# Patient Record
Sex: MALE | Race: WHITE | Employment: OTHER | URBAN - METROPOLITAN AREA
[De-identification: names, ages, dates, MRNs, and addresses within clinical notes are randomized per-mention and may not be internally consistent; named-entity substitution may affect disease eponyms.]

---

## 2017-09-26 PROBLEM — E66.9 OBESITY: Status: ACTIVE | Noted: 2017-09-26

## 2017-09-26 PROBLEM — I82.409 DEEP VENOUS THROMBOSIS (HCC): Status: ACTIVE | Noted: 2017-09-26

## 2017-09-26 PROBLEM — J84.112 IDIOPATHIC PULMONARY FIBROSIS (HCC): Status: ACTIVE | Noted: 2017-09-26

## 2017-09-26 PROBLEM — M10.9 GOUT: Status: ACTIVE | Noted: 2017-09-26

## 2017-11-08 PROBLEM — G47.33 OBSTRUCTIVE SLEEP APNEA: Status: ACTIVE | Noted: 2017-11-08

## 2017-11-08 PROBLEM — Z87.891 HISTORY OF TOBACCO USE: Status: ACTIVE | Noted: 2017-11-08

## 2017-11-22 LAB
LEFT VENTRICULAR EJECTION FRACTION MODE: NORMAL
LV EF: 49 %

## 2017-12-05 PROBLEM — I51.9 RIGHT VENTRICULAR DYSFUNCTION: Status: ACTIVE | Noted: 2017-12-05

## 2018-05-21 ENCOUNTER — HOSPITAL ENCOUNTER (OUTPATIENT)
Age: 63
Discharge: HOME | End: 2018-05-23
Payer: MEDICARE

## 2018-05-21 PROCEDURE — 36415 COLL VENOUS BLD VENIPUNCTURE: CPT

## 2018-05-21 PROCEDURE — 80076 HEPATIC FUNCTION PANEL: CPT

## 2018-05-22 LAB
ALBUMIN SERPL-MCNC: 3.6 G/DL (ref 3.5–5.2)
ALP BLD-CCNC: 134 U/L (ref 40–129)
ALT SERPL-CCNC: 22 U/L (ref 0–40)
AST SERPL-CCNC: 30 U/L (ref 0–39)
BILIRUB SERPL-MCNC: 1.2 MG/DL (ref 0–1.2)
BILIRUBIN DIRECT: 0.2 MG/DL (ref 0–0.3)
BILIRUBIN, INDIRECT: 1 MG/DL (ref 0–1)
TOTAL PROTEIN: 7.3 G/DL (ref 6.4–8.3)

## 2018-07-17 ENCOUNTER — OFFICE VISIT (OUTPATIENT)
Dept: CARDIOLOGY CLINIC | Age: 63
End: 2018-07-17
Payer: MEDICARE

## 2018-07-17 VITALS
HEIGHT: 70 IN | WEIGHT: 227.8 LBS | RESPIRATION RATE: 16 BRPM | HEART RATE: 92 BPM | SYSTOLIC BLOOD PRESSURE: 110 MMHG | DIASTOLIC BLOOD PRESSURE: 70 MMHG | OXYGEN SATURATION: 92 % | BODY MASS INDEX: 32.61 KG/M2

## 2018-07-17 DIAGNOSIS — J84.10 PULMONARY FIBROSIS (HCC): ICD-10-CM

## 2018-07-17 DIAGNOSIS — I51.9 RIGHT VENTRICULAR DYSFUNCTION: Primary | ICD-10-CM

## 2018-07-17 PROCEDURE — 93000 ELECTROCARDIOGRAM COMPLETE: CPT | Performed by: INTERNAL MEDICINE

## 2018-07-17 PROCEDURE — 99215 OFFICE O/P EST HI 40 MIN: CPT | Performed by: INTERNAL MEDICINE

## 2018-07-17 PROCEDURE — 94618 PULMONARY STRESS TESTING: CPT | Performed by: INTERNAL MEDICINE

## 2018-07-17 RX ORDER — DULOXETIN HYDROCHLORIDE 30 MG/1
60 CAPSULE, DELAYED RELEASE ORAL DAILY
COMMUNITY

## 2018-07-17 NOTE — PATIENT INSTRUCTIONS
1. Right heart catheterization July 26, 2018    2. Take all your medications on the day of the right heart catheterization     3. 6 minute walk testing today    4. Labs during the right heart catheterization    5.  Return visit in 6 weeks

## 2018-07-17 NOTE — PROGRESS NOTES
as possible cause of dyspnea. Just recently diagnosed with VIDAL - gets CPAP tomorrow. He denies orthopnea, PND, abdominal distention or bloating, early satiety, anorexia/change in appetite, unintentional weight loss. He does not have lower extremity edema. He denies exertional lightheadedness. He denies palpitations, syncope or near syncope. Review of systems is negative for chest pain, pressure, discomfort. When ambulating on an incline, he denies leg claudication. History is negative for neurological symptoms including transient loss of vision. In particular he denies any SICCA symptoms, arthralgias, myalgias, esophageal issues, Raynaud's, skin lesions or change, digital ulcerations. Patient Active Problem List    Diagnosis Date Noted    Right ventricular dysfunction 12/05/2017     Priority: High     Dx 10/2017 on TTE. RV:LV is > 1.5, with apical RV and severe RV dysfunction, and systolic septal flattening consistent with elevated RVSP. CPET 11/2017 with RER 1.1, greatly elevated VE/VCO2, low peak VO2. Suspected that he hyperventilated resulting in RER of > 1.1, however no matter what, had elevated VE/VCO2. High suspicion of elevated PVR -> low RV stroke volume and low LV preload. Patient again refused RHC.  Idiopathic pulmonary fibrosis ([facility] [address] 09/26/2017     Priority: High     Dx ~ 2014.  Obstructive sleep apnea 11/08/2017     Priority: Medium     Dx 10/2017. Initiated CPAP 11/2017.  Obesity 09/26/2017     Priority: Medium    Gout 09/26/2017     Priority: Low    Deep venous thrombosis ([address] 09/26/2017     Priority: Low     Dx 1995. Was not attributed to anything. Was on oral anticoagulation/warfarin for 1 year only. 10/2017 VQ scan: low probability of pulmonary emboli.  History of tobacco use 11/08/2017      1 PPD x 40 years, quit in 2014             Past Medical History:   Diagnosis Date    Deep venous thrombosis ([address] 9/26/2017    Dx 1995. Was not attributed to anything. Was on oral anticoagulation for 1 year only.  Gout     History of tobacco use 11/8/2017     1 PPD x 40 years, quit in 2014    Idiopathic pulmonary fibrosis ([facility] [address] 9/26/2017    Interstitial lung disease ([facility] [address]     Kidney stones     Obesity 9/26/2017    Obstructive sleep apnea 11/8/2017    Dx 10/2017. Initiated CPAP 11/2017.      Sleep apnea     Tobacco abuse          Past Surgical History:   Procedure Laterality Date    TONSILLECTOMY      VASECTOMY           No Known Allergies        Outpatient Prescriptions Marked as Taking for the 7/17/18 encounter (Office Visit) with Marin Barrow MD   Medication Sig Dispense Refill    tiotropium (Gerardine Rhody) 18 MCG inhalation capsule Inhale 18 mcg into the lungs daily      DULoxetine (CYMBALTA) 30 MG extended release capsule Take 30 mg by mouth daily      OXYGEN Inhale into the lungs       diphenhydrAMINE (BENADRYL) 25 MG tablet Take 25 mg by mouth nightly as needed for Itching       Sodium Chloride-Sodium Bicarb (NETI POT SINUS WASH NA) by Nasal route      Nintedanib Esylate (OFEV) 100 MG CAPS Take 150 mg/day by mouth 2 times daily      allopurinol (ZYLOPRIM) 300 MG tablet Take 300 mg by mouth daily       cetirizine (ZYRTEC) 10 MG tablet Take 10 mg by mouth daily      fluticasone (FLONASE) 50 MCG/ACT nasal spray 2 sprays by Nasal route daily as needed       vitamin B-12 (CYANOCOBALAMIN) 500 MCG tablet Take 500 mcg by mouth daily      Ascorbic Acid (VITAMIN C) 500 MG tablet Take 500 mg by mouth daily      Multiple Vitamins-Minerals (CENTRUM SILVER 50+MEN) TABS Take by mouth daily      Cholecalciferol (VITAMIN D-3 PO) Take by mouth daily      omeprazole (PRILOSEC) 20 MG delayed release capsule Take 1 capsule by mouth daily 60 capsule 3         PAH Guideline directed medical therapy:  PDE-5i: No  ERA:  No  Prostacyclin:  No  Oral No IV  No Subcutaneous  No   Prostacyclin receptor agonist: No  Soluble guanylate cyclase stimulator: No  Spironolactone: No  Digoxin: No            Review of Systems:   Cardiac: As per HPI  General: No fever, chills, rigors  Pulmonary: As per HPI  HEENT: No visual disturbances, difficult swallowing  GI: No nausea, vomiting, abdominal pain  : No dysuria or hematuria  Endocrine: No thyroid disease or diabetes  Musculoskeletal: CAGLE x 4, no focal motor deficits  Skin: Intact, no rashes  Neuro/Psych: No headache or seizures        Weights: Wt Readings from Last 3 Encounters:   07/17/18 227 lb 12.8 oz (103.3 kg)   01/08/18 235 lb 12.8 oz (107 kg)   11/22/17 235 lb (106.6 kg)     Physical Examination:     /70   Pulse 92   Resp 16   Ht 5' 10\" (1.778 m)   Wt 227 lb 12.8 oz (103.3 kg) Comment: no home weights. SpO2 92% Comment: 2liters NC  BMI 32.69 kg/m²   Wt Readings from Last 3 Encounters:   07/17/18 227 lb 12.8 oz (103.3 kg)   01/08/18 235 lb 12.8 oz (107 kg)   11/22/17 235 lb (106.6 kg)     CONSTITUTIONAL: Alert and oriented times 3, no acute distress and cooperative to examination with proper mood and affect. SKIN: Skin color, texture, turgor normal. No rashes or lesions. LYMPH: no cervical nodes, no inguinal nodes  HEENT: Head is normocephalic, atraumatic. EOMI, PERRLA. NECK: Supple, symmetrical, trachea midline, no adenopathy, thyroid symmetric, not enlarged and no tenderness, skin normal. No JVD. CHEST/LUNGS: chest symmetric with normal A/P diameter, normal respiratory rate and rhythm, lungs clear to auscultation without wheezes, rales or rhonchi. No accessory muscle use. Scars None   CARDIOVASCULAR: Heart sounds are normal.  Regular rate and rhythm without murmur, gallop or rub. Normal S1 and S2. . Carotid and femoral pulses 2+/4 and equal bilaterally. Normal P2.  ABDOMEN: Normal shape. No and Laparoscopic scar(s) present. Normal bowel sounds. No bruits. soft, nondistended, no masses or organomegaly. no evidence of hernia. Percussion: Normal without hepatosplenomegally.  Tenderness: essentially ruled out signs of  HFpEF including elevated SHUN, LV mass index. Given age, long standing systemic hypertension, and obesity, and despite TTE, there is likely a component of left heart disease, however unsure of the degree it would contribute to [place] if present, as he has not had any hospitalizations for HFpEF, and history is completely negative for anything other than a possibility of HF ACC/AHA stage A or B. He has known obstructive sleep apnea and wears his CPAP intermittently. I encouraged him to comply with the CPAP, discussing all the risks of untreated VIDAL including HFpEF and PH. Since he started wearing the CPAP again he has noted some symptomatic improvement. He does have a history of DVT in the 1990s. He denies any recurrent incidence, and denies any history, signs or symptoms of pulmonary emboli. Hypercoag labs have not been done. VQ scan done last fall revealed low probability of PEs, and no chronic changes. Prior CPET done revealed abnormal functional capacity, and when he became hypoxic, began to hyperventilate which I think accounts for the RER of > 1.1 and not because he actually exercised to capacity. Thus abnormal VO2 I do not think was accurately represented. If represented, the low peak VO2 and abnormal VE/VCO2 may be due to elevated PVR. Likely all these will be repeated at transplant center. Nocturnal cough last year has resolved with addition of a PPI. Assessment:   1. Chronic hypoxemia, LTOT  2. Idiopathic pulmonary fibrosis  4. Obesity  5. Obstructive sleep apnea  6. History of deep venous thrombosis  7. History of gout  8. GERD         Plan:   1. Right heart catheterization July 26, 2018  2. Take all your medications on the day of the right heart catheterization   3. 6 minute walk testing today  4. Labs during the right heart catheterization      Cele Medina M.D.   Advanced Heart Failure and Pulmonary Hypertension  [facility] [id number]

## 2018-07-23 ENCOUNTER — HOSPITAL ENCOUNTER (OUTPATIENT)
Age: 63
Discharge: HOME | End: 2018-07-25
Payer: MEDICARE

## 2018-07-23 DIAGNOSIS — J84.10 PULMONARY FIBROSIS (HCC): ICD-10-CM

## 2018-07-23 DIAGNOSIS — I51.9 RIGHT VENTRICULAR DYSFUNCTION: ICD-10-CM

## 2018-07-23 LAB
ALBUMIN SERPL-MCNC: 3.7 G/DL (ref 3.5–5.2)
ALP BLD-CCNC: 166 U/L (ref 40–129)
ALT SERPL-CCNC: 22 U/L (ref 0–40)
ANION GAP SERPL CALCULATED.3IONS-SCNC: 16 MMOL/L (ref 7–16)
AST SERPL-CCNC: 30 U/L (ref 0–39)
BACTERIA: ABNORMAL /HPF
BASOPHILS ABSOLUTE: 0.05 E9/L (ref 0–0.2)
BASOPHILS RELATIVE PERCENT: 0.5 % (ref 0–2)
BILIRUB SERPL-MCNC: 1.6 MG/DL (ref 0–1.2)
BILIRUBIN URINE: NEGATIVE
BLOOD, URINE: NEGATIVE
BUN BLDV-MCNC: 17 MG/DL (ref 8–23)
CALCIUM SERPL-MCNC: 9.3 MG/DL (ref 8.6–10.2)
CHLORIDE BLD-SCNC: 102 MMOL/L (ref 98–107)
CHOLESTEROL, TOTAL: 161 MG/DL (ref 0–199)
CLARITY: CLEAR
CO2: 23 MMOL/L (ref 22–29)
COLOR: YELLOW
CREAT SERPL-MCNC: 1 MG/DL (ref 0.7–1.2)
EOSINOPHILS ABSOLUTE: 0.2 E9/L (ref 0.05–0.5)
EOSINOPHILS RELATIVE PERCENT: 1.8 % (ref 0–6)
GFR AFRICAN AMERICAN: >60
GFR NON-AFRICAN AMERICAN: >60 ML/MIN/1.73
GLUCOSE BLD-MCNC: 95 MG/DL (ref 74–109)
GLUCOSE URINE: NEGATIVE MG/DL
HBA1C MFR BLD: 6.3 % (ref 4–5.6)
HCT VFR BLD CALC: 58.3 % (ref 37–54)
HDLC SERPL-MCNC: 39 MG/DL
HEMOGLOBIN: 18.1 G/DL (ref 12.5–16.5)
IMMATURE GRANULOCYTES #: 0.04 E9/L
IMMATURE GRANULOCYTES %: 0.4 % (ref 0–5)
KETONES, URINE: NEGATIVE MG/DL
LDL CHOLESTEROL CALCULATED: 103 MG/DL (ref 0–99)
LEUKOCYTE ESTERASE, URINE: NEGATIVE
LYMPHOCYTES ABSOLUTE: 2.17 E9/L (ref 1.5–4)
LYMPHOCYTES RELATIVE PERCENT: 20 % (ref 20–42)
MCH RBC QN AUTO: 30.6 PG (ref 26–35)
MCHC RBC AUTO-ENTMCNC: 31 % (ref 32–34.5)
MCV RBC AUTO: 98.6 FL (ref 80–99.9)
MONOCYTES ABSOLUTE: 0.77 E9/L (ref 0.1–0.95)
MONOCYTES RELATIVE PERCENT: 7.1 % (ref 2–12)
NEUTROPHILS ABSOLUTE: 7.62 E9/L (ref 1.8–7.3)
NEUTROPHILS RELATIVE PERCENT: 70.2 % (ref 43–80)
NITRITE, URINE: NEGATIVE
PDW BLD-RTO: 16.6 FL (ref 11.5–15)
PH UA: 6 (ref 5–9)
PLATELET # BLD: 217 E9/L (ref 130–450)
PMV BLD AUTO: 11.4 FL (ref 7–12)
POTASSIUM SERPL-SCNC: 4.9 MMOL/L (ref 3.5–5)
PROSTATE SPECIFIC ANTIGEN: 2.77 NG/ML (ref 0–4)
PROTEIN UA: 30 MG/DL
RBC # BLD: 5.91 E12/L (ref 3.8–5.8)
RBC UA: ABNORMAL /HPF (ref 0–2)
SODIUM BLD-SCNC: 141 MMOL/L (ref 132–146)
SPECIFIC GRAVITY UA: 1.01 (ref 1–1.03)
TOTAL PROTEIN: 7.2 G/DL (ref 6.4–8.3)
TRIGL SERPL-MCNC: 95 MG/DL (ref 0–149)
UROBILINOGEN, URINE: 0.2 E.U./DL
VLDLC SERPL CALC-MCNC: 19 MG/DL
WBC # BLD: 10.9 E9/L (ref 4.5–11.5)
WBC UA: ABNORMAL /HPF (ref 0–5)

## 2018-07-23 PROCEDURE — 85025 COMPLETE CBC W/AUTO DIFF WBC: CPT

## 2018-07-23 PROCEDURE — G0103 PSA SCREENING: HCPCS

## 2018-07-23 PROCEDURE — 80053 COMPREHEN METABOLIC PANEL: CPT

## 2018-07-23 PROCEDURE — 36415 COLL VENOUS BLD VENIPUNCTURE: CPT

## 2018-07-23 PROCEDURE — 83036 HEMOGLOBIN GLYCOSYLATED A1C: CPT

## 2018-07-23 PROCEDURE — 81001 URINALYSIS AUTO W/SCOPE: CPT

## 2018-07-23 PROCEDURE — 82248 BILIRUBIN DIRECT: CPT

## 2018-07-23 PROCEDURE — 80061 LIPID PANEL: CPT

## 2018-07-24 LAB
BILIRUBIN DIRECT: 0.4 MG/DL (ref 0–0.3)
BILIRUBIN, INDIRECT: 1.2 MG/DL (ref 0–1)

## 2018-07-26 ENCOUNTER — HOSPITAL ENCOUNTER (OUTPATIENT)
Dept: CARDIAC CATH/INVASIVE PROCEDURES | Age: 63
Setting detail: OBSERVATION
Discharge: HOME | End: 2018-07-27
Attending: INTERNAL MEDICINE | Admitting: INTERNAL MEDICINE
Payer: MEDICARE

## 2018-07-26 DIAGNOSIS — R09.02 HYPOXIA: ICD-10-CM

## 2018-07-26 LAB
B.E.: 1.6 MMOL/L
COHB: 1.2 % (ref 0–1.5)
CRITICAL: ABNORMAL
DATE ANALYZED: ABNORMAL
DATE OF COLLECTION: ABNORMAL
HCO3: 29 MMOL/L
HHB: 41.3 %
LAB: ABNORMAL
Lab: 1106
METHB: 0.1 % (ref 0–1.5)
MODE: ABNORMAL
O2 CONTENT: 15 ML/DL
O2 SATURATION: 58.2 %
O2HB: 57.4 %
OPERATOR ID: ABNORMAL
PATIENT TEMP: 37 C
PCO2: 54.8 MMHG
PH BLOOD GAS: 7.34 (ref 7.3–7.42)
PO2: 33 MMHG
SOURCE, BLOOD GAS: ABNORMAL
THB: 18.7 G/DL (ref 11.5–16.5)
TIME ANALYZED: 1111

## 2018-07-26 PROCEDURE — 6370000000 HC RX 637 (ALT 250 FOR IP): Performed by: INTERNAL MEDICINE

## 2018-07-26 PROCEDURE — 93451 RIGHT HEART CATH: CPT | Performed by: INTERNAL MEDICINE

## 2018-07-26 PROCEDURE — C1751 CATH, INF, PER/CENT/MIDLINE: HCPCS

## 2018-07-26 PROCEDURE — C1894 INTRO/SHEATH, NON-LASER: HCPCS

## 2018-07-26 PROCEDURE — 6360000002 HC RX W HCPCS

## 2018-07-26 PROCEDURE — S0028 INJECTION, FAMOTIDINE, 20 MG: HCPCS | Performed by: INTERNAL MEDICINE

## 2018-07-26 PROCEDURE — 2709999900 HC NON-CHARGEABLE SUPPLY

## 2018-07-26 PROCEDURE — 2500000003 HC RX 250 WO HCPCS: Performed by: INTERNAL MEDICINE

## 2018-07-26 PROCEDURE — 2580000003 HC RX 258: Performed by: INTERNAL MEDICINE

## 2018-07-26 PROCEDURE — G0378 HOSPITAL OBSERVATION PER HR: HCPCS

## 2018-07-26 PROCEDURE — 82805 BLOOD GASES W/O2 SATURATION: CPT

## 2018-07-26 PROCEDURE — 2500000003 HC RX 250 WO HCPCS

## 2018-07-26 RX ORDER — BUMETANIDE 0.25 MG/ML
1 INJECTION, SOLUTION INTRAMUSCULAR; INTRAVENOUS DAILY
Status: DISCONTINUED | OUTPATIENT
Start: 2018-07-26 | End: 2018-07-27 | Stop reason: HOSPADM

## 2018-07-26 RX ORDER — ALLOPURINOL 300 MG/1
300 TABLET ORAL DAILY
Status: DISCONTINUED | OUTPATIENT
Start: 2018-07-26 | End: 2018-07-27 | Stop reason: HOSPADM

## 2018-07-26 RX ORDER — SODIUM CHLORIDE 0.9 % (FLUSH) 0.9 %
10 SYRINGE (ML) INJECTION PRN
Status: DISCONTINUED | OUTPATIENT
Start: 2018-07-26 | End: 2018-07-27 | Stop reason: HOSPADM

## 2018-07-26 RX ORDER — LANOLIN ALCOHOL/MO/W.PET/CERES
500 CREAM (GRAM) TOPICAL DAILY
Status: DISCONTINUED | OUTPATIENT
Start: 2018-07-26 | End: 2018-07-27 | Stop reason: HOSPADM

## 2018-07-26 RX ORDER — SODIUM CHLORIDE 0.9 % (FLUSH) 0.9 %
10 SYRINGE (ML) INJECTION EVERY 12 HOURS SCHEDULED
Status: DISCONTINUED | OUTPATIENT
Start: 2018-07-26 | End: 2018-07-27 | Stop reason: HOSPADM

## 2018-07-26 RX ORDER — POTASSIUM CHLORIDE 20 MEQ/1
40 TABLET, EXTENDED RELEASE ORAL PRN
Status: DISCONTINUED | OUTPATIENT
Start: 2018-07-26 | End: 2018-07-27 | Stop reason: HOSPADM

## 2018-07-26 RX ORDER — MAGNESIUM SULFATE 1 G/100ML
1 INJECTION INTRAVENOUS PRN
Status: DISCONTINUED | OUTPATIENT
Start: 2018-07-26 | End: 2018-07-27 | Stop reason: HOSPADM

## 2018-07-26 RX ORDER — POTASSIUM CHLORIDE 20MEQ/15ML
40 LIQUID (ML) ORAL PRN
Status: DISCONTINUED | OUTPATIENT
Start: 2018-07-26 | End: 2018-07-27 | Stop reason: HOSPADM

## 2018-07-26 RX ORDER — POTASSIUM CHLORIDE 7.45 MG/ML
10 INJECTION INTRAVENOUS PRN
Status: DISCONTINUED | OUTPATIENT
Start: 2018-07-26 | End: 2018-07-27 | Stop reason: HOSPADM

## 2018-07-26 RX ORDER — FLUTICASONE PROPIONATE 50 MCG
2 SPRAY, SUSPENSION (ML) NASAL DAILY PRN
Status: DISCONTINUED | OUTPATIENT
Start: 2018-07-26 | End: 2018-07-27 | Stop reason: HOSPADM

## 2018-07-26 RX ORDER — BUMETANIDE 0.25 MG/ML
2 INJECTION, SOLUTION INTRAMUSCULAR; INTRAVENOUS ONCE
Status: COMPLETED | OUTPATIENT
Start: 2018-07-26 | End: 2018-07-26

## 2018-07-26 RX ORDER — DIPHENHYDRAMINE HCL 25 MG
25 TABLET ORAL NIGHTLY PRN
Status: DISCONTINUED | OUTPATIENT
Start: 2018-07-26 | End: 2018-07-27 | Stop reason: HOSPADM

## 2018-07-26 RX ORDER — PANTOPRAZOLE SODIUM 40 MG/1
40 TABLET, DELAYED RELEASE ORAL
Status: DISCONTINUED | OUTPATIENT
Start: 2018-07-27 | End: 2018-07-27 | Stop reason: HOSPADM

## 2018-07-26 RX ORDER — ONDANSETRON 2 MG/ML
4 INJECTION INTRAMUSCULAR; INTRAVENOUS EVERY 6 HOURS PRN
Status: DISCONTINUED | OUTPATIENT
Start: 2018-07-26 | End: 2018-07-27 | Stop reason: HOSPADM

## 2018-07-26 RX ORDER — DULOXETIN HYDROCHLORIDE 30 MG/1
30 CAPSULE, DELAYED RELEASE ORAL DAILY
Status: DISCONTINUED | OUTPATIENT
Start: 2018-07-26 | End: 2018-07-27 | Stop reason: HOSPADM

## 2018-07-26 RX ORDER — SODIUM CHLORIDE 9 MG/ML
INJECTION, SOLUTION INTRAVENOUS ONCE
Status: COMPLETED | OUTPATIENT
Start: 2018-07-26 | End: 2018-07-26

## 2018-07-26 RX ORDER — CETIRIZINE HYDROCHLORIDE 10 MG/1
10 TABLET ORAL DAILY
Status: DISCONTINUED | OUTPATIENT
Start: 2018-07-26 | End: 2018-07-27 | Stop reason: HOSPADM

## 2018-07-26 RX ORDER — ASCORBIC ACID 500 MG
500 TABLET ORAL DAILY
Status: DISCONTINUED | OUTPATIENT
Start: 2018-07-26 | End: 2018-07-27 | Stop reason: HOSPADM

## 2018-07-26 RX ADMIN — ALLOPURINOL 300 MG: 300 TABLET ORAL at 21:00

## 2018-07-26 RX ADMIN — SODIUM CHLORIDE: 9 INJECTION, SOLUTION INTRAVENOUS at 07:14

## 2018-07-26 RX ADMIN — Medication 10 ML: at 21:00

## 2018-07-26 RX ADMIN — CYANOCOBALAMIN TAB 1000 MCG 500 MCG: 1000 TAB at 16:53

## 2018-07-26 RX ADMIN — BUMETANIDE 1 MG: 0.25 INJECTION INTRAMUSCULAR; INTRAVENOUS at 16:54

## 2018-07-26 RX ADMIN — BUMETANIDE 2 MG: 0.25 INJECTION INTRAMUSCULAR; INTRAVENOUS at 12:10

## 2018-07-26 RX ADMIN — Medication 500 MG: at 16:53

## 2018-07-26 RX ADMIN — FAMOTIDINE 20 MG: 10 INJECTION, SOLUTION INTRAVENOUS at 21:00

## 2018-07-26 RX ADMIN — Medication 10 ML: at 16:54

## 2018-07-26 ASSESSMENT — PAIN SCALES - GENERAL
PAINLEVEL_OUTOF10: 0

## 2018-07-26 NOTE — PROCEDURES
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 Low cardiac indices  8. Erythropoiesis, in the setting of chronic hypoxemia (no other eval done yet to date)      Recommendations:   1. Referral to lung transplant center for evaluation, and possible enrollment into [facility] clinical trial for WHO group 3  2. Bumetanide 2 mg IV x 1 now  3. Add on erythropoietin levels to labs. MARYCARMEN-2 if abnormal only. Chela Askew M.D.   Advanced Heart Failure and Pulmonary Hypertension  [place] [phone]

## 2018-07-27 VITALS
RESPIRATION RATE: 18 BRPM | SYSTOLIC BLOOD PRESSURE: 123 MMHG | BODY MASS INDEX: 31.12 KG/M2 | DIASTOLIC BLOOD PRESSURE: 84 MMHG | HEART RATE: 97 BPM | OXYGEN SATURATION: 93 % | WEIGHT: 217.4 LBS | HEIGHT: 70 IN | TEMPERATURE: 97.8 F

## 2018-07-27 LAB
ANION GAP SERPL CALCULATED.3IONS-SCNC: 12 MMOL/L (ref 7–16)
BASOPHILS ABSOLUTE: 0.07 E9/L (ref 0–0.2)
BASOPHILS RELATIVE PERCENT: 0.6 % (ref 0–2)
BUN BLDV-MCNC: 20 MG/DL (ref 8–23)
CALCIUM SERPL-MCNC: 9.6 MG/DL (ref 8.6–10.2)
CHLORIDE BLD-SCNC: 96 MMOL/L (ref 98–107)
CO2: 30 MMOL/L (ref 22–29)
CREAT SERPL-MCNC: 1.2 MG/DL (ref 0.7–1.2)
EOSINOPHILS ABSOLUTE: 0.23 E9/L (ref 0.05–0.5)
EOSINOPHILS RELATIVE PERCENT: 1.9 % (ref 0–6)
FERRITIN: 96 NG/ML
GFR AFRICAN AMERICAN: >60
GFR NON-AFRICAN AMERICAN: >60 ML/MIN/1.73
GLUCOSE BLD-MCNC: 101 MG/DL (ref 74–109)
HCT VFR BLD CALC: 56.1 % (ref 37–54)
HEMOGLOBIN: 19 G/DL (ref 12.5–16.5)
IMMATURE GRANULOCYTES #: 0.05 E9/L
IMMATURE GRANULOCYTES %: 0.4 % (ref 0–5)
IRON SATURATION: 29 % (ref 20–55)
IRON: 87 MCG/DL (ref 59–158)
LYMPHOCYTES ABSOLUTE: 2.09 E9/L (ref 1.5–4)
LYMPHOCYTES RELATIVE PERCENT: 17.2 % (ref 20–42)
MAGNESIUM: 2 MG/DL (ref 1.6–2.6)
MCH RBC QN AUTO: 31.5 PG (ref 26–35)
MCHC RBC AUTO-ENTMCNC: 33.9 % (ref 32–34.5)
MCV RBC AUTO: 92.9 FL (ref 80–99.9)
MONOCYTES ABSOLUTE: 0.89 E9/L (ref 0.1–0.95)
MONOCYTES RELATIVE PERCENT: 7.3 % (ref 2–12)
NEUTROPHILS ABSOLUTE: 8.83 E9/L (ref 1.8–7.3)
NEUTROPHILS RELATIVE PERCENT: 72.6 % (ref 43–80)
PDW BLD-RTO: 15.4 FL (ref 11.5–15)
PLATELET # BLD: 199 E9/L (ref 130–450)
PMV BLD AUTO: 11 FL (ref 7–12)
POTASSIUM SERPL-SCNC: 4.6 MMOL/L (ref 3.5–5)
PRO-BNP: 1608 PG/ML (ref 0–125)
RBC # BLD: 6.04 E12/L (ref 3.8–5.8)
SODIUM BLD-SCNC: 138 MMOL/L (ref 132–146)
TOTAL IRON BINDING CAPACITY: 301 MCG/DL (ref 250–450)
TSH SERPL DL<=0.05 MIU/L-ACNC: 1.76 UIU/ML (ref 0.27–4.2)
WBC # BLD: 12.2 E9/L (ref 4.5–11.5)

## 2018-07-27 PROCEDURE — 83880 ASSAY OF NATRIURETIC PEPTIDE: CPT

## 2018-07-27 PROCEDURE — S0028 INJECTION, FAMOTIDINE, 20 MG: HCPCS | Performed by: INTERNAL MEDICINE

## 2018-07-27 PROCEDURE — 99220 PR INITIAL OBSERVATION CARE/DAY 70 MINUTES: CPT | Performed by: INTERNAL MEDICINE

## 2018-07-27 PROCEDURE — 96374 THER/PROPH/DIAG INJ IV PUSH: CPT

## 2018-07-27 PROCEDURE — 2580000003 HC RX 258: Performed by: INTERNAL MEDICINE

## 2018-07-27 PROCEDURE — G0378 HOSPITAL OBSERVATION PER HR: HCPCS

## 2018-07-27 PROCEDURE — 80048 BASIC METABOLIC PNL TOTAL CA: CPT

## 2018-07-27 PROCEDURE — 96375 TX/PRO/DX INJ NEW DRUG ADDON: CPT

## 2018-07-27 PROCEDURE — 82728 ASSAY OF FERRITIN: CPT

## 2018-07-27 PROCEDURE — 83540 ASSAY OF IRON: CPT

## 2018-07-27 PROCEDURE — 83735 ASSAY OF MAGNESIUM: CPT

## 2018-07-27 PROCEDURE — 84443 ASSAY THYROID STIM HORMONE: CPT

## 2018-07-27 PROCEDURE — 85025 COMPLETE CBC W/AUTO DIFF WBC: CPT

## 2018-07-27 PROCEDURE — 2500000003 HC RX 250 WO HCPCS: Performed by: INTERNAL MEDICINE

## 2018-07-27 PROCEDURE — 36415 COLL VENOUS BLD VENIPUNCTURE: CPT

## 2018-07-27 PROCEDURE — 6370000000 HC RX 637 (ALT 250 FOR IP): Performed by: INTERNAL MEDICINE

## 2018-07-27 PROCEDURE — 2700000000 HC OXYGEN THERAPY PER DAY

## 2018-07-27 PROCEDURE — 83550 IRON BINDING TEST: CPT

## 2018-07-27 RX ADMIN — Medication 500 MG: at 10:21

## 2018-07-27 RX ADMIN — PANTOPRAZOLE SODIUM 40 MG: 40 TABLET, DELAYED RELEASE ORAL at 07:25

## 2018-07-27 RX ADMIN — FAMOTIDINE 20 MG: 10 INJECTION, SOLUTION INTRAVENOUS at 10:22

## 2018-07-27 RX ADMIN — TIOTROPIUM BROMIDE 18 MCG: 18 CAPSULE ORAL; RESPIRATORY (INHALATION) at 10:21

## 2018-07-27 RX ADMIN — CETIRIZINE HYDROCHLORIDE 10 MG: 10 TABLET, FILM COATED ORAL at 10:21

## 2018-07-27 RX ADMIN — DULOXETINE HYDROCHLORIDE 30 MG: 30 CAPSULE, DELAYED RELEASE ORAL at 10:21

## 2018-07-27 RX ADMIN — BUMETANIDE 1 MG: 0.25 INJECTION INTRAMUSCULAR; INTRAVENOUS at 10:22

## 2018-07-27 RX ADMIN — Medication 10 ML: at 10:22

## 2018-07-27 RX ADMIN — CYANOCOBALAMIN TAB 1000 MCG 500 MCG: 1000 TAB at 10:21

## 2018-07-27 ASSESSMENT — PAIN SCALES - GENERAL
PAINLEVEL_OUTOF10: 0

## 2018-07-27 NOTE — PLAN OF CARE
Problem: Airway Clearance - Ineffective:  Goal: Ability to maintain a clear airway will improve  Ability to maintain a clear airway will improve  Outcome: Met This Shift

## 2018-08-02 NOTE — H&P
Advanced Heart Failure and Pulmonary Hypertension Clinic  Follow Up Visit        Reason for Visit: pulmonary hypertension       Referring Physician: Genna Spring M.D. Primary Cardiologist: Chela Askew M.D. History of Present Illness:   Mr. Yfn Montenegro is a pleasant 57year old with history of long standing tobacco use, obesity, recently diagnosed obstructive sleep apnea, who long standing pulmonary fibrosis, chronic hypoxemia with LTOT (no changes recently) initially referred to me 2017 for further investigation of [place] and possible referral for lung transplantation evaluation. His wife was especially against any invasive hemodynamics, since her own father had  in the operating room when undergoing CABG. No matter what I said, could not convince her and so patient was enthusiastic about undergoing RHC as well, and additionally deferred any referral to a quaternary center for lung transplant evaluation. 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 Since I saw him early January, he denies recurrent hospitalization or ED visits or urgent care visits. Cannot recall any prolonged illness other than mild viral URI with prolonged cough which has since resolved. He has ongoing limited functional capacity but nothing has changed, and he remains on the same amount of supplemental O2. He denies overt dyspnea with exertion, shortness of breath, or decline in overall functional capacity recently though in the past 1 year he feels that his activity level has decreased.  This was felt to be out of proportion to whatever CT imaging has revealed, prompting referral for further investigation of pulmonary hypertension Deep venous thrombosis ([facility] [address] 9/26/2017    Dx 1995. Was not attributed to anything. Was on oral anticoagulation for 1 year only.  Gout     History of tobacco use 11/8/2017     1 PPD x 40 years, quit in 2014    Hypertension     Idiopathic pulmonary fibrosis ([facility] [address] 9/26/2017    Interstitial lung disease ([facility] [address]     Kidney stones     Obesity 9/26/2017    Obstructive sleep apnea 11/8/2017    Dx 10/2017. Initiated CPAP 11/2017.      Sleep apnea     Tobacco abuse          Past Surgical History:   Procedure Laterality Date    CARDIAC CATHETERIZATION  07/26/2018    Dr. Hope Mast           No Known Allergies        Outpatient Prescriptions Marked as Taking for the 7/26/18 encounter [facility] Encounter) with [facility]   Medication Sig Dispense Refill    tiotropium (SPIRIVA HANDIHALER) 18 MCG inhalation capsule Inhale 18 mcg into the lungs daily      DULoxetine (CYMBALTA) 30 MG extended release capsule Take 30 mg by mouth daily      OXYGEN Inhale into the lungs       CPAP Machine MISC by Does not apply route      diphenhydrAMINE (BENADRYL) 25 MG tablet Take 25 mg by mouth nightly as needed for Itching       Sodium Chloride-Sodium Bicarb (NETI POT SINUS WASH NA) by Nasal route      Nintedanib Esylate (OFEV) 100 MG CAPS Take 150 mg/day by mouth 2 times daily      allopurinol (ZYLOPRIM) 300 MG tablet Take 300 mg by mouth daily       cetirizine (ZYRTEC) 10 MG tablet Take 10 mg by mouth daily      fluticasone (FLONASE) 50 MCG/ACT nasal spray 2 sprays by Nasal route daily as needed       vitamin B-12 (CYANOCOBALAMIN) 500 MCG tablet Take 500 mcg by mouth daily      Ascorbic Acid (VITAMIN C) 500 MG tablet Take 500 mg by mouth daily      Multiple Vitamins-Minerals (CENTRUM SILVER 50+MEN) TABS Take by mouth daily      Cholecalciferol (VITAMIN D-3 PO) Take by mouth daily      omeprazole (PRILOSEC) 20 MG delayed release capsule Take 1 capsule by mouth daily 60 capsule 3         PAH Guideline directed medical therapy:  PDE-5i: No  ERA:  No  Prostacyclin:  No  Oral No IV  No Subcutaneous  No   Prostacyclin receptor agonist: No  Soluble guanylate cyclase stimulator: No  Spironolactone: No  Digoxin: No            Review of Systems:   Cardiac: As per HPI  General: No fever, chills, rigors  Pulmonary: As per HPI  HEENT: No visual disturbances, difficult swallowing  GI: No nausea, vomiting, abdominal pain  : No dysuria or hematuria  Endocrine: No thyroid disease or diabetes  Musculoskeletal: CAGLE x 4, no focal motor deficits  Skin: Intact, no rashes  Neuro/Psych: No headache or seizures        Weights: Wt Readings from Last 3 Encounters:   07/27/18 217 lb 6.4 oz (98.6 kg)   07/17/18 227 lb 12.8 oz (103.3 kg)   01/08/18 235 lb 12.8 oz (107 kg)     Physical Examination:     /84   Pulse 97   Temp 97.8 °F (36.6 °C) (Temporal)   Resp 18   Ht 5' 10\" (1.778 m)   Wt 217 lb 6.4 oz (98.6 kg)   SpO2 93%   BMI 31.19 kg/m²   Wt Readings from Last 3 Encounters:   07/27/18 217 lb 6.4 oz (98.6 kg)   07/17/18 227 lb 12.8 oz (103.3 kg)   01/08/18 235 lb 12.8 oz (107 kg)     CONSTITUTIONAL: Alert and oriented times 3, no acute distress and cooperative to examination with proper mood and affect. SKIN: Skin color, texture, turgor normal. No rashes or lesions. LYMPH: no cervical nodes, no inguinal nodes  HEENT: Head is normocephalic, atraumatic. EOMI, PERRLA. NECK: Supple, symmetrical, trachea midline, no adenopathy, thyroid symmetric, not enlarged and no tenderness, skin normal. No JVD. CHEST/LUNGS: chest symmetric with normal A/P diameter, normal respiratory rate and rhythm, lungs clear to auscultation without wheezes, rales or rhonchi. No accessory muscle use. Scars None   CARDIOVASCULAR: Heart sounds are normal.  Regular rate and rhythm without murmur, gallop or rub. Normal S1 and S2. . Carotid and femoral pulses 2+/4 and equal bilaterally.  Normal P2.  ABDOMEN: Normal shape. No and Laparoscopic scar(s) present. Normal bowel sounds. No bruits. soft, nondistended, no masses or organomegaly. no evidence of hernia. Percussion: Normal without hepatosplenomegally. Tenderness: absent. RECTAL: deferred, not clinically indicated  NEUROLOGIC: There are no focalizing motor or sensory deficits. CN II-XII are grossly intact. Placido Lucks EXTREMITIES: no cyanosis, no clubbing and no edema. All the following diagnostics were personally reviewed and interpreted by me. Lab Data:  No lab data      2017 CT chest Imagin/2017 PFTs -               6 minute walk test 2017: ambulated 1000 feet. Average functional capacity. Normal HR and BP at rest. Appropriate increase in HR and BP. Appropriate HR and BP recovery. Audrey score averaged about 2-3. Stopped due to shortness of breath. 6 minute walk test 2018: On 3 L/min nasal cannula, desaturations to 81% at peak, HR 80s-120s, BP 110s-140s/60s-70s, audrey score of 3. Ambulated 1220 feet. 6 minute walk test 2018: 4 L/min nasal cannula. 1100 feet. Normal HR and BP response, poor HRR. Audrey score 3 at test end. Poor functional capacity for age. Discussion:   Mr. Ruby Desai is a 57year old with several year history of dyspnea with exertion, dry cough, and slow functional decline especially in the past one year, who presents today for further investigation of possible pulmonary hypertension. He has previously chose not to pursue RHC, but today is willing to proceed and ultimately be referred for lung transplantation evaluation at the urging of his family. Since I last saw him, no signs of overt RHF, with only occasional dependent lower extremity edema. No signs of RV ischemia including syncope, and no dysrhythmias. He denies any change in chronic O2 requirements the past 2-3 years, and remains at 5 l/min. NO signs of LHF/HFpEF.       Pulmonary function studies have revealed a restrictive physiology as of catheterization   3. 6 minute walk testing today  4. Labs during the right heart catheterization      Avani Johnson M.D.   Advanced Heart Failure and Pulmonary Hypertension  [facility] [id number]

## 2018-08-03 ENCOUNTER — TELEPHONE (OUTPATIENT)
Dept: CARDIOLOGY CLINIC | Age: 63
End: 2018-08-03

## 2018-08-12 NOTE — DISCHARGE SUMMARY
Physician Discharge Summary     Patient ID:  Corinne Reyna  [id number]  24 y.o.  1955    Admit date: 7/26/2018    Discharge date and time: 7/27/2018  3:21 PM     Admitting Physician: Matty Murray MD     Discharge Physician: Matty Murray M.D. Admission Diagnoses:   hypoxia    Discharge Diagnoses:   Hypoxia  Acute on chronic hypoxic respiratory failure  Pulmonary arterial hypertension  Right heart failure  Right ventricular dysfunction      Admission Condition: fair    Discharged Condition: good    Indication for Admission: acute on chronic hypoxia    Hospital Course: Patient presented for outpatient elective RHC for further investigation of pulmonary hypertension in the setting of IPF/ILD, and this demonstrated severely elevated PVR, and when supine desaturated on chronic O2. Therefore admitted for IV diuresis and for further investigation. He was admitted observation. Decision made the next day to refer for expedited lung transplantation evaluation (potential heart but his Elisa was good), at [facility] in [facility], and he was agreeable. Appointment was made and patient was discharged to home on baseline O2 after IV diuretics. Consults: none    Significant Diagnostic Studies:      Treatments:      Hemodynamic Data:   RA (a/v/m): 18/14/14  RV (a/v/m): 90/12/20  PA (a/v/m): 90/49/63  PCWP (PAOP) (a/v/m): 17/20/16  PCWP (PAOP) (a/v/m): 18/21/17  PCWP (PAOP) (a/v/m): 18/22/18        Cuff blood pressure: 131/96/123        PA O2 saturation: 58.2 %  SA O2 saturation: 96 %  Hemoglobin: 18.1 g/dL  BSA: 2.19 m2       NATE THERMODILUTION   Stroke volume (mls) 32.63 29.7   Cardiac Output (l/min) 3.14 2.96   Cardiac Output Index (l/min/m2) 1.4 1.3      Calculations using [facility]):   Cardiac power output () = 0.851  Pulmonary artery pulsatility index (Elisa) = 2.9  DPG 33  TPG 47  PVR 14.9 = 1197  PVR Index 2685  SVR 2777  SVR Index 6228           Impression:  1.  Elevated

## 2018-09-11 ENCOUNTER — OFFICE VISIT (OUTPATIENT)
Dept: CARDIOLOGY CLINIC | Age: 63
End: 2018-09-11

## 2018-09-11 VITALS
HEART RATE: 80 BPM | OXYGEN SATURATION: 94 % | WEIGHT: 232 LBS | HEIGHT: 70 IN | DIASTOLIC BLOOD PRESSURE: 84 MMHG | SYSTOLIC BLOOD PRESSURE: 130 MMHG | BODY MASS INDEX: 33.21 KG/M2 | RESPIRATION RATE: 20 BRPM

## 2018-09-11 DIAGNOSIS — R06.02 SHORTNESS OF BREATH: Primary | ICD-10-CM

## 2018-09-11 RX ORDER — BUMETANIDE 1 MG/1
1 TABLET ORAL DAILY
Qty: 90 TABLET | Refills: 3 | Status: SHIPPED | OUTPATIENT
Start: 2018-09-11 | End: 2019-03-11 | Stop reason: SDUPTHER

## 2018-09-11 NOTE — PATIENT INSTRUCTIONS
1. I wrote for bumetanide 1 mg daily only. 2. Hold this until you are seen again on Thursday for the heart cath and discuss with Dr. Pk Bradshaw. 3. The other medications we talked about for the right ventricle include aldactone and digoxin. 4. Return visit in 6 months.

## 2018-10-10 ENCOUNTER — HOSPITAL ENCOUNTER (OUTPATIENT)
Age: 63
Discharge: HOME | End: 2018-10-12
Payer: MEDICARE

## 2018-10-10 LAB
ALBUMIN SERPL-MCNC: 3.6 G/DL (ref 3.5–5.2)
ALP BLD-CCNC: 143 U/L (ref 40–129)
ALT SERPL-CCNC: 16 U/L (ref 0–40)
ANION GAP SERPL CALCULATED.3IONS-SCNC: 11 MMOL/L (ref 7–16)
AST SERPL-CCNC: 24 U/L (ref 0–39)
BASOPHILS ABSOLUTE: 0.05 E9/L (ref 0–0.2)
BASOPHILS RELATIVE PERCENT: 0.6 % (ref 0–2)
BILIRUB SERPL-MCNC: 1 MG/DL (ref 0–1.2)
BUN BLDV-MCNC: 16 MG/DL (ref 8–23)
CALCIUM SERPL-MCNC: 9.2 MG/DL (ref 8.6–10.2)
CHLORIDE BLD-SCNC: 103 MMOL/L (ref 98–107)
CO2: 25 MMOL/L (ref 22–29)
CREAT SERPL-MCNC: 0.8 MG/DL (ref 0.7–1.2)
EOSINOPHILS ABSOLUTE: 0.22 E9/L (ref 0.05–0.5)
EOSINOPHILS RELATIVE PERCENT: 2.4 % (ref 0–6)
GFR AFRICAN AMERICAN: >60
GFR NON-AFRICAN AMERICAN: >60 ML/MIN/1.73
GLUCOSE BLD-MCNC: 108 MG/DL (ref 74–109)
HBA1C MFR BLD: 6.3 % (ref 4–5.6)
HCT VFR BLD CALC: 50.2 % (ref 37–54)
HEMOGLOBIN: 16.3 G/DL (ref 12.5–16.5)
IMMATURE GRANULOCYTES #: 0.05 E9/L
IMMATURE GRANULOCYTES %: 0.6 % (ref 0–5)
LYMPHOCYTES ABSOLUTE: 1.52 E9/L (ref 1.5–4)
LYMPHOCYTES RELATIVE PERCENT: 16.8 % (ref 20–42)
MCH RBC QN AUTO: 31.1 PG (ref 26–35)
MCHC RBC AUTO-ENTMCNC: 32.5 % (ref 32–34.5)
MCV RBC AUTO: 95.8 FL (ref 80–99.9)
MONOCYTES ABSOLUTE: 0.7 E9/L (ref 0.1–0.95)
MONOCYTES RELATIVE PERCENT: 7.7 % (ref 2–12)
NEUTROPHILS ABSOLUTE: 6.52 E9/L (ref 1.8–7.3)
NEUTROPHILS RELATIVE PERCENT: 71.9 % (ref 43–80)
PDW BLD-RTO: 15.3 FL (ref 11.5–15)
PLATELET # BLD: 225 E9/L (ref 130–450)
PMV BLD AUTO: 10.8 FL (ref 7–12)
POTASSIUM SERPL-SCNC: 4.4 MMOL/L (ref 3.5–5)
RBC # BLD: 5.24 E12/L (ref 3.8–5.8)
SODIUM BLD-SCNC: 139 MMOL/L (ref 132–146)
TOTAL PROTEIN: 7.2 G/DL (ref 6.4–8.3)
URIC ACID, SERUM: 5 MG/DL (ref 3.4–7)
WBC # BLD: 9.1 E9/L (ref 4.5–11.5)

## 2018-10-10 PROCEDURE — 36415 COLL VENOUS BLD VENIPUNCTURE: CPT

## 2018-10-10 PROCEDURE — 84550 ASSAY OF BLOOD/URIC ACID: CPT

## 2018-10-10 PROCEDURE — 80053 COMPREHEN METABOLIC PANEL: CPT

## 2018-10-10 PROCEDURE — 85025 COMPLETE CBC W/AUTO DIFF WBC: CPT

## 2018-10-10 PROCEDURE — 83036 HEMOGLOBIN GLYCOSYLATED A1C: CPT

## 2018-11-12 ENCOUNTER — TELEPHONE (OUTPATIENT)
Dept: CARDIOLOGY CLINIC | Age: 63
End: 2018-11-12

## 2018-11-12 NOTE — TELEPHONE ENCOUNTER
11/12/2018 8:50 AM spoke with Link Brocktwyla at [facility] [phone]    update from last office visit notes sent 9-6-2018. Work up with transplant team. Next appt at [address] [place] [address] is  11-27-18   Dr Zofia Estrella and transplant team. Direct # fo rDr Zofia Estrella office is: [phone]. And fax [phone] (home)  James Lowe 1955 , left voice message for Mr Fredy Hutson to call office with update on how he is feeling and how work up is going at [facility]. Pending call back.

## 2018-12-04 ENCOUNTER — HOSPITAL ENCOUNTER (OUTPATIENT)
Age: 63
Discharge: HOME | End: 2018-12-06
Payer: MEDICARE

## 2018-12-04 LAB
ALBUMIN SERPL-MCNC: 3.8 G/DL (ref 3.5–5.2)
ALP BLD-CCNC: 130 U/L (ref 40–129)
ALT SERPL-CCNC: 13 U/L (ref 0–40)
ANION GAP SERPL CALCULATED.3IONS-SCNC: 13 MMOL/L (ref 7–16)
AST SERPL-CCNC: 22 U/L (ref 0–39)
BASOPHILS ABSOLUTE: 0.05 E9/L (ref 0–0.2)
BASOPHILS RELATIVE PERCENT: 0.5 % (ref 0–2)
BILIRUB SERPL-MCNC: 0.6 MG/DL (ref 0–1.2)
BUN BLDV-MCNC: 20 MG/DL (ref 8–23)
CALCIUM SERPL-MCNC: 9.5 MG/DL (ref 8.6–10.2)
CHLORIDE BLD-SCNC: 102 MMOL/L (ref 98–107)
CHOLESTEROL, TOTAL: 153 MG/DL (ref 0–199)
CO2: 25 MMOL/L (ref 22–29)
CREAT SERPL-MCNC: 0.9 MG/DL (ref 0.7–1.2)
EOSINOPHILS ABSOLUTE: 0.34 E9/L (ref 0.05–0.5)
EOSINOPHILS RELATIVE PERCENT: 3.6 % (ref 0–6)
GFR AFRICAN AMERICAN: >60
GFR NON-AFRICAN AMERICAN: >60 ML/MIN/1.73
GLUCOSE BLD-MCNC: 105 MG/DL (ref 74–99)
HBA1C MFR BLD: 5.7 % (ref 4–5.6)
HCT VFR BLD CALC: 51.8 % (ref 37–54)
HDLC SERPL-MCNC: 40 MG/DL
HEMOGLOBIN: 16.1 G/DL (ref 12.5–16.5)
IMMATURE GRANULOCYTES #: 0.03 E9/L
IMMATURE GRANULOCYTES %: 0.3 % (ref 0–5)
LDL CHOLESTEROL CALCULATED: 95 MG/DL (ref 0–99)
LYMPHOCYTES ABSOLUTE: 1.29 E9/L (ref 1.5–4)
LYMPHOCYTES RELATIVE PERCENT: 13.8 % (ref 20–42)
MCH RBC QN AUTO: 31.1 PG (ref 26–35)
MCHC RBC AUTO-ENTMCNC: 31.1 % (ref 32–34.5)
MCV RBC AUTO: 100.2 FL (ref 80–99.9)
MONOCYTES ABSOLUTE: 0.82 E9/L (ref 0.1–0.95)
MONOCYTES RELATIVE PERCENT: 8.8 % (ref 2–12)
NEUTROPHILS ABSOLUTE: 6.81 E9/L (ref 1.8–7.3)
NEUTROPHILS RELATIVE PERCENT: 73 % (ref 43–80)
PDW BLD-RTO: 14.6 FL (ref 11.5–15)
PLATELET # BLD: 231 E9/L (ref 130–450)
PMV BLD AUTO: 11 FL (ref 7–12)
POTASSIUM SERPL-SCNC: 4.4 MMOL/L (ref 3.5–5)
RBC # BLD: 5.17 E12/L (ref 3.8–5.8)
SODIUM BLD-SCNC: 140 MMOL/L (ref 132–146)
TOTAL PROTEIN: 7.2 G/DL (ref 6.4–8.3)
TRIGL SERPL-MCNC: 88 MG/DL (ref 0–149)
VLDLC SERPL CALC-MCNC: 18 MG/DL
WBC # BLD: 9.3 E9/L (ref 4.5–11.5)

## 2018-12-04 PROCEDURE — 80053 COMPREHEN METABOLIC PANEL: CPT

## 2018-12-04 PROCEDURE — 83036 HEMOGLOBIN GLYCOSYLATED A1C: CPT

## 2018-12-04 PROCEDURE — 36415 COLL VENOUS BLD VENIPUNCTURE: CPT

## 2018-12-04 PROCEDURE — 85025 COMPLETE CBC W/AUTO DIFF WBC: CPT

## 2018-12-04 PROCEDURE — 80061 LIPID PANEL: CPT

## 2018-12-14 ENCOUNTER — HOSPITAL ENCOUNTER (OUTPATIENT)
Age: 63
Discharge: HOME | End: 2018-12-16
Payer: MEDICARE

## 2018-12-14 LAB
ALBUMIN SERPL-MCNC: 3.7 G/DL (ref 3.5–5.2)
ALP BLD-CCNC: 130 U/L (ref 40–129)
ALT SERPL-CCNC: 15 U/L (ref 0–40)
ANION GAP SERPL CALCULATED.3IONS-SCNC: 14 MMOL/L (ref 7–16)
AST SERPL-CCNC: 26 U/L (ref 0–39)
BASOPHILS ABSOLUTE: 0.04 E9/L (ref 0–0.2)
BASOPHILS RELATIVE PERCENT: 0.5 % (ref 0–2)
BILIRUB SERPL-MCNC: 0.8 MG/DL (ref 0–1.2)
BUN BLDV-MCNC: 19 MG/DL (ref 8–23)
CALCIUM SERPL-MCNC: 9.4 MG/DL (ref 8.6–10.2)
CHLORIDE BLD-SCNC: 101 MMOL/L (ref 98–107)
CO2: 24 MMOL/L (ref 22–29)
CREAT SERPL-MCNC: 0.9 MG/DL (ref 0.7–1.2)
EOSINOPHILS ABSOLUTE: 0.3 E9/L (ref 0.05–0.5)
EOSINOPHILS RELATIVE PERCENT: 3.4 % (ref 0–6)
GFR AFRICAN AMERICAN: >60
GFR NON-AFRICAN AMERICAN: >60 ML/MIN/1.73
GLUCOSE BLD-MCNC: 95 MG/DL (ref 74–99)
HCT VFR BLD CALC: 52.8 % (ref 37–54)
HEMOGLOBIN: 16.9 G/DL (ref 12.5–16.5)
IMMATURE GRANULOCYTES #: 0.03 E9/L
IMMATURE GRANULOCYTES %: 0.3 % (ref 0–5)
LYMPHOCYTES ABSOLUTE: 1.42 E9/L (ref 1.5–4)
LYMPHOCYTES RELATIVE PERCENT: 16.3 % (ref 20–42)
MCH RBC QN AUTO: 31.1 PG (ref 26–35)
MCHC RBC AUTO-ENTMCNC: 32 % (ref 32–34.5)
MCV RBC AUTO: 97.1 FL (ref 80–99.9)
MONOCYTES ABSOLUTE: 0.79 E9/L (ref 0.1–0.95)
MONOCYTES RELATIVE PERCENT: 9.1 % (ref 2–12)
NEUTROPHILS ABSOLUTE: 6.14 E9/L (ref 1.8–7.3)
NEUTROPHILS RELATIVE PERCENT: 70.4 % (ref 43–80)
PDW BLD-RTO: 14.3 FL (ref 11.5–15)
PLATELET # BLD: 239 E9/L (ref 130–450)
PMV BLD AUTO: 10.7 FL (ref 7–12)
POTASSIUM SERPL-SCNC: 4.8 MMOL/L (ref 3.5–5)
RBC # BLD: 5.44 E12/L (ref 3.8–5.8)
SODIUM BLD-SCNC: 139 MMOL/L (ref 132–146)
TOTAL PROTEIN: 7.3 G/DL (ref 6.4–8.3)
WBC # BLD: 8.7 E9/L (ref 4.5–11.5)

## 2018-12-14 PROCEDURE — 36415 COLL VENOUS BLD VENIPUNCTURE: CPT

## 2018-12-14 PROCEDURE — 85025 COMPLETE CBC W/AUTO DIFF WBC: CPT

## 2018-12-14 PROCEDURE — 80053 COMPREHEN METABOLIC PANEL: CPT

## 2019-01-07 ENCOUNTER — HOSPITAL ENCOUNTER (OUTPATIENT)
Age: 64
Discharge: HOME | End: 2019-01-09
Payer: MEDICARE

## 2019-01-07 PROCEDURE — 36415 COLL VENOUS BLD VENIPUNCTURE: CPT

## 2019-01-07 PROCEDURE — 80076 HEPATIC FUNCTION PANEL: CPT

## 2019-01-08 LAB
ALBUMIN SERPL-MCNC: 3.8 G/DL (ref 3.5–5.2)
ALP BLD-CCNC: 134 U/L (ref 40–129)
ALT SERPL-CCNC: 17 U/L (ref 0–40)
AST SERPL-CCNC: 28 U/L (ref 0–39)
BILIRUB SERPL-MCNC: 0.7 MG/DL (ref 0–1.2)
BILIRUBIN DIRECT: 0.2 MG/DL (ref 0–0.3)
BILIRUBIN, INDIRECT: 0.5 MG/DL (ref 0–1)
TOTAL PROTEIN: 7.6 G/DL (ref 6.4–8.3)

## 2019-02-04 ENCOUNTER — HOSPITAL ENCOUNTER (OUTPATIENT)
Age: 64
Discharge: HOME | End: 2019-02-04
Payer: MEDICARE

## 2019-02-04 PROCEDURE — 87070 CULTURE OTHR SPECIMN AEROBIC: CPT

## 2019-02-04 PROCEDURE — 87015 SPECIMEN INFECT AGNT CONCNTJ: CPT

## 2019-02-04 PROCEDURE — 87205 SMEAR GRAM STAIN: CPT

## 2019-02-04 PROCEDURE — 87116 MYCOBACTERIA CULTURE: CPT

## 2019-02-04 PROCEDURE — 87102 FUNGUS ISOLATION CULTURE: CPT

## 2019-02-04 PROCEDURE — 87206 SMEAR FLUORESCENT/ACID STAI: CPT

## 2019-02-06 LAB
CULTURE, RESPIRATORY: NORMAL
SMEAR, RESPIRATORY: NORMAL

## 2019-03-11 ENCOUNTER — OFFICE VISIT (OUTPATIENT)
Dept: CARDIOLOGY CLINIC | Age: 64
End: 2019-03-11
Payer: MEDICARE

## 2019-03-11 VITALS
DIASTOLIC BLOOD PRESSURE: 46 MMHG | SYSTOLIC BLOOD PRESSURE: 98 MMHG | RESPIRATION RATE: 18 BRPM | BODY MASS INDEX: 33.41 KG/M2 | WEIGHT: 233.4 LBS | HEIGHT: 70 IN | OXYGEN SATURATION: 92 % | HEART RATE: 78 BPM | TEMPERATURE: 98.1 F

## 2019-03-11 DIAGNOSIS — J84.112 IPF (IDIOPATHIC PULMONARY FIBROSIS) (HCC): ICD-10-CM

## 2019-03-11 DIAGNOSIS — I27.20 PULMONARY HYPERTENSION (HCC): Primary | ICD-10-CM

## 2019-03-11 DIAGNOSIS — R06.02 SHORTNESS OF BREATH: ICD-10-CM

## 2019-03-11 DIAGNOSIS — I27.21 PAH (PULMONARY ARTERY HYPERTENSION) (HCC): Primary | ICD-10-CM

## 2019-03-11 LAB
FUNGUS (MYCOLOGY) CULTURE: NORMAL
FUNGUS STAIN: NORMAL

## 2019-03-11 PROCEDURE — 93000 ELECTROCARDIOGRAM COMPLETE: CPT | Performed by: INTERNAL MEDICINE

## 2019-03-11 PROCEDURE — 99215 OFFICE O/P EST HI 40 MIN: CPT | Performed by: INTERNAL MEDICINE

## 2019-03-11 RX ORDER — BUMETANIDE 1 MG/1
1 TABLET ORAL DAILY
Qty: 90 TABLET | Refills: 3 | Status: SHIPPED
Start: 2019-03-11 | End: 2020-03-02

## 2019-03-11 RX ORDER — ISONIAZID 300 MG/1
300 TABLET ORAL DAILY
COMMUNITY
End: 2020-09-20 | Stop reason: ALTCHOICE

## 2019-03-11 RX ORDER — PRAVASTATIN SODIUM 10 MG
10 TABLET ORAL DAILY
COMMUNITY
Start: 2019-01-08 | End: 2020-09-20 | Stop reason: ALTCHOICE

## 2019-03-11 RX ORDER — LANOLIN ALCOHOL/MO/W.PET/CERES
50 CREAM (GRAM) TOPICAL DAILY
COMMUNITY
End: 2020-09-20 | Stop reason: ALTCHOICE

## 2019-03-19 ENCOUNTER — TELEPHONE (OUTPATIENT)
Dept: CARDIOLOGY CLINIC | Age: 64
End: 2019-03-19

## 2019-03-26 LAB
AFB CULTURE (MYCOBACTERIA): NORMAL
AFB SMEAR: NORMAL

## 2019-04-11 ENCOUNTER — HOSPITAL ENCOUNTER (OUTPATIENT)
Age: 64
Discharge: HOME | End: 2019-04-13
Payer: MEDICARE

## 2019-04-11 PROCEDURE — 87329 GIARDIA AG IA: CPT

## 2019-04-11 PROCEDURE — 87324 CLOSTRIDIUM AG IA: CPT

## 2019-04-11 PROCEDURE — 87045 FECES CULTURE AEROBIC BACT: CPT

## 2019-04-11 PROCEDURE — 89055 LEUKOCYTE ASSESSMENT FECAL: CPT

## 2019-04-11 PROCEDURE — G0328 FECAL BLOOD SCRN IMMUNOASSAY: HCPCS

## 2019-04-12 LAB
C DIFFICILE TOXIN, EIA: NORMAL
GIARDIA ANTIGEN STOOL: NORMAL
OCCULT BLOOD SCREENING: NORMAL
WHITE BLOOD CELLS (WBC), STOOL: NORMAL

## 2019-04-14 LAB — CULTURE, STOOL: NORMAL

## 2019-07-29 ENCOUNTER — TELEPHONE (OUTPATIENT)
Dept: CARDIOLOGY CLINIC | Age: 64
End: 2019-07-29

## 2019-09-17 ENCOUNTER — HOSPITAL ENCOUNTER (OUTPATIENT)
Age: 64
Discharge: HOME | End: 2019-09-19
Payer: MEDICARE

## 2019-09-18 ENCOUNTER — HOSPITAL ENCOUNTER (OUTPATIENT)
Age: 64
Discharge: HOME | End: 2019-09-20
Payer: MEDICARE

## 2019-09-18 PROCEDURE — 87206 SMEAR FLUORESCENT/ACID STAI: CPT

## 2019-09-18 PROCEDURE — 87070 CULTURE OTHR SPECIMN AEROBIC: CPT

## 2019-09-20 LAB
CULTURE, RESPIRATORY: NORMAL
SMEAR, RESPIRATORY: NORMAL

## 2020-01-11 ENCOUNTER — HOSPITAL ENCOUNTER (OUTPATIENT)
Age: 65
Discharge: HOME | End: 2020-01-13
Payer: MEDICARE

## 2020-01-11 LAB
ALBUMIN SERPL-MCNC: 3.4 G/DL (ref 3.5–5.2)
ALP BLD-CCNC: 117 U/L (ref 40–129)
ALT SERPL-CCNC: 15 U/L (ref 0–40)
ANION GAP SERPL CALCULATED.3IONS-SCNC: 12 MMOL/L (ref 7–16)
AST SERPL-CCNC: 25 U/L (ref 0–39)
BASOPHILS ABSOLUTE: 0.03 E9/L (ref 0–0.2)
BASOPHILS RELATIVE PERCENT: 0.3 % (ref 0–2)
BILIRUB SERPL-MCNC: 0.6 MG/DL (ref 0–1.2)
BUN BLDV-MCNC: 19 MG/DL (ref 8–23)
CALCIUM SERPL-MCNC: 9.1 MG/DL (ref 8.6–10.2)
CHLORIDE BLD-SCNC: 102 MMOL/L (ref 98–107)
CHOLESTEROL, TOTAL: 125 MG/DL (ref 0–199)
CO2: 25 MMOL/L (ref 22–29)
CREAT SERPL-MCNC: 0.8 MG/DL (ref 0.7–1.2)
EOSINOPHILS ABSOLUTE: 0.26 E9/L (ref 0.05–0.5)
EOSINOPHILS RELATIVE PERCENT: 2.7 % (ref 0–6)
GFR AFRICAN AMERICAN: >60
GFR NON-AFRICAN AMERICAN: >60 ML/MIN/1.73
GLUCOSE BLD-MCNC: 108 MG/DL (ref 74–99)
HBA1C MFR BLD: 6 % (ref 4–5.6)
HCT VFR BLD CALC: 52.6 % (ref 37–54)
HDLC SERPL-MCNC: 40 MG/DL
HEMOGLOBIN: 15.9 G/DL (ref 12.5–16.5)
IMMATURE GRANULOCYTES #: 0.05 E9/L
IMMATURE GRANULOCYTES %: 0.5 % (ref 0–5)
LDL CHOLESTEROL CALCULATED: 63 MG/DL (ref 0–99)
LYMPHOCYTES ABSOLUTE: 1.47 E9/L (ref 1.5–4)
LYMPHOCYTES RELATIVE PERCENT: 15.5 % (ref 20–42)
MCH RBC QN AUTO: 28.8 PG (ref 26–35)
MCHC RBC AUTO-ENTMCNC: 30.2 % (ref 32–34.5)
MCV RBC AUTO: 95.3 FL (ref 80–99.9)
MONOCYTES ABSOLUTE: 0.67 E9/L (ref 0.1–0.95)
MONOCYTES RELATIVE PERCENT: 7.1 % (ref 2–12)
NEUTROPHILS ABSOLUTE: 6.98 E9/L (ref 1.8–7.3)
NEUTROPHILS RELATIVE PERCENT: 73.9 % (ref 43–80)
PDW BLD-RTO: 15.6 FL (ref 11.5–15)
PLATELET # BLD: 196 E9/L (ref 130–450)
PMV BLD AUTO: 11.4 FL (ref 7–12)
POTASSIUM SERPL-SCNC: 4.4 MMOL/L (ref 3.5–5)
RBC # BLD: 5.52 E12/L (ref 3.8–5.8)
SODIUM BLD-SCNC: 139 MMOL/L (ref 132–146)
TOTAL PROTEIN: 7 G/DL (ref 6.4–8.3)
TRIGL SERPL-MCNC: 110 MG/DL (ref 0–149)
URIC ACID, SERUM: 4.6 MG/DL (ref 3.4–7)
VLDLC SERPL CALC-MCNC: 22 MG/DL
WBC # BLD: 9.5 E9/L (ref 4.5–11.5)

## 2020-01-11 PROCEDURE — 85025 COMPLETE CBC W/AUTO DIFF WBC: CPT

## 2020-01-11 PROCEDURE — 80061 LIPID PANEL: CPT

## 2020-01-11 PROCEDURE — 83036 HEMOGLOBIN GLYCOSYLATED A1C: CPT

## 2020-01-11 PROCEDURE — 80053 COMPREHEN METABOLIC PANEL: CPT

## 2020-01-11 PROCEDURE — 36415 COLL VENOUS BLD VENIPUNCTURE: CPT

## 2020-01-11 PROCEDURE — 84550 ASSAY OF BLOOD/URIC ACID: CPT

## 2020-03-02 RX ORDER — BUMETANIDE 1 MG/1
TABLET ORAL
Qty: 90 TABLET | Refills: 4 | Status: SHIPPED
Start: 2020-03-02 | End: 2020-09-20 | Stop reason: ALTCHOICE

## 2020-03-02 NOTE — TELEPHONE ENCOUNTER
Faxed  Dr Abhay Lewis for most recent office notes, med list.  [phone]  Fax [phone]  Then called DR Abhay Lewis and spoke with  Jim Quach,  8:57 AM   Still listed on transplant listing. Verified bumex 1mg daily from DR Abhay Lewis office. Reordered per epic auto generated request via DR Norman Bennett.   Estefani Morales RN

## 2020-04-15 ENCOUNTER — TELEPHONE (OUTPATIENT)
Dept: CARDIOLOGY CLINIC | Age: 65
End: 2020-04-15

## 2020-08-17 ENCOUNTER — HOSPITAL ENCOUNTER (OUTPATIENT)
Age: 65
Discharge: HOME | End: 2020-08-19
Payer: MEDICARE

## 2020-08-17 LAB
ANION GAP SERPL CALCULATED.3IONS-SCNC: 15 MMOL/L (ref 7–16)
ANISOCYTOSIS: ABNORMAL
BASOPHILS ABSOLUTE: 0 E9/L (ref 0–0.2)
BASOPHILS RELATIVE PERCENT: 0.2 % (ref 0–2)
BUN BLDV-MCNC: 29 MG/DL (ref 8–23)
CALCIUM SERPL-MCNC: 9.9 MG/DL (ref 8.6–10.2)
CHLORIDE BLD-SCNC: 102 MMOL/L (ref 98–107)
CO2: 26 MMOL/L (ref 22–29)
CREAT SERPL-MCNC: 1 MG/DL (ref 0.7–1.2)
EOSINOPHILS ABSOLUTE: 0.08 E9/L (ref 0.05–0.5)
EOSINOPHILS RELATIVE PERCENT: 0.9 % (ref 0–6)
GFR AFRICAN AMERICAN: >60
GFR NON-AFRICAN AMERICAN: >60 ML/MIN/1.73
GLUCOSE BLD-MCNC: 165 MG/DL (ref 74–99)
HCT VFR BLD CALC: 33.5 % (ref 37–54)
HEMOGLOBIN: 9.7 G/DL (ref 12.5–16.5)
LYMPHOCYTES ABSOLUTE: 1.19 E9/L (ref 1.5–4)
LYMPHOCYTES RELATIVE PERCENT: 13.9 % (ref 20–42)
MCH RBC QN AUTO: 30.3 PG (ref 26–35)
MCHC RBC AUTO-ENTMCNC: 29 % (ref 32–34.5)
MCV RBC AUTO: 104.7 FL (ref 80–99.9)
MONOCYTES ABSOLUTE: 0.6 E9/L (ref 0.1–0.95)
MONOCYTES RELATIVE PERCENT: 7 % (ref 2–12)
NEUTROPHILS ABSOLUTE: 6.63 E9/L (ref 1.8–7.3)
NEUTROPHILS RELATIVE PERCENT: 78.3 % (ref 43–80)
NUCLEATED RED BLOOD CELLS: 2.6 /100 WBC
OVALOCYTES: ABNORMAL
PDW BLD-RTO: 17.1 FL (ref 11.5–15)
PLATELET # BLD: 364 E9/L (ref 130–450)
PMV BLD AUTO: 9.8 FL (ref 7–12)
POIKILOCYTES: ABNORMAL
POLYCHROMASIA: ABNORMAL
POTASSIUM SERPL-SCNC: 4.3 MMOL/L (ref 3.5–5)
RBC # BLD: 3.2 E12/L (ref 3.8–5.8)
SODIUM BLD-SCNC: 143 MMOL/L (ref 132–146)
WBC # BLD: 8.5 E9/L (ref 4.5–11.5)

## 2020-08-17 PROCEDURE — 80197 ASSAY OF TACROLIMUS: CPT

## 2020-08-17 PROCEDURE — 85025 COMPLETE CBC W/AUTO DIFF WBC: CPT

## 2020-08-17 PROCEDURE — 80048 BASIC METABOLIC PNL TOTAL CA: CPT

## 2020-08-19 LAB — TACROLIMUS BLOOD: 7.7 NG/ML

## 2020-08-24 ENCOUNTER — HOSPITAL ENCOUNTER (OUTPATIENT)
Age: 65
Discharge: HOME | End: 2020-08-26
Payer: MEDICARE

## 2020-08-24 LAB
ANION GAP SERPL CALCULATED.3IONS-SCNC: 19 MMOL/L (ref 7–16)
ANISOCYTOSIS: ABNORMAL
BASOPHILS ABSOLUTE: 0 E9/L (ref 0–0.2)
BASOPHILS RELATIVE PERCENT: 0.1 % (ref 0–2)
BUN BLDV-MCNC: 34 MG/DL (ref 8–23)
CALCIUM SERPL-MCNC: 9.6 MG/DL (ref 8.6–10.2)
CHLORIDE BLD-SCNC: 105 MMOL/L (ref 98–107)
CO2: 20 MMOL/L (ref 22–29)
CREAT SERPL-MCNC: 0.8 MG/DL (ref 0.7–1.2)
EOSINOPHILS ABSOLUTE: 0 E9/L (ref 0.05–0.5)
EOSINOPHILS RELATIVE PERCENT: 0.1 % (ref 0–6)
GFR AFRICAN AMERICAN: >60
GFR NON-AFRICAN AMERICAN: >60 ML/MIN/1.73
GLUCOSE BLD-MCNC: 85 MG/DL (ref 74–99)
HCT VFR BLD CALC: 34.5 % (ref 37–54)
HEMOGLOBIN: 9.8 G/DL (ref 12.5–16.5)
LYMPHOCYTES ABSOLUTE: 1.86 E9/L (ref 1.5–4)
LYMPHOCYTES RELATIVE PERCENT: 27 % (ref 20–42)
MCH RBC QN AUTO: 30.6 PG (ref 26–35)
MCHC RBC AUTO-ENTMCNC: 28.4 % (ref 32–34.5)
MCV RBC AUTO: 107.8 FL (ref 80–99.9)
METAMYELOCYTES RELATIVE PERCENT: 1.7 % (ref 0–1)
MONOCYTES ABSOLUTE: 0.34 E9/L (ref 0.1–0.95)
MONOCYTES RELATIVE PERCENT: 5.2 % (ref 2–12)
NEUTROPHILS ABSOLUTE: 4.69 E9/L (ref 1.8–7.3)
NEUTROPHILS RELATIVE PERCENT: 66.1 % (ref 43–80)
NUCLEATED RED BLOOD CELLS: 0.9 /100 WBC
OVALOCYTES: ABNORMAL
PDW BLD-RTO: 18.8 FL (ref 11.5–15)
PLATELET # BLD: 272 E9/L (ref 130–450)
PMV BLD AUTO: 9.9 FL (ref 7–12)
POIKILOCYTES: ABNORMAL
POLYCHROMASIA: ABNORMAL
POTASSIUM SERPL-SCNC: 4.3 MMOL/L (ref 3.5–5)
RBC # BLD: 3.2 E12/L (ref 3.8–5.8)
SODIUM BLD-SCNC: 144 MMOL/L (ref 132–146)
TEAR DROP CELLS: ABNORMAL
WBC # BLD: 6.9 E9/L (ref 4.5–11.5)

## 2020-08-24 PROCEDURE — 80048 BASIC METABOLIC PNL TOTAL CA: CPT

## 2020-08-24 PROCEDURE — 85025 COMPLETE CBC W/AUTO DIFF WBC: CPT

## 2020-08-31 ENCOUNTER — HOSPITAL ENCOUNTER (OUTPATIENT)
Age: 65
Discharge: HOME | End: 2020-09-02
Payer: MEDICARE

## 2020-08-31 LAB
ANION GAP SERPL CALCULATED.3IONS-SCNC: 15 MMOL/L (ref 7–16)
ANISOCYTOSIS: ABNORMAL
BASOPHILS ABSOLUTE: 0 E9/L (ref 0–0.2)
BASOPHILS RELATIVE PERCENT: 0 % (ref 0–2)
BUN BLDV-MCNC: 31 MG/DL (ref 8–23)
CALCIUM SERPL-MCNC: 9.9 MG/DL (ref 8.6–10.2)
CHLORIDE BLD-SCNC: 106 MMOL/L (ref 98–107)
CO2: 25 MMOL/L (ref 22–29)
CREAT SERPL-MCNC: 0.8 MG/DL (ref 0.7–1.2)
EOSINOPHILS ABSOLUTE: 0 E9/L (ref 0.05–0.5)
EOSINOPHILS RELATIVE PERCENT: 0 % (ref 0–6)
GFR AFRICAN AMERICAN: >60
GFR NON-AFRICAN AMERICAN: >60 ML/MIN/1.73
GLUCOSE BLD-MCNC: 29 MG/DL (ref 74–99)
HCT VFR BLD CALC: 39.2 % (ref 37–54)
HEMOGLOBIN: 10.8 G/DL (ref 12.5–16.5)
LYMPHOCYTES ABSOLUTE: 1.62 E9/L (ref 1.5–4)
LYMPHOCYTES RELATIVE PERCENT: 18 % (ref 20–42)
MCH RBC QN AUTO: 30.4 PG (ref 26–35)
MCHC RBC AUTO-ENTMCNC: 27.6 % (ref 32–34.5)
MCV RBC AUTO: 110.4 FL (ref 80–99.9)
METAMYELOCYTES RELATIVE PERCENT: 1 % (ref 0–1)
MONOCYTES ABSOLUTE: 0.99 E9/L (ref 0.1–0.95)
MONOCYTES RELATIVE PERCENT: 11 % (ref 2–12)
MYELOCYTE PERCENT: 1 % (ref 0–0)
NEUTROPHILS ABSOLUTE: 6.39 E9/L (ref 1.8–7.3)
NEUTROPHILS RELATIVE PERCENT: 69 % (ref 43–80)
NUCLEATED RED BLOOD CELLS: 1 /100 WBC
PDW BLD-RTO: 18.9 FL (ref 11.5–15)
PLATELET # BLD: 228 E9/L (ref 130–450)
PMV BLD AUTO: 10.2 FL (ref 7–12)
POIKILOCYTES: ABNORMAL
POLYCHROMASIA: ABNORMAL
POTASSIUM SERPL-SCNC: 4.3 MMOL/L (ref 3.5–5)
RBC # BLD: 3.55 E12/L (ref 3.8–5.8)
SODIUM BLD-SCNC: 146 MMOL/L (ref 132–146)
TEAR DROP CELLS: ABNORMAL
WBC # BLD: 9 E9/L (ref 4.5–11.5)

## 2020-08-31 PROCEDURE — 36415 COLL VENOUS BLD VENIPUNCTURE: CPT

## 2020-08-31 PROCEDURE — 80048 BASIC METABOLIC PNL TOTAL CA: CPT

## 2020-08-31 PROCEDURE — 85025 COMPLETE CBC W/AUTO DIFF WBC: CPT

## 2020-09-20 ENCOUNTER — HOSPITAL ENCOUNTER (INPATIENT)
Age: 65
LOS: 1 days | Discharge: ANOTHER HOSPITAL | DRG: 205 | End: 2020-09-20
Attending: EMERGENCY MEDICINE | Admitting: INTERNAL MEDICINE
Payer: MEDICARE

## 2020-09-20 ENCOUNTER — APPOINTMENT (OUTPATIENT)
Dept: GENERAL RADIOLOGY | Age: 65
DRG: 205 | End: 2020-09-20
Payer: MEDICARE

## 2020-09-20 ENCOUNTER — APPOINTMENT (OUTPATIENT)
Dept: CT IMAGING | Age: 65
DRG: 205 | End: 2020-09-20
Payer: MEDICARE

## 2020-09-20 VITALS
HEIGHT: 70 IN | WEIGHT: 207 LBS | DIASTOLIC BLOOD PRESSURE: 67 MMHG | OXYGEN SATURATION: 97 % | TEMPERATURE: 98.5 F | RESPIRATION RATE: 20 BRPM | SYSTOLIC BLOOD PRESSURE: 115 MMHG | HEART RATE: 106 BPM | BODY MASS INDEX: 29.63 KG/M2

## 2020-09-20 PROBLEM — J18.9 PNEUMONIA: Status: ACTIVE | Noted: 2020-09-20

## 2020-09-20 LAB
ALBUMIN SERPL-MCNC: 3.6 G/DL (ref 3.5–5.2)
ALP BLD-CCNC: 74 U/L (ref 40–129)
ALT SERPL-CCNC: 17 U/L (ref 0–40)
ANION GAP SERPL CALCULATED.3IONS-SCNC: 11 MMOL/L (ref 7–16)
APTT: 25.8 SEC (ref 24.5–35.1)
AST SERPL-CCNC: 23 U/L (ref 0–39)
BASOPHILS ABSOLUTE: 0 E9/L (ref 0–0.2)
BASOPHILS RELATIVE PERCENT: 0.3 % (ref 0–2)
BILIRUB SERPL-MCNC: 0.4 MG/DL (ref 0–1.2)
BUN BLDV-MCNC: 36 MG/DL (ref 8–23)
CALCIUM SERPL-MCNC: 9.8 MG/DL (ref 8.6–10.2)
CHLORIDE BLD-SCNC: 97 MMOL/L (ref 98–107)
CO2: 31 MMOL/L (ref 22–29)
CREAT SERPL-MCNC: 0.9 MG/DL (ref 0.7–1.2)
EKG ATRIAL RATE: 111 BPM
EKG P AXIS: 37 DEGREES
EKG P-R INTERVAL: 154 MS
EKG Q-T INTERVAL: 286 MS
EKG QRS DURATION: 90 MS
EKG QTC CALCULATION (BAZETT): 388 MS
EKG R AXIS: -16 DEGREES
EKG T AXIS: 50 DEGREES
EKG VENTRICULAR RATE: 111 BPM
EOSINOPHILS ABSOLUTE: 0 E9/L (ref 0.05–0.5)
EOSINOPHILS RELATIVE PERCENT: 0 % (ref 0–6)
GFR AFRICAN AMERICAN: >60
GFR NON-AFRICAN AMERICAN: >60 ML/MIN/1.73
GLUCOSE BLD-MCNC: 204 MG/DL (ref 74–99)
HCT VFR BLD CALC: 39.9 % (ref 37–54)
HEMOGLOBIN: 11.5 G/DL (ref 12.5–16.5)
INR BLD: 1
LACTIC ACID, SEPSIS: 1.1 MMOL/L (ref 0.5–1.9)
LYMPHOCYTES ABSOLUTE: 0.6 E9/L (ref 1.5–4)
LYMPHOCYTES RELATIVE PERCENT: 5.2 % (ref 20–42)
MCH RBC QN AUTO: 30.7 PG (ref 26–35)
MCHC RBC AUTO-ENTMCNC: 28.8 % (ref 32–34.5)
MCV RBC AUTO: 106.7 FL (ref 80–99.9)
METAMYELOCYTES RELATIVE PERCENT: 0.9 % (ref 0–1)
MONOCYTES ABSOLUTE: 0.83 E9/L (ref 0.1–0.95)
MONOCYTES RELATIVE PERCENT: 6.9 % (ref 2–12)
NEUTROPHILS ABSOLUTE: 10.47 E9/L (ref 1.8–7.3)
NEUTROPHILS RELATIVE PERCENT: 87.1 % (ref 43–80)
PDW BLD-RTO: 16.8 FL (ref 11.5–15)
PLATELET # BLD: 177 E9/L (ref 130–450)
PMV BLD AUTO: 10.3 FL (ref 7–12)
POTASSIUM REFLEX MAGNESIUM: 4.7 MMOL/L (ref 3.5–5)
PROTHROMBIN TIME: 10.9 SEC (ref 9.3–12.4)
RBC # BLD: 3.74 E12/L (ref 3.8–5.8)
SODIUM BLD-SCNC: 139 MMOL/L (ref 132–146)
TOTAL PROTEIN: 6.5 G/DL (ref 6.4–8.3)
TROPONIN: 0.04 NG/ML (ref 0–0.03)
WBC # BLD: 11.9 E9/L (ref 4.5–11.5)

## 2020-09-20 PROCEDURE — 87040 BLOOD CULTURE FOR BACTERIA: CPT

## 2020-09-20 PROCEDURE — 6360000002 HC RX W HCPCS: Performed by: STUDENT IN AN ORGANIZED HEALTH CARE EDUCATION/TRAINING PROGRAM

## 2020-09-20 PROCEDURE — 87150 DNA/RNA AMPLIFIED PROBE: CPT

## 2020-09-20 PROCEDURE — 1200000000 HC SEMI PRIVATE

## 2020-09-20 PROCEDURE — 93005 ELECTROCARDIOGRAM TRACING: CPT | Performed by: EMERGENCY MEDICINE

## 2020-09-20 PROCEDURE — 96374 THER/PROPH/DIAG INJ IV PUSH: CPT

## 2020-09-20 PROCEDURE — 96368 THER/DIAG CONCURRENT INF: CPT

## 2020-09-20 PROCEDURE — 83605 ASSAY OF LACTIC ACID: CPT

## 2020-09-20 PROCEDURE — 71275 CT ANGIOGRAPHY CHEST: CPT

## 2020-09-20 PROCEDURE — 96366 THER/PROPH/DIAG IV INF ADDON: CPT

## 2020-09-20 PROCEDURE — 85730 THROMBOPLASTIN TIME PARTIAL: CPT

## 2020-09-20 PROCEDURE — 80053 COMPREHEN METABOLIC PANEL: CPT

## 2020-09-20 PROCEDURE — 96361 HYDRATE IV INFUSION ADD-ON: CPT

## 2020-09-20 PROCEDURE — 6360000004 HC RX CONTRAST MEDICATION: Performed by: RADIOLOGY

## 2020-09-20 PROCEDURE — 36415 COLL VENOUS BLD VENIPUNCTURE: CPT

## 2020-09-20 PROCEDURE — 94640 AIRWAY INHALATION TREATMENT: CPT

## 2020-09-20 PROCEDURE — 96375 TX/PRO/DX INJ NEW DRUG ADDON: CPT

## 2020-09-20 PROCEDURE — 96365 THER/PROPH/DIAG IV INF INIT: CPT

## 2020-09-20 PROCEDURE — 6370000000 HC RX 637 (ALT 250 FOR IP): Performed by: STUDENT IN AN ORGANIZED HEALTH CARE EDUCATION/TRAINING PROGRAM

## 2020-09-20 PROCEDURE — 99285 EMERGENCY DEPT VISIT HI MDM: CPT

## 2020-09-20 PROCEDURE — 85025 COMPLETE CBC W/AUTO DIFF WBC: CPT

## 2020-09-20 PROCEDURE — 84484 ASSAY OF TROPONIN QUANT: CPT

## 2020-09-20 PROCEDURE — 71045 X-RAY EXAM CHEST 1 VIEW: CPT

## 2020-09-20 PROCEDURE — 87186 SC STD MICRODIL/AGAR DIL: CPT

## 2020-09-20 PROCEDURE — 2580000003 HC RX 258: Performed by: STUDENT IN AN ORGANIZED HEALTH CARE EDUCATION/TRAINING PROGRAM

## 2020-09-20 PROCEDURE — 85610 PROTHROMBIN TIME: CPT

## 2020-09-20 RX ORDER — MIRTAZAPINE 15 MG/1
7.5 TABLET, FILM COATED ORAL NIGHTLY
Status: ON HOLD | COMMUNITY
End: 2022-06-29

## 2020-09-20 RX ORDER — IPRATROPIUM BROMIDE AND ALBUTEROL SULFATE 2.5; .5 MG/3ML; MG/3ML
1 SOLUTION RESPIRATORY (INHALATION) ONCE
Status: COMPLETED | OUTPATIENT
Start: 2020-09-20 | End: 2020-09-20

## 2020-09-20 RX ORDER — LORATADINE 10 MG/1
10 CAPSULE, LIQUID FILLED ORAL DAILY
COMMUNITY

## 2020-09-20 RX ORDER — MYCOPHENOLATE MOFETIL 250 MG/1
250 CAPSULE ORAL 2 TIMES DAILY
COMMUNITY

## 2020-09-20 RX ORDER — TACROLIMUS 1 MG/1
6 CAPSULE ORAL 2 TIMES DAILY
COMMUNITY

## 2020-09-20 RX ORDER — VALGANCICLOVIR 450 MG/1
900 TABLET, FILM COATED ORAL DAILY
COMMUNITY

## 2020-09-20 RX ORDER — PREDNISONE 20 MG/1
5 TABLET ORAL DAILY
COMMUNITY

## 2020-09-20 RX ORDER — PANTOPRAZOLE SODIUM 40 MG/1
40 TABLET, DELAYED RELEASE ORAL DAILY
COMMUNITY

## 2020-09-20 RX ORDER — SODIUM CHLORIDE 0.9 % (FLUSH) 0.9 %
10 SYRINGE (ML) INJECTION EVERY 12 HOURS SCHEDULED
Status: DISCONTINUED | OUTPATIENT
Start: 2020-09-20 | End: 2020-09-20 | Stop reason: HOSPADM

## 2020-09-20 RX ORDER — SODIUM CHLORIDE 0.9 % (FLUSH) 0.9 %
10 SYRINGE (ML) INJECTION PRN
Status: DISCONTINUED | OUTPATIENT
Start: 2020-09-20 | End: 2020-09-20 | Stop reason: HOSPADM

## 2020-09-20 RX ORDER — NITROGLYCERIN 0.4 MG/1
0.4 TABLET SUBLINGUAL ONCE
Status: DISCONTINUED | OUTPATIENT
Start: 2020-09-20 | End: 2020-09-20 | Stop reason: HOSPADM

## 2020-09-20 RX ORDER — DAPSONE 100 MG/1
100 TABLET ORAL DAILY
COMMUNITY

## 2020-09-20 RX ORDER — VORICONAZOLE 200 MG/1
300 TABLET, FILM COATED ORAL 2 TIMES DAILY
COMMUNITY

## 2020-09-20 RX ORDER — 0.9 % SODIUM CHLORIDE 0.9 %
500 INTRAVENOUS SOLUTION INTRAVENOUS ONCE
Status: COMPLETED | OUTPATIENT
Start: 2020-09-20 | End: 2020-09-20

## 2020-09-20 RX ORDER — MAGNESIUM OXIDE 400 MG/1
400 TABLET ORAL 2 TIMES DAILY
COMMUNITY

## 2020-09-20 RX ORDER — FUROSEMIDE 40 MG/1
40 TABLET ORAL DAILY
Status: ON HOLD | COMMUNITY
End: 2022-06-29

## 2020-09-20 RX ORDER — FENTANYL CITRATE 50 UG/ML
25 INJECTION, SOLUTION INTRAMUSCULAR; INTRAVENOUS ONCE
Status: COMPLETED | OUTPATIENT
Start: 2020-09-20 | End: 2020-09-20

## 2020-09-20 RX ORDER — SITAGLIPTIN 100 MG/1
100 TABLET, FILM COATED ORAL DAILY
Status: ON HOLD | COMMUNITY
End: 2022-06-29

## 2020-09-20 RX ORDER — ASPIRIN 81 MG/1
81 TABLET, CHEWABLE ORAL DAILY
COMMUNITY

## 2020-09-20 RX ADMIN — SODIUM CHLORIDE 1000 ML: 9 INJECTION, SOLUTION INTRAVENOUS at 11:37

## 2020-09-20 RX ADMIN — VANCOMYCIN HYDROCHLORIDE 2000 MG: 10 INJECTION, POWDER, LYOPHILIZED, FOR SOLUTION INTRAVENOUS at 12:26

## 2020-09-20 RX ADMIN — CEFEPIME HYDROCHLORIDE 2 G: 2 INJECTION, POWDER, FOR SOLUTION INTRAVENOUS at 11:43

## 2020-09-20 RX ADMIN — IOPAMIDOL 75 ML: 755 INJECTION, SOLUTION INTRAVENOUS at 10:02

## 2020-09-20 RX ADMIN — IPRATROPIUM BROMIDE AND ALBUTEROL SULFATE 1 AMPULE: .5; 3 SOLUTION RESPIRATORY (INHALATION) at 08:47

## 2020-09-20 RX ADMIN — FENTANYL CITRATE 25 MCG: 50 INJECTION, SOLUTION INTRAMUSCULAR; INTRAVENOUS at 08:48

## 2020-09-20 ASSESSMENT — PAIN SCALES - GENERAL
PAINLEVEL_OUTOF10: 7
PAINLEVEL_OUTOF10: 6

## 2020-09-20 ASSESSMENT — ENCOUNTER SYMPTOMS
EYES NEGATIVE: 1
ABDOMINAL PAIN: 0
NAUSEA: 0
SORE THROAT: 0
COUGH: 0
SHORTNESS OF BREATH: 1

## 2020-09-20 ASSESSMENT — PAIN DESCRIPTION - FREQUENCY: FREQUENCY: CONTINUOUS

## 2020-09-20 ASSESSMENT — PAIN DESCRIPTION - ONSET: ONSET: ON-GOING

## 2020-09-20 ASSESSMENT — PAIN DESCRIPTION - DESCRIPTORS: DESCRIPTORS: SHARP

## 2020-09-20 ASSESSMENT — PAIN DESCRIPTION - PROGRESSION: CLINICAL_PROGRESSION: NOT CHANGED

## 2020-09-20 ASSESSMENT — PAIN DESCRIPTION - ORIENTATION: ORIENTATION: MID

## 2020-09-20 ASSESSMENT — PAIN DESCRIPTION - PAIN TYPE: TYPE: ACUTE PAIN

## 2020-09-20 NOTE — ED PROVIDER NOTES
Patient evaluated and case discussed with Dr. Leonor Becerra.    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 He describes the chest pain as sharp and radiating down his left arm. He says this morning he woke up feeling short of breath. He does have an IVC filter and is on Eliquis. He states he did have an infection in his blood approximately 1 month ago. He is not on oxygen at home, however on initial presentation his SPO2 was 89%. It has remained 98-99% on 2 L NCO2. He endorses some lightheadedness. He denies fever, chills, nausea, and headache. The history is provided by the patient. Review of Systems   Constitutional: Negative for chills and fever. HENT: Negative for congestion and sore throat. Eyes: Negative. Respiratory: Positive for shortness of breath. Negative for cough. Cardiovascular: Positive for chest pain. Gastrointestinal: Negative for abdominal pain and nausea. Endocrine: Negative. Genitourinary: Negative for dysuria. Musculoskeletal: Negative. Skin: Negative. Neurological: Positive for light-headedness. Negative for headaches. Physical Exam  Constitutional:       General: He is in acute distress. Comments: Patient in mild distress, taking rapid shallow breaths  Appears uncomfortable   HENT:      Head: Normocephalic. Eyes:      Extraocular Movements: Extraocular movements intact. Pupils: Pupils are equal, round, and reactive to light. Neck:      Musculoskeletal: Normal range of motion. Cardiovascular:      Rate and Rhythm: Regular rhythm. Tachycardia present. Pulmonary:      Effort: Tachypnea and respiratory distress present. Breath sounds: Decreased breath sounds and wheezing present.       Comments: Diminished breath sounds in all lung fields on having CP and that nitro did not help much. [AP]      ED Course User Index  [AP] Melida Durbin MD  [NC] Mariely Chiu,       ED Course as of Sep 20 1634   Sun Sep 20, 2020   0179 ATTENDING PROVIDER ATTESTATION:     I have personally performed and/or participated in the history, exam, medical decision making, and procedures and agree with all pertinent clinical information unless otherwise noted. I have also reviewed and agree with the past medical, family and social history unless otherwise noted. I have discussed this patient in detail with the resident, and provided the instruction and education regarding patient complaining of left chest pain with increased short of breath, symptoms started yesterday. Denies fevers, sweats or chills. Has a known history of current PE, is on blood thinners and states he is taking them regularly. Denies new extremity pain or swelling. Denies fevers, sweats or chills or productive cough. Does get worsened pain with deep breathing or coughing. No lightheadedness or syncope. No palpitations. .  My findings/plan: Patient in mild distress, tachypnea with shallow splinted breathing and appears uncomfortable with it. He has scattered mild wheezes with diminished left-sided breath sounds compared to the right. Heart rate mildly tachycardic. He has no pretibial edema or calf pain. No jaundice or icterus. Abdomen is soft and nontender. No focal neurologic deficits. Alert and awake. [NC]   [id number] Stat CXR showed no apparent pneumothorax    [AP]   4959 Pt sitting up in bed, remains tachypneic    [AP]   7977 Pt sitting in bed. States chest pain is still present. Requesting ice chips. [AP]   4824 Case discussed with Dr. Radha Mansfield, detailed over given, he will admit the patient, telemetry full admission. [NC]   6248 Wife at bedside. States patient was getting treatment for pneumonia for the past several weeks.   He did have a PICC line in and was 11.5 - 15.0 fL    Platelets 848 207 - 322 E9/L    MPV 10.3 7.0 - 12.0 fL    Neutrophils % 87.1 (H) 43.0 - 80.0 %    Lymphocytes % 5.2 (L) 20.0 - 42.0 %    Monocytes % 6.9 2.0 - 12.0 %    Eosinophils % 0.0 0.0 - 6.0 %    Basophils % 0.3 0.0 - 2.0 %    Neutrophils Absolute 10.47 (H) 1.80 - 7.30 E9/L    Lymphocytes Absolute 0.60 (L) 1.50 - 4.00 E9/L    Monocytes Absolute 0.83 0.10 - 0.95 E9/L    Eosinophils Absolute 0.00 (L) 0.05 - 0.50 E9/L    Basophils Absolute 0.00 0.00 - 0.20 E9/L    Metamyelocytes Relative 0.9 0.0 - 1.0 %   Comprehensive Metabolic Panel w/ Reflex to MG   Result Value Ref Range    Sodium 139 132 - 146 mmol/L    Potassium reflex Magnesium 4.7 3.5 - 5.0 mmol/L    Chloride 97 (L) 98 - 107 mmol/L    CO2 31 (H) 22 - 29 mmol/L    Anion Gap 11 7 - 16 mmol/L    Glucose 204 (H) 74 - 99 mg/dL    BUN 36 (H) 8 - 23 mg/dL    CREATININE 0.9 0.7 - 1.2 mg/dL    GFR Non-African American >60 >=60 mL/min/1.73    GFR African American >60     Calcium 9.8 8.6 - 10.2 mg/dL    Total Protein 6.5 6.4 - 8.3 g/dL    Alb 3.6 3.5 - 5.2 g/dL    Total Bilirubin 0.4 0.0 - 1.2 mg/dL    Alkaline Phosphatase 74 40 - 129 U/L    ALT 17 0 - 40 U/L    AST 23 0 - 39 U/L   Lactate, Sepsis   Result Value Ref Range    Lactic Acid, Sepsis 1.1 0.5 - 1.9 mmol/L   APTT   Result Value Ref Range    aPTT 25.8 24.5 - 35.1 sec   Protime-INR   Result Value Ref Range    Protime 10.9 9.3 - 12.4 sec    INR 1.0    EKG 12 Lead   Result Value Ref Range    Ventricular Rate 111 BPM    Atrial Rate 111 BPM    P-R Interval 154 ms    QRS Duration 90 ms    Q-T Interval 286 ms    QTc Calculation (Bazett) 388 ms    P Axis 37 degrees    R Axis -16 degrees    T Axis 50 degrees       RADIOLOGY:  CTA PULMONARY W CONTRAST   Final Result   1. No evidence of pulmonary embolism. 2. Probable bilateral lower lobe atelectasis, greater on the left. Subtle   pneumonia at the left base cannot be completely excluded. Tiny left pleural   effusion.    3. 1.2 cm slightly irregular right middle lobe nodule. Correlation with   PET-CT scan should be considered. 4. Multiple calcified mediastinal nodes. No definite mediastinal or hilar   lymphadenopathy. RECOMMENDATIONS:   12.0 mm solid suspicious pulmonary nodule. Consider a non-contrast Chest CT   at 3 months, a PET/CT, or tissue sampling. These guidelines do not apply to immunocompromised patients and patients with   cancer. Follow up in patients with significant comorbidities as clinically   warranted. For lung cancer screening, adhere to Lung-RADS guidelines. Reference: Radiology. 2017; 284(1):228-43. XR CHEST PORTABLE   Final Result   1. Left basilar opacity that could be related to atelectasis or pneumonia. Probable right basilar atelectasis. 2. Median sternotomy changes. EKG:  This EKG is signed and interpreted by me. Rate: 111   Rhythm: Sinus and occasional PACs  Interpretation: no acute changes  Comparison: no previous EKG available      ------------------------- NURSING NOTES AND VITALS REVIEWED ---------------------------  Date / Time Roomed:  9/20/2020  8:16 AM  ED Bed Assignment:  04/04    The nursing notes within the ED encounter and vital signs as below have been reviewed. Patient Vitals for the past 24 hrs:   BP Temp Temp src Pulse Resp SpO2 Height Weight   09/20/20 1149 89/66 -- -- 108 24 98 % -- --   09/20/20 1012 122/75 -- -- 108 24 98 % -- --   09/20/20 0820 138/81 98 °F (36.7 °C) Oral 113 20 (!) 89 % 5' 10\" (1.778 m) 205 lb (93 kg)       Oxygen Saturation Interpretation: Improved after treatment    ------------------------------------------ PROGRESS NOTES ------------------------------------------  Re-evaluation(s):  See ED course above    Counseling:  I have spoken with the patient and discussed todays results, in addition to providing specific details for the plan of care and counseling regarding the diagnosis and prognosis.   Their questions are answered at this time and they are agreeable with the plan of admission.    --------------------------------- ADDITIONAL PROVIDER NOTES ---------------------------------  Consultations:  ED course above    This patient's ED course included: a personal history and physicial examination, re-evaluation prior to disposition, multiple bedside re-evaluations, IV medications, cardiac monitoring and continuous pulse oximetry    This patient has remained hemodynamically stable during their ED course. Diagnosis:  1. Chest pain, unspecified type    2. Dyspnea, unspecified type    3. Pneumonia due to organism    4. Elevated troponin        Disposition:  Patient's disposition: Admit to med/surg floor  Patient's condition is stable.             Rubén Bradley MD  Resident  09/20/20 1719

## 2020-09-20 NOTE — PROGRESS NOTES
Nurse to nurse called to [facility] at [facility] 3 [phone].

## 2020-09-20 NOTE — H&P
History and Physical      CHIEF COMPLAINT: Chest pain    History of Present Illness: This is a very pleasant 77years old white male with history of idiopathic pulmonary fibrosis who recently underwent double lung transplant March 2020 postop developed complication of pulmonary embolism he is currently on medications he presented to the ER at [facility] complaining of sharp chest pain radiating down his left arm with associated shortness of breath because of the pulmonary embolism patient does have a IVC filter and was placed on Eliquis. Patient had bacteremia approximately a month ago not on any oxygen at home when he came to the ER his pulse ox was 89% on room air he was given 2 L of oxygen and the pulse ox came up to 99% no fever no chills no nausea no vomiting no headaches did complain of mild lightheadedness and increasing work of breathing. Informant(s) for H&P: Patient     REVIEW OF SYSTEMS:  Constitutional: Negative for chills and fever. HENT: Negative for congestion and sore throat. Eyes: Negative. Respiratory: Positive for shortness of breath. Negative for cough. Cardiovascular: Positive for chest pain. Gastrointestinal: Negative for abdominal pain and nausea. Endocrine: Negative. Genitourinary: Negative for dysuria. Musculoskeletal: Negative. Skin: Negative. Neurological: Positive for light-headedness. Negative for headaches. PMH:  Past Medical History:   Diagnosis Date    COPD (chronic obstructive pulmonary disease) ([address]     Deep venous thrombosis ([address] 9/26/2017    Dx 1995. Was not attributed to anything. Was on oral anticoagulation for 1 year only.  Gout     History of tobacco use 11/8/2017     1 PPD x 40 years, quit in 2014    Hypertension     Idiopathic pulmonary fibrosis ([address] 9/26/2017    Interstitial lung disease ([address]     Kidney stones     Obesity 9/26/2017    Obstructive sleep apnea 11/8/2017    Dx 10/2017. Initiated CPAP 11/2017.      Sleep apnea     Tobacco abuse        Surgical History:  Past Surgical History:   Procedure Laterality Date    CARDIAC CATHETERIZATION  07/26/2018    Dr. Nataliia Chicas, DOUBLE  03/2020    TONSILLECTOMY      VASECTOMY         Medications Prior to Admission:    Prior to Admission medications    Medication Sig Start Date End Date Taking? Authorizing Provider   tacrolimus (PROGRAF) 1 MG capsule Take 1 mg by mouth 2 times daily . 5 ng hs   Yes Historical Provider, MD   mycophenolate (CELLCEPT) 250 MG capsule Take 250 mg by mouth 2 times daily   Yes Historical Provider, MD   predniSONE (DELTASONE) 20 MG tablet Take 40 mg by mouth daily   Yes Historical Provider, MD   dapsone 100 MG tablet Take 100 mg by mouth daily   Yes Historical Provider, MD   valGANciclovir (VALCYTE) 450 MG tablet Take 900 mg by mouth daily   Yes Historical Provider, MD   voriconazole (VFEND) 200 MG tablet Take 200 mg by mouth 2 times daily   Yes Historical Provider, MD   aspirin 81 MG chewable tablet Take 81 mg by mouth daily   Yes Historical Provider, MD   furosemide (LASIX) 40 MG tablet Take 40 mg by mouth daily   Yes Historical Provider, MD   pantoprazole (PROTONIX) 40 MG tablet Take 40 mg by mouth daily Delayed release   Yes Historical Provider, MD   loratadine (CLARITIN) 10 MG capsule Take 10 mg by mouth daily   Yes Historical Provider, MD   magnesium oxide (MAG-OX) 400 MG tablet Take 1,200 mg by mouth 2 times daily   Yes Historical Provider, MD   metoprolol tartrate (LOPRESSOR) 25 MG tablet Take 25 mg by mouth 2 times daily   Yes Historical Provider, MD   mirtazapine (REMERON) 15 MG tablet Take 7.5 mg by mouth nightly   Yes Historical Provider, MD   SITagliptin (JANUVIA) 100 MG tablet Take 100 mg by mouth daily   Yes Historical Provider, MD   apixaban (ELIQUIS) 5 MG TABS tablet Take 5 mg by mouth 2 times daily   Yes Historical Provider, MD   DULoxetine (CYMBALTA) 30 MG extended release capsule Take 60 mg by mouth daily    Yes Historical Provider, MD   CPAP Machine MISC Inhale into the lungs nightly Indications: Sleep Apnea, Dr Leigha Lowe Cpap. *Has started to use more consistently   Yes Historical Provider, MD   allopurinol (ZYLOPRIM) 300 MG tablet Take 300 mg by mouth daily  9/10/17  Yes Historical Provider, MD   Multiple Vitamins-Minerals (CENTRUM SILVER 50+MEN) TABS Take by mouth daily   Yes Historical Provider, MD   Cholecalciferol (VITAMIN D-3 PO) Take 2,000 Units by mouth daily    Yes Historical Provider, MD   tiotropium (SPIRIVA HANDIHALER) 18 MCG inhalation capsule Inhale 18 mcg into the lungs daily    Historical Provider, MD   OXYGEN Inhale into the lungs Indications: Dr Hai Crawford manages/orders 02 6-8liters activity and  4 liters otherwise     Historical Provider, MD       Allergies:    Patient has no known allergies. Social History:    reports that he quit smoking about 5 years ago. His smoking use included cigarettes. He has a 45.00 pack-year smoking history. He has never used smokeless tobacco. He reports previous alcohol use of about 2.0 standard drinks of alcohol per week. He reports that he does not use drugs. Family History:   family history is not on file.       PHYSICAL EXAM:  Vitals:  /67   Pulse 106   Temp 98.5 °F (36.9 °C) (Oral)   Resp 20   Ht 5' 10\" (1.778 m)   Wt 207 lb (93.9 kg)   SpO2 97%   BMI 29.70 kg/m²     Patient awake alert oriented in mild respiratory distress  HEENT unremarkable  Cardiac examination S1-S2 normal tachycardic  Abdomen soft negative hepatosplenomegaly  Pulmonary decreased breath sound at the bases diffuse wheezing  Extremities trace edema no clubbing or cyanosis  Skin unremarkable  Neurologic exam no focal deficits normal mentation cranial nerves II through XII intact  Psych mild anxiety        LABS:  Recent Labs     09/20/20  0845      K 4.7   CL 97*   CO2 31*   BUN 36*   CREATININE 0.9   GLUCOSE 204*   CALCIUM 9.8       Recent Labs 09/20/20  0845   WBC 11.9*   RBC 3.74*   HGB 11.5*   HCT 39.9   .7*   MCH 30.7   MCHC 28.8*   RDW 16.8*      MPV 10.3       No results for input(s): POCGLU in the last 72 hours. Radiology: Xr Chest Portable    Result Date: 9/20/2020  EXAMINATION: ONE XRAY VIEW OF THE CHEST 9/20/2020 8:47 am COMPARISON: None. HISTORY: ORDERING SYSTEM PROVIDED HISTORY: SOB, diminished left sounds TECHNOLOGIST PROVIDED HISTORY: Reason for exam:->SOB, diminished left sounds FINDINGS: There are median sternotomy changes. Strandy densities seen at the left base that could be related to atelectasis or pneumonia. Mild probable atelectasis is seen at the right base. There is poor inspiration. The upper lung fields are clear. The heart is within normal limits in size and there is no evidence of vascular congestion or pleural effusion. 1. Left basilar opacity that could be related to atelectasis or pneumonia. Probable right basilar atelectasis. 2. Median sternotomy changes. Cta Pulmonary W Contrast    Result Date: 9/20/2020  EXAMINATION: CTA OF THE CHEST 9/20/2020 9:44 am TECHNIQUE: CTA of the chest was performed after the administration of intravenous contrast.  Multiplanar reformatted images are provided for review. MIP images are provided for review. Dose modulation, iterative reconstruction, and/or weight based adjustment of the mA/kV was utilized to reduce the radiation dose to as low as reasonably achievable. COMPARISON: Portable chest done earlier today HISTORY: ORDERING SYSTEM PROVIDED HISTORY: chest pain, hx of PE TECHNOLOGIST PROVIDED HISTORY: Reason for exam:->chest pain, hx of PE SOB, chest pain, history of double lung transplant in March 2020 FINDINGS: Pulmonary Arteries: Pulmonary arteries are adequately opacified for evaluation. No evidence of intraluminal filling defect to suggest pulmonary embolism. Main pulmonary artery is normal in caliber.  Mediastinum: No evidence of mediastinal 284(1):228-43. EKG: Reviewed no acute changes    ASSESSMENT:      Active Problems:    Pneumonia  Resolved Problems:    * No resolved hospital problems. *    Interstitial pulmonary fibrosis  Status post double lung transplant  Pneumonitis  History of DVT/PE  History of gout  History of kidney stones  COPD  Obstructive sleep apnea  History of tobacco abuse  Hypertension  Leukocytosis      PLAN:    1. Patient was initially admitted to telemetry floor  2. IV antibiotics were given in the ER  3. After discussing with the patient and his transplant coordinator Rosalinda Brito it was decided to transfer the patient to [facility] because of his complex medical history. 4. I will be making the phone call to the transfer center to make arrangements  5.  Continue rest of the home medications    Code Status: Full code  DVT prophylaxis: Patient already on Eliquis      Electronically signed by Haider Best MD on 9/20/2020 at 2:03 PM

## 2020-09-21 LAB
ACINETOBACTER BAUMANNII BY PCR: NOT DETECTED
BOTTLE TYPE: ABNORMAL
CANDIDA ALBICANS BY PCR: NOT DETECTED
CANDIDA GLABRATA BY PCR: NOT DETECTED
CANDIDA KRUSEI BY PCR: NOT DETECTED
CANDIDA PARAPSILOSIS BY PCR: NOT DETECTED
CANDIDA TROPICALIS BY PCR: NOT DETECTED
ENTEROBACTER CLOACAE COMPLEX BY PCR: NOT DETECTED
ENTEROBACTERALES BY PCR: NOT DETECTED
ENTEROCOCCUS BY PCR: NOT DETECTED
ESCHERICHIA COLI BY PCR: NOT DETECTED
HAEMOPHILUS INFLUENZAE BY PCR: NOT DETECTED
KLEBSIELLA OXYTOCA BY PCR: NOT DETECTED
KLEBSIELLA PNEUMONIAE GROUP BY PCR: NOT DETECTED
LISTERIA MONOCYTOGENES BY PCR: NOT DETECTED
METHICILLIN RESISTANCE MECA/C  BY PCR: NOT DETECTED
NEISSERIA MENINGITIDIS BY PCR: NOT DETECTED
ORDER NUMBER: ABNORMAL
PROTEUS BY PCR: NOT DETECTED
PSEUDOMONAS AERUGINOSA BY PCR: NOT DETECTED
SERRATIA MARCESCENS BY PCR: NOT DETECTED
SOURCE OF BLOOD CULTURE: ABNORMAL
STAPHYLOCOCCUS AUREUS BY PCR: DETECTED
STAPHYLOCOCCUS SPECIES BY PCR: DETECTED
STREPTOCOCCUS AGALACTIAE BY PCR: NOT DETECTED
STREPTOCOCCUS PNEUMONIAE BY PCR: NOT DETECTED
STREPTOCOCCUS PYOGENES  BY PCR: NOT DETECTED
STREPTOCOCCUS SPECIES BY PCR: NOT DETECTED

## 2020-09-23 LAB
CULTURE, BLOOD 2: ABNORMAL
CULTURE, BLOOD 2: ABNORMAL
ORGANISM: ABNORMAL
ORGANISM: ABNORMAL

## 2020-10-05 ENCOUNTER — HOSPITAL ENCOUNTER (OUTPATIENT)
Age: 65
Discharge: HOME | End: 2020-10-07
Payer: MEDICARE

## 2020-10-05 LAB
ANION GAP SERPL CALCULATED.3IONS-SCNC: 13 MMOL/L (ref 7–16)
ANISOCYTOSIS: ABNORMAL
BASOPHILS ABSOLUTE: 0.01 E9/L (ref 0–0.2)
BASOPHILS RELATIVE PERCENT: 0.2 % (ref 0–2)
BUN BLDV-MCNC: 27 MG/DL (ref 8–23)
C-REACTIVE PROTEIN: 3.6 MG/DL (ref 0–0.4)
CALCIUM SERPL-MCNC: 9.2 MG/DL (ref 8.6–10.2)
CHLORIDE BLD-SCNC: 107 MMOL/L (ref 98–107)
CO2: 24 MMOL/L (ref 22–29)
CREAT SERPL-MCNC: 0.8 MG/DL (ref 0.7–1.2)
EOSINOPHILS ABSOLUTE: 0.02 E9/L (ref 0.05–0.5)
EOSINOPHILS RELATIVE PERCENT: 0.4 % (ref 0–6)
GFR AFRICAN AMERICAN: >60
GFR NON-AFRICAN AMERICAN: >60 ML/MIN/1.73
GLUCOSE BLD-MCNC: 95 MG/DL (ref 74–99)
HCT VFR BLD CALC: 31.6 % (ref 37–54)
HEMOGLOBIN: 8.8 G/DL (ref 12.5–16.5)
HYPOCHROMIA: ABNORMAL
IMMATURE GRANULOCYTES #: 0.33 E9/L
IMMATURE GRANULOCYTES %: 5.9 % (ref 0–5)
LYMPHOCYTES ABSOLUTE: 1.01 E9/L (ref 1.5–4)
LYMPHOCYTES RELATIVE PERCENT: 18.2 % (ref 20–42)
MCH RBC QN AUTO: 30.8 PG (ref 26–35)
MCHC RBC AUTO-ENTMCNC: 27.8 % (ref 32–34.5)
MCV RBC AUTO: 110.5 FL (ref 80–99.9)
MONOCYTES ABSOLUTE: 0.39 E9/L (ref 0.1–0.95)
MONOCYTES RELATIVE PERCENT: 7 % (ref 2–12)
NEUTROPHILS ABSOLUTE: 3.8 E9/L (ref 1.8–7.3)
NEUTROPHILS RELATIVE PERCENT: 68.3 % (ref 43–80)
OVALOCYTES: ABNORMAL
PDW BLD-RTO: 17.1 FL (ref 11.5–15)
PLATELET # BLD: 260 E9/L (ref 130–450)
PMV BLD AUTO: 10.1 FL (ref 7–12)
POIKILOCYTES: ABNORMAL
POLYCHROMASIA: ABNORMAL
POTASSIUM SERPL-SCNC: 4.7 MMOL/L (ref 3.5–5)
RBC # BLD: 2.86 E12/L (ref 3.8–5.8)
SCHISTOCYTES: ABNORMAL
SEDIMENTATION RATE, ERYTHROCYTE: 62 MM/HR (ref 0–15)
SODIUM BLD-SCNC: 144 MMOL/L (ref 132–146)
TEAR DROP CELLS: ABNORMAL
WBC # BLD: 5.6 E9/L (ref 4.5–11.5)

## 2020-10-05 PROCEDURE — 85651 RBC SED RATE NONAUTOMATED: CPT

## 2020-10-05 PROCEDURE — 86140 C-REACTIVE PROTEIN: CPT

## 2020-10-05 PROCEDURE — 80048 BASIC METABOLIC PNL TOTAL CA: CPT

## 2020-10-05 PROCEDURE — 85025 COMPLETE CBC W/AUTO DIFF WBC: CPT

## 2020-10-12 ENCOUNTER — HOSPITAL ENCOUNTER (OUTPATIENT)
Age: 65
Discharge: HOME | End: 2020-10-14
Payer: MEDICARE

## 2020-10-12 LAB
ANION GAP SERPL CALCULATED.3IONS-SCNC: 14 MMOL/L (ref 7–16)
ANISOCYTOSIS: ABNORMAL
BASOPHILS ABSOLUTE: 0.01 E9/L (ref 0–0.2)
BASOPHILS RELATIVE PERCENT: 0.1 % (ref 0–2)
BUN BLDV-MCNC: 28 MG/DL (ref 8–23)
C-REACTIVE PROTEIN: 10.4 MG/DL (ref 0–0.4)
CALCIUM SERPL-MCNC: 9.2 MG/DL (ref 8.6–10.2)
CHLORIDE BLD-SCNC: 101 MMOL/L (ref 98–107)
CO2: 27 MMOL/L (ref 22–29)
CREAT SERPL-MCNC: 0.8 MG/DL (ref 0.7–1.2)
EOSINOPHILS ABSOLUTE: 0 E9/L (ref 0.05–0.5)
EOSINOPHILS RELATIVE PERCENT: 0 % (ref 0–6)
GFR AFRICAN AMERICAN: >60
GFR NON-AFRICAN AMERICAN: >60 ML/MIN/1.73
GLUCOSE BLD-MCNC: 63 MG/DL (ref 74–99)
HCT VFR BLD CALC: 34.8 % (ref 37–54)
HEMOGLOBIN: 9.6 G/DL (ref 12.5–16.5)
HYPOCHROMIA: ABNORMAL
IMMATURE GRANULOCYTES #: 0.97 E9/L
IMMATURE GRANULOCYTES %: 11.1 % (ref 0–5)
LYMPHOCYTES ABSOLUTE: 0.54 E9/L (ref 1.5–4)
LYMPHOCYTES RELATIVE PERCENT: 6.2 % (ref 20–42)
MCH RBC QN AUTO: 29.5 PG (ref 26–35)
MCHC RBC AUTO-ENTMCNC: 27.6 % (ref 32–34.5)
MCV RBC AUTO: 107.1 FL (ref 80–99.9)
MONOCYTES ABSOLUTE: 0.65 E9/L (ref 0.1–0.95)
MONOCYTES RELATIVE PERCENT: 7.4 % (ref 2–12)
NEUTROPHILS ABSOLUTE: 6.58 E9/L (ref 1.8–7.3)
NEUTROPHILS RELATIVE PERCENT: 75.2 % (ref 43–80)
PDW BLD-RTO: 16.8 FL (ref 11.5–15)
PLATELET # BLD: 272 E9/L (ref 130–450)
PMV BLD AUTO: 10.2 FL (ref 7–12)
POIKILOCYTES: ABNORMAL
POLYCHROMASIA: ABNORMAL
POTASSIUM SERPL-SCNC: 4.6 MMOL/L (ref 3.5–5)
RBC # BLD: 3.25 E12/L (ref 3.8–5.8)
SCHISTOCYTES: ABNORMAL
SEDIMENTATION RATE, ERYTHROCYTE: 85 MM/HR (ref 0–15)
SODIUM BLD-SCNC: 142 MMOL/L (ref 132–146)
TARGET CELLS: ABNORMAL
TEAR DROP CELLS: ABNORMAL
WBC # BLD: 8.8 E9/L (ref 4.5–11.5)

## 2020-10-12 PROCEDURE — 80048 BASIC METABOLIC PNL TOTAL CA: CPT

## 2020-10-12 PROCEDURE — 86140 C-REACTIVE PROTEIN: CPT

## 2020-10-12 PROCEDURE — 85025 COMPLETE CBC W/AUTO DIFF WBC: CPT

## 2020-10-12 PROCEDURE — 85651 RBC SED RATE NONAUTOMATED: CPT

## 2020-10-14 ENCOUNTER — HOSPITAL ENCOUNTER (EMERGENCY)
Age: 65
Discharge: ANOTHER HOSPITAL | End: 2020-10-14
Attending: EMERGENCY MEDICINE
Payer: MEDICARE

## 2020-10-14 ENCOUNTER — TELEPHONE (OUTPATIENT)
Dept: OTHER | Facility: CLINIC | Age: 65
End: 2020-10-14

## 2020-10-14 ENCOUNTER — APPOINTMENT (OUTPATIENT)
Dept: CT IMAGING | Age: 65
End: 2020-10-14
Payer: MEDICARE

## 2020-10-14 VITALS
SYSTOLIC BLOOD PRESSURE: 106 MMHG | BODY MASS INDEX: 37.74 KG/M2 | DIASTOLIC BLOOD PRESSURE: 63 MMHG | TEMPERATURE: 98.6 F | RESPIRATION RATE: 18 BRPM | HEART RATE: 103 BPM | OXYGEN SATURATION: 98 % | WEIGHT: 263 LBS

## 2020-10-14 LAB
ALBUMIN SERPL-MCNC: 3.2 G/DL (ref 3.5–5.2)
ALP BLD-CCNC: 168 U/L (ref 40–129)
ALT SERPL-CCNC: 11 U/L (ref 0–40)
ANION GAP SERPL CALCULATED.3IONS-SCNC: 11 MMOL/L (ref 7–16)
ANISOCYTOSIS: ABNORMAL
APTT: 31 SEC (ref 24.5–35.1)
AST SERPL-CCNC: 25 U/L (ref 0–39)
B.E.: 3.5 MMOL/L (ref -3–3)
BASOPHILS ABSOLUTE: 0 E9/L (ref 0–0.2)
BASOPHILS RELATIVE PERCENT: 0.2 % (ref 0–2)
BILIRUB SERPL-MCNC: 0.2 MG/DL (ref 0–1.2)
BUN BLDV-MCNC: 36 MG/DL (ref 8–23)
CALCIUM SERPL-MCNC: 9.8 MG/DL (ref 8.6–10.2)
CHLORIDE BLD-SCNC: 101 MMOL/L (ref 98–107)
CO2: 29 MMOL/L (ref 22–29)
CREAT SERPL-MCNC: 0.8 MG/DL (ref 0.7–1.2)
DELIVERY SYSTEMS: ABNORMAL
DEVICE: ABNORMAL
EKG ATRIAL RATE: 109 BPM
EKG P AXIS: 54 DEGREES
EKG P-R INTERVAL: 120 MS
EKG Q-T INTERVAL: 300 MS
EKG QRS DURATION: 92 MS
EKG QTC CALCULATION (BAZETT): 404 MS
EKG R AXIS: -10 DEGREES
EKG T AXIS: 42 DEGREES
EKG VENTRICULAR RATE: 109 BPM
EOSINOPHILS ABSOLUTE: 0 E9/L (ref 0.05–0.5)
EOSINOPHILS RELATIVE PERCENT: 0 % (ref 0–6)
FIO2 ARTERIAL: 2
GFR AFRICAN AMERICAN: >60
GFR NON-AFRICAN AMERICAN: >60 ML/MIN/1.73
GLUCOSE BLD-MCNC: 80 MG/DL (ref 74–99)
HCO3 ARTERIAL: 30.2 MMOL/L (ref 22–26)
HCT VFR BLD CALC: 34.4 % (ref 37–54)
HEMOGLOBIN: 9.6 G/DL (ref 12.5–16.5)
INR BLD: 1.1
LYMPHOCYTES ABSOLUTE: 0.1 E9/L (ref 1.5–4)
LYMPHOCYTES RELATIVE PERCENT: 0.9 % (ref 20–42)
MCH RBC QN AUTO: 29.2 PG (ref 26–35)
MCHC RBC AUTO-ENTMCNC: 27.9 % (ref 32–34.5)
MCV RBC AUTO: 104.6 FL (ref 80–99.9)
MONOCYTES ABSOLUTE: 0.1 E9/L (ref 0.1–0.95)
MONOCYTES RELATIVE PERCENT: 0.9 % (ref 2–12)
MYELOCYTE PERCENT: 2.6 % (ref 0–0)
NEUTROPHILS ABSOLUTE: 10.09 E9/L (ref 1.8–7.3)
NEUTROPHILS RELATIVE PERCENT: 95.7 % (ref 43–80)
O2 SATURATION: 96.9 % (ref 92–98.5)
OPERATOR ID: 557
OVALOCYTES: ABNORMAL
PCO2 ARTERIAL: 54.6 MMHG (ref 35–45)
PDW BLD-RTO: 16.6 FL (ref 11.5–15)
PH BLOOD GAS: 7.35 (ref 7.35–7.45)
PLATELET # BLD: 305 E9/L (ref 130–450)
PMV BLD AUTO: 9.4 FL (ref 7–12)
PO2 ARTERIAL: 96.2 MMHG (ref 60–80)
POIKILOCYTES: ABNORMAL
POLYCHROMASIA: ABNORMAL
POTASSIUM REFLEX MAGNESIUM: 4.8 MMOL/L (ref 3.5–5)
PRO-BNP: 1388 PG/ML (ref 0–125)
PROTHROMBIN TIME: 12.6 SEC (ref 9.3–12.4)
RBC # BLD: 3.29 E12/L (ref 3.8–5.8)
SODIUM BLD-SCNC: 141 MMOL/L (ref 132–146)
SOURCE, BLOOD GAS: ABNORMAL
TEAR DROP CELLS: ABNORMAL
TOTAL PROTEIN: 6.7 G/DL (ref 6.4–8.3)
TROPONIN: 0.02 NG/ML (ref 0–0.03)
WBC # BLD: 10.3 E9/L (ref 4.5–11.5)

## 2020-10-14 PROCEDURE — 80053 COMPREHEN METABOLIC PANEL: CPT

## 2020-10-14 PROCEDURE — 71275 CT ANGIOGRAPHY CHEST: CPT

## 2020-10-14 PROCEDURE — 82803 BLOOD GASES ANY COMBINATION: CPT

## 2020-10-14 PROCEDURE — 85730 THROMBOPLASTIN TIME PARTIAL: CPT

## 2020-10-14 PROCEDURE — 6360000004 HC RX CONTRAST MEDICATION: Performed by: RADIOLOGY

## 2020-10-14 PROCEDURE — 83880 ASSAY OF NATRIURETIC PEPTIDE: CPT

## 2020-10-14 PROCEDURE — 93005 ELECTROCARDIOGRAM TRACING: CPT | Performed by: EMERGENCY MEDICINE

## 2020-10-14 PROCEDURE — 84484 ASSAY OF TROPONIN QUANT: CPT

## 2020-10-14 PROCEDURE — 93010 ELECTROCARDIOGRAM REPORT: CPT | Performed by: INTERNAL MEDICINE

## 2020-10-14 PROCEDURE — 99285 EMERGENCY DEPT VISIT HI MDM: CPT

## 2020-10-14 PROCEDURE — 85025 COMPLETE CBC W/AUTO DIFF WBC: CPT

## 2020-10-14 PROCEDURE — 85610 PROTHROMBIN TIME: CPT

## 2020-10-14 PROCEDURE — 70450 CT HEAD/BRAIN W/O DYE: CPT

## 2020-10-14 RX ORDER — 0.9 % SODIUM CHLORIDE 0.9 %
500 INTRAVENOUS SOLUTION INTRAVENOUS ONCE
Status: DISCONTINUED | OUTPATIENT
Start: 2020-10-14 | End: 2020-10-14

## 2020-10-14 RX ADMIN — IOPAMIDOL 75 ML: 755 INJECTION, SOLUTION INTRAVENOUS at 13:19

## 2020-10-14 NOTE — ED PROVIDER NOTES
chills, lightheadedness, nasal congestion, chest pain,  cough, nausea, vomiting, abdominal pain, diarrhea, constipation, dysuria or hematuria. The patient has no other stated complaints at this time. Code Status on file: Prior. NIH Stroke Scale at time of initial evaluation:   NIH/MNHISS Stroke Scale  Interval: 2 hrs Posttreatment  Level of Consciousness (1a. ): Alert  LOC Questions (1b. ): Answers both correctly  LOC Commands (1c. ): Performs both tasks correctly  Best Gaze (2. ): Normal  Visual (3. ): No visual loss  Facial Palsy (4. ): (!) Complete paralysis  Motor Arm, Left (5a. ): No drift  Motor Arm, Right (5b. ): No drift  Motor Leg, Left (6a. ): No drift  Motor Leg, Right (6b. ): No drift  Limb Ataxia (7. ): Absent  Sensory (8. ): Normal  Best Language (9. ): No aphasia  Dysarthria (10. ): Normal  Extinction and Inattention (11): No abnormality  Total: 3      Past Medical History:  has a past medical history of COPD (chronic obstructive pulmonary disease) (HCC), Deep venous thrombosis ([address] Gout, History of tobacco use, Hypertension, Idiopathic pulmonary fibrosis ([facility] [address] Interstitial lung disease ([address] Kidney stones, Obesity, Obstructive sleep apnea, Sleep apnea, and Tobacco abuse. 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 Social History:  reports that he quit smoking about 6 years ago. His smoking use included cigarettes. He has a 45.00 pack-year smoking history. He has never used smokeless tobacco. He reports previous alcohol use of about 2.0 standard drinks of alcohol per week. He reports that he does not use drugs. Prior Functional Status(Modified [place] Scale):  0=No symptoms at all    Family History: family history is not on file. The patients home medications have been reviewed.   Prior to Admission medications    Medication Sig Start Date End Date Taking? Authorizing Provider   tacrolimus (PROGRAF) 1 MG capsule Take 1 mg by mouth 2 times daily . 5 ng hs    Historical Provider, MD   mycophenolate (CELLCEPT) 250 MG capsule Take 250 mg by mouth 2 times daily    Historical Provider, MD   predniSONE (DELTASONE) 20 MG tablet Take 40 mg by mouth daily    Historical Provider, MD   dapsone 100 MG tablet Take 100 mg by mouth daily    Historical Provider, MD   valGANciclovir (VALCYTE) 450 MG tablet Take 900 mg by mouth daily    Historical Provider, MD   voriconazole (VFEND) 200 MG tablet Take 200 mg by mouth 2 times daily    Historical Provider, MD   aspirin 81 MG chewable tablet Take 81 mg by mouth daily    Historical Provider, MD   furosemide (LASIX) 40 MG tablet Take 40 mg by mouth daily    Historical Provider, MD   pantoprazole (PROTONIX) 40 MG tablet Take 40 mg by mouth daily Delayed release    Historical Provider, MD   loratadine (CLARITIN) 10 MG capsule Take 10 mg by mouth daily    Historical Provider, MD   magnesium oxide (MAG-OX) 400 MG tablet Take 1,200 mg by mouth 2 times daily    Historical Provider, MD   metoprolol tartrate (LOPRESSOR) 25 MG tablet Take 25 mg by mouth 2 times daily    Historical Provider, MD   mirtazapine (REMERON) 15 MG tablet Take 7.5 mg by mouth nightly    Historical Provider, MD   SITagliptin (JANUVIA) 100 MG tablet Take 100 mg by mouth daily    Historical Provider, MD   apixaban (ELIQUIS) 5 MG TABS tablet Take 5 mg by mouth 2 times daily    Historical Provider, MD   cefepime (MAXIPIME) infusion Infuse 1,000 mg intravenously every 8 hours Compound per protocol 9/20/20   Fatoumata Lopez MD   tiotropium (SPIRIVA HANDIHALER) 18 MCG inhalation capsule Inhale 18 mcg into the lungs daily    Historical Provider, MD   DULoxetine (CYMBALTA) 30 MG extended release capsule Take 60 mg by mouth daily     Historical Provider, MD   OXYGEN Inhale into the lungs Indications: Dr Adalberto Martinez manages/orders 02 6-8liters activity and  4 liters otherwise     Historical Provider, MD   CPAP Machine MISC Inhale into the lungs nightly Indications: Sleep Apnea, Dr Soha Mathis Cpap. *Has started to use more consistently    Historical Provider, MD   allopurinol (ZYLOPRIM) 300 MG tablet Take 300 mg by mouth daily  9/10/17   Historical Provider, MD   Multiple Vitamins-Minerals (CENTRUM SILVER 50+MEN) TABS Take by mouth daily    Historical Provider, MD   Cholecalciferol (VITAMIN D-3 PO) Take 2,000 Units by mouth daily     Historical Provider, MD       Allergies: Patient has no known allergies. Review of Systems:   Pertinent positives and negatives are stated within HPI, all other systems reviewed and are negative.    ---------------------------------------------------PHYSICAL EXAM--------------------------------------    Constitutional/General: Alert and oriented x3, well appearing, non toxic in NAD  Head: Normocephalic and atraumatic  Eyes: PERRL, EOMI, mild decrease in closing of the right eye  Mouth: Oropharynx clear, handling secretions, no trismus. Right  facial droop  Neck: Supple, full ROM, non tender to palpation in the midline, no stridor, no crepitus, no meningeal signs  Pulmonary: Lungs clear to auscultation bilaterally, no wheezes, rales, or rhonchi.  mild accessory muscle use and tachypnea  Cardiovascular:  Regular rate. Regular rhythm. No murmurs, gallops, or rubs. 2+ distal pulses  Chest: no chest wall tenderness, there is a vertical incision which is clean, dry and intact, sutures are in place, no overlying erythema  Abdomen: Soft. Non tender. Non distended. +BS. No rebound, guarding, or rigidity. No pulsatile masses appreciated. Musculoskeletal: Moves all extremities x 4. Warm and well perfused, no clubbing, cyanosis, there is 2+ pitting edema bilaterally capillary refill <3 seconds  Skin: warm and dry. No rashes.    Neurologic: GCS 15, CN 2-12 grossly intact aside from right-sided Bell's palsy  deficits, symmetric strength 5/5 in the upper and lower extremities bilaterally. Speech  Normal finger to nose. No drift. Please also see NIH stroke scale. Psych: Normal Affect    NIH Stroke Scale/Score at time of initial evaluation:  1A: Level of Consciousness 0 - alert; keenly responsive   1B: Ask Month and Age 0 - answers both questions correctly   1C: Tell Patient To Open and Close Eyes, then Hand  Squeeze 0 - performs both tasks correctly   2: Test Horizontal Extraocular Movements 0 - normal   3: Test Visual Fields 0 - no visual loss   4: Test Facial Palsy 3 - complete paralysis of one or both sides (absence of facial movement in the upper and lower face)   5A: Test Left Arm Motor Drift 0 - no drift, limb holds 90 (or 45) degrees for full 10 seconds   5B: Test Right Arm Motor Drift 0 - no drift, limb holds 90 (or 45) degrees for full 10 seconds   6A: Test Left Leg Motor Drift 0 - no drift; leg holds 30 degree position for full 5 seconds   6B: Test Right Leg Motor Drift 0 - no drift; leg holds 30 degree position for full 5 seconds   7: Test Limb Ataxia   (FNF/Heel-Shin) 0 - absent   8: Test Sensation 0 - normal; no sensory loss   9: Test Language/Aphasia 0 - no aphasia, normal   10: Test Dysarthria 0 - normal   11: Test Extinction/Inattention 0 - no abnormality   Total Score: 3   10/14/20 at 11:34 AM EDT.       Acute CVA Core Measures:      - t-PA Eligibility: Not TPA eligible as patient is on Eliquis as well as has Bell's palsy      -------------------------------------------------- RESULTS -------------------------------------------------  All laboratory and imaging studies have been reviewed by myself    LABS:  Results for orders placed or performed during the hospital encounter of 10/14/20   CBC Auto Differential   Result Value Ref Range    WBC 10.3 4.5 - 11.5 E9/L    RBC 3.29 (L) 3.80 - 5.80 E12/L    Hemoglobin 9.6 (L) 12.5 - 16.5 g/dL    Hematocrit 34.4 (L) 37.0 - 54.0 %    .6 (H) 80.0 - 99.9 fL    MCH 29.2 26.0 - 35.0 pg    MCHC  ID [address]     Delivery Systems Cannula    EKG 12 Lead   Result Value Ref Range    Ventricular Rate 109 BPM    Atrial Rate 109 BPM    P-R Interval 120 ms    QRS Duration 92 ms    Q-T Interval 300 ms    QTc Calculation (Bazett) 404 ms    P Axis 54 degrees    R Axis -10 degrees    T Axis 42 degrees       RADIOLOGY:  Interpreted by Radiologist.  CTA CHEST W CONTRAST   Final Result   No central pulmonary embolism or aortic dissection. Atelectasis/infiltrate at the lung bases left more than right concerning for   developing pneumonia. Significant improvement in the previously described nodular density in the   right middle lobe. Sternotomy defect with an open wound and inflammatory changes in the anterior   chest wall with irregularity of the sternotomy and small amount of air in the   substernal area. Developing infectious inflammatory process including   sternal osteomyelitis is considered. CT HEAD WO CONTRAST   Final Result   1. No acute intracranial abnormality. 2. Chronic small vessel ischemic disease. EKG Interpretation  Interpreted by emergency department physician. Sinus tachycardia, rate of 109, no ST segment elevation or depression, no T wave inversions, DC interval 120 MS, QRS 92 MS,  MS, stable compared to patient's prior EKG        ------------------------- NURSING NOTES AND VITALS REVIEWED ---------------------------   The nursing notes within the ED encounter and vital signs as below have been reviewed. /65   Pulse 102   Temp 98.6 °F (37 °C) (Oral)   Resp 18   Wt 263 lb (119.3 kg)   SpO2 93%   BMI 37.74 kg/m²   Oxygen Saturation Interpretation: Normal    The patients available past medical records and past encounters were reviewed.         ------------------------------ ED COURSE/MEDICAL DECISION MAKING----------------------  Medications   iopamidol (ISOVUE-370) 76 % injection 75 mL (75 mLs Intravenous Given 10/14/20 1319)       Based upon patient's stroke like symptoms a stroke neurology consult is indicated. Acute CVA Core Measures:          Medical Decision Making:    Patient is a 67-year-old male presenting emergency department the chief complaint of facial droop. On exam patient did have Bell's palsy. Patient does also complain of mild shortness of breath. The patient is status post lung transplant. History was obtained from patient's medical record, the patient as well as his transplant coordinator Charo Varma. The patient did have labs and imaging which were reviewed. CBC fairly unremarkable aside from anemia hemoglobin of 9.6 which is patient's baseline, CMP unremarkable, troponin negative, ABG fairly unremarkable aside from mild hypercapnia, pH 7.35. CTA of the chest negative for any pulmonary embolus, did reveal the sternotomy infection for which patient is on outpatient antibiotics. CT head negative. The case was discussed with the patient transplant coordinator Charo Varma. She does recommend the patient be transferred after discussing with the patient's transplant surgeon. The patient will be transferred to [facility] AT [facility]. Re-Evaluations:             ED Course as of Oct 14 1544   Wed Oct 14, 2020   1248 Patient in the bed in no acute distress. [MT]   2539 Patient is in the bed in no acute distress. [MT]   36 Spoke with the patient's wife Steven Metcalf by phone. She states that patient does have antibiotics at home for infection which was and sternotomy wires. [MT]   Ahsan 7655 with Charo Varma the patient's transplant coordinator.   They would like the patient to go to [facility].  Patient will be admitted under Dr. Leida House    [MT]      ED Course User Index  [MT] Cyn Bocanegra DO         Critical care:  Critical Care: Please note that the withdrawal or failure to initiate urgent interventions for this patient would likely result in a life threatening deterioration or permanent disability. Accordingly this patient received 40 minutes of critical care time, excluding separately billable procedures. For coordination of care and transferred to [facility] [address]: The emergency provider has spoken with the patient and discussed todays presentation, condition, results and treatment options, in addition to providing specific details for the plan of care and counseling regarding the diagnosis and prognosis. Questions are answered at this time and they are agreeable with the plan. Was IV tPa Delay/Not Given: Not given as the patient does have Bell's palsy     --------------------------------- IMPRESSION AND DISPOSITION ---------------------------------    IMPRESSION  1. Bell's palsy    2.  Status post lung transplantation [facility])        DISPOSITION  Disposition: Transfer to [facility]  Patient condition is stable               Joanna Alan,   10/14/20 5023

## 2020-10-14 NOTE — ED NOTES
Wife Ashleigh Mock in car cell phone in Emergency Contact.   Patient had covid test done yesterday at [facility]  10/14/20 8490

## 2020-10-14 NOTE — ED NOTES
Bed: 16  Expected date:   Expected time:   Means of arrival:   Comments:  jareth Steve RN  10/14/20 1127

## 2020-10-14 NOTE — ED NOTES
Patient ambulated by LPN, states he ambulated in to the 93% range up and down the echevarria, as patient got to room 16- pulse oximetry down to 89%, patient denies feeling SOB during ambulation. Physician notified.       Jenaro Alamo RN  10/14/20 0291

## 2020-10-26 ENCOUNTER — HOSPITAL ENCOUNTER (OUTPATIENT)
Age: 65
Discharge: HOME | End: 2020-10-28
Payer: MEDICARE

## 2020-10-26 LAB
ANION GAP SERPL CALCULATED.3IONS-SCNC: 15 MMOL/L (ref 7–16)
ANISOCYTOSIS: ABNORMAL
BASOPHILS ABSOLUTE: 0 E9/L (ref 0–0.2)
BASOPHILS RELATIVE PERCENT: 0.2 % (ref 0–2)
BUN BLDV-MCNC: 33 MG/DL (ref 8–23)
C-REACTIVE PROTEIN: 0.8 MG/DL (ref 0–0.4)
CALCIUM SERPL-MCNC: 9 MG/DL (ref 8.6–10.2)
CHLORIDE BLD-SCNC: 96 MMOL/L (ref 98–107)
CO2: 28 MMOL/L (ref 22–29)
CREAT SERPL-MCNC: 0.8 MG/DL (ref 0.7–1.2)
EOSINOPHILS ABSOLUTE: 0 E9/L (ref 0.05–0.5)
EOSINOPHILS RELATIVE PERCENT: 0.1 % (ref 0–6)
GFR AFRICAN AMERICAN: >60
GFR NON-AFRICAN AMERICAN: >60 ML/MIN/1.73
GLUCOSE BLD-MCNC: 105 MG/DL (ref 74–99)
HCT VFR BLD CALC: 35.3 % (ref 37–54)
HEMOGLOBIN: 10.1 G/DL (ref 12.5–16.5)
HYPOCHROMIA: ABNORMAL
LYMPHOCYTES ABSOLUTE: 1.41 E9/L (ref 1.5–4)
LYMPHOCYTES RELATIVE PERCENT: 17.4 % (ref 20–42)
MCH RBC QN AUTO: 28.1 PG (ref 26–35)
MCHC RBC AUTO-ENTMCNC: 28.6 % (ref 32–34.5)
MCV RBC AUTO: 98.1 FL (ref 80–99.9)
MONOCYTES ABSOLUTE: 0.58 E9/L (ref 0.1–0.95)
MONOCYTES RELATIVE PERCENT: 7 % (ref 2–12)
NEUTROPHILS ABSOLUTE: 6.31 E9/L (ref 1.8–7.3)
NEUTROPHILS RELATIVE PERCENT: 75.7 % (ref 43–80)
OVALOCYTES: ABNORMAL
PDW BLD-RTO: 17.1 FL (ref 11.5–15)
PLATELET # BLD: 493 E9/L (ref 130–450)
PMV BLD AUTO: 10.1 FL (ref 7–12)
POIKILOCYTES: ABNORMAL
POLYCHROMASIA: ABNORMAL
POTASSIUM SERPL-SCNC: 4.5 MMOL/L (ref 3.5–5)
RBC # BLD: 3.6 E12/L (ref 3.8–5.8)
SEDIMENTATION RATE, ERYTHROCYTE: 77 MM/HR (ref 0–15)
SODIUM BLD-SCNC: 139 MMOL/L (ref 132–146)
TEAR DROP CELLS: ABNORMAL
WBC # BLD: 8.3 E9/L (ref 4.5–11.5)

## 2020-10-26 PROCEDURE — 86140 C-REACTIVE PROTEIN: CPT

## 2020-10-26 PROCEDURE — 85651 RBC SED RATE NONAUTOMATED: CPT

## 2020-10-26 PROCEDURE — 80048 BASIC METABOLIC PNL TOTAL CA: CPT

## 2020-10-26 PROCEDURE — 85025 COMPLETE CBC W/AUTO DIFF WBC: CPT

## 2021-03-15 LAB
ANION GAP SERPL CALCULATED.3IONS-SCNC: 5 MMOL/L (ref 7–16)
ANISOCYTOSIS: ABNORMAL
BASOPHILS ABSOLUTE: 0.08 E9/L (ref 0–0.2)
BASOPHILS RELATIVE PERCENT: 1.7 % (ref 0–2)
BUN BLDV-MCNC: 15 MG/DL (ref 8–23)
C-REACTIVE PROTEIN: 1.5 MG/DL (ref 0–0.4)
CALCIUM SERPL-MCNC: 9.1 MG/DL (ref 8.6–10.2)
CHLORIDE BLD-SCNC: 105 MMOL/L (ref 98–107)
CO2: 30 MMOL/L (ref 22–29)
CREAT SERPL-MCNC: 0.9 MG/DL (ref 0.7–1.2)
EOSINOPHILS ABSOLUTE: 0.12 E9/L (ref 0.05–0.5)
EOSINOPHILS RELATIVE PERCENT: 2.6 % (ref 0–6)
GFR AFRICAN AMERICAN: >60
GFR NON-AFRICAN AMERICAN: >60 ML/MIN/1.73
GLUCOSE BLD-MCNC: 134 MG/DL (ref 74–99)
HCT VFR BLD CALC: 30.1 % (ref 37–54)
HEMOGLOBIN: 8.5 G/DL (ref 12.5–16.5)
LYMPHOCYTES ABSOLUTE: 0.8 E9/L (ref 1.5–4)
LYMPHOCYTES RELATIVE PERCENT: 16.5 % (ref 20–42)
MCH RBC QN AUTO: 25.1 PG (ref 26–35)
MCHC RBC AUTO-ENTMCNC: 28.2 % (ref 32–34.5)
MCV RBC AUTO: 89.1 FL (ref 80–99.9)
MONOCYTES ABSOLUTE: 0.19 E9/L (ref 0.1–0.95)
MONOCYTES RELATIVE PERCENT: 4.3 % (ref 2–12)
NEUTROPHILS ABSOLUTE: 3.53 E9/L (ref 1.8–7.3)
NEUTROPHILS RELATIVE PERCENT: 74.8 % (ref 43–80)
OVALOCYTES: ABNORMAL
PDW BLD-RTO: 17.6 FL (ref 11.5–15)
PLATELET # BLD: 316 E9/L (ref 130–450)
PMV BLD AUTO: 10.4 FL (ref 7–12)
POIKILOCYTES: ABNORMAL
POLYCHROMASIA: ABNORMAL
POTASSIUM SERPL-SCNC: 4.3 MMOL/L (ref 3.5–5)
RBC # BLD: 3.38 E12/L (ref 3.8–5.8)
SODIUM BLD-SCNC: 140 MMOL/L (ref 132–146)
WBC # BLD: 4.7 E9/L (ref 4.5–11.5)

## 2021-03-22 LAB
ANION GAP SERPL CALCULATED.3IONS-SCNC: 7 MMOL/L (ref 7–16)
ANISOCYTOSIS: ABNORMAL
BASOPHILS ABSOLUTE: 0.05 E9/L (ref 0–0.2)
BASOPHILS RELATIVE PERCENT: 0.9 % (ref 0–2)
BUN BLDV-MCNC: 22 MG/DL (ref 8–23)
C-REACTIVE PROTEIN: 1 MG/DL (ref 0–0.4)
CALCIUM SERPL-MCNC: 9.2 MG/DL (ref 8.6–10.2)
CHLORIDE BLD-SCNC: 110 MMOL/L (ref 98–107)
CO2: 28 MMOL/L (ref 22–29)
CREAT SERPL-MCNC: 0.8 MG/DL (ref 0.7–1.2)
EOSINOPHILS ABSOLUTE: 0.13 E9/L (ref 0.05–0.5)
EOSINOPHILS RELATIVE PERCENT: 2.4 % (ref 0–6)
GFR AFRICAN AMERICAN: >60
GFR NON-AFRICAN AMERICAN: >60 ML/MIN/1.73
GLUCOSE BLD-MCNC: 83 MG/DL (ref 74–99)
HCT VFR BLD CALC: 29.3 % (ref 37–54)
HEMOGLOBIN: 8.2 G/DL (ref 12.5–16.5)
HYPOCHROMIA: ABNORMAL
IMMATURE GRANULOCYTES #: 0.27 E9/L
IMMATURE GRANULOCYTES %: 4.9 % (ref 0–5)
LYMPHOCYTES ABSOLUTE: 0.92 E9/L (ref 1.5–4)
LYMPHOCYTES RELATIVE PERCENT: 16.8 % (ref 20–42)
MCH RBC QN AUTO: 25.1 PG (ref 26–35)
MCHC RBC AUTO-ENTMCNC: 28 % (ref 32–34.5)
MCV RBC AUTO: 89.6 FL (ref 80–99.9)
MONOCYTES ABSOLUTE: 0.51 E9/L (ref 0.1–0.95)
MONOCYTES RELATIVE PERCENT: 9.3 % (ref 2–12)
NEUTROPHILS ABSOLUTE: 3.61 E9/L (ref 1.8–7.3)
NEUTROPHILS RELATIVE PERCENT: 65.7 % (ref 43–80)
OVALOCYTES: ABNORMAL
PDW BLD-RTO: 17.3 FL (ref 11.5–15)
PLATELET # BLD: 320 E9/L (ref 130–450)
PMV BLD AUTO: 11.1 FL (ref 7–12)
POIKILOCYTES: ABNORMAL
POLYCHROMASIA: ABNORMAL
POTASSIUM SERPL-SCNC: 4.6 MMOL/L (ref 3.5–5)
RBC # BLD: 3.27 E12/L (ref 3.8–5.8)
SODIUM BLD-SCNC: 145 MMOL/L (ref 132–146)
WBC # BLD: 5.5 E9/L (ref 4.5–11.5)

## 2021-03-29 LAB
ANION GAP SERPL CALCULATED.3IONS-SCNC: 8 MMOL/L (ref 7–16)
ANISOCYTOSIS: ABNORMAL
BASOPHILS ABSOLUTE: 0.1 E9/L (ref 0–0.2)
BASOPHILS RELATIVE PERCENT: 1.7 % (ref 0–2)
BUN BLDV-MCNC: 23 MG/DL (ref 8–23)
C-REACTIVE PROTEIN: 0.7 MG/DL (ref 0–0.4)
CALCIUM SERPL-MCNC: 9 MG/DL (ref 8.6–10.2)
CHLORIDE BLD-SCNC: 107 MMOL/L (ref 98–107)
CHOLESTEROL, TOTAL: 141 MG/DL (ref 0–199)
CO2: 27 MMOL/L (ref 22–29)
CREAT SERPL-MCNC: 0.8 MG/DL (ref 0.7–1.2)
EOSINOPHILS ABSOLUTE: 0.2 E9/L (ref 0.05–0.5)
EOSINOPHILS RELATIVE PERCENT: 3.5 % (ref 0–6)
GFR AFRICAN AMERICAN: >60
GFR NON-AFRICAN AMERICAN: >60 ML/MIN/1.73
GLUCOSE BLD-MCNC: 94 MG/DL (ref 74–99)
HCT VFR BLD CALC: 30.2 % (ref 37–54)
HDLC SERPL-MCNC: 29 MG/DL
HEMOGLOBIN: 8.5 G/DL (ref 12.5–16.5)
HYPOCHROMIA: ABNORMAL
LDL CHOLESTEROL CALCULATED: 71 MG/DL (ref 0–99)
LYMPHOCYTES ABSOLUTE: 1.54 E9/L (ref 1.5–4)
LYMPHOCYTES RELATIVE PERCENT: 27 % (ref 20–42)
MCH RBC QN AUTO: 24.5 PG (ref 26–35)
MCHC RBC AUTO-ENTMCNC: 28.1 % (ref 32–34.5)
MCV RBC AUTO: 87 FL (ref 80–99.9)
MONOCYTES ABSOLUTE: 0.23 E9/L (ref 0.1–0.95)
MONOCYTES RELATIVE PERCENT: 3.5 % (ref 2–12)
NEUTROPHILS ABSOLUTE: 3.65 E9/L (ref 1.8–7.3)
NEUTROPHILS RELATIVE PERCENT: 64.3 % (ref 43–80)
OVALOCYTES: ABNORMAL
PDW BLD-RTO: 17.6 FL (ref 11.5–15)
PLATELET # BLD: 366 E9/L (ref 130–450)
PMV BLD AUTO: 11.4 FL (ref 7–12)
POIKILOCYTES: ABNORMAL
POLYCHROMASIA: ABNORMAL
POTASSIUM SERPL-SCNC: 4.5 MMOL/L (ref 3.5–5)
RBC # BLD: 3.47 E12/L (ref 3.8–5.8)
SODIUM BLD-SCNC: 142 MMOL/L (ref 132–146)
TRIGL SERPL-MCNC: 206 MG/DL (ref 0–149)
URIC ACID, SERUM: 2.9 MG/DL (ref 3.4–7)
VLDLC SERPL CALC-MCNC: 41 MG/DL
WBC # BLD: 5.7 E9/L (ref 4.5–11.5)

## 2021-04-05 LAB
ANION GAP SERPL CALCULATED.3IONS-SCNC: 10 MMOL/L (ref 7–16)
ANISOCYTOSIS: ABNORMAL
BASOPHILS ABSOLUTE: 0.08 E9/L (ref 0–0.2)
BASOPHILS RELATIVE PERCENT: 1.7 % (ref 0–2)
BUN BLDV-MCNC: 27 MG/DL (ref 8–23)
C-REACTIVE PROTEIN: 0.8 MG/DL (ref 0–0.4)
CALCIUM SERPL-MCNC: 8.8 MG/DL (ref 8.6–10.2)
CHLORIDE BLD-SCNC: 107 MMOL/L (ref 98–107)
CO2: 24 MMOL/L (ref 22–29)
CREAT SERPL-MCNC: 0.8 MG/DL (ref 0.7–1.2)
EOSINOPHILS ABSOLUTE: 0.08 E9/L (ref 0.05–0.5)
EOSINOPHILS RELATIVE PERCENT: 1.7 % (ref 0–6)
GFR AFRICAN AMERICAN: >60
GFR NON-AFRICAN AMERICAN: >60 ML/MIN/1.73
GLUCOSE BLD-MCNC: 110 MG/DL (ref 74–99)
HCT VFR BLD CALC: 26.9 % (ref 37–54)
HEMOGLOBIN: 7.5 G/DL (ref 12.5–16.5)
HYPOCHROMIA: ABNORMAL
LYMPHOCYTES ABSOLUTE: 0.58 E9/L (ref 1.5–4)
LYMPHOCYTES RELATIVE PERCENT: 12.2 % (ref 20–42)
MCH RBC QN AUTO: 23.9 PG (ref 26–35)
MCHC RBC AUTO-ENTMCNC: 27.9 % (ref 32–34.5)
MCV RBC AUTO: 85.7 FL (ref 80–99.9)
MONOCYTES ABSOLUTE: 0.14 E9/L (ref 0.1–0.95)
MONOCYTES RELATIVE PERCENT: 2.6 % (ref 2–12)
NEUTROPHILS ABSOLUTE: 3.94 E9/L (ref 1.8–7.3)
NEUTROPHILS RELATIVE PERCENT: 81.7 % (ref 43–80)
OVALOCYTES: ABNORMAL
PDW BLD-RTO: 17.7 FL (ref 11.5–15)
PLATELET # BLD: 287 E9/L (ref 130–450)
PMV BLD AUTO: 10.8 FL (ref 7–12)
POIKILOCYTES: ABNORMAL
POLYCHROMASIA: ABNORMAL
POTASSIUM SERPL-SCNC: 4.6 MMOL/L (ref 3.5–5)
RBC # BLD: 3.14 E12/L (ref 3.8–5.8)
SODIUM BLD-SCNC: 141 MMOL/L (ref 132–146)
TARGET CELLS: ABNORMAL
WBC # BLD: 4.8 E9/L (ref 4.5–11.5)

## 2021-04-12 LAB
ALBUMIN SERPL-MCNC: 3.2 G/DL (ref 3.5–5.2)
ALP BLD-CCNC: 346 U/L (ref 40–129)
ALT SERPL-CCNC: 20 U/L (ref 0–40)
ANION GAP SERPL CALCULATED.3IONS-SCNC: 9 MMOL/L (ref 7–16)
ANISOCYTOSIS: ABNORMAL
AST SERPL-CCNC: 43 U/L (ref 0–39)
BASOPHILS ABSOLUTE: 0.08 E9/L (ref 0–0.2)
BASOPHILS RELATIVE PERCENT: 2 % (ref 0–2)
BILIRUB SERPL-MCNC: 0.3 MG/DL (ref 0–1.2)
BUN BLDV-MCNC: 19 MG/DL (ref 8–23)
BURR CELLS: ABNORMAL
C-REACTIVE PROTEIN: 0.5 MG/DL (ref 0–0.4)
CALCIUM SERPL-MCNC: 9 MG/DL (ref 8.6–10.2)
CHLORIDE BLD-SCNC: 104 MMOL/L (ref 98–107)
CO2: 26 MMOL/L (ref 22–29)
CREAT SERPL-MCNC: 0.7 MG/DL (ref 0.7–1.2)
EOSINOPHILS ABSOLUTE: 0.08 E9/L (ref 0.05–0.5)
EOSINOPHILS RELATIVE PERCENT: 2 % (ref 0–6)
GFR AFRICAN AMERICAN: >60
GFR NON-AFRICAN AMERICAN: >60 ML/MIN/1.73
GLUCOSE BLD-MCNC: 81 MG/DL (ref 74–99)
HCT VFR BLD CALC: 29.1 % (ref 37–54)
HEMOGLOBIN: 8 G/DL (ref 12.5–16.5)
HYPOCHROMIA: ABNORMAL
IMMATURE GRANULOCYTES #: 0.06 E9/L
IMMATURE GRANULOCYTES %: 1.5 % (ref 0–5)
LYMPHOCYTES ABSOLUTE: 0.92 E9/L (ref 1.5–4)
LYMPHOCYTES RELATIVE PERCENT: 22.9 % (ref 20–42)
MCH RBC QN AUTO: 23 PG (ref 26–35)
MCHC RBC AUTO-ENTMCNC: 27.5 % (ref 32–34.5)
MCV RBC AUTO: 83.6 FL (ref 80–99.9)
MONOCYTES ABSOLUTE: 0.33 E9/L (ref 0.1–0.95)
MONOCYTES RELATIVE PERCENT: 8.2 % (ref 2–12)
NEUTROPHILS ABSOLUTE: 2.54 E9/L (ref 1.8–7.3)
NEUTROPHILS RELATIVE PERCENT: 63.4 % (ref 43–80)
OVALOCYTES: ABNORMAL
PDW BLD-RTO: 18 FL (ref 11.5–15)
PLATELET # BLD: 313 E9/L (ref 130–450)
PMV BLD AUTO: 10.7 FL (ref 7–12)
POIKILOCYTES: ABNORMAL
POLYCHROMASIA: ABNORMAL
POTASSIUM SERPL-SCNC: 4.3 MMOL/L (ref 3.5–5)
RBC # BLD: 3.48 E12/L (ref 3.8–5.8)
SODIUM BLD-SCNC: 139 MMOL/L (ref 132–146)
TARGET CELLS: ABNORMAL
TEAR DROP CELLS: ABNORMAL
TOTAL PROTEIN: 6.4 G/DL (ref 6.4–8.3)
WBC # BLD: 4 E9/L (ref 4.5–11.5)

## 2021-04-19 LAB
ALBUMIN SERPL-MCNC: 3.5 G/DL (ref 3.5–5.2)
ALP BLD-CCNC: 338 U/L (ref 40–129)
ALT SERPL-CCNC: 16 U/L (ref 0–40)
ANION GAP SERPL CALCULATED.3IONS-SCNC: 12 MMOL/L (ref 7–16)
ANISOCYTOSIS: ABNORMAL
AST SERPL-CCNC: 37 U/L (ref 0–39)
BASOPHILS ABSOLUTE: 0.13 E9/L (ref 0–0.2)
BASOPHILS RELATIVE PERCENT: 2.6 % (ref 0–2)
BILIRUB SERPL-MCNC: 0.3 MG/DL (ref 0–1.2)
BUN BLDV-MCNC: 19 MG/DL (ref 8–23)
BURR CELLS: ABNORMAL
C-REACTIVE PROTEIN: 0.5 MG/DL (ref 0–0.4)
CALCIUM SERPL-MCNC: 9.1 MG/DL (ref 8.6–10.2)
CHLORIDE BLD-SCNC: 104 MMOL/L (ref 98–107)
CO2: 25 MMOL/L (ref 22–29)
CREAT SERPL-MCNC: 0.9 MG/DL (ref 0.7–1.2)
EOSINOPHILS ABSOLUTE: 0.18 E9/L (ref 0.05–0.5)
EOSINOPHILS RELATIVE PERCENT: 3.5 % (ref 0–6)
GFR AFRICAN AMERICAN: >60
GFR NON-AFRICAN AMERICAN: >60 ML/MIN/1.73
GLUCOSE BLD-MCNC: 86 MG/DL (ref 74–99)
HCT VFR BLD CALC: 29.7 % (ref 37–54)
HEMOGLOBIN: 8.1 G/DL (ref 12.5–16.5)
HYPOCHROMIA: ABNORMAL
LYMPHOCYTES ABSOLUTE: 0.85 E9/L (ref 1.5–4)
LYMPHOCYTES RELATIVE PERCENT: 16.5 % (ref 20–42)
MCH RBC QN AUTO: 22.7 PG (ref 26–35)
MCHC RBC AUTO-ENTMCNC: 27.3 % (ref 32–34.5)
MCV RBC AUTO: 83.2 FL (ref 80–99.9)
MONOCYTES ABSOLUTE: 0.2 E9/L (ref 0.1–0.95)
MONOCYTES RELATIVE PERCENT: 4.3 % (ref 2–12)
NEUTROPHILS ABSOLUTE: 3.65 E9/L (ref 1.8–7.3)
NEUTROPHILS RELATIVE PERCENT: 73 % (ref 43–80)
OVALOCYTES: ABNORMAL
PDW BLD-RTO: 18.1 FL (ref 11.5–15)
PLATELET # BLD: 318 E9/L (ref 130–450)
PMV BLD AUTO: 10.6 FL (ref 7–12)
POIKILOCYTES: ABNORMAL
POLYCHROMASIA: ABNORMAL
POTASSIUM SERPL-SCNC: 4.6 MMOL/L (ref 3.5–5)
RBC # BLD: 3.57 E12/L (ref 3.8–5.8)
SCHISTOCYTES: ABNORMAL
SODIUM BLD-SCNC: 141 MMOL/L (ref 132–146)
TOTAL PROTEIN: 6.3 G/DL (ref 6.4–8.3)
WBC # BLD: 5 E9/L (ref 4.5–11.5)

## 2021-04-26 LAB
ANION GAP SERPL CALCULATED.3IONS-SCNC: 10 MMOL/L (ref 7–16)
ANISOCYTOSIS: ABNORMAL
BASOPHILS ABSOLUTE: 0.07 E9/L (ref 0–0.2)
BASOPHILS RELATIVE PERCENT: 1.5 % (ref 0–2)
BUN BLDV-MCNC: 26 MG/DL (ref 6–23)
BURR CELLS: ABNORMAL
C-REACTIVE PROTEIN: 0.5 MG/DL (ref 0–0.4)
CALCIUM SERPL-MCNC: 9.1 MG/DL (ref 8.6–10.2)
CHLORIDE BLD-SCNC: 108 MMOL/L (ref 98–107)
CO2: 25 MMOL/L (ref 22–29)
CREAT SERPL-MCNC: 0.8 MG/DL (ref 0.7–1.2)
EOSINOPHILS ABSOLUTE: 0.17 E9/L (ref 0.05–0.5)
EOSINOPHILS RELATIVE PERCENT: 3.7 % (ref 0–6)
GFR AFRICAN AMERICAN: >60
GFR NON-AFRICAN AMERICAN: >60 ML/MIN/1.73
GLUCOSE FASTING: 89 MG/DL (ref 74–99)
HCT VFR BLD CALC: 29 % (ref 37–54)
HEMOGLOBIN: 7.8 G/DL (ref 12.5–16.5)
HYPOCHROMIA: ABNORMAL
IMMATURE GRANULOCYTES #: 0.04 E9/L
IMMATURE GRANULOCYTES %: 0.9 % (ref 0–5)
LYMPHOCYTES ABSOLUTE: 1.2 E9/L (ref 1.5–4)
LYMPHOCYTES RELATIVE PERCENT: 25.9 % (ref 20–42)
MCH RBC QN AUTO: 21.8 PG (ref 26–35)
MCHC RBC AUTO-ENTMCNC: 26.9 % (ref 32–34.5)
MCV RBC AUTO: 81.2 FL (ref 80–99.9)
MONOCYTES ABSOLUTE: 0.4 E9/L (ref 0.1–0.95)
MONOCYTES RELATIVE PERCENT: 8.6 % (ref 2–12)
NEUTROPHILS ABSOLUTE: 2.76 E9/L (ref 1.8–7.3)
NEUTROPHILS RELATIVE PERCENT: 59.4 % (ref 43–80)
OVALOCYTES: ABNORMAL
PDW BLD-RTO: 18.5 FL (ref 11.5–15)
PLATELET # BLD: 316 E9/L (ref 130–450)
PMV BLD AUTO: 11.6 FL (ref 7–12)
POIKILOCYTES: ABNORMAL
POLYCHROMASIA: ABNORMAL
POTASSIUM SERPL-SCNC: 4.7 MMOL/L (ref 3.5–5)
RBC # BLD: 3.57 E12/L (ref 3.8–5.8)
SCHISTOCYTES: ABNORMAL
SODIUM BLD-SCNC: 143 MMOL/L (ref 132–146)
TARGET CELLS: ABNORMAL
WBC # BLD: 4.6 E9/L (ref 4.5–11.5)

## 2021-05-03 LAB
ALBUMIN SERPL-MCNC: 3.2 G/DL (ref 3.5–5.2)
ALP BLD-CCNC: 268 U/L (ref 40–129)
ALT SERPL-CCNC: 12 U/L (ref 0–40)
ANION GAP SERPL CALCULATED.3IONS-SCNC: 7 MMOL/L (ref 7–16)
ANISOCYTOSIS: ABNORMAL
AST SERPL-CCNC: 28 U/L (ref 0–39)
BASOPHILS ABSOLUTE: 0.09 E9/L (ref 0–0.2)
BASOPHILS RELATIVE PERCENT: 1.9 % (ref 0–2)
BILIRUB SERPL-MCNC: 0.3 MG/DL (ref 0–1.2)
BUN BLDV-MCNC: 24 MG/DL (ref 6–23)
C-REACTIVE PROTEIN: 0.6 MG/DL (ref 0–0.4)
CALCIUM SERPL-MCNC: 8.9 MG/DL (ref 8.6–10.2)
CHLORIDE BLD-SCNC: 105 MMOL/L (ref 98–107)
CO2: 27 MMOL/L (ref 22–29)
CREAT SERPL-MCNC: 0.9 MG/DL (ref 0.7–1.2)
EOSINOPHILS ABSOLUTE: 0.13 E9/L (ref 0.05–0.5)
EOSINOPHILS RELATIVE PERCENT: 2.8 % (ref 0–6)
GFR AFRICAN AMERICAN: >60
GFR NON-AFRICAN AMERICAN: >60 ML/MIN/1.73
GLUCOSE BLD-MCNC: 91 MG/DL (ref 74–99)
HCT VFR BLD CALC: 27.8 % (ref 37–54)
HEMOGLOBIN: 7.6 G/DL (ref 12.5–16.5)
HYPOCHROMIA: ABNORMAL
LYMPHOCYTES ABSOLUTE: 0.89 E9/L (ref 1.5–4)
LYMPHOCYTES RELATIVE PERCENT: 18.5 % (ref 20–42)
MCH RBC QN AUTO: 22 PG (ref 26–35)
MCHC RBC AUTO-ENTMCNC: 27.3 % (ref 32–34.5)
MCV RBC AUTO: 80.6 FL (ref 80–99.9)
MONOCYTES ABSOLUTE: 0.14 E9/L (ref 0.1–0.95)
MONOCYTES RELATIVE PERCENT: 2.8 % (ref 2–12)
NEUTROPHILS ABSOLUTE: 3.48 E9/L (ref 1.8–7.3)
NEUTROPHILS RELATIVE PERCENT: 74.1 % (ref 43–80)
OVALOCYTES: ABNORMAL
PDW BLD-RTO: 19 FL (ref 11.5–15)
PLATELET # BLD: 246 E9/L (ref 130–450)
PMV BLD AUTO: 10.2 FL (ref 7–12)
POIKILOCYTES: ABNORMAL
POLYCHROMASIA: ABNORMAL
POTASSIUM SERPL-SCNC: 4.5 MMOL/L (ref 3.5–5)
RBC # BLD: 3.45 E12/L (ref 3.8–5.8)
SCHISTOCYTES: ABNORMAL
SODIUM BLD-SCNC: 139 MMOL/L (ref 132–146)
TEAR DROP CELLS: ABNORMAL
TOTAL PROTEIN: 5.9 G/DL (ref 6.4–8.3)
WBC # BLD: 4.7 E9/L (ref 4.5–11.5)

## 2021-05-10 LAB
ALBUMIN SERPL-MCNC: 3.6 G/DL (ref 3.5–5.2)
ALP BLD-CCNC: 319 U/L (ref 40–129)
ALT SERPL-CCNC: 16 U/L (ref 0–40)
ANION GAP SERPL CALCULATED.3IONS-SCNC: 10 MMOL/L (ref 7–16)
ANISOCYTOSIS: ABNORMAL
AST SERPL-CCNC: 36 U/L (ref 0–39)
BASOPHILS ABSOLUTE: 0.11 E9/L (ref 0–0.2)
BASOPHILS RELATIVE PERCENT: 2.6 % (ref 0–2)
BILIRUB SERPL-MCNC: 0.3 MG/DL (ref 0–1.2)
BUN BLDV-MCNC: 29 MG/DL (ref 6–23)
BURR CELLS: ABNORMAL
C-REACTIVE PROTEIN: 0.4 MG/DL (ref 0–0.4)
CALCIUM SERPL-MCNC: 9.4 MG/DL (ref 8.6–10.2)
CHLORIDE BLD-SCNC: 105 MMOL/L (ref 98–107)
CO2: 25 MMOL/L (ref 22–29)
CREAT SERPL-MCNC: 0.8 MG/DL (ref 0.7–1.2)
EOSINOPHILS ABSOLUTE: 0.15 E9/L (ref 0.05–0.5)
EOSINOPHILS RELATIVE PERCENT: 3.5 % (ref 0–6)
GFR AFRICAN AMERICAN: >60
GFR NON-AFRICAN AMERICAN: >60 ML/MIN/1.73
GLUCOSE BLD-MCNC: 88 MG/DL (ref 74–99)
HCT VFR BLD CALC: 30.8 % (ref 37–54)
HEMOGLOBIN: 8.2 G/DL (ref 12.5–16.5)
HYPOCHROMIA: ABNORMAL
LYMPHOCYTES ABSOLUTE: 0.86 E9/L (ref 1.5–4)
LYMPHOCYTES RELATIVE PERCENT: 20.2 % (ref 20–42)
MCH RBC QN AUTO: 21.6 PG (ref 26–35)
MCHC RBC AUTO-ENTMCNC: 26.6 % (ref 32–34.5)
MCV RBC AUTO: 81.1 FL (ref 80–99.9)
MONOCYTES ABSOLUTE: 0.34 E9/L (ref 0.1–0.95)
MONOCYTES RELATIVE PERCENT: 7.9 % (ref 2–12)
NEUTROPHILS ABSOLUTE: 2.84 E9/L (ref 1.8–7.3)
NEUTROPHILS RELATIVE PERCENT: 65.8 % (ref 43–80)
NUCLEATED RED BLOOD CELLS: 0.9 /100 WBC
OVALOCYTES: ABNORMAL
PDW BLD-RTO: 19.1 FL (ref 11.5–15)
PLATELET # BLD: 318 E9/L (ref 130–450)
PMV BLD AUTO: 11.5 FL (ref 7–12)
POIKILOCYTES: ABNORMAL
POLYCHROMASIA: ABNORMAL
POTASSIUM SERPL-SCNC: 4.6 MMOL/L (ref 3.5–5)
RBC # BLD: 3.8 E12/L (ref 3.8–5.8)
SCHISTOCYTES: ABNORMAL
SODIUM BLD-SCNC: 140 MMOL/L (ref 132–146)
TOTAL PROTEIN: 6.5 G/DL (ref 6.4–8.3)
VACUOLATED NEUTROPHILS: ABNORMAL
WBC # BLD: 4.3 E9/L (ref 4.5–11.5)

## 2021-05-17 LAB
ACANTHOCYTES: ABNORMAL
ALBUMIN SERPL-MCNC: 3.5 G/DL (ref 3.5–5.2)
ALP BLD-CCNC: 326 U/L (ref 40–129)
ALT SERPL-CCNC: 16 U/L (ref 0–40)
ANION GAP SERPL CALCULATED.3IONS-SCNC: 10 MMOL/L (ref 7–16)
ANISOCYTOSIS: ABNORMAL
AST SERPL-CCNC: 31 U/L (ref 0–39)
BASOPHILS ABSOLUTE: 0.03 E9/L (ref 0–0.2)
BASOPHILS RELATIVE PERCENT: 0.9 % (ref 0–2)
BILIRUB SERPL-MCNC: 0.3 MG/DL (ref 0–1.2)
BUN BLDV-MCNC: 25 MG/DL (ref 6–23)
BURR CELLS: ABNORMAL
C-REACTIVE PROTEIN: 0.5 MG/DL (ref 0–0.4)
CALCIUM SERPL-MCNC: 9.1 MG/DL (ref 8.6–10.2)
CHLORIDE BLD-SCNC: 101 MMOL/L (ref 98–107)
CO2: 29 MMOL/L (ref 22–29)
CREAT SERPL-MCNC: 0.8 MG/DL (ref 0.7–1.2)
EOSINOPHILS ABSOLUTE: 0 E9/L (ref 0.05–0.5)
EOSINOPHILS RELATIVE PERCENT: 1.4 % (ref 0–6)
GFR AFRICAN AMERICAN: >60
GFR NON-AFRICAN AMERICAN: >60 ML/MIN/1.73
GLUCOSE BLD-MCNC: 105 MG/DL (ref 74–99)
HCT VFR BLD CALC: 30.1 % (ref 37–54)
HEMOGLOBIN: 8.2 G/DL (ref 12.5–16.5)
HYPOCHROMIA: ABNORMAL
LYMPHOCYTES ABSOLUTE: 1.11 E9/L (ref 1.5–4)
LYMPHOCYTES RELATIVE PERCENT: 29.6 % (ref 20–42)
MCH RBC QN AUTO: 21.6 PG (ref 26–35)
MCHC RBC AUTO-ENTMCNC: 27.2 % (ref 32–34.5)
MCV RBC AUTO: 79.2 FL (ref 80–99.9)
MONOCYTES ABSOLUTE: 0.18 E9/L (ref 0.1–0.95)
MONOCYTES RELATIVE PERCENT: 5.2 % (ref 2–12)
NEUTROPHILS ABSOLUTE: 2.37 E9/L (ref 1.8–7.3)
NEUTROPHILS RELATIVE PERCENT: 64.3 % (ref 43–80)
OVALOCYTES: ABNORMAL
PDW BLD-RTO: 19 FL (ref 11.5–15)
PLATELET # BLD: 313 E9/L (ref 130–450)
PMV BLD AUTO: 12.2 FL (ref 7–12)
POIKILOCYTES: ABNORMAL
POLYCHROMASIA: ABNORMAL
POTASSIUM SERPL-SCNC: 4.5 MMOL/L (ref 3.5–5)
RBC # BLD: 3.8 E12/L (ref 3.8–5.8)
SCHISTOCYTES: ABNORMAL
SODIUM BLD-SCNC: 140 MMOL/L (ref 132–146)
TEAR DROP CELLS: ABNORMAL
TOTAL PROTEIN: 6.3 G/DL (ref 6.4–8.3)
WBC # BLD: 3.7 E9/L (ref 4.5–11.5)

## 2021-05-24 LAB
ACANTHOCYTES: ABNORMAL
ALBUMIN SERPL-MCNC: 3.5 G/DL (ref 3.5–5.2)
ALP BLD-CCNC: 334 U/L (ref 40–129)
ALT SERPL-CCNC: 30 U/L (ref 0–40)
ANION GAP SERPL CALCULATED.3IONS-SCNC: 7 MMOL/L (ref 7–16)
ANISOCYTOSIS: ABNORMAL
AST SERPL-CCNC: 77 U/L (ref 0–39)
BASOPHILS ABSOLUTE: 0.13 E9/L (ref 0–0.2)
BASOPHILS RELATIVE PERCENT: 3.5 % (ref 0–2)
BILIRUB SERPL-MCNC: 0.4 MG/DL (ref 0–1.2)
BUN BLDV-MCNC: 20 MG/DL (ref 6–23)
BURR CELLS: ABNORMAL
C-REACTIVE PROTEIN: 0.4 MG/DL (ref 0–0.4)
CALCIUM SERPL-MCNC: 9 MG/DL (ref 8.6–10.2)
CHLORIDE BLD-SCNC: 108 MMOL/L (ref 98–107)
CO2: 27 MMOL/L (ref 22–29)
CREAT SERPL-MCNC: 0.8 MG/DL (ref 0.7–1.2)
EOSINOPHILS ABSOLUTE: 0 E9/L (ref 0.05–0.5)
EOSINOPHILS RELATIVE PERCENT: 1.4 % (ref 0–6)
GFR AFRICAN AMERICAN: >60
GFR NON-AFRICAN AMERICAN: >60 ML/MIN/1.73
GLUCOSE BLD-MCNC: 89 MG/DL (ref 74–99)
HCT VFR BLD CALC: 29 % (ref 37–54)
HEMOGLOBIN: 7.9 G/DL (ref 12.5–16.5)
HYPOCHROMIA: ABNORMAL
LYMPHOCYTES ABSOLUTE: 0.97 E9/L (ref 1.5–4)
LYMPHOCYTES RELATIVE PERCENT: 27 % (ref 20–42)
MCH RBC QN AUTO: 21.4 PG (ref 26–35)
MCHC RBC AUTO-ENTMCNC: 27.2 % (ref 32–34.5)
MCV RBC AUTO: 78.6 FL (ref 80–99.9)
MONOCYTES ABSOLUTE: 0.22 E9/L (ref 0.1–0.95)
MONOCYTES RELATIVE PERCENT: 6.1 % (ref 2–12)
NEUTROPHILS ABSOLUTE: 2.3 E9/L (ref 1.8–7.3)
NEUTROPHILS RELATIVE PERCENT: 63.5 % (ref 43–80)
OVALOCYTES: ABNORMAL
PDW BLD-RTO: 19.2 FL (ref 11.5–15)
PLATELET # BLD: 285 E9/L (ref 130–450)
PMV BLD AUTO: 10.7 FL (ref 7–12)
POIKILOCYTES: ABNORMAL
POLYCHROMASIA: ABNORMAL
POTASSIUM SERPL-SCNC: 4.5 MMOL/L (ref 3.5–5)
RBC # BLD: 3.69 E12/L (ref 3.8–5.8)
SCHISTOCYTES: ABNORMAL
SODIUM BLD-SCNC: 142 MMOL/L (ref 132–146)
TEAR DROP CELLS: ABNORMAL
TOTAL PROTEIN: 6.3 G/DL (ref 6.4–8.3)
WBC # BLD: 3.6 E9/L (ref 4.5–11.5)

## 2022-06-25 NOTE — ED NOTES
-H/H remaining stable  -serial BMP, Fe, TIBC, ferritin, B12 and folate.     Returned from CT at this time. Wife at the bedside.      Jd Gutierrez RN  09/20/20 9553

## 2022-06-29 ENCOUNTER — APPOINTMENT (OUTPATIENT)
Dept: GENERAL RADIOLOGY | Age: 67
DRG: 563 | End: 2022-06-29
Payer: MEDICARE

## 2022-06-29 ENCOUNTER — APPOINTMENT (OUTPATIENT)
Dept: CT IMAGING | Age: 67
DRG: 563 | End: 2022-06-29
Payer: MEDICARE

## 2022-06-29 ENCOUNTER — HOSPITAL ENCOUNTER (INPATIENT)
Age: 67
LOS: 2 days | Discharge: HOME | DRG: 563 | End: 2022-07-01
Attending: EMERGENCY MEDICINE | Admitting: INTERNAL MEDICINE
Payer: MEDICARE

## 2022-06-29 DIAGNOSIS — R55 SYNCOPE AND COLLAPSE: Primary | ICD-10-CM

## 2022-06-29 DIAGNOSIS — S42.294A OTHER CLOSED NONDISPLACED FRACTURE OF PROXIMAL END OF RIGHT HUMERUS, INITIAL ENCOUNTER: ICD-10-CM

## 2022-06-29 LAB
ANION GAP SERPL CALCULATED.3IONS-SCNC: 6 MMOL/L (ref 7–16)
BACTERIA: ABNORMAL /HPF
BILIRUBIN URINE: NEGATIVE
BLOOD, URINE: NEGATIVE
BUN BLDV-MCNC: 19 MG/DL (ref 6–23)
CALCIUM SERPL-MCNC: 8.7 MG/DL (ref 8.6–10.2)
CHLORIDE BLD-SCNC: 100 MMOL/L (ref 98–107)
CLARITY: CLEAR
CO2: 30 MMOL/L (ref 22–29)
COLOR: YELLOW
CORTISOL TOTAL: 15.45 MCG/DL (ref 2.68–18.4)
CREAT SERPL-MCNC: 0.7 MG/DL (ref 0.7–1.2)
EKG ATRIAL RATE: 68 BPM
EKG P AXIS: 84 DEGREES
EKG P-R INTERVAL: 122 MS
EKG Q-T INTERVAL: 402 MS
EKG QRS DURATION: 82 MS
EKG QTC CALCULATION (BAZETT): 427 MS
EKG R AXIS: 1 DEGREES
EKG T AXIS: 34 DEGREES
EKG VENTRICULAR RATE: 68 BPM
EPITHELIAL CELLS, UA: ABNORMAL /HPF
GFR AFRICAN AMERICAN: >60
GFR NON-AFRICAN AMERICAN: >60 ML/MIN/1.73
GLUCOSE BLD-MCNC: 129 MG/DL (ref 74–99)
GLUCOSE URINE: >=1000 MG/DL
HCT VFR BLD CALC: 32.9 % (ref 37–54)
HEMOGLOBIN: 10.2 G/DL (ref 12.5–16.5)
KETONES, URINE: NEGATIVE MG/DL
LACTIC ACID: 1.3 MMOL/L (ref 0.5–2.2)
LEUKOCYTE ESTERASE, URINE: NEGATIVE
MAGNESIUM: 1.9 MG/DL (ref 1.6–2.6)
MCH RBC QN AUTO: 32.4 PG (ref 26–35)
MCHC RBC AUTO-ENTMCNC: 31 % (ref 32–34.5)
MCV RBC AUTO: 104.4 FL (ref 80–99.9)
METER GLUCOSE: 161 MG/DL (ref 74–99)
NITRITE, URINE: NEGATIVE
PDW BLD-RTO: 15.9 FL (ref 11.5–15)
PH UA: 6.5 (ref 5–9)
PLATELET # BLD: 125 E9/L (ref 130–450)
PMV BLD AUTO: 10.8 FL (ref 7–12)
POTASSIUM SERPL-SCNC: 3.8 MMOL/L (ref 3.5–5)
PROTEIN UA: NEGATIVE MG/DL
RBC # BLD: 3.15 E12/L (ref 3.8–5.8)
RBC UA: ABNORMAL /HPF (ref 0–2)
SODIUM BLD-SCNC: 136 MMOL/L (ref 132–146)
SPECIFIC GRAVITY UA: <=1.005 (ref 1–1.03)
TROPONIN, HIGH SENSITIVITY: 25 NG/L (ref 0–11)
TROPONIN, HIGH SENSITIVITY: 25 NG/L (ref 0–11)
UROBILINOGEN, URINE: 0.2 E.U./DL
WBC # BLD: 7.5 E9/L (ref 4.5–11.5)
WBC UA: ABNORMAL /HPF (ref 0–5)

## 2022-06-29 PROCEDURE — 2580000003 HC RX 258: Performed by: EMERGENCY MEDICINE

## 2022-06-29 PROCEDURE — 6360000002 HC RX W HCPCS: Performed by: INTERNAL MEDICINE

## 2022-06-29 PROCEDURE — 6370000000 HC RX 637 (ALT 250 FOR IP): Performed by: INTERNAL MEDICINE

## 2022-06-29 PROCEDURE — 6360000004 HC RX CONTRAST MEDICATION: Performed by: RADIOLOGY

## 2022-06-29 PROCEDURE — 71275 CT ANGIOGRAPHY CHEST: CPT

## 2022-06-29 PROCEDURE — 81001 URINALYSIS AUTO W/SCOPE: CPT

## 2022-06-29 PROCEDURE — 1200000000 HC SEMI PRIVATE

## 2022-06-29 PROCEDURE — 84484 ASSAY OF TROPONIN QUANT: CPT

## 2022-06-29 PROCEDURE — 85027 COMPLETE CBC AUTOMATED: CPT

## 2022-06-29 PROCEDURE — 83605 ASSAY OF LACTIC ACID: CPT

## 2022-06-29 PROCEDURE — 70450 CT HEAD/BRAIN W/O DYE: CPT

## 2022-06-29 PROCEDURE — 2580000003 HC RX 258: Performed by: INTERNAL MEDICINE

## 2022-06-29 PROCEDURE — 99285 EMERGENCY DEPT VISIT HI MDM: CPT

## 2022-06-29 PROCEDURE — 83735 ASSAY OF MAGNESIUM: CPT

## 2022-06-29 PROCEDURE — 87040 BLOOD CULTURE FOR BACTERIA: CPT

## 2022-06-29 PROCEDURE — 72125 CT NECK SPINE W/O DYE: CPT

## 2022-06-29 PROCEDURE — 36415 COLL VENOUS BLD VENIPUNCTURE: CPT

## 2022-06-29 PROCEDURE — 93005 ELECTROCARDIOGRAM TRACING: CPT | Performed by: EMERGENCY MEDICINE

## 2022-06-29 PROCEDURE — 82962 GLUCOSE BLOOD TEST: CPT

## 2022-06-29 PROCEDURE — 73060 X-RAY EXAM OF HUMERUS: CPT

## 2022-06-29 PROCEDURE — 71045 X-RAY EXAM CHEST 1 VIEW: CPT

## 2022-06-29 PROCEDURE — 82533 TOTAL CORTISOL: CPT

## 2022-06-29 PROCEDURE — 80048 BASIC METABOLIC PNL TOTAL CA: CPT

## 2022-06-29 RX ORDER — DEXTROSE MONOHYDRATE 50 MG/ML
100 INJECTION, SOLUTION INTRAVENOUS PRN
Status: DISCONTINUED | OUTPATIENT
Start: 2022-06-29 | End: 2022-07-01 | Stop reason: HOSPADM

## 2022-06-29 RX ORDER — SODIUM CHLORIDE 9 MG/ML
INJECTION, SOLUTION INTRAVENOUS PRN
Status: DISCONTINUED | OUTPATIENT
Start: 2022-06-29 | End: 2022-07-01 | Stop reason: HOSPADM

## 2022-06-29 RX ORDER — ONDANSETRON 2 MG/ML
4 INJECTION INTRAMUSCULAR; INTRAVENOUS EVERY 6 HOURS PRN
Status: DISCONTINUED | OUTPATIENT
Start: 2022-06-29 | End: 2022-07-01 | Stop reason: HOSPADM

## 2022-06-29 RX ORDER — MYCOPHENOLATE MOFETIL 250 MG/1
250 CAPSULE ORAL 2 TIMES DAILY
Status: DISCONTINUED | OUTPATIENT
Start: 2022-06-29 | End: 2022-07-01 | Stop reason: HOSPADM

## 2022-06-29 RX ORDER — ENOXAPARIN SODIUM 100 MG/ML
40 INJECTION SUBCUTANEOUS DAILY
Status: DISCONTINUED | OUTPATIENT
Start: 2022-06-29 | End: 2022-06-29

## 2022-06-29 RX ORDER — ALLOPURINOL 100 MG/1
300 TABLET ORAL DAILY
Status: DISCONTINUED | OUTPATIENT
Start: 2022-06-29 | End: 2022-07-01 | Stop reason: HOSPADM

## 2022-06-29 RX ORDER — SODIUM CHLORIDE 9 MG/ML
INJECTION, SOLUTION INTRAVENOUS CONTINUOUS
Status: DISCONTINUED | OUTPATIENT
Start: 2022-06-29 | End: 2022-07-01 | Stop reason: HOSPADM

## 2022-06-29 RX ORDER — ONDANSETRON 4 MG/1
4 TABLET, ORALLY DISINTEGRATING ORAL EVERY 8 HOURS PRN
Status: DISCONTINUED | OUTPATIENT
Start: 2022-06-29 | End: 2022-07-01 | Stop reason: HOSPADM

## 2022-06-29 RX ORDER — ASPIRIN 81 MG/1
81 TABLET, CHEWABLE ORAL DAILY
Status: DISCONTINUED | OUTPATIENT
Start: 2022-06-29 | End: 2022-07-01 | Stop reason: HOSPADM

## 2022-06-29 RX ORDER — FERROUS SULFATE 325(65) MG
325 TABLET ORAL EVERY OTHER DAY
Status: DISCONTINUED | OUTPATIENT
Start: 2022-06-29 | End: 2022-06-29

## 2022-06-29 RX ORDER — VORICONAZOLE 200 MG/1
300 TABLET, FILM COATED ORAL 2 TIMES DAILY
Status: DISCONTINUED | OUTPATIENT
Start: 2022-06-29 | End: 2022-07-01 | Stop reason: HOSPADM

## 2022-06-29 RX ORDER — VALGANCICLOVIR 450 MG/1
900 TABLET, FILM COATED ORAL DAILY
Status: DISCONTINUED | OUTPATIENT
Start: 2022-06-29 | End: 2022-07-01 | Stop reason: HOSPADM

## 2022-06-29 RX ORDER — PANTOPRAZOLE SODIUM 40 MG/1
40 TABLET, DELAYED RELEASE ORAL DAILY
Status: DISCONTINUED | OUTPATIENT
Start: 2022-06-29 | End: 2022-07-01 | Stop reason: HOSPADM

## 2022-06-29 RX ORDER — DOCUSATE SODIUM 100 MG/1
100 CAPSULE, LIQUID FILLED ORAL DAILY PRN
COMMUNITY

## 2022-06-29 RX ORDER — TACROLIMUS 1 MG/1
6 CAPSULE ORAL 2 TIMES DAILY
Status: DISCONTINUED | OUTPATIENT
Start: 2022-06-29 | End: 2022-07-01 | Stop reason: HOSPADM

## 2022-06-29 RX ORDER — DAPSONE 25 MG/1
100 TABLET ORAL DAILY
Status: DISCONTINUED | OUTPATIENT
Start: 2022-06-29 | End: 2022-07-01 | Stop reason: HOSPADM

## 2022-06-29 RX ORDER — ACETAMINOPHEN 650 MG/1
650 SUPPOSITORY RECTAL EVERY 6 HOURS PRN
Status: DISCONTINUED | OUTPATIENT
Start: 2022-06-29 | End: 2022-07-01 | Stop reason: HOSPADM

## 2022-06-29 RX ORDER — PREDNISONE 1 MG/1
5 TABLET ORAL DAILY
Status: DISCONTINUED | OUTPATIENT
Start: 2022-06-29 | End: 2022-07-01 | Stop reason: HOSPADM

## 2022-06-29 RX ORDER — SODIUM CHLORIDE 0.9 % (FLUSH) 0.9 %
5-40 SYRINGE (ML) INJECTION PRN
Status: DISCONTINUED | OUTPATIENT
Start: 2022-06-29 | End: 2022-07-01 | Stop reason: HOSPADM

## 2022-06-29 RX ORDER — SODIUM CHLORIDE 0.9 % (FLUSH) 0.9 %
5-40 SYRINGE (ML) INJECTION EVERY 12 HOURS SCHEDULED
Status: DISCONTINUED | OUTPATIENT
Start: 2022-06-29 | End: 2022-07-01 | Stop reason: HOSPADM

## 2022-06-29 RX ORDER — ACETAMINOPHEN 325 MG/1
650 TABLET ORAL EVERY 6 HOURS PRN
Status: DISCONTINUED | OUTPATIENT
Start: 2022-06-29 | End: 2022-07-01 | Stop reason: HOSPADM

## 2022-06-29 RX ORDER — FERROUS SULFATE 325(65) MG
325 TABLET ORAL EVERY OTHER DAY
COMMUNITY
End: 2022-08-18

## 2022-06-29 RX ORDER — 0.9 % SODIUM CHLORIDE 0.9 %
500 INTRAVENOUS SOLUTION INTRAVENOUS ONCE
Status: COMPLETED | OUTPATIENT
Start: 2022-06-29 | End: 2022-06-29

## 2022-06-29 RX ORDER — LANOLIN ALCOHOL/MO/W.PET/CERES
400 CREAM (GRAM) TOPICAL 2 TIMES DAILY
Status: DISCONTINUED | OUTPATIENT
Start: 2022-06-29 | End: 2022-07-01 | Stop reason: HOSPADM

## 2022-06-29 RX ORDER — DULOXETIN HYDROCHLORIDE 60 MG/1
60 CAPSULE, DELAYED RELEASE ORAL DAILY
Status: DISCONTINUED | OUTPATIENT
Start: 2022-06-29 | End: 2022-07-01 | Stop reason: HOSPADM

## 2022-06-29 RX ORDER — 0.9 % SODIUM CHLORIDE 0.9 %
1000 INTRAVENOUS SOLUTION INTRAVENOUS ONCE
Status: COMPLETED | OUTPATIENT
Start: 2022-06-29 | End: 2022-06-29

## 2022-06-29 RX ORDER — POLYETHYLENE GLYCOL 3350 17 G/17G
17 POWDER, FOR SOLUTION ORAL DAILY PRN
Status: DISCONTINUED | OUTPATIENT
Start: 2022-06-29 | End: 2022-07-01 | Stop reason: HOSPADM

## 2022-06-29 RX ORDER — ALENDRONATE SODIUM 70 MG/1
70 TABLET ORAL
COMMUNITY

## 2022-06-29 RX ORDER — FERROUS SULFATE 325(65) MG
325 TABLET ORAL EVERY OTHER DAY
Status: DISCONTINUED | OUTPATIENT
Start: 2022-06-30 | End: 2022-07-01 | Stop reason: HOSPADM

## 2022-06-29 RX ADMIN — PREDNISONE 5 MG: 5 TABLET ORAL at 18:03

## 2022-06-29 RX ADMIN — APIXABAN 5 MG: 5 TABLET, FILM COATED ORAL at 21:11

## 2022-06-29 RX ADMIN — ALLOPURINOL 300 MG: 100 TABLET ORAL at 18:03

## 2022-06-29 RX ADMIN — VORICONAZOLE 300 MG: 200 TABLET ORAL at 21:13

## 2022-06-29 RX ADMIN — DAPSONE 100 MG: 25 TABLET ORAL at 18:06

## 2022-06-29 RX ADMIN — MYCOPHENOLATE MOFETIL 250 MG: 250 CAPSULE ORAL at 21:11

## 2022-06-29 RX ADMIN — ASPIRIN 81 MG CHEWABLE TABLET 81 MG: 81 TABLET CHEWABLE at 18:03

## 2022-06-29 RX ADMIN — VALGANCICLOVIR 900 MG: 450 TABLET, FILM COATED ORAL at 18:03

## 2022-06-29 RX ADMIN — PANTOPRAZOLE SODIUM 40 MG: 40 TABLET, DELAYED RELEASE ORAL at 18:06

## 2022-06-29 RX ADMIN — SODIUM CHLORIDE 500 ML: 9 INJECTION, SOLUTION INTRAVENOUS at 14:36

## 2022-06-29 RX ADMIN — SODIUM CHLORIDE: 9 INJECTION, SOLUTION INTRAVENOUS at 23:21

## 2022-06-29 RX ADMIN — ACETAMINOPHEN 650 MG: 325 TABLET ORAL at 21:10

## 2022-06-29 RX ADMIN — SODIUM CHLORIDE 1000 ML: 9 INJECTION, SOLUTION INTRAVENOUS at 21:04

## 2022-06-29 RX ADMIN — Medication 400 MG: at 21:11

## 2022-06-29 RX ADMIN — DULOXETINE HYDROCHLORIDE 60 MG: 60 CAPSULE, DELAYED RELEASE ORAL at 18:03

## 2022-06-29 RX ADMIN — SODIUM CHLORIDE, PRESERVATIVE FREE 10 ML: 5 INJECTION INTRAVENOUS at 21:18

## 2022-06-29 RX ADMIN — IOPAMIDOL 75 ML: 755 INJECTION, SOLUTION INTRAVENOUS at 15:00

## 2022-06-29 RX ADMIN — TACROLIMUS 6 MG: 1 CAPSULE ORAL at 21:12

## 2022-06-29 ASSESSMENT — PAIN SCALES - GENERAL
PAINLEVEL_OUTOF10: 3
PAINLEVEL_OUTOF10: 2
PAINLEVEL_OUTOF10: 2

## 2022-06-29 ASSESSMENT — PAIN DESCRIPTION - DESCRIPTORS: DESCRIPTORS: ACHING

## 2022-06-29 ASSESSMENT — ENCOUNTER SYMPTOMS
DIFFICULTY BREATHING: 0
SHORTNESS OF BREATH: 0
DIARRHEA: 0
CHEST TIGHTNESS: 0
CONSTIPATION: 0
ABDOMINAL PAIN: 0
COLOR CHANGE: 1
VOMITING: 0
NAUSEA: 0

## 2022-06-29 ASSESSMENT — PAIN DESCRIPTION - ORIENTATION: ORIENTATION: RIGHT

## 2022-06-29 ASSESSMENT — PAIN DESCRIPTION - LOCATION: LOCATION: ARM;SHOULDER

## 2022-06-29 NOTE — H&P
mg by mouth daily    Historical Provider, MD   voriconazole (VFEND) 200 MG tablet Take 200 mg by mouth 2 times daily    Historical Provider, MD   aspirin 81 MG chewable tablet Take 81 mg by mouth daily    Historical Provider, MD   pantoprazole (PROTONIX) 40 MG tablet Take 40 mg by mouth daily Delayed release    Historical Provider, MD   loratadine (CLARITIN) 10 MG capsule Take 10 mg by mouth daily    Historical Provider, MD   magnesium oxide (MAG-OX) 400 MG tablet Take 1,200 mg by mouth 2 times daily    Historical Provider, MD   metoprolol tartrate (LOPRESSOR) 25 MG tablet Take 25 mg by mouth 2 times daily    Historical Provider, MD   mirtazapine (REMERON) 15 MG tablet Take 7.5 mg by mouth nightly    Historical Provider, MD   SITagliptin (JANUVIA) 100 MG tablet Take 100 mg by mouth daily    Historical Provider, MD   apixaban (ELIQUIS) 5 MG TABS tablet Take 5 mg by mouth 2 times daily    Historical Provider, MD   DULoxetine (CYMBALTA) 30 MG extended release capsule Take 60 mg by mouth daily     Historical Provider, MD   allopurinol (ZYLOPRIM) 300 MG tablet Take 300 mg by mouth daily  9/10/17   Historical Provider, MD   Multiple Vitamins-Minerals (CENTRUM SILVER 50+MEN) TABS Take by mouth daily    Historical Provider, MD   Cholecalciferol (VITAMIN D-3 PO) Take 2,000 Units by mouth daily     Historical Provider, MD       ALLERGIES:  Patient has no known allergies.     SOCIAL Hx:  Social History     Socioeconomic History    Marital status:      Spouse name: Not on file    Number of children: Not on file    Years of education: Not on file    Highest education level: Not on file   Occupational History    Not on file   Tobacco Use    Smoking status: Former Smoker     Packs/day: 1.00     Years: 45.00     Pack years: 45.00     Types: Cigarettes     Quit date: 10/10/2014     Years since quittin.7    Smokeless tobacco: Never Used   Vaping Use    Vaping Use: Never used   Substance and Sexual Activity    Alcohol use: Not Currently     Alcohol/week: 2.0 standard drinks     Types: 1 Cans of beer, 1 Shots of liquor per week     Comment: none since 10/2018    Drug use: No     Types: Marijuana (Arlis Blunt), Cocaine     Comment: in his teens/20's; none since    Sexual activity: Not Currently     Partners: Female   Other Topics Concern    Not on file   Social History Narrative    He denies any family history of hypercoagulability, premature death, SCD, cardiomyopathies, pulmonary hypertension, Marfan's, connective tissue diseases. He quit smoking cigarettes but the development of current cough occurred prior to smoking cessation. He used to smoke 1 PPD x 40 years, quit 3 years ago in 2014, when the onset of above chronic cough prompted him to quit. Dr. Betty Moctezuma who obtained repeat imaging which revealed unchanged fibrotic changes of the upper and lower lobes of both lungs, ultimately diagnosed last week with idiopathic pulmonary fibrosis. No tissue diagnosis at this time. Drinks 2 cups of coffee daily. Social Determinants of Health     Financial Resource Strain:     Difficulty of Paying Living Expenses: Not on file   Food Insecurity:     Worried About Running Out of Food in the Last Year: Not on file    Derick of Food in the Last Year: Not on file   Transportation Needs:     Lack of Transportation (Medical): Not on file    Lack of Transportation (Non-Medical):  Not on file   Physical Activity:     Days of Exercise per Week: Not on file    Minutes of Exercise per Session: Not on file   Stress:     Feeling of Stress : Not on file   Social Connections:     Frequency of Communication with Friends and Family: Not on file    Frequency of Social Gatherings with Friends and Family: Not on file    Attends Yarsanism Services: Not on file    Active Member of Clubs or Organizations: Not on file    Attends Club or Organization Meetings: Not on file    Marital Status: Not on file Intimate Partner Violence:     Fear of Current or Ex-Partner: Not on file    Emotionally Abused: Not on file    Physically Abused: Not on file    Sexually Abused: Not on file   Housing Stability:     Unable to Pay for Housing in the Last Year: Not on file    Number of [place] in the Last Year: Not on file    Unstable Housing in the Last Year: Not on file       ROS: Positive in bold  General:   Denies chills, fatigue, fever, malaise, night sweats or weight loss    Psychological:   Denies anxiety, disorientation or hallucinations    ENT:    Denies epistaxis, headaches, vertigo or visual changes    Cardiovascular:   Denies any chest pain, irregular heartbeats, or palpitations. No paroxysmal nocturnal dyspnea. Respiratory:   Denies shortness of breath, coughing, sputum production, hemoptysis, or wheezing. No orthopnea. Gastrointestinal:   Denies nausea, vomiting, diarrhea, or constipation. Denies any abdominal pain. Denies change in bowel habits or stools. Genito-Urinary:    Denies any urgency, frequency, hematuria. Voiding without difficulty. Musculoskeletal:   Denies joint pain, joint stiffness, joint swelling or muscle pain    Neurology:    Denies any headache or focal neurological deficits. No weakness or paresthesia. Derm:    Denies any rashes, ulcers, or excoriations. Denies bruising. Extremities:   Denies any lower extremity swelling or edema. PHYSICAL EXAM: Abnormal findings noted  VITALS:  Vitals:    06/29/22 1530   BP: (!) 104/55   Pulse: 69   Resp: 16   Temp: 99 °F (37.2 °C)   SpO2: 98%         CONSTITUTIONAL:    Awake, alert, cooperative, no apparent distress, and appears stated age    EYES:    EOMI, sclera clear, conjunctiva normal    ENT:    Normocephalic, atraumatic,  External ears without lesions.      NECK:    Supple, symmetrical, trachea midline, , no JVD    HEMATOLOGIC/LYMPHATICS:    No cervical lymphadenopathy and no supraclavicular lymphadenopathy    LUNGS:    Symmetric. No increased work of breathing, good air exchange, clear to auscultation bilaterally, no wheezes, rhonchi, or rales,     CARDIOVASCULAR:    Normal apical impulse, regular rate and rhythm, normal S1 and S2, no S3 or S4, and no murmur noted  Left upper sternal border systolic murmur      ABDOMEN:     soft, non-distended, non-tender    MUSCULOSKELETAL:    There is no redness, warmth, or swelling of the joints. NEUROLOGIC:    Awake, alert, oriented to name, place and time. SKIN:    No bruising or bleeding. No redness, warmth, or swelling    EXTREMITIES:    Peripheral pulses present. No edema, cyanosis, or swelling.     LINES/CATHETERS     LABORATORY DATA:  CBC with Differential:    Lab Results   Component Value Date/Time    WBC 7.5 06/29/2022 11:26 AM    RBC 3.15 06/29/2022 11:26 AM    HGB 10.2 06/29/2022 11:26 AM    HCT 32.9 06/29/2022 11:26 AM     06/29/2022 11:26 AM    .4 06/29/2022 11:26 AM    MCH 32.4 06/29/2022 11:26 AM    MCHC 31.0 06/29/2022 11:26 AM    RDW 15.9 06/29/2022 11:26 AM    NRBC 0.9 08/06/2021 09:20 AM    SEGSPCT 61 06/25/2013 03:25 PM    METASPCT 0.9 09/20/2020 08:45 AM    LYMPHOPCT 19.4 08/06/2021 09:20 AM    MONOPCT 11.1 08/06/2021 09:20 AM    MYELOPCT 2.6 10/14/2020 11:39 AM    BASOPCT 2.8 08/06/2021 09:20 AM    MONOSABS 0.66 08/06/2021 09:20 AM    LYMPHSABS 1.14 08/06/2021 09:20 AM    EOSABS 0.00 08/06/2021 09:20 AM    BASOSABS 0.17 08/06/2021 09:20 AM     CMP:    Lab Results   Component Value Date/Time     06/29/2022 11:26 AM    K 3.8 06/29/2022 11:26 AM    K 4.8 10/14/2020 11:39 AM     06/29/2022 11:26 AM    CO2 30 06/29/2022 11:26 AM    BUN 19 06/29/2022 11:26 AM    CREATININE 0.7 06/29/2022 11:26 AM    GFRAA >60 06/29/2022 11:26 AM    LABGLOM >60 06/29/2022 11:26 AM    GLUCOSE 129 06/29/2022 11:26 AM    GLUCOSE 100 05/30/2012 08:35 AM    PROT 6.1 08/06/2021 09:20 AM    LABALBU 3.5 08/06/2021 09:20 AM    LABALBU 3.9 05/30/2012 08:35 AM    CALCIUM 8.7 06/29/2022 11:26 AM    BILITOT 0.2 08/06/2021 09:20 AM    ALKPHOS 293 08/06/2021 09:20 AM    AST 36 08/06/2021 09:20 AM    ALT 17 08/06/2021 09:20 AM       ASSESSMENT/PLAN:  1. Syncope  2. Right humerus fracture  3. right lung lower lobe nodules  4. Chronic normocytic anemia  5. Non-insulin-dependent diabetes mellitus  6. History of lung transplant  7. COPD  8. History of DVT on Eliquis  9. History of sleep apnea   10. History of gout  11. Hypertension  12. history of tobacco abuse    Patient presented following syncopal event. Patient appears to be dehydrated. Patient will be rehydrated with IV fluids. Orthostatic blood pressures will be obtained. Echocardiogram ordered. Cardiology will be consulted. Orthopedic surgery consulted.       Alayna Brown [place]  4:42 PM  6/29/2022    Electronically signed by Alayna Brown DO on 6/29/22 at 4:42 PM EDT

## 2022-06-29 NOTE — PROGRESS NOTES
Patient's home medication list updated in the computer per list provided by patient and patient himself

## 2022-06-29 NOTE — PROGRESS NOTES
Stood patient at side of bed with HCA to remove old linens from underneath patient, as he did not want to roll to remove them. Patient able to stand with minimal help, but after standing for a couple of minutes, became very unsteady and stated that he felt woozy. Patient assisted back into bed by both RN and HCA. Blood pressure checked when patient sat back down on bed 104/55, HR 69. Patient was instructed that he is to call for help if he needs to get out of bed for any reason. Bed alarm on, call light within reach.

## 2022-06-29 NOTE — ED PROVIDER NOTES
Presents emergency department with complaint of syncopal episode this morning. He states that yesterday he did a lot of yard work and felt good during the yard work. Following the yard work he lost his balance and fell. He was complaining of pain into his right shoulder and arm. This morning when he went to stand up he was very weak and went down. His wife states that he did hit his head. He is currently on Eliquis. He denies headache, chest pain or shortness of breath. No abdominal pain, nausea or vomiting. The history is provided by the patient and the spouse. Loss of Consciousness  Episode history:  Single  Most recent episode: Today  Progression:  Improving  Chronicity:  New  Context: normal activity and standing up    Witnessed: yes    Relieved by:  Lying down  Worsened by:  Posture  Ineffective treatments:  None tried  Associated symptoms: malaise/fatigue and recent fall    Associated symptoms: no anxiety, no chest pain, no diaphoresis, no difficulty breathing, no dizziness, no fever, no focal sensory loss, no headaches, no nausea, no palpitations, no shortness of breath, no vomiting and no weakness    Risk factors comment:  Lung transplant      Review of Systems   Constitutional: Positive for activity change, fatigue and malaise/fatigue. Negative for appetite change, chills, diaphoresis, fever and unexpected weight change. HENT: Negative. Eyes: Negative for visual disturbance. Respiratory: Negative for chest tightness and shortness of breath. Cardiovascular: Positive for syncope. Negative for chest pain and palpitations. Gastrointestinal: Negative for abdominal pain, constipation, diarrhea, nausea and vomiting. Genitourinary: Negative. Negative for decreased urine volume. Musculoskeletal: Positive for arthralgias, myalgias and neck pain. Skin: Positive for color change (Bruising). Neurological: Positive for syncope and light-headedness.  Negative for dizziness, weakness and headaches. Hematological: Bruises/bleeds easily. Psychiatric/Behavioral: Negative. Physical Exam  Vitals and nursing note reviewed. Constitutional:       General: He is not in acute distress. Appearance: Normal appearance. He is well-developed and normal weight. He is ill-appearing (Fatigued in appearance). He is not toxic-appearing. HENT:      Head: Normocephalic and atraumatic. Nose: Nose normal.      Mouth/Throat:      Mouth: Mucous membranes are moist.      Pharynx: Oropharynx is clear. Eyes:      Extraocular Movements: Extraocular movements intact. Conjunctiva/sclera: Conjunctivae normal.      Pupils: Pupils are equal, round, and reactive to light. Cardiovascular:      Rate and Rhythm: Normal rate and regular rhythm. Pulses: Normal pulses. Heart sounds: Normal heart sounds. No murmur heard. Pulmonary:      Effort: Pulmonary effort is normal. No respiratory distress. Breath sounds: Normal breath sounds. No wheezing or rales. Abdominal:      General: Bowel sounds are normal.      Palpations: Abdomen is soft. Tenderness: There is no abdominal tenderness. There is no guarding or rebound. Musculoskeletal:         General: Swelling and tenderness (Right proximal upper arm) present. Normal range of motion. Cervical back: Normal range of motion and neck supple. Right lower leg: No edema. Left lower leg: No edema. Skin:     General: Skin is warm and dry. Capillary Refill: Capillary refill takes less than 2 seconds. Findings: Bruising present. Neurological:      General: No focal deficit present. Mental Status: He is alert and oriented to person, place, and time. Mental status is at baseline. Cranial Nerves: No cranial nerve deficit. Sensory: No sensory deficit. Motor: No weakness.       Coordination: Coordination normal.   Psychiatric:         Mood and Affect: Mood normal.         Behavior: Behavior normal. 6 - 23 mg/dL    CREATININE 0.7 0.7 - 1.2 mg/dL    GFR Non-African American >60 >=60 mL/min/1.73    GFR African American >60     Calcium 8.7 8.6 - 10.2 mg/dL   CBC   Result Value Ref Range    WBC 7.5 4.5 - 11.5 E9/L    RBC 3.15 (L) 3.80 - 5.80 E12/L    Hemoglobin 10.2 (L) 12.5 - 16.5 g/dL    Hematocrit 32.9 (L) 37.0 - 54.0 %    .4 (H) 80.0 - 99.9 fL    MCH 32.4 26.0 - 35.0 pg    MCHC 31.0 (L) 32.0 - 34.5 %    RDW 15.9 (H) 11.5 - 15.0 fL    Platelets 310 (L) 926 - 450 E9/L    MPV 10.8 7.0 - 12.0 fL   Troponin   Result Value Ref Range    Troponin, High Sensitivity 25 (H) 0 - 11 ng/L   Lactic Acid   Result Value Ref Range    Lactic Acid 1.3 0.5 - 2.2 mmol/L   Magnesium   Result Value Ref Range    Magnesium 1.9 1.6 - 2.6 mg/dL   Troponin   Result Value Ref Range    Troponin, High Sensitivity 25 (H) 0 - 11 ng/L   EKG 12 Lead   Result Value Ref Range    Ventricular Rate 68 BPM    Atrial Rate 68 BPM    P-R Interval 122 ms    QRS Duration 82 ms    Q-T Interval 402 ms    QTc Calculation (Bazett) 427 ms    P Axis 84 degrees    R Axis 1 degrees    T Axis 34 degrees       Radiology  XR HUMERUS RIGHT (MIN 2 VIEWS)   Final Result   Minimally displaced comminuted fracture through the neck of the right   humerus. The fracture extends through the greater tuberosity. .      There is no fracture of the distal humerus. XR CHEST 1 VIEW   Final Result   No acute process. There is no pneumothorax      Comminuted nondisplaced fracture of the proximal right humerus. CT HEAD WO CONTRAST   Final Result   No acute intracranial abnormality. Mild cortical atrophy and periventricular leukomalacia. RECOMMENDATIONS:   Unavailable         CT CERVICAL SPINE WO CONTRAST   Final Result   No acute abnormality of the cervical spine. Multilevel degenerative changes.       RECOMMENDATIONS:   Unavailable             EKG:  This EKG is signed and interpreted by me.      ------------------------- NURSING NOTES AND VITALS REVIEWED ---------------------------  Date / Time Roomed:  6/29/2022 10:29 AM  ED Bed Assignment:  14/14    The nursing notes within the ED encounter and vital signs as below have been reviewed. Patient Vitals for the past 24 hrs:   BP Temp Temp src Pulse Resp SpO2   06/29/22 1319 98/61 -- -- 69 16 97 %   06/29/22 1215 111/60 -- -- 69 18 97 %   06/29/22 1036 127/66 98.2 °F (36.8 °C) Oral 70 20 97 %       Oxygen Saturation Interpretation: Normal      ------------------------------------------ PROGRESS NOTES ------------------------------------------  Re-evaluation(s):  Time: 13:45. Patients symptoms show no change  Repeat physical examination is not changed    I have spoken with the patient and wife and discussed todays results, in addition to providing specific details for the plan of care and counseling regarding the diagnosis and prognosis. Their questions are answered at this time and they are agreeable with the plan.      --------------------------------- ADDITIONAL PROVIDER NOTES ---------------------------------  Consultations:  Spoke with Dr. Rosealee Sandifer,  They will admit this patient. This patient's ED course included: a personal history and physicial examination, multiple bedside re-evaluations and cardiac monitoring    This patient has remained hemodynamically stable and remained unchanged during their ED course. Please note that the withdrawal or failure to initiate urgent interventions for this patient would likely result in a life threatening deterioration or permanent disability. Accordingly this patient received 0 minutes of critical care time, excluding separately billable procedures. Clinical Impression  1. Syncope and collapse    2. Other closed nondisplaced fracture of proximal end of right humerus, initial encounter          Disposition  Patient's disposition: Admit to telemetry  Patient's condition is stable.        Dilshad De La Vega DO  06/29/22 1426

## 2022-06-30 ENCOUNTER — APPOINTMENT (OUTPATIENT)
Dept: GENERAL RADIOLOGY | Age: 67
DRG: 563 | End: 2022-06-30
Payer: MEDICARE

## 2022-06-30 DIAGNOSIS — R55 SYNCOPE AND COLLAPSE: Primary | ICD-10-CM

## 2022-06-30 LAB
ALBUMIN SERPL-MCNC: 3.3 G/DL (ref 3.5–5.2)
ALP BLD-CCNC: 228 U/L (ref 40–129)
ALT SERPL-CCNC: 17 U/L (ref 0–40)
ANION GAP SERPL CALCULATED.3IONS-SCNC: 6 MMOL/L (ref 7–16)
ANISOCYTOSIS: ABNORMAL
AST SERPL-CCNC: 28 U/L (ref 0–39)
BASOPHILS ABSOLUTE: 0.03 E9/L (ref 0–0.2)
BASOPHILS RELATIVE PERCENT: 0.5 % (ref 0–2)
BILIRUB SERPL-MCNC: 0.4 MG/DL (ref 0–1.2)
BUN BLDV-MCNC: 15 MG/DL (ref 6–23)
CALCIUM SERPL-MCNC: 8.4 MG/DL (ref 8.6–10.2)
CHLORIDE BLD-SCNC: 106 MMOL/L (ref 98–107)
CHOLESTEROL, TOTAL: 139 MG/DL (ref 0–199)
CO2: 26 MMOL/L (ref 22–29)
CREAT SERPL-MCNC: 0.7 MG/DL (ref 0.7–1.2)
EOSINOPHILS ABSOLUTE: 0.04 E9/L (ref 0.05–0.5)
EOSINOPHILS RELATIVE PERCENT: 0.6 % (ref 0–6)
FOLATE: 19.9 NG/ML (ref 4.8–24.2)
GFR AFRICAN AMERICAN: >60
GFR NON-AFRICAN AMERICAN: >60 ML/MIN/1.73
GLUCOSE BLD-MCNC: 127 MG/DL (ref 74–99)
HBA1C MFR BLD: 5.4 % (ref 4–5.6)
HCT VFR BLD CALC: 34.1 % (ref 37–54)
HDLC SERPL-MCNC: 48 MG/DL
HEMOGLOBIN: 10.5 G/DL (ref 12.5–16.5)
IMMATURE GRANULOCYTES #: 0.03 E9/L
IMMATURE GRANULOCYTES %: 0.5 % (ref 0–5)
IRON SATURATION: 34 % (ref 20–55)
IRON: 91 MCG/DL (ref 59–158)
LDL CHOLESTEROL CALCULATED: 68 MG/DL (ref 0–99)
LV EF: 64 %
LVEF MODALITY: NORMAL
LYMPHOCYTES ABSOLUTE: 0.8 E9/L (ref 1.5–4)
LYMPHOCYTES RELATIVE PERCENT: 12.1 % (ref 20–42)
MAGNESIUM: 2 MG/DL (ref 1.6–2.6)
MCH RBC QN AUTO: 32.5 PG (ref 26–35)
MCHC RBC AUTO-ENTMCNC: 30.8 % (ref 32–34.5)
MCV RBC AUTO: 105.6 FL (ref 80–99.9)
MONOCYTES ABSOLUTE: 0.44 E9/L (ref 0.1–0.95)
MONOCYTES RELATIVE PERCENT: 6.6 % (ref 2–12)
NEUTROPHILS ABSOLUTE: 5.28 E9/L (ref 1.8–7.3)
NEUTROPHILS RELATIVE PERCENT: 79.7 % (ref 43–80)
PDW BLD-RTO: 16 FL (ref 11.5–15)
PHOSPHORUS: 3.1 MG/DL (ref 2.5–4.5)
PLATELET # BLD: 113 E9/L (ref 130–450)
PMV BLD AUTO: 10.6 FL (ref 7–12)
POIKILOCYTES: ABNORMAL
POLYCHROMASIA: ABNORMAL
POTASSIUM SERPL-SCNC: 3.8 MMOL/L (ref 3.5–5)
PROCALCITONIN: 0.16 NG/ML (ref 0–0.08)
RBC # BLD: 3.23 E12/L (ref 3.8–5.8)
SODIUM BLD-SCNC: 138 MMOL/L (ref 132–146)
T4 FREE: 0.81 NG/DL (ref 0.93–1.7)
TEAR DROP CELLS: ABNORMAL
TOTAL IRON BINDING CAPACITY: 271 MCG/DL (ref 250–450)
TOTAL PROTEIN: 5.9 G/DL (ref 6.4–8.3)
TRIGL SERPL-MCNC: 113 MG/DL (ref 0–149)
TROPONIN, HIGH SENSITIVITY: 20 NG/L (ref 0–11)
TROPONIN, HIGH SENSITIVITY: 21 NG/L (ref 0–11)
TSH SERPL DL<=0.05 MIU/L-ACNC: 0.6 UIU/ML (ref 0.27–4.2)
VITAMIN B-12: 723 PG/ML (ref 211–946)
VLDLC SERPL CALC-MCNC: 23 MG/DL
WBC # BLD: 6.6 E9/L (ref 4.5–11.5)

## 2022-06-30 PROCEDURE — 6360000002 HC RX W HCPCS: Performed by: INTERNAL MEDICINE

## 2022-06-30 PROCEDURE — 99231 SBSQ HOSP IP/OBS SF/LOW 25: CPT | Performed by: ORTHOPAEDIC SURGERY

## 2022-06-30 PROCEDURE — 73030 X-RAY EXAM OF SHOULDER: CPT

## 2022-06-30 PROCEDURE — 83036 HEMOGLOBIN GLYCOSYLATED A1C: CPT

## 2022-06-30 PROCEDURE — 85025 COMPLETE CBC W/AUTO DIFF WBC: CPT

## 2022-06-30 PROCEDURE — 99222 1ST HOSP IP/OBS MODERATE 55: CPT | Performed by: INTERNAL MEDICINE

## 2022-06-30 PROCEDURE — 80061 LIPID PANEL: CPT

## 2022-06-30 PROCEDURE — 82607 VITAMIN B-12: CPT

## 2022-06-30 PROCEDURE — 84439 ASSAY OF FREE THYROXINE: CPT

## 2022-06-30 PROCEDURE — 23600 CLTX PROX HUMRL FX W/O MNPJ: CPT | Performed by: ORTHOPAEDIC SURGERY

## 2022-06-30 PROCEDURE — 1200000000 HC SEMI PRIVATE

## 2022-06-30 PROCEDURE — 84145 PROCALCITONIN (PCT): CPT

## 2022-06-30 PROCEDURE — 83540 ASSAY OF IRON: CPT

## 2022-06-30 PROCEDURE — 6370000000 HC RX 637 (ALT 250 FOR IP): Performed by: INTERNAL MEDICINE

## 2022-06-30 PROCEDURE — 2580000003 HC RX 258: Performed by: INTERNAL MEDICINE

## 2022-06-30 PROCEDURE — 73080 X-RAY EXAM OF ELBOW: CPT

## 2022-06-30 PROCEDURE — 97530 THERAPEUTIC ACTIVITIES: CPT

## 2022-06-30 PROCEDURE — 84484 ASSAY OF TROPONIN QUANT: CPT

## 2022-06-30 PROCEDURE — APPSS180 APP SPLIT SHARED TIME > 60 MINUTES: Performed by: NURSE PRACTITIONER

## 2022-06-30 PROCEDURE — 97530 THERAPEUTIC ACTIVITIES: CPT | Performed by: PHYSICAL THERAPIST

## 2022-06-30 PROCEDURE — 93306 TTE W/DOPPLER COMPLETE: CPT

## 2022-06-30 PROCEDURE — 97161 PT EVAL LOW COMPLEX 20 MIN: CPT | Performed by: PHYSICAL THERAPIST

## 2022-06-30 PROCEDURE — 84100 ASSAY OF PHOSPHORUS: CPT

## 2022-06-30 PROCEDURE — 84443 ASSAY THYROID STIM HORMONE: CPT

## 2022-06-30 PROCEDURE — 82746 ASSAY OF FOLIC ACID SERUM: CPT

## 2022-06-30 PROCEDURE — 80053 COMPREHEN METABOLIC PANEL: CPT

## 2022-06-30 PROCEDURE — 97165 OT EVAL LOW COMPLEX 30 MIN: CPT

## 2022-06-30 PROCEDURE — 83735 ASSAY OF MAGNESIUM: CPT

## 2022-06-30 PROCEDURE — 83550 IRON BINDING TEST: CPT

## 2022-06-30 PROCEDURE — 36415 COLL VENOUS BLD VENIPUNCTURE: CPT

## 2022-06-30 RX ADMIN — VORICONAZOLE 300 MG: 200 TABLET ORAL at 10:41

## 2022-06-30 RX ADMIN — PANTOPRAZOLE SODIUM 40 MG: 40 TABLET, DELAYED RELEASE ORAL at 10:40

## 2022-06-30 RX ADMIN — TACROLIMUS 6 MG: 1 CAPSULE ORAL at 10:43

## 2022-06-30 RX ADMIN — VALGANCICLOVIR 900 MG: 450 TABLET, FILM COATED ORAL at 10:40

## 2022-06-30 RX ADMIN — MYCOPHENOLATE MOFETIL 250 MG: 250 CAPSULE ORAL at 10:41

## 2022-06-30 RX ADMIN — SODIUM CHLORIDE, PRESERVATIVE FREE 10 ML: 5 INJECTION INTRAVENOUS at 22:10

## 2022-06-30 RX ADMIN — VORICONAZOLE 300 MG: 200 TABLET ORAL at 22:09

## 2022-06-30 RX ADMIN — DAPSONE 100 MG: 25 TABLET ORAL at 10:40

## 2022-06-30 RX ADMIN — METOPROLOL TARTRATE 12.5 MG: 25 TABLET, FILM COATED ORAL at 10:41

## 2022-06-30 RX ADMIN — Medication 400 MG: at 22:05

## 2022-06-30 RX ADMIN — ASPIRIN 81 MG CHEWABLE TABLET 81 MG: 81 TABLET CHEWABLE at 10:41

## 2022-06-30 RX ADMIN — FERROUS SULFATE TAB 325 MG (65 MG ELEMENTAL FE) 325 MG: 325 (65 FE) TAB at 10:41

## 2022-06-30 RX ADMIN — METOPROLOL TARTRATE 12.5 MG: 25 TABLET, FILM COATED ORAL at 22:05

## 2022-06-30 RX ADMIN — TACROLIMUS 6 MG: 1 CAPSULE ORAL at 22:07

## 2022-06-30 RX ADMIN — PREDNISONE 5 MG: 5 TABLET ORAL at 10:41

## 2022-06-30 RX ADMIN — DULOXETINE HYDROCHLORIDE 60 MG: 60 CAPSULE, DELAYED RELEASE ORAL at 10:40

## 2022-06-30 RX ADMIN — APIXABAN 5 MG: 5 TABLET, FILM COATED ORAL at 10:41

## 2022-06-30 RX ADMIN — APIXABAN 5 MG: 5 TABLET, FILM COATED ORAL at 22:04

## 2022-06-30 RX ADMIN — ALLOPURINOL 300 MG: 100 TABLET ORAL at 10:41

## 2022-06-30 RX ADMIN — METOPROLOL TARTRATE 12.5 MG: 25 TABLET, FILM COATED ORAL at 02:57

## 2022-06-30 RX ADMIN — SODIUM CHLORIDE: 9 INJECTION, SOLUTION INTRAVENOUS at 11:20

## 2022-06-30 RX ADMIN — ACETAMINOPHEN 650 MG: 325 TABLET ORAL at 23:24

## 2022-06-30 RX ADMIN — Medication 400 MG: at 10:41

## 2022-06-30 RX ADMIN — MYCOPHENOLATE MOFETIL 250 MG: 250 CAPSULE ORAL at 22:07

## 2022-06-30 ASSESSMENT — PAIN SCALES - GENERAL
PAINLEVEL_OUTOF10: 3
PAINLEVEL_OUTOF10: 0

## 2022-06-30 ASSESSMENT — PAIN DESCRIPTION - DESCRIPTORS: DESCRIPTORS: ACHING

## 2022-06-30 ASSESSMENT — PAIN DESCRIPTION - LOCATION: LOCATION: HEAD

## 2022-06-30 NOTE — CONSULTS
History Of Present Illness: CHIEF COMPLAINT: Syncope     HISTORY OF PRESENT ILLNESS:    Patient is 64-year-old male who presents to the ED following syncopal event. Patient states that he passed out yesterday twice. And additionally patient did have an episode of lightheadedness/dizziness while on the floors. Patient denies any chest pain or shortness of breath. As above per hospitalist.  Patient interviewed and denies prior history of fainting. He had done yard work all day prior to ascending stairs(walking) and without warning passed out fracturing right arm. The patient is a 69 y.o. male with significant past medical history of see below who presents with above. The patient has the following symptoms:    Change in level of consciousness: alert    New Weakness: no    Numbness or Tingling: no    Difficulty Swallowing: no    Current Medications:   Scheduled Meds:   metoprolol tartrate  12.5 mg Oral BID    sodium chloride flush  5-40 mL IntraVENous 2 times per day    apixaban  5 mg Oral BID    allopurinol  300 mg Oral Daily    aspirin  81 mg Oral Daily    DULoxetine  60 mg Oral Daily    magnesium oxide  400 mg Oral BID    mycophenolate  250 mg Oral BID    pantoprazole  40 mg Oral Daily    predniSONE  5 mg Oral Daily    valGANciclovir  900 mg Oral Daily    dapsone  100 mg Oral Daily    voriconazole  300 mg Oral BID    tacrolimus  6 mg Oral BID    ferrous sulfate  325 mg Oral Every Other Day     Continuous Infusions:   dextrose      sodium chloride      sodium chloride 75 mL/hr at 06/30/22 1120     PRN Meds:glucose, dextrose bolus **OR** dextrose bolus, glucagon (rDNA), dextrose, sodium chloride flush, sodium chloride, ondansetron **OR** ondansetron, polyethylene glycol, acetaminophen **OR** acetaminophen, perflutren lipid microspheres    Allergies:  Patient has no known allergies. Social History:   TOBACCO:   reports that he quit smoking about 7 years ago.  His smoking use included cigarettes. He has a 45.00 pack-year smoking history. He has never used smokeless tobacco.  ETOH:   reports previous alcohol use of about 2.0 standard drinks of alcohol per week. Past Medical History:        Diagnosis Date    [facility]     Deep venous thrombosis ([facility] 9/26/2017    Dx 1995. Was not attributed to anything. Was on oral anticoagulation for 1 year only.  Gout     History of tobacco use 11/8/2017     1 PPD x 40 years, quit in 2014    Hypertension     Idiopathic pulmonary fibrosis ([facility] [address] 9/26/2017    Interstitial lung disease ([facility]     Kidney stones     Obesity 9/26/2017    Obstructive sleep apnea 11/8/2017    Dx 10/2017. Initiated CPAP 11/2017.  Sleep apnea     Tobacco abuse        Past Surgical History:        Procedure Laterality Date    CARDIAC CATHETERIZATION  07/26/2018    Dr. Sadaf Smith - Belen Staley, DOUBLE  03/2020    AT [facility] [employer], [address]           Outside reports reviewed: ER records, historical medical records, lab reports and radiology reports. Patient's medications, allergies, past medical, surgical, social and family histories were reviewed and updated as appropriate. Review of Systems  A comprehensive review of systems was negative except for:       Objective:     Neuro exam 104/54 p 80 t 99  General: normal orientation and alertness. Cranial nerve testing was normal.  Funduscopic eye exam revealed not testable. Motor exam: normal strength and muscle mass. Limited evaluation right upper extremity--sling. Deep tendon reflexes were 1+ bilaterally. Plantar responses were flexor bilaterally. Cerebellar exam noted finger to nose without dysmetria. Sensation was normal to joint position sense and light touch . Jose Rafael Power       Assessment:   Probable cardiogenic syncope   Moderate macrocytic anemia  Non focal exam with negative head ct      Plan:   If B12 low will need replacement   Consider GI evaluation of anemia  Defer combination aspirin and eliquis to internal medicine  Cardiac work up  No further neurologic tests suggested at this time  Thanks for consult

## 2022-06-30 NOTE — PROGRESS NOTES
Internal Medicine Progress Note    [facility]    Ashley Arteaga., JANICE.[email or web address]. Sean Edwards D.O., LUIS Parmar D.O. Meridith Fire, MSN, APRN, NP-C  Noni Neumann. Horacio Tejeda, MSN, APRN-CNP     Primary Care Physician: Waylon Quiñonez MD   Admitting Physician:  Cristal Ariza DO  Admission date and time: 6/29/2022 10:29 AM    Room:  [address] [place] [address]  Admitting diagnosis: Syncope and collapse [R55]  Other closed nondisplaced fracture of proximal end of right humerus, initial encounter [S42.294A]    Patient Name: Robin Romano  MRN: [id number]    Date of Service: 6/30/2022     Subjective:  Bharathi Gee is a 69 y.o. male who was seen and examined today,6/30/2022, at the bedside. Patient has some complaints of discomfort in her right humerus. Discussed the potential etiology of his syncopal episode which appear to be multifactorial.  Evidence of orthostasis is noted. Also had 6 beat ventricular tachycardia last evening with other evidence of ectopics. Currently doing well    Wife was present during the exam    Review of System:   Constitutional:   Denies fever or chills, weight loss or gain, fatigue or malaise. HEENT:   Denies ear pain, sore throat, sinus or eye problems. Cardiovascular:   Denies any chest pain, irregular heartbeats, or palpitations. Respiratory:   Denies shortness of breath, coughing, sputum production, hemoptysis, or wheezing. Gastrointestinal:   Denies nausea, vomiting, diarrhea, or constipation. Denies any abdominal pain. Genitourinary:    Denies any urgency, frequency, hematuria. Voiding  without difficulty. Extremities:   Denies lower extremity swelling, edema or cyanosis. Discomfort in right shoulder  Neurology:    Denies any headache or focal neurological deficits, Denies generalized weakness or memory difficulty. Psch:   Denies being anxious or depressed.   Musculoskeletal: Denies  myalgias, joint complaints or back pain. Integumentary:   Denies any rashes, ulcers, or excoriations. Denies bruising. Hematologic/Lymphatic:  Denies bruising or bleeding. Physical Exam:  I/O this shift:  In: 370 [P.O.:360; I.V.:10]  Out: 600 [Urine:600]    Intake/Output Summary (Last 24 hours) at 6/30/2022 1653  Last data filed at 6/30/2022 1052  Gross per 24 hour   Intake 730 ml   Output 1250 ml   Net -520 ml   I/O last 3 completed shifts: In: 360 [P.O.:360]  Out: 650 [Urine:650]  Patient Vitals for the past 96 hrs (Last 3 readings):   Weight   06/29/22 1530 180 lb (81.6 kg)     Vital Signs:   Blood pressure 117/72, pulse 81, temperature 99.1 °F (37.3 °C), temperature source Oral, resp. rate 16, height 5' 10\" (1.778 m), weight 180 lb (81.6 kg), SpO2 96 %. General appearance:  Alert, responsive, oriented to person, place, and time. Well preserved, alert, no distress. Head:  Normocephalic. No masses, lesions or tenderness. Eyes:  PERRLA. EOMI. Sclera clear. Buccal mucosa moist.  ENT:  Ears normal. Mucosa normal.  Neck:    Supple. Trachea midline. No thyromegaly. No JVD. No bruits. Heart:    Rhythm regular. Rate controlled. No murmurs. Lungs:    Symmetrical. Clear to auscultation bilaterally. No wheezes. No rhonchi. No rales. Abdomen:   Soft. Non-tender. Non-distended. Bowel sounds positive. No organomegaly or masses. No pain on palpation. Extremities:    Peripheral pulses present. No peripheral edema. No ulcers. No cyanosis. No clubbing. Right shoulder in sling  Neurologic:    Alert x 3. No focal deficit. Cranial nerves grossly intact. No focal weakness. Psych:   Behavior is normal. Mood appears normal. Speech is not rapid and/or pressured. Musculoskeletal:   Spine ROM normal. Muscular strength intact. Gait not assessed. Integumentary:  No rashes  Skin normal color and texture.   Genitalia/Breast:  Deferred    Medication:  Scheduled Meds:   metoprolol tartrate  12.5 mg Oral BID    sodium chloride flush  5-40 mL IntraVENous 2 times per day    apixaban  5 mg Oral BID    allopurinol  300 mg Oral Daily    aspirin  81 mg Oral Daily    DULoxetine  60 mg Oral Daily    magnesium oxide  400 mg Oral BID    mycophenolate  250 mg Oral BID    pantoprazole  40 mg Oral Daily    predniSONE  5 mg Oral Daily    valGANciclovir  900 mg Oral Daily    dapsone  100 mg Oral Daily    voriconazole  300 mg Oral BID    tacrolimus  6 mg Oral BID    ferrous sulfate  325 mg Oral Every Other Day     Continuous Infusions:   dextrose      sodium chloride      sodium chloride 75 mL/hr at 06/30/22 1120       Objective Data:  Recent Labs     06/29/22  1126 06/30/22  0554   WBC 7.5 6.6   RBC 3.15* 3.23*   HGB 10.2* 10.5*   HCT 32.9* 34.1*   .4* 105.6*   MCH 32.4 32.5   MCHC 31.0* 30.8*   RDW 15.9* 16.0*   * 113*   MPV 10.8 10.6     Recent Labs     06/29/22  1126 06/30/22  0554    138   K 3.8 3.8    106   CO2 30* 26   BUN 19 15   CREATININE 0.7 0.7   GLUCOSE 129* 127*   CALCIUM 8.7 8.4*   PROT  --  5.9*   LABALBU  --  3.3*   BILITOT  --  0.4   ALKPHOS  --  228*   AST  --  28   ALT  --  17     Lab Results   Component Value Date    TROPONINI 0.02 10/14/2020    TROPONINI 0.04 (H) 09/20/2020    TROPONINI <0.01 09/29/2017                 Assessment:    · Syncope episode possibly secondary to orthostatic events but must consider that of dysrhythmia   · Right humerus fracture  · 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 Evaluate 6-minute pulmonary walk  · Cardiology consult  · Continue anticoagulant therapy  · Orthopedic evaluation      More than 50% of my  time was spent at the bedside counseling/coordinating care with the patient and/or family with face to face contact. This time was spent reviewing notes and laboratory data as well as instructing and counseling the patient. Time I spent with the family or surrogate(s) is included only if the patient was incapable of providing the necessary information or participating in medical decisions. I also discussed the differential diagnosis and all of the proposed management plans with the patient and individuals accompanying the patient.     Annamarie Lopez DO, F.A.C.O.I.  6/30/2022  4:53 PM

## 2022-06-30 NOTE — CONSULTS
Department of Orthopedic Surgery  Resident Consult Note          Reason for Consult: Right shoulder pain    HISTORY OF PRESENT ILLNESS:       Patient is a 69 y.o. male, right-hand-dominant, who presents with complaint of right shoulder pain. Pain began after a syncopal fall at home. He struck his head. Following the fall he was able to ambulate, however he noted right shoulder pain and difficulty with moving the arm. He denies any history of injury to the right upper extremity. He denies pain to any other extremities. He has no numbness or tingling. No chest pain or shortness of breath. No nausea or vomiting. He takes Eliquis with a history of DVT and cardiac catheterization. He has also had bilateral lung transplants in 2020 and states he is on immunosuppressants for this. No additional complaints at this time. Past Medical History:        Diagnosis Date    COPD [facility]ch[facility] obstructive [facility] disease) [facility] [address]     Deep venous thrombosis ([facility] [address] 9/26/2017    Dx 1995. Was not attributed to anything. Was on oral anticoagulation for 1 year only.  Gout     History of tobacco use 11/8/2017     1 PPD x 40 years, quit in 2014    Hypertension     Idiopathic pulmonary fibrosis ([facility] [address] 9/26/2017    Interstitial lung disease ([facility] [address]     Kidney stones     Obesity 9/26/2017    Obstructive sleep apnea 11/8/2017    Dx 10/2017. Initiated CPAP 11/2017.      Sleep apnea     Tobacco abuse      Past Surgical History:        Procedure Laterality Date    CARDIAC CATHETERIZATION  07/26/2018    Dr. Mignon Allen Erps, DOUBLE  03/2020    TONSILLECTOMY      VASECTOMY       Current Medications:   Current Facility-Administered Medications: glucose chewable tablet 16 g, 4 tablet, Oral, PRN  dextrose bolus 10% 125 mL, 125 mL, IntraVENous, PRN **OR** dextrose bolus 10% 250 mL, 250 mL, IntraVENous, PRN  glucagon (rDNA) injection 1 mg, 1 mg, IntraMUSCular, PRN  dextrose 5 % solution, 100 mL/hr, IntraVENous, PRN  sodium chloride flush 0.9 % injection 5-40 mL, 5-40 mL, IntraVENous, 2 times per day  sodium chloride flush 0.9 % injection 5-40 mL, 5-40 mL, IntraVENous, PRN  0.9 % sodium chloride infusion, , IntraVENous, PRN  ondansetron (ZOFRAN-ODT) disintegrating tablet 4 mg, 4 mg, Oral, Q8H PRN **OR** ondansetron (ZOFRAN) injection 4 mg, 4 mg, IntraVENous, Q6H PRN  polyethylene glycol (GLYCOLAX) packet 17 g, 17 g, Oral, Daily PRN  acetaminophen (TYLENOL) tablet 650 mg, 650 mg, Oral, Q6H PRN **OR** acetaminophen (TYLENOL) suppository 650 mg, 650 mg, Rectal, Q6H PRN  apixaban (ELIQUIS) tablet 5 mg, 5 mg, Oral, BID  allopurinol (ZYLOPRIM) tablet 300 mg, 300 mg, Oral, Daily  aspirin chewable tablet 81 mg, 81 mg, Oral, Daily  DULoxetine (CYMBALTA) extended release capsule 60 mg, 60 mg, Oral, Daily  magnesium oxide (MAG-OX) tablet 400 mg, 400 mg, Oral, BID  mycophenolate (CELLCEPT) capsule 250 mg, 250 mg, Oral, BID  pantoprazole (PROTONIX) tablet 40 mg, 40 mg, Oral, Daily  predniSONE (DELTASONE) tablet 5 mg, 5 mg, Oral, Daily  valGANciclovir (VALCYTE) tablet 900 mg, 900 mg, Oral, Daily  dapsone tablet 100 mg, 100 mg, Oral, Daily  voriconazole (VFEND) tablet 300 mg, 300 mg, Oral, BID  tacrolimus (PROGRAF) capsule 6 mg, 6 mg, Oral, BID  ferrous sulfate (IRON 325) tablet 325 mg, 325 mg, Oral, Every Other Day  perflutren lipid microspheres (DEFINITY) injection 1.65 mg, 1.5 mL, IntraVENous, ONCE PRN  0.9 % sodium chloride infusion, , IntraVENous, Continuous  Allergies:  Patient has no known allergies. Social History:   TOBACCO:   reports that he quit smoking about 7 years ago. His smoking use included cigarettes. He has a 45.00 pack-year smoking history. He has never used smokeless tobacco.  ETOH:   reports previous alcohol use of about 2.0 standard drinks of alcohol per week. DRUGS:   reports no history of drug use. ACTIVITIES OF DAILY LIVING:    OCCUPATION:    Family History:   History reviewed.  No pertinent family history. REVIEW OF SYSTEMS:  CONSTITUTIONAL:  negative for  fevers, chills  EYES:  negative for blurred vision, visual disturbance  HEENT:  negative for  hearing loss, voice change  RESPIRATORY: See HPI  CARDIOVASCULAR:  negative for  chest pain, palpitations  GASTROINTESTINAL:  negative for nausea, vomiting  GENITOURINARY:  negative for frequency, urinary incontinence  HEMATOLOGIC/LYMPHATIC:  negative for bleeding and petechiae  MUSCULOSKELETAL: See HPI  NEUROLOGICAL:  negative for headaches, dizziness  BEHAVIOR/PSYCH:  negative for increased agitation and anxiety    PHYSICAL EXAM:    VITALS:  /64   Pulse 83   Temp 99.1 °F (37.3 °C) (Oral)   Resp 16   Ht 5' 10\" (1.778 m)   Wt 180 lb (81.6 kg)   SpO2 96%   BMI 25.83 kg/m²   CONSTITUTIONAL: Awake, alert, cooperative, appears stated age  MUSCULOSKELETAL:  Right upper Extremity:  · Ecchymosis present over the proximal humerus about the shoulder with corresponding tenderness to palpation. Skin is intact circumferentially  · Limited motion of the shoulder secondary to pain  · No tenderness to palpation about the medial and lateral epicondyle, minimal tenderness to palpation over the olecranon  · Extension and flexion of the elbow present without pain  · No tenderness to palpation about the wrist or hand with full motion  · Motor function intact to AIN, PIN, median, radial, and ulnar nerve distributions  · Sensation intact to axillary, median, radial, and ulnar nerve distributions  · Radial pulse +2/4  · Compartments soft and compressible    Secondary Exam:   · leftUE: No obvious signs of trauma. -TTP to fingers, hand, wrist, forearm, elbow, humerus, shoulder or clavicle. -- Patient able to flex/extend fingers, wrist, elbow and shoulder with active and passive ROM without pain, +2/4 Radial pulse, cap refill <3sec, +AIN/PIN/Radial/Ulnar/Median N, distal sensation grossly intact to C4-T1 dermatomes, compartments soft and compressible. · bilateralLE: No obvious signs of trauma. -TTP to foot, ankle, leg, knee, thigh, hip.-- Patient able to flex/extend toes, ankle, knee and hip with active and passive ROM without pain,+2/4 DP & PT pulses, cap refill <3sec, +5/5 PF/DF/EHL, distal sensation grossly intact to L4-S1 dermatomes, compartments soft and compressible. · Pelvis: -TTP, -Log roll, -Heel strike     DATA:    CBC:   Lab Results   Component Value Date/Time    WBC 7.5 06/29/2022 11:26 AM    RBC 3.15 06/29/2022 11:26 AM    HGB 10.2 06/29/2022 11:26 AM    HCT 32.9 06/29/2022 11:26 AM    .4 06/29/2022 11:26 AM    MCH 32.4 06/29/2022 11:26 AM    MCHC 31.0 06/29/2022 11:26 AM    RDW 15.9 06/29/2022 11:26 AM     06/29/2022 11:26 AM    MPV 10.8 06/29/2022 11:26 AM     PT/INR:    Lab Results   Component Value Date/Time    PROTIME 12.6 10/14/2020 11:39 AM    INR 1.1 10/14/2020 11:39 AM       Radiology Review:  X-ray imaging of the right humerus reviewed. These limited views of the glenohumeral joint demonstrate a fracture of the proximal humerus which includes the surgical neck and greater tuberosity. The glenohumeral joint is well located. No fractures noted in the distal humerus. IMPRESSION:  · Right, closed, proximal humerus fracture    PLAN:  · Nonweightbearing to right upper extremity in sling  · Ice the affected area as needed for control of swelling and pain  · Pain medications per admitting service  · DVT prophylaxis per admitting service  · Dedicated x-ray imaging of the right shoulder and right elbow is pending  · Patient will likely be more comfortable resting in a more upright position, either in a recliner or with the head of the bed elevated. No orthopedic surgical intervention at this time. Overall alignment of his fracture is well-preserved and he has significant medical comorbidities that would make surgery of higher risk.   He will follow-up with Dr. Rigo Herrera as an outpatient for further imaging and management of his fracture. · I was present with the resident during the history and exam. I discussed the case with the resident and agree with the findings and plan as documented in the resident's note.

## 2022-06-30 NOTE — PROGRESS NOTES
Physical Therapy  Physical Therapy Initial Evaluation/Plan of Care    Room #:  6785/9904-58  Patient Name: Kaylee Kay  YOB: 1955  MRN: [id number]    Date of Service: 6/30/2022     Tentative placement recommendation: Home  Equipment recommendation: Patient has needed equipment       Evaluating Physical Therapist: Emeka Ceballos PT, DPT #[id number]      Specific Provider Orders/Date/Referring Provider :     06/29/22 1930   PT eval and treat Start: 06/29/22 1930, End: 06/29/22 1930, ONE TIME, Standing Count: 1 Occurrences, R    Jaleel Mendez, DO Acknowledge New    Admitting Diagnosis:   Syncope and collapse [R55]  Other closed nondisplaced fracture of proximal end of right humerus, initial encounter [S42.294A]      Surgery: none  Visit Diagnoses       Codes    Other closed nondisplaced fracture of proximal end of right humerus, initial encounter     S42.294A          Patient Active Problem List   Diagnosis    Gout    [facility] venous thrombosis ([facility] [address]    Obesity    Idiopathic pulmonary fibrosis ([facility] [address]    Obstructive sleep apnea    History of tobacco use    Right ventricular dysfunction    Hypoxia    Pulmonary arterial hypertension ([address]    Heart failure with preserved ejection fraction (HCC)    Hypoxemia    Pneumonia    Syncope and collapse        ASSESSMENT of Current Deficits Patient exhibits decreased strength, balance and endurance impairing functional mobility, transfers, gait , gait distance and tolerance to activity. Pt generally steady with ambulation and stairs demonstrating good safety awareness throughout session. Pt did not have any LOB, dizziness/lightheadedness or SOB. Pt able to perform all function while remaining NWB through RUE.         PHYSICAL THERAPY  PLAN OF CARE       Physical therapy plan of care is established based on physician order,  patient diagnosis and clinical assessment    Current Treatment Recommendations:    -Bed Mobility: Lower extremity exercises  and Trunk control activities   -Sitting Balance: Incorporate reaching activities to activate trunk muscles , Hands on support to maintain midline , Facilitate active trunk muscle engagement , Facilitate postural control in all planes  and Engage in core activities to allow for movement within base of support   -Standing Balance: Perform strengthening exercises in standing to promote motor control with or without upper extremity support , Instruct patient on adequate base of support to maintain balance and Challenge balance utilizing reaching  activities beyond center of gravity    -Transfers: Provide instruction on proper hand and foot position for adequate transfer of weight onto lower extremities and use of gait device if needed, Cues for hand placement, technique and safety. Provide stabilization to prevent fall , Facilitate weight shift forward on to lower extremities and provide necessary stabilization of bilateral lower extremities , Support transfer of weight on to lower extremities and Assist with extension of knees trunk and hip to accept weight transfer   -Gait: Gait training, Standing activities to improve: base of support, weight shift, weight bearing , Exercises to improve trunk control, Exercises to improve hip and knee control, Performance of protected weight bearing activities and Activities to increase weight bearing   -Endurance: Utilize Supervised activities to increase level of endurance to allow for safe functional mobility including transfers and gait  and Use graduated activities to promote good breathing techniques and provide support and education to maximize respiratory function  -Stairs: Stair training with instruction on proper technique and hand placement on rail    PT long term treatment goals are located in below grid    Patient and or family understand(s) diagnosis, prognosis, and plan of care. Frequency of treatments: Patient will be seen  daily.          Prior Level of Function: Patient ambulated independently and with cane   Rehab Potential: good  for baseline    Past medical history:   Past Medical History:   Diagnosis Date    COPD (chronic obstructive pulmonary disease) ([facility] [address]     Deep venous thrombosis ([facility] [address] 9/26/2017    Dx 1995. Was not attributed to anything. Was on oral anticoagulation for 1 year only.  Gout     History of tobacco use 11/8/2017     1 PPD x 40 years, quit in 2014    Hypertension     Idiopathic pulmonary fibrosis ([facility] [address] 9/26/2017    Interstitial lung disease ([facility] [address]     Kidney stones     Obesity 9/26/2017    Obstructive sleep apnea 11/8/2017    Dx 10/2017. Initiated CPAP 11/2017.  Sleep apnea     Tobacco abuse      Past Surgical History:   Procedure Laterality Date    CARDIAC CATHETERIZATION  07/26/2018    Dr. Mignon Allen Erps, DOUBLE  03/2020    AT [facility] [employer], [place]:    Precautions:  Up with assistance, falls and non weight bearing RUE from R humerus fx  Social history: Patient lives with spouse in a ranch home  with 3 steps  to enter with Rail with 14 stairs and 1 HR to basement and Walk in shower grab bars    Equipment owned: Cane and [address] [facility] [address] chair,      [address]   How much difficulty turning over in bed?: A Little  How much difficulty sitting down on / standing up from a chair with arms?: A Little  How much difficulty moving from lying on back to sitting on side of bed?: A Little  How much help from another person moving to and from a bed to a chair?: A Little  How much help from another person needed to walk in hospital room?: A Little  How much help from another person for climbing 3-5 steps with a railing?: A Little  AM-PAC Inpatient Mobility Raw Score : 18  AM-PAC Inpatient T-Scale Score : 43.63  Mobility Inpatient CMS 0-100% Score: 46.58  Mobility Inpatient CMS G-Code Modifier : CK    Nursing cleared patient for PT evaluation. The admitting diagnosis and active problem list as listed above have been reviewed prior to the initiation of this evaluation. OBJECTIVE;   Initial Evaluation  Date: 6/30/2022 Treatment Date:     Short Term/ Long Term   Goals   Was pt agreeable to Eval/treatment? Yes  To be met in 2 days   Pain level   1/10  R shoulder     Bed Mobility    Rolling: Supervision     Supine to sit: Supervision     Sit to supine: Supervision     Scooting: Supervision     Rolling: Independent    Supine to sit:  Independent    Sit to supine: Independent    Scooting: Independent     Transfers Sit to stand: Supervision    Sit to stand: Independent    Ambulation    2 x 125 feet using  IV pole with Supervision    for balance and safety   > 250 feet using  least restrictive device versus no device with Independent    Stair negotiation: ascended and descended   10 steps with 1 HR with Supervision    10 steps with 1 HR with Mod I   ROM Within functional limits    Increase range of motion 10% of affected joints    Strength BUE:  refer to OT eval  RLE:  4+/5  LLE:  4+/5  Increase strength in affected mm groups by 1/3 grade   Balance Sitting EOB:  good -  Dynamic Standing:  fair +  Sitting EOB:  good   Dynamic Standing: good      Patient is Alert & Oriented x person, place, time and situation and follows directions    Sensation:  Patient  denies numbness/tingling   Edema:  no   Endurance: fair  +    Vitals: room air   Blood Pressure at rest  Blood Pressure during session    Heart Rate at rest  Heart Rate during session    SPO2 at rest %  SPO2 during session %     Patient education  Patient educated on role of Physical Therapy, risks of immobility, safety and plan of care, energy conservation,  importance of mobility while in hospital , ankle pumps, quad set and glut set for edema control, blood clot prevention, safety  and stair training      Patient response to education:   Pt verbalized understanding Pt demonstrated skill Pt requires further education in this area   Yes Partial Yes      Treatment:  Patient practiced and was instructed/facilitated in the following treatment: Patient Sat edge of bed 10 minutes with Supervision  to increase dynamic sitting balance and activity tolerance. Pt performed transfers, ambulation in hallway, stairs, adjusted pt sling and discussed proper arm positioning, discussed neck exercises to perform to prevent stiff neck. Therapeutic Exercises:  not performed     At end of session, patient in chair with spouse present call light and phone within reach,  all lines and tubes intact, nursing notified. Patient would benefit from continued skilled Physical Therapy to improve functional independence and quality of life. Patient's/ family goals   home    Time in  80  Time out  1108    Total Treatment Time  17 minutes    Evaluation time includes thorough review of current medical information, gathering information on past medical history/social history and prior level of function, completion of standardized testing/informal observation of tasks, assessment of data, and development of Plan of care and goals.      CPT codes:  Low Complexity PT evaluation (00360)  Therapeutic activities (81157)   17 minutes  1 unit(s)    Emeka Ceballos PT

## 2022-06-30 NOTE — PLAN OF CARE
Pantoprazole was changed to Omeprazole d/t drug formulary   However, after Omeprazole was sent, there was a quantity limit.  So PA for Pantoprazole was completed and approved.    Prescription for pantoprazole sent.  Omeprazole removed from med list    Coral Milner RN     Problem: Safety - Adult  Goal: Free from fall injury  Outcome: Progressing     Problem: ABCDS Injury Assessment  Goal: Absence of physical injury  Outcome: Progressing     Problem: Pain  Goal: Verbalizes/displays adequate comfort level or baseline comfort level  Outcome: Progressing Scalpel Size: 15 blade

## 2022-06-30 NOTE — PROGRESS NOTES
[address] [facility]  [facility] [place]        Date:2022                                                  Patient Name: Jacek Sheth    MRN: [id number]    : 1955    Room: [facility]2789-64      Evaluating OT: Arya Perez OTR/L; [id number]     Referring Provider and Specific Provider Orders/Date:      22  OT eval and treat  Start:  22,   End:  22,   ONE TIME,   Standing Count:  1 Occurrences,   [address], DO      Placement Recommendation: [address] if needed        Diagnosis:   1. Syncope and collapse    2. Other closed nondisplaced fracture of proximal end of right humerus, initial encounter         Surgery: none       Pertinent Medical History:       Past Medical History:   Diagnosis Date    [facility]     Deep venous thrombosis ([facility] 2017    Dx . Was not attributed to anything. Was on oral anticoagulation for 1 year only.  Gout     History of tobacco use 2017     1 PPD x 40 years, quit in     Hypertension     Idiopathic pulmonary fibrosis ([facility] 2017    Interstitial lung disease ([facility]     Kidney stones     Obesity 2017    Obstructive sleep apnea 2017    Dx 10/2017. Initiated CPAP 2017.      Sleep apnea     Tobacco abuse          Past Surgical History:   Procedure Laterality Date    CARDIAC CATHETERIZATION  2018    Dr. Yung Dumont - Litzy Malone, DOUBLE  2020    AT [address] [facility] [employer], [address] [place] [address]      VASECTOMY        Precautions:  Fall Risk, R proximal humerus fx with sling, syncopal episode x 2 at home, hx of double lung transplant in      Assessment of current deficits:     [x] Functional mobility  [x]ADLs  [x] Strength               []Cognition    [x] Functional transfers   [x] IADLs         [] Safety Awareness [x]Endurance    [] Fine Coordination              [x] Balance      [] Vision/perception   []Sensation     []Gross Motor Coordination  [x] ROM  [] Delirium                   [] Motor Control     OT PLAN OF CARE   OT POC based on physician orders, patient diagnosis and results of clinical assessment    Frequency/Duration 1-3 days/wk for 2 weeks PRN     Specific OT Treatment Interventions to include:   * Instruction/training on adapted ADL techniques and AE recommendations to increase functional independence within precautions       * Training on energy conservation strategies, correct breathing pattern and techniques to improve independence/tolerance for self-care routine  * Functional transfer/mobility training/DME recommendations for increased independence, safety, and fall prevention  * Patient/Family education to increase follow through with safety techniques and functional independence  * Recommendation of environmental modifications for increased safety with functional transfers/mobility and ADLs  * Therapeutic exercise to improve motor endurance, ROM, and functional strength for ADLs/functional transfers  * Therapeutic activities to facilitate/challenge dynamic balance, stand tolerance for increased safety and independence with ADLs  * Positioning to improve skin integrity, interaction with environment and functional independence    Recommended Adaptive Equipment: tub transfer bench (spouse would like to discuss this with spouse before placing order), long handle shoe horn will be provided. Home Living: with spouse; single family home, 1 story, 3 steps to enter with rail, tub shower and walker in shower. Equipment owned: none     Prior Level of Function: Independent with ADLs , Independent with IADLs; ambulated with no device    Driving: yes  Occupation: retired      Pain Level: no pain at time of evaluation; Nursing notified.       Cognition: A&O: 4/4; Follows 2 step directions   Memory: good    Sequencing: good    Problem solving: good    Judgement/safety: good     [facility]   AM-PAC Daily Activity Inpatient   How much help for putting on and taking off regular lower body clothing?: Total  How much help for Bathing?: A Lot  How much help for Toileting?: A Little  How much help for putting on and taking off regular upper body clothing?: A Lot  How much help for taking care of personal grooming?: A Little  How much help for eating meals?: None  AM-PAC Inpatient Daily Activity Raw Score: 15  AM-PAC Inpatient ADL T-Scale Score : 34.69  ADL Inpatient CMS 0-100% Score: 56.46  ADL Inpatient CMS G-Code Modifier : CK     Functional Assessment:    Initial Eval Status  Date: 6/30/22 Treatment Status  Date: STGs = LTGs  Time frame: 10-14 days   Feeding Independent   Independent    Grooming Supervision   Independent    UB Dressing Moderate Assist   Independent    LB Dressing Dependent to don compression stockings using easy slide. Pt provided easy slide devise after instruction/training on use, demo provided. Maximal assist to doff and don socks   Minimal Assist    Bathing Maximal Assist  Minimal Assist    Toileting Supervision   Independent    Bed Mobility  Supine to sit: N/T as pt was up in chair   Sit to supine: Supervision   Supine to sit: Independent   Sit to supine: Independent    Functional Transfers Supervision from bedside chair. Supervision for transfer to and from low commode with minimal verbal cues to use grab bar for safe commode transfer. Transfer training with verbal cues for hand placement throughout session to improve safety.    Independent    Functional Mobility Supervisoin with no device to improve balance to and from bathroom  Modified [place]    Balance Sitting:     Static: good     Dynamic: fair   Standing: fair  with wheeled walker     Activity Tolerance fair   good    Visual/  Perceptual Glasses: yes, readers                 Hand Dominance: right      AROM (PROM) Strength Additional Info:    RUE  WFL 4/5 good  and wfl FMC/dexterity noted during ADL tasks     LUE WFL 4/5 good  and wfl FMC/dexterity noted during ADL tasks       Hearing: WFL   Sensation:  No c/o numbness or tingling  Tone: WFL   Edema: no    Comments: Upon arrival the patient was seated in bedside chiar. At end of session, patient was supine with HOB elevated with call light and phone within reach, all lines and tubes intact. Overall patient demonstrated decreased independence and safety during completion of ADL/functional transfer/mobility tasks. Pt would benefit from continued skilled OT to increase safety and independence with completion of ADL/IADL tasks for functional independence and quality of life. Treatment: OT treatment provided this date includes:    Instruction/training on safety and adapted techniques for completion of ADLs    Instruction/training on safe functional mobility/transfer techniques    Instruction/training on energy conservation/work simplification for completion of ADLs    Proper Positioning/Alignment    Rehab Potential: Good for established goals. Patient / Family Goal: return home      Patient and/or family were instructed on functional diagnosis, prognosis/goals and OT plan of care. Demonstrated good understanding.      Eval Complexity: Low    Time In: 1:40pm   Time Out: 2:15pm    Total Treatment Time: 15      Min Units   OT Eval Low 97165  X  1    OT Eval Medium 48021      OT Eval High 14901      OT Re-Eval 40814            ADL/Self Care 59308 10    Therapeutic Activities 09937  15  1    Therapeutic Ex 36604       Orthotic Management 07754       Manual [id number]     Neuro Re-Ed 65635       Non-Billable Time        Evaluation Time additionally includes thorough review of current medical information, gathering information on past medical history/social history and prior level of function, interpretation of standardized testing/informal observation of tasks, assessment of data and development of plan of care and goals.         Evaluating OT: Americo Del Cid OTR/L; [id number]

## 2022-06-30 NOTE — PROGRESS NOTES
Pt had 6 beats of VTach on the monitor. Alert and orientedx4. HR83 resp 16 /64 temp 99.1 96%RA w/ no complaints of chest pain.

## 2022-06-30 NOTE — CARE COORDINATION
Ss note:6/30/2022 10:16 AM No covid testing. Charting reflects pt had covid vaccine. Met with wife Sven Sawant as pt is oor for testing. Resides w/wife in 1 story home, 2 steps w/rails. PTA pt independent, is on room air, does not own any DME. Wife relays pt had a double lung transplant at [facility] two years ago and has been doing well. Hx of HHC after transplant, hx of pulmonary rehab, no hx of SNF. Wife relays pt has sling on rt arm from fall at home. Pt has a dtr Eloise Ring for support whom resides in [place]. Therapy has been ordered however wife does not feel pt will need any HHC at discharge. [facility] or [facility] in [place]. Plan is home with wife.  JACQUES Rose

## 2022-06-30 NOTE — CONSULTS
Pulmonary/Critical Care Consult Note    CHIEF COMPLAINT: Syncopal episode x2, right comminuted humeral neck fracture, status post double lung transplant secondary to idiopathic pulmonary fibrosis    HISTORY OF PRESENT ILLNESS: Patient is a 75-year-old male former  with a long history of cigarette smoking. The patient underwent a double lung transplant in  at [facility] and has done relatively well ever since. He does not wear oxygen at home and is very diligent about taking his antirejection medications which include prednisone 5 mg p.o. daily, dapsone 100 mg p.o. daily, tacrolimus 6 mg twice daily valganciclovir for CMV, voriconazole for Aspergillus and mycophenolate to 50 mg p.o. daily. He follows with Dr. Drew Torre and Dr. Denver Calabrese at [facility]. He does not become short of breath with exertion and overall has had excellent results. Patient was admitted through the emergency room with 2 separate syncopal episodes within several hours of each other. He fell on his right shoulder and sustained a comminuted fracture of the right humeral neck. He takes apixaban daily for a history of DVT in the past.    ALLERGY:  Patient has no known allergies. FAMILY HISTORY:  History reviewed. No pertinent family history.     SOCIAL HISTORY:  Social History     Socioeconomic History    Marital status:      Spouse name: Not on file    Number of children: Not on file    Years of education: Not on file    Highest education level: Not on file   Occupational History    Not on file   Tobacco Use    Smoking status: Former Smoker     Packs/day: 1.00     Years: 45.00     Pack years: 45.00     Types: Cigarettes     Quit date: 10/10/2014     Years since quittin.7    Smokeless tobacco: Never Used   Vaping Use    Vaping Use: Never used   Substance and Sexual Activity    Alcohol use: Not Currently     Alcohol/week: 2.0 standard drinks     Types: 1 Cans of beer, 1 Shots of liquor per week     Comment: none since 10/2018    Drug use: No     Types: Marijuana (Jillolman Cartagenaa), Cocaine     Comment: in his teens/20's; none since    Sexual activity: Not Currently     Partners: Female   Other Topics Concern    Not on file   Social History Narrative    He denies any family history of hypercoagulability, premature death, SCD, cardiomyopathies, pulmonary hypertension, Marfan's, connective tissue diseases. He quit smoking cigarettes but the development of current cough occurred prior to smoking cessation. He used to smoke 1 PPD x 40 years, quit 3 years ago in 2014, when the onset of above chronic cough prompted him to quit. Dr. Cherie Smith who obtained repeat imaging which revealed unchanged fibrotic changes of the upper and lower lobes of both lungs, ultimately diagnosed last week with idiopathic pulmonary fibrosis. No tissue diagnosis at this time. Drinks 2 cups of coffee daily. Social Determinants of Health     Financial Resource Strain:     Difficulty of Paying Living Expenses: Not on file   Food Insecurity:     Worried About Running Out of Food in the Last Year: Not on file    Derick of Food in the Last Year: Not on file   Transportation Needs:     Lack of Transportation (Medical): Not on file    Lack of Transportation (Non-Medical):  Not on file   Physical Activity:     Days of Exercise per Week: Not on file    Minutes of Exercise per Session: Not on file   Stress:     Feeling of Stress : Not on file   Social Connections:     Frequency of Communication with Friends and Family: Not on file    Frequency of Social Gatherings with Friends and Family: Not on file    Attends Buddhism Services: Not on file    Active Member of Clubs or Organizations: Not on file    Attends Club or Organization Meetings: Not on file    Marital Status: Not on file   Intimate Partner Violence:     Fear of Current or Ex-Partner: Not on file    Emotionally Abused: Not on file  Physically Abused: Not on file    Sexually Abused: Not on file   Housing Stability:     Unable to Pay for Housing in the Last Year: Not on file    Number of Places Lived in the Last Year: Not on file    Unstable Housing in the Last Year: Not on file       MEDICAL HISTORY:  Past Medical History:   Diagnosis Date    [facility]     Deep venous thrombosis ([facility] 9/26/2017    Dx 1995. Was not attributed to anything. Was on oral anticoagulation for 1 year only.  Gout     History of tobacco use 11/8/2017     1 PPD x 40 years, quit in 2014    Hypertension     Idi[facility] fibrosis [facility] 9/26/2017    Interstitial lung disease ([facility]     Kidney stones     Obesity 9/26/2017    Obstructive sleep apnea 11/8/2017    Dx 10/2017. Initiated CPAP 11/2017.      Sleep apnea     Tobacco abuse        MEDICATIONS:   metoprolol tartrate  12.5 mg Oral BID    sodium chloride flush  5-40 mL IntraVENous 2 times per day    apixaban  5 mg Oral BID    allopurinol  300 mg Oral Daily    aspirin  81 mg Oral Daily    DULoxetine  60 mg Oral Daily    magnesium oxide  400 mg Oral BID    mycophenolate  250 mg Oral BID    pantoprazole  40 mg Oral Daily    predniSONE  5 mg Oral Daily    valGANciclovir  900 mg Oral Daily    dapsone  100 mg Oral Daily    voriconazole  300 mg Oral BID    tacrolimus  6 mg Oral BID    ferrous sulfate  325 mg Oral Every Other Day      dextrose      sodium chloride      sodium chloride 75 mL/hr at 06/30/22 1120     glucose, dextrose bolus **OR** dextrose bolus, glucagon (rDNA), dextrose, sodium chloride flush, sodium chloride, ondansetron **OR** ondansetron, polyethylene glycol, acetaminophen **OR** acetaminophen, perflutren lipid microspheres    REVIEW OF SYSTEMS:  Constitutional: Denies fever, weight loss, night sweats, and fatigue  Skin: Denies pigmentation, dark lesions, and rashes   HEENT: Denies hearing loss, tinnitus, ear drainage, epistaxis, sore throat, and hoarseness. Cardiovascular: Denies palpitations, chest pain, and chest pressure. Respiratory: Denies cough, dyspnea at rest, hemoptysis, apnea, and choking. Gastrointestinal: Denies nausea, vomiting, poor appetite, diarrhea, heartburn or reflux  Genitourinary: Denies dysuria, frequency, urgency or hematuria  Musculoskeletal: Denies myalgias, muscle weakness, and bone pain  Neurological: Denies dizziness, vertigo, headache, and focal weakness  Psychological: Denies anxiety and depression  Endocrine: Denies heat intolerance and cold intolerance  Hematopoietic/Lymphatic: Denies bleeding problems and blood transfusions    PHYSICAL EXAM:  Vitals:    06/30/22 0745   BP: 117/72   Pulse: 81   Resp: 16   Temp: 99.1 °F (37.3 °C)   SpO2: 96%           O2 Device: None (Room air)    Constitutional: No fever, chills, diaphoresis  Skin: No skin rash, no skin breakdown  HEENT: Unremarkable  Neck: No JVD, lymphadenopathy, thyromegaly  Cardiovascular: S1, S2 regular. No S3 murmurs rubs present  Respiratory: Clear to auscultation bilaterally. No crackles or rubs present  Gastrointestinal: Unremarkable-soft, flat, nontender  Genitourinary: No CVA tenderness  Extremities: No clubbing, cyanosis, or edema  Neurological: Awake, alert, oriented x3. The patient has some facial asymmetry secondary to a remote right sided Bell's palsy  Psychological: In good spirits.   Appropriate affect    LABS:  WBC   Date Value Ref Range Status   06/30/2022 6.6 4.5 - 11.5 E9/L Final   06/29/2022 7.5 4.5 - 11.5 E9/L Final   08/06/2021 6.0 4.5 - 11.5 E9/L Final     Hemoglobin   Date Value Ref Range Status   06/30/2022 10.5 (L) 12.5 - 16.5 g/dL Final   06/29/2022 10.2 (L) 12.5 - 16.5 g/dL Final   08/06/2021 8.9 (L) 12.5 - 16.5 g/dL Final     Hematocrit   Date Value Ref Range Status   06/30/2022 34.1 (L) 37.0 - 54.0 % Final   06/29/2022 32.9 (L) 37.0 - 54.0 % Final   08/06/2021 32.1 (L) 37.0 - 54.0 % Final     MCV   Date Value Ref Range Status   06/30/2022 105.6 (H) 80.0 - 99.9 fL Final   06/29/2022 104.4 (H) 80.0 - 99.9 fL Final   08/06/2021 85.6 80.0 - 99.9 fL Final     Platelets   Date Value Ref Range Status   06/30/2022 113 (L) 130 - 450 E9/L Final   06/29/2022 125 (L) 130 - 450 E9/L Final   08/06/2021 234 130 - 450 E9/L Final     Sodium   Date Value Ref Range Status   06/30/2022 138 132 - 146 mmol/L Final   06/29/2022 136 132 - 146 mmol/L Final   08/06/2021 140 132 - 146 mmol/L Final     Potassium   Date Value Ref Range Status   06/30/2022 3.8 3.5 - 5.0 mmol/L Final   06/29/2022 3.8 3.5 - 5.0 mmol/L Final   08/06/2021 4.6 3.5 - 5.0 mmol/L Final     Potassium reflex Magnesium   Date Value Ref Range Status   10/14/2020 4.8 3.5 - 5.0 mmol/L Final   09/20/2020 4.7 3.5 - 5.0 mmol/L Final     Chloride   Date Value Ref Range Status   06/30/2022 106 98 - 107 mmol/L Final   06/29/2022 100 98 - 107 mmol/L Final   08/06/2021 105 98 - 107 mmol/L Final     CO2   Date Value Ref Range Status   06/30/2022 26 22 - 29 mmol/L Final   06/29/2022 30 (H) 22 - 29 mmol/L Final   08/06/2021 25 22 - 29 mmol/L Final     BUN   Date Value Ref Range Status   06/30/2022 15 6 - 23 mg/dL Final   06/29/2022 19 6 - 23 mg/dL Final   08/06/2021 25 (H) 6 - 23 mg/dL Final     CREATININE   Date Value Ref Range Status   06/30/2022 0.7 0.7 - 1.2 mg/dL Final   06/29/2022 0.7 0.7 - 1.2 mg/dL Final   08/06/2021 0.9 0.7 - 1.2 mg/dL Final     Glucose   Date Value Ref Range Status   06/30/2022 127 (H) 74 - 99 mg/dL Final   06/29/2022 129 (H) 74 - 99 mg/dL Final   08/06/2021 112 (H) 74 - 99 mg/dL Final   05/30/2012 100 70 - 110 mg/dL Final   05/11/2011 85 70 - 110 mg/dL Final     Calcium   Date Value Ref Range Status   06/30/2022 8.4 (L) 8.6 - 10.2 mg/dL Final   06/29/2022 8.7 8.6 - 10.2 mg/dL Final   08/06/2021 9.0 8.6 - 10.2 mg/dL Final     Total Protein   Date Value Ref Range Status   06/30/2022 5.9 (L) 6.4 - 8.3 g/dL Final   08/06/2021 6.1 (L) 6.4 - 8.3 g/dL Final   07/28/2021 6.5 6.4 - 8.3 g/dL Final     Albumin   Date Value Ref Range Status   06/30/2022 3.3 (L) 3.5 - 5.2 g/dL Final   08/06/2021 3.5 3.5 - 5.2 g/dL Final   07/28/2021 3.7 3.5 - 5.2 g/dL Final   05/30/2012 3.9 3.2 - 4.8 g/dL Final   05/11/2011 4.1 3.2 - 4.8 g/dL Final     Total Bilirubin   Date Value Ref Range Status   06/30/2022 0.4 0.0 - 1.2 mg/dL Final   08/06/2021 0.2 0.0 - 1.2 mg/dL Final   07/28/2021 0.3 0.0 - 1.2 mg/dL Final     Alkaline Phosphatase   Date Value Ref Range Status   06/30/2022 228 (H) 40 - 129 U/L Final   08/06/2021 293 (H) 40 - 129 U/L Final   07/28/2021 299 (H) 40 - 129 U/L Final     AST   Date Value Ref Range Status   06/30/2022 28 0 - 39 U/L Final   08/06/2021 36 0 - 39 U/L Final   07/28/2021 53 (H) 0 - 39 U/L Final     ALT   Date Value Ref Range Status   06/30/2022 17 0 - 40 U/L Final   08/06/2021 17 0 - 40 U/L Final   07/28/2021 25 0 - 40 U/L Final     GFR Non-   Date Value Ref Range Status   06/30/2022 >60 >=60 mL/min/1.73 Final     Comment:     Chronic Kidney Disease: less than 60 ml/min/1.73 sq.m. Kidney Failure: less than 15 ml/min/1.73 sq.m. Results valid for patients 18 years and older. 06/29/2022 >60 >=60 mL/min/1.73 Final     Comment:     Chronic Kidney Disease: less than 60 ml/min/1.73 sq.m. Kidney Failure: less than 15 ml/min/1.73 sq.m. Results valid for patients 18 years and older. 08/06/2021 >60 >=60 mL/min/1.73 Final     Comment:     Chronic Kidney Disease: less than 60 ml/min/1.73 sq.m. Kidney Failure: less than 15 ml/min/1.73 sq.m. Results valid for patients 18 years and older.        GFR    Date Value Ref Range Status   06/30/2022 >60  Final   06/29/2022 >60  Final   08/06/2021 >60  Final     Magnesium   Date Value Ref Range Status   06/30/2022 2.0 1.6 - 2.6 mg/dL Final   06/29/2022 1.9 1.6 - 2.6 mg/dL Final   08/06/2021 1.9 1.6 - 2.6 mg/dL Final     Phosphorus   Date Value Ref Range Status   06/30/2022 3.1 2.5 - 4.5 mg/dL Final     No results for input(s): PH, PO2, PCO2, HCO3, BE, O2SAT in the last 72 hours. RADIOLOGY:  XR ELBOW RIGHT (MIN 3 VIEWS)   Final Result   Stable appearance of mildly comminuted humeral neck fracture. Right olecranon spur. XR SHOULDER RIGHT (MIN 2 VIEWS)   Final Result   Stable appearance of mildly comminuted humeral neck fracture. Right olecranon spur. CTA CHEST W CONTRAST   Final Result   1. No pulmonary embolism. 2.  Dilation of the main pulmonary artery may signify pulmonary arterial   hypertension. 3. Left lower lobe atelectasis or scarring. No evidence of pneumonia. 4. 4 mm and 3 mm right lower lobe nodules are likely benign in etiology. Airspace disease in this region on prior CTs precludes evaluation for   interval change. RECOMMENDATIONS:   Unavailable         XR HUMERUS RIGHT (MIN 2 VIEWS)   Final Result   Minimally displaced comminuted fracture through the neck of the right   humerus. The fracture extends through the greater tuberosity. .      There is no fracture of the distal humerus. XR CHEST 1 VIEW   Final Result   No acute process. There is no pneumothorax      Comminuted nondisplaced fracture of the proximal right humerus. CT HEAD WO CONTRAST   Final Result   No acute intracranial abnormality. Mild cortical atrophy and periventricular leukomalacia. RECOMMENDATIONS:   Unavailable         CT CERVICAL SPINE WO CONTRAST   Final Result   No acute abnormality of the cervical spine. Multilevel degenerative changes. RECOMMENDATIONS:   Unavailable             IMPRESSION:  1. 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 Status post syncopal episode x2 etiology unclear  3. Right comminuted humeral neck fracture    PLAN:  1. From the pulmonary standpoint, the patient is quite stable at this time.   There is no indication for any intervention during this hospital stay while his syncopal episodes are being evaluated. 2. Should any respiratory issues come up, please not hesitate to ask us to follow-up at that time. 3. Thank you for the opportunity to participate in the care of this transferring gracious gentleman.         Electronically signed by Sara Mast MD on 6/30/2022 at 2:07 PM

## 2022-06-30 NOTE — CONSULTS
Inpatient Cardiology Consultation      Reason for Consult:  Syncope    Consulting Physician: Dr. Fong Back    Requesting Physician:  Dr. Mandy Chiu     Date of Consultation: 6/30/2022    HISTORY OF PRESENT ILLNESS:   Mr. Filemon Shea is a 69year old  male who followed with Dr. Noni Valente. He was most recently seen in the office with Dr. Noni Valente on 03/31/2019. His medical history as stated below. Mr. Filemon Shea presented to [facility] ED on 06/29/2022 with complaints of falling. He states that prior to presentation he suffered 2 falls with subsequent significant pain to his right arm. His initial fall occurred on 06/28/2022 after he stood up. He denies accompanying dizziness lightheadedness, chest pain, palpitations or feelings of his heart racing, or dyspnea. He states \" I just went down, no warning\". He states that he did have LOC at that time with subsequent right arm pain. He states the following day after he woke, was ambulating in his house he had a sudden episode of falling \" again without warning\" with accompanying LOC. 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 He states that he has been staying well-hydrated and denies recent falls prior to this week. States compliance with his medications. He denies chest pain, SU, orthopnea and PND. Upon arrival to the ED his VS were oral temperature 98.2-70-/66-97% RA. EKG SR.  .  K3.8.  BUN/SCR 19/0.7. WBC 7.5. H&H 10.2/32.9. Troponin of 25. Right humerus x-ray indicative of fracture. CXR and CT of the head unremarkable. He received a 1 L NS bolus. He was admitted to a telemetry monitored unit. Orthopedic Surgery was consulted. An echocardiogram was ordered. Telemetry monitoring with 6 beats NSVT overnight. Cardiology was consulted for management of syncope.       Please note: past medical records were reviewed per electronic medical record (EMR) - see detailed reports under Past Medical/ Surgical History. Past Medical and Surgical History:    1. Obesity   2. Longstanding tobacco abuse (1ppd for 40 years,quit in 2014)  3. Longstanding Idiopathic Pulmonary Fibrosis  4. TTE 10/06/2017 (Dr. Ruma Stokes): Normal left ventricular size and function. Visually estimated LVEF is 60%. There is septal flattening throughout systole consistent with elevated right ventricular systolic pressure. Normal aortic root and ascending aorta. No evidence of pericardial effusion. Severely enlarged right atrium with LAZARA of ~ 47 ml/m2. Severely enlarged right ventricle with severe systolic dysfunction, with prominent right ventricular apex and RV:LV of > 1.5. Normal tricuspid valve leaflets. Dilated annulus. Mild tricuspid regurgitation. RVSP is 47 mm Hg. This and right sided enlargement is consistent with pulmonary hypertension. Enlarged IVC of 2.6 cm, with no collapse. Estimated RAP is 15 mm Hg. 5. Right Heart Catheterization (Dr. Ruma Stokes) on 07/26/2018: Elevated biventricular filling pressures. CVP:PCWP 0.88. Elisa 2.9. mPA 63, PVR 14.9 JACINTO. Elevated systemic vascular resistance. 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 Low cardiac indices. Erythropoiesis, in the setting of chronic hypoxemia (no other eval done yet to date) Recommendations: Referral to lung transplant center for evaluation, and possible enrollment into [facility] clinical trial for WHO group 3. Bumetanide 2 mg IV x 1 now. Add on erythropoietin levels to labs. MARYCARMEN-2 if abnormal only. 6. Chronic Hypoxemia with LTOT, s/p Double lung transplant in 03/22/2020 ()  7. VIDAL (diagnosed in 10/2017 with Cpap initiated in 11/2017)  8. Gout   9. DVT on 1995 not attributed to anything, and was on Coumadin for one year only. 10/2017 VQ Scan: Low probability of PE   10. Hx Kidney stones   11.  S/p Tonsillectomy and vasectomy Medications Prior to admit:  Prior to Admission medications    Medication Sig Start Date End Date Taking?  Authorizing Provider   empagliflozin (JARDIANCE) 25 MG tablet Take 25 mg by mouth daily   Yes Historical Provider, MD   docusate sodium (COLACE) 100 MG capsule Take 100 mg by mouth daily as needed for Constipation   Yes Historical Provider, MD   alendronate (FOSAMAX) 70 MG tablet Take 70 mg by mouth every 7 days Once weekly on sunday   Yes Historical Provider, MD   ferrous sulfate (IRON 325) 325 (65 Fe) MG tablet Take 325 mg by mouth every other day   Yes Historical Provider, MD   empagliflozin (JARDIANCE) 25 MG tablet Take 25 mg by mouth daily   Yes Historical Provider, MD   tacrolimus (PROGRAF) 1 MG capsule Take 6 mg by mouth 2 times daily     Historical Provider, MD   mycophenolate (CELLCEPT) 250 MG capsule Take 250 mg by mouth 2 times daily    Historical Provider, MD   predniSONE (DELTASONE) 20 MG tablet Take 5 mg by mouth daily     Historical Provider, MD   dapsone 100 MG tablet Take 100 mg by mouth daily    Historical Provider, MD   valGANciclovir (VALCYTE) 450 MG tablet Take 900 mg by mouth daily    Historical Provider, MD   voriconazole (VFEND) 200 MG tablet Take 300 mg by mouth 2 times daily     Historical Provider, MD   aspirin 81 MG chewable tablet Take 81 mg by mouth daily    Historical Provider, MD   pantoprazole (PROTONIX) 40 MG tablet Take 40 mg by mouth daily Delayed release    Historical Provider, MD   loratadine (CLARITIN) 10 MG capsule Take 10 mg by mouth daily    Historical Provider, MD   magnesium oxide (MAG-OX) 400 MG tablet Take 400 mg by mouth 2 times daily     Historical Provider, MD   metoprolol tartrate (LOPRESSOR) 25 MG tablet Take 12.5 mg by mouth 2 times daily Hold for SBP<150 or HR <60    Historical Provider, MD   apixaban (ELIQUIS) 5 MG TABS tablet Take 5 mg by mouth 2 times daily    Historical Provider, MD   DULoxetine (CYMBALTA) 30 MG extended release capsule Take 60 mg by mouth daily     Historical Provider, MD   allopurinol (ZYLOPRIM) 300 MG tablet Take 300 mg by mouth daily  9/10/17   Historical Provider, MD   Multiple Vitamins-Minerals (CENTRUM SILVER 50+MEN) TABS Take by mouth daily    Historical Provider, MD   Cholecalciferol (VITAMIN D-3 PO) Take 1,000 Units by mouth daily     Historical Provider, MD       Current Medications:    Current Facility-Administered Medications: metoprolol tartrate (LOPRESSOR) tablet 12.5 mg, 12.5 mg, Oral, BID  glucose chewable tablet 16 g, 4 tablet, Oral, PRN  dextrose bolus 10% 125 mL, 125 mL, IntraVENous, PRN **OR** dextrose bolus 10% 250 mL, 250 mL, IntraVENous, PRN  glucagon (rDNA) injection 1 mg, 1 mg, IntraMUSCular, PRN  dextrose 5 % solution, 100 mL/hr, IntraVENous, PRN  sodium chloride flush 0.9 % injection 5-40 mL, 5-40 mL, IntraVENous, 2 times per day  sodium chloride flush 0.9 % injection 5-40 mL, 5-40 mL, IntraVENous, PRN  0.9 % sodium chloride infusion, , IntraVENous, PRN  ondansetron (ZOFRAN-ODT) disintegrating tablet 4 mg, 4 mg, Oral, Q8H PRN **OR** ondansetron (ZOFRAN) injection 4 mg, 4 mg, IntraVENous, Q6H PRN  polyethylene glycol (GLYCOLAX) packet 17 g, 17 g, Oral, Daily PRN  acetaminophen (TYLENOL) tablet 650 mg, 650 mg, Oral, Q6H PRN **OR** acetaminophen (TYLENOL) suppository 650 mg, 650 mg, Rectal, Q6H PRN  apixaban (ELIQUIS) tablet 5 mg, 5 mg, Oral, BID  allopurinol (ZYLOPRIM) tablet 300 mg, 300 mg, Oral, Daily  aspirin chewable tablet 81 mg, 81 mg, Oral, Daily  DULoxetine (CYMBALTA) extended release capsule 60 mg, 60 mg, Oral, Daily  magnesium oxide (MAG-OX) tablet 400 mg, 400 mg, Oral, BID  mycophenolate (CELLCEPT) capsule 250 mg, 250 mg, Oral, BID  pantoprazole (PROTONIX) tablet 40 mg, 40 mg, Oral, Daily  predniSONE (DELTASONE) tablet 5 mg, 5 mg, Oral, Daily  valGANciclovir (VALCYTE) tablet 900 mg, 900 mg, Oral, Daily  dapsone tablet 100 mg, 100 mg, Oral, Daily  voriconazole (VFEND) tablet 300 mg, 300 mg, Oral, BID  tacrolimus (PROGRAF) capsule 6 mg, 6 mg, Oral, BID  ferrous sulfate (IRON 325) tablet 325 mg, 325 mg, Oral, Every Other Day  perflutren lipid microspheres (DEFINITY) injection 1.65 mg, 1.5 mL, IntraVENous, ONCE PRN  0.9 % sodium chloride infusion, , IntraVENous, Continuous    Allergies:  Patient has no known allergies. Social History:    Denies tobacco use since double lung transplant 03/2020. Denies alcohol and illicit drug use. Caffeine intake includes 2-3 cups of coffee a day. Family History:   Please note this information was not obtained at this time, as it is limited in nature due to the patient's advanced age. REVIEW OF SYSTEMS:     · Constitutional: Denies fevers, chills, night sweats, and fatigue  · HEENT: Denies headaches, nose bleeds, and blurred vision,oral pain, abscess or lesion. · Musculoskeletal: Denies pain to BLE with ambulation and edema to BLE. · Neurological: Denies dizziness and lightheadedness, numbness and tingling. Complains of syncopal episodes-see HPI  · Cardiovascular: Denies chest pain, palpitations, and feelings of heart racing. · Respiratory: Denies orthopnea and PND  · Gastrointestinal: Denies heartburn, nausea/vomiting, diarrhea and constipation, black/bloody, and tarry stools. · Genitourinary: Denies dysuria and hematuria  · Hematologic: Denies excessive bruising or bleeding  · Lymphatic: Denies lumps and bumps to neck, axilla, breast, and groin  · Endocrine: Denies excessive thirst. Denies intolerance to hot and cold  · Psychiatric: Denies anxiety and depression. PHYSICAL EXAM:   /72   Pulse 81   Temp 99.1 °F (37.3 °C) (Oral)   Resp 16   Ht 5' 10\" (1.778 m)   Wt 180 lb (81.6 kg)   SpO2 96%   BMI 25.83 kg/m²   CONST:  Well developed, well nourished elderly  male who appears stated age.  Awake, alert, cooperative, no apparent distress  HEENT:   Head- Normocephalic, atraumatic   Eyes- Conjunctivae pink, anicteric  Throat- Oral mucosa pink and moist  Neck-  No stridor, trachea midline, no jugular venous distention. No adenopathy   CHEST: Chest symmetrical and non-tender to palpation. No accessory muscle use or intercostal retractions  RESPIRATORY: Lung sounds - clear throughout fields   CARDIOVASCULAR:     No carotid bruit  Heart Inspection- shows no noted pulsations  Heart Palpation- no heaves or thrills; PMI is non-displaced   Heart Ausculation- Regular rate and rhythm, no murmur. No s3, s4 or rub   PV: No lower extremity edema. No varicosities. Pedal pulses palpable, no clubbing or cyanosis   ABDOMEN: Soft, non-tender to light palpation. Bowel sounds present. No palpable masses no organomegaly; no abdominal bruit  MS: Good muscle strength and tone. No atrophy or abnormal movements. : Deferred  SKIN: Warm and dry no statis dermatitis or ulcers   NEURO / PSYCH: Oriented to person, place and time. Speech clear and appropriate. Follows all commands.  Pleasant affect     DATA:    ECG: As above  Tele strips: SR with 6 beats of NSVT earlier today  Diagnostic:      Intake/Output Summary (Last 24 hours) at 6/30/2022 0915  Last data filed at 6/30/2022 0622  Gross per 24 hour   Intake 360 ml   Output 650 ml   Net -290 ml       Labs:   CBC:   Recent Labs     06/29/22  1126 06/30/22  0554   WBC 7.5 6.6   HGB 10.2* 10.5*   HCT 32.9* 34.1*   * 113*     BMP:   Recent Labs     06/29/22  1126 06/30/22  0554    138   K 3.8 3.8   CO2 30* 26   BUN 19 15   CREATININE 0.7 0.7   LABGLOM >60 >60   CALCIUM 8.7 8.4*     Mag:   Recent Labs     06/29/22  1126 06/30/22  0554   MG 1.9 2.0     Phos:   Recent Labs     06/30/22  0554   PHOS 3.1     TSH:   Recent Labs     06/30/22  0554   TSH 0.604     HgA1c:   Lab Results   Component Value Date    LABA1C 6.0 (H) 01/11/2020   FASTING LIPID PANEL:  Lab Results   Component Value Date/Time    CHOL 139 06/30/2022 05:54 AM    HDL 48 06/30/2022 05:54 AM    LDLCALC 68 06/30/2022 05:54 AM    TRIG 113 06/30/2022 05:54 AM LIVER PROFILE:  Recent Labs     06/30/22  0554   AST 28   ALT 17   LABALBU 3.3*     Results for Darrion Antonio (MRN [id number]) as of 6/30/2022 09:23   Ref. Range 6/29/2022 11:26 6/29/2022 13:01   Troponin, High Sensitivity Latest Ref Range: 0 - 11 ng/L 25 (H) 25 (H)     06/29/2022 CT Head:   No acute intracranial abnormality. Mild cortical atrophy and periventricular leukomalacia.    06/29/2022 CT Cervical Spine:   No acute abnormality of the cervical spine. Multilevel degenerative changes    06/29/2022 CXR:   No acute process. There is no pneumothorax  Comminuted nondisplaced fracture of the proximal right humerus. 06/29/2022 Right Humerus XRay:   Minimally displaced comminuted fracture through the neck of the right  humerus.  The fracture extends through the greater tuberosity. .  There is no fracture of the distal humerus. 06/29/2022 CTA Chest:   1. No pulmonary embolism. 2.  Dilation of the main pulmonary artery may signify pulmonary arterial hypertension. 3. Left lower lobe atelectasis or scarring.  No evidence of pneumonia. 4. 4 mm and 3 mm right lower lobe nodules are likely benign in etiology. Airspace disease in this region on prior CTs precludes evaluation for interval change. 06/30/2022 Right Shoulder XRay: Pending    06/30/2022 Right Elbow XRay: Pending      IMPRESSION and PLAN to follow per Dr. Claritza King    Electronically signed by FEROZ Plummer CNP on 6/30/2022 at 9:15 AM       Addendum  Dina Watson MD                                                                                                         Inpatient CARDIOLOGY Consultation       Our ANJU exam, assessment reviewed and reflect my work. I personally saw, examined, and evaluated the patient today. I spent more than 50% of total consult time today. I am the primary author for the consult notes.   I personally reviewed the medications, rhythm strips, pertinent labs and test reports.    I directly participated in the medical-decision making, ordering pertinent tests and medication adjustment.     Reason for Consult:  Syncope     Date of Consultation: 6/30/2022     Patient previously known to Dr. Alvarado Blackwell Last seen 2019.     HISTORY OF PRESENT ILLNESS: 69year old with history of Hypertension, IPF-s/p double Lung transplant in 2020, VIDAL, OSH presented to ED for \"falling\". He states that prior to presentation he suffered 2 falls with subsequent significant pain to his right arm. His initial fall occurred on 06/28/2022 after he stood up. He denies accompanying dizziness lightheadedness, chest pain, palpitations or feelings of his heart racing, or dyspnea. He states \" I just went down, no warning\". He states that he did have LOC at that time with subsequent right arm pain. He states the following day after he woke, was ambulating in his house he had a sudden episode of falling \" again without warning\" with accompanying LOC. Since his bilateral lung transplant 03/2020 he was having orthostatic hypotension and was noted to be asymptomatic. He states that he has been staying well-hydrated and denies recent falls prior to this week. States compliance with his medications. He denies chest pain, SU, orthopnea and PND. In the ER - his VS were oral temperature 98.2-70-/66-97% RA. EKG SR.  .  K3.8.  BUN/SCR 19/0.7. WBC 7.5. H&H 10.2/32.9. Troponin of 25. Right humerus x-ray indicative of fracture. CXR and CT of the head unremarkable. He received a 1 L NS bolus. He was admitted to a telemetry monitored unit. Orthopedic Surgery was consulted. 2D Echocardiogram was ordered. Telemetry monitoring with 6 beats NSVT overnight-asymtoamtic. Cardiology was consulted for management of syncope. He denied any CP or heart racing; No fever or chills. No orthopnea.   States complaint with his medications.     REVIEW OF SYSTEMS:   Review of rest of 12 systems negative except as mentioned in HPI.     Please note: past medical records were reviewed per electronic medical record (EMR) - see detailed reports under Past Medical/ Surgical History.         Past Medical History:         Past Medical History:   Diagnosis Date    [facility]ch[facility] obstructive [facility] disease[facility]      [facility] 2017     Dx . Was not attributed to anything. Was on oral anticoagulation for 1 year only.  Gout      History of tobacco use 2017      1 PPD x 40 years, quit in     Hypertension      Idiopathic pulmonary fibrosis ([facility] 2017    Interstitial lung disease ([facility]      Kidney stones      Obesity 2017    Obstructive sleep apnea 2017     Dx 10/2017. Initiated CPAP 2017.  Sleep apnea                  Past Surgical History:    Past Surgical History         Past Surgical History:   Procedure Laterality Date    CARDIAC CATHETERIZATION   2018     Dr. Татьяна Salinas - [address] [place] [address]        LUNG TRANSPLANT, DOUBLE   2020     AT  in [address]        VASECTOMY                   Allergies:    Patient has no known allergies.     Social History:    Social History               Socioeconomic History    Marital status:        Spouse name: Not on file    Number of children: Not on file    Years of education: Not on file    Highest education level: Not on file   Occupational History    Not on file   Tobacco Use    Smoking status: Former Smoker       Packs/day: 1.00       Years: 45.00       Pack years: 45.00       Types: Cigarettes       Quit date: 10/10/2014       Years since quittin.7    Smokeless tobacco: Never Used   Vaping Use    Vaping Use: Never used   Substance and Sexual Activity    Alcohol use: Not Currently       Alcohol/week: 2.0 standard drinks       Types: 1 Cans of beer, 1 Shots of liquor per week       Comment: none since 10/2018    Drug use:  No       Types: Marijuana Radha Chuck), Cocaine       Comment: in his teens/20's; none since    Sexual activity: Not Currently       Partners: Female   Other Topics Concern    Not on file   Social History Narrative     He denies any family history of hypercoagulability, premature death, SCD, cardiomyopathies, pulmonary hypertension, Marfan's, connective tissue diseases.                 He quit smoking cigarettes but the development of current cough occurred prior to smoking cessation. He used to smoke 1 PPD x 40 years, quit 3 years ago in 2014, when the onset of above chronic cough prompted him to quit.                  Dr. Caitlin Vega who obtained repeat imaging which revealed unchanged fibrotic changes of the upper and lower lobes of both lungs, ultimately diagnosed last week with idiopathic pulmonary fibrosis. No tissue diagnosis at this time.                  Drinks 2 cups of coffee daily.       Social Determinants of Health          Financial Resource Strain:     Difficulty of Paying Living Expenses: Not on file   Food Insecurity:     Worried About Running Out of Food in the Last Year: Not on file    Derick of Food in the Last Year: Not on file   Transportation Needs:     Lack of Transportation (Medical): Not on file    Lack of Transportation (Non-Medical):  Not on file   Physical Activity:     Days of Exercise per Week: Not on file    Minutes of Exercise per Session: Not on file   Stress:     Feeling of Stress : Not on file   Social Connections:     Frequency of Communication with Friends and Family: Not on file    Frequency of Social Gatherings with Friends and Family: Not on file    Attends [employer] Services: Not on file    Active Member of Clubs or Organizations: Not on file    Attends Club or Organization Meetings: Not on file    Marital Status: Not on file   Intimate Partner Violence:     Fear of Current or Ex-Partner: Not on file    Emotionally Abused: Not on file    Physically Abused: Not on file    Sexually Abused: Not on file   Housing Stability:     Unable to Pay for Housing in the Last Year: Not on file    Number of Places Lived in the Last Year: Not on file    Unstable Housing in the Last Year: Not on file            Family History:   Family History   History reviewed. No pertinent family history.           Medications Prior to admit: Reviewed  Home Medications           Prior to Admission medications    Medication Sig Start Date End Date Taking?  Authorizing Provider   empagliflozin (JARDIANCE) 25 MG tablet Take 25 mg by mouth daily     Yes Historical Provider, MD   docusate sodium (COLACE) 100 MG capsule Take 100 mg by mouth daily as needed for Constipation     Yes Historical Provider, MD   alendronate (FOSAMAX) 70 MG tablet Take 70 mg by mouth every 7 days Once weekly on sunday     Yes Historical Provider, MD   ferrous sulfate (IRON 325) 325 (65 Fe) MG tablet Take 325 mg by mouth every other day     Yes Historical Provider, MD   empagliflozin (JARDIANCE) 25 MG tablet Take 25 mg by mouth daily     Yes Historical Provider, MD   tacrolimus (PROGRAF) 1 MG capsule Take 6 mg by mouth 2 times daily        Historical Provider, MD   mycophenolate (CELLCEPT) 250 MG capsule Take 250 mg by mouth 2 times daily       Historical Provider, MD   predniSONE (DELTASONE) 20 MG tablet Take 5 mg by mouth daily        Historical Provider, MD   dapsone 100 MG tablet Take 100 mg by mouth daily       Historical Provider, MD   valGANciclovir (VALCYTE) 450 MG tablet Take 900 mg by mouth daily       Historical Provider, MD   voriconazole (VFEND) 200 MG tablet Take 300 mg by mouth 2 times daily        Historical Provider, MD   aspirin 81 MG chewable tablet Take 81 mg by mouth daily       Historical Provider, MD   pantoprazole (PROTONIX) 40 MG tablet Take 40 mg by mouth daily Delayed release       Historical Provider, MD   loratadine (CLARITIN) 10 MG capsule Take 10 mg by mouth daily       Historical Provider, MD   magnesium oxide (MAG-OX) 400 MG tablet Take 400 mg by LIPID PANEL:        Lab Results   Component Value Date/Time     CHOL 139 06/30/2022 05:54 AM     HDL 48 06/30/2022 05:54 AM     LDLCALC 68 06/30/2022 05:54 AM     TRIG 113 06/30/2022 05:54 AM      LIVER PROFILE:      Recent Labs     06/30/22  0554   AST 28   ALT 17   LABALBU 3.3*         Current Inpatient Medications:  Scheduled Medications    metoprolol tartrate  12.5 mg Oral BID    sodium chloride flush  5-40 mL IntraVENous 2 times per day    apixaban  5 mg Oral BID    allopurinol  300 mg Oral Daily    aspirin  81 mg Oral Daily    DULoxetine  60 mg Oral Daily    magnesium oxide  400 mg Oral BID    mycophenolate  250 mg Oral BID    pantoprazole  40 mg Oral Daily    predniSONE  5 mg Oral Daily    valGANciclovir  900 mg Oral Daily    dapsone  100 mg Oral Daily    voriconazole  300 mg Oral BID    tacrolimus  6 mg Oral BID    ferrous sulfate  325 mg Oral Every Other Day            IV Infusions (if any): Infusions Meds    dextrose      sodium chloride      sodium chloride 75 mL/hr at 06/29/22 9861            PHYSICAL EXAM:      /72   Pulse 81   Temp 99.1 °F (37.3 °C) (Oral)   Resp 16   Ht 5' 10\" (1.778 m)   Wt 180 lb (81.6 kg)   SpO2 96%   BMI 25.83 kg/m²      CONST:  Well developed, appears stated age. Awake, alert and no apparent distress. HEENT:   Head- Normocephalic  Eyes- Conjunctivae pink, no icterus  Throat- Oral mucosa moist  Neck-  No stridor, no jugular venous distention. No carotid bruit. CHEST: Chest symmetrical No accessory muscle use  RESPIRATORY: Lung sounds - Few rhonchi  CARDIOVASCULAR:     Heart Inspection-  Heart Palpation- No thrills. Heart Ausculation- Regular rate and rhythm, 1/6 systolic murmur. No s3 or rub   EXT: + lower extremity edema. Distal pulses palpable, no cyanosis   ABDOMEN: Soft, non-tender to light palpation. Bowel sounds present.  No abdominal bruit  MS: Right arm in sling+  : Deferred  RECTAL: Deferred  SKIN: Warm and dry   NEURO / PSYCH: Oriented to person, place; Good mood and affect.          IMPRESSION / RECOMMENDATIONS:     Syncope and collapse - Likely from orthostasis - Continue fluids, Preventive measures discussed, No faustino or sustained tachy arrhythmias on tele.      OSH - Continue fluids, Preventive measures discussed (Drink more fluids, liberal on salt intake; avoid dehydration, caffeine and sudden change in position, wear compression stockings)     NS VT - Check lytes, Continue low dose BB - Out-Pt Event monitor -14 day, 2D Echo ordered     Right arm fracture - Ortho consulted    Hypertension - Controlled     IPF - s/p Double Lung transplant - March 2020                   Above recommendations discussed with him and his family (D. I. L)        Electronically signed by Ronnell Mukherjee MD on 6/30/2022   [facility]) Cardiology

## 2022-07-01 ENCOUNTER — NURSE ONLY (OUTPATIENT)
Dept: CARDIOLOGY CLINIC | Age: 67
End: 2022-07-01

## 2022-07-01 VITALS
BODY MASS INDEX: 25.77 KG/M2 | HEIGHT: 70 IN | RESPIRATION RATE: 16 BRPM | OXYGEN SATURATION: 97 % | HEART RATE: 68 BPM | SYSTOLIC BLOOD PRESSURE: 122 MMHG | WEIGHT: 180 LBS | TEMPERATURE: 98.6 F | DIASTOLIC BLOOD PRESSURE: 70 MMHG

## 2022-07-01 LAB
ALBUMIN SERPL-MCNC: 3 G/DL (ref 3.5–5.2)
ALP BLD-CCNC: 206 U/L (ref 40–129)
ALT SERPL-CCNC: 15 U/L (ref 0–40)
ANION GAP SERPL CALCULATED.3IONS-SCNC: 7 MMOL/L (ref 7–16)
AST SERPL-CCNC: 28 U/L (ref 0–39)
BASOPHILS ABSOLUTE: 0.11 E9/L (ref 0–0.2)
BASOPHILS RELATIVE PERCENT: 1.8 % (ref 0–2)
BILIRUB SERPL-MCNC: 0.4 MG/DL (ref 0–1.2)
BUN BLDV-MCNC: 16 MG/DL (ref 6–23)
CALCIUM SERPL-MCNC: 8.4 MG/DL (ref 8.6–10.2)
CHLORIDE BLD-SCNC: 107 MMOL/L (ref 98–107)
CO2: 25 MMOL/L (ref 22–29)
CREAT SERPL-MCNC: 0.7 MG/DL (ref 0.7–1.2)
EOSINOPHILS ABSOLUTE: 0.11 E9/L (ref 0.05–0.5)
EOSINOPHILS RELATIVE PERCENT: 1.8 % (ref 0–6)
GFR AFRICAN AMERICAN: >60
GFR NON-AFRICAN AMERICAN: >60 ML/MIN/1.73
GLUCOSE BLD-MCNC: 111 MG/DL (ref 74–99)
HCT VFR BLD CALC: 32.9 % (ref 37–54)
HEMOGLOBIN: 10.1 G/DL (ref 12.5–16.5)
LYMPHOCYTES ABSOLUTE: 0.48 E9/L (ref 1.5–4)
LYMPHOCYTES RELATIVE PERCENT: 7.9 % (ref 20–42)
MCH RBC QN AUTO: 32.8 PG (ref 26–35)
MCHC RBC AUTO-ENTMCNC: 30.7 % (ref 32–34.5)
MCV RBC AUTO: 106.8 FL (ref 80–99.9)
MONOCYTES ABSOLUTE: 0.18 E9/L (ref 0.1–0.95)
MONOCYTES RELATIVE PERCENT: 2.6 % (ref 2–12)
MYELOCYTE PERCENT: 0.9 % (ref 0–0)
NEUTROPHILS ABSOLUTE: 5.16 E9/L (ref 1.8–7.3)
NEUTROPHILS RELATIVE PERCENT: 85.1 % (ref 43–80)
OVALOCYTES: ABNORMAL
PDW BLD-RTO: 15.7 FL (ref 11.5–15)
PLATELET # BLD: 105 E9/L (ref 130–450)
PMV BLD AUTO: 10.6 FL (ref 7–12)
POIKILOCYTES: ABNORMAL
POLYCHROMASIA: ABNORMAL
POTASSIUM SERPL-SCNC: 4.1 MMOL/L (ref 3.5–5)
RBC # BLD: 3.08 E12/L (ref 3.8–5.8)
SODIUM BLD-SCNC: 139 MMOL/L (ref 132–146)
TEAR DROP CELLS: ABNORMAL
TOTAL PROTEIN: 5.6 G/DL (ref 6.4–8.3)
WBC # BLD: 6 E9/L (ref 4.5–11.5)

## 2022-07-01 PROCEDURE — 85025 COMPLETE CBC W/AUTO DIFF WBC: CPT

## 2022-07-01 PROCEDURE — 2580000003 HC RX 258: Performed by: INTERNAL MEDICINE

## 2022-07-01 PROCEDURE — 6370000000 HC RX 637 (ALT 250 FOR IP): Performed by: INTERNAL MEDICINE

## 2022-07-01 PROCEDURE — 6360000002 HC RX W HCPCS: Performed by: INTERNAL MEDICINE

## 2022-07-01 PROCEDURE — 36415 COLL VENOUS BLD VENIPUNCTURE: CPT

## 2022-07-01 PROCEDURE — 80053 COMPREHEN METABOLIC PANEL: CPT

## 2022-07-01 RX ADMIN — MYCOPHENOLATE MOFETIL 250 MG: 250 CAPSULE ORAL at 09:23

## 2022-07-01 RX ADMIN — METOPROLOL TARTRATE 12.5 MG: 25 TABLET, FILM COATED ORAL at 09:22

## 2022-07-01 RX ADMIN — DULOXETINE HYDROCHLORIDE 60 MG: 60 CAPSULE, DELAYED RELEASE ORAL at 09:24

## 2022-07-01 RX ADMIN — SODIUM CHLORIDE: 9 INJECTION, SOLUTION INTRAVENOUS at 00:24

## 2022-07-01 RX ADMIN — PREDNISONE 5 MG: 5 TABLET ORAL at 09:22

## 2022-07-01 RX ADMIN — VALGANCICLOVIR 900 MG: 450 TABLET, FILM COATED ORAL at 09:22

## 2022-07-01 RX ADMIN — TACROLIMUS 6 MG: 1 CAPSULE ORAL at 09:23

## 2022-07-01 RX ADMIN — ALLOPURINOL 300 MG: 100 TABLET ORAL at 09:24

## 2022-07-01 RX ADMIN — Medication 400 MG: at 09:23

## 2022-07-01 RX ADMIN — VORICONAZOLE 300 MG: 200 TABLET ORAL at 09:24

## 2022-07-01 RX ADMIN — DAPSONE 100 MG: 25 TABLET ORAL at 09:23

## 2022-07-01 RX ADMIN — APIXABAN 5 MG: 5 TABLET, FILM COATED ORAL at 09:22

## 2022-07-01 RX ADMIN — ASPIRIN 81 MG CHEWABLE TABLET 81 MG: 81 TABLET CHEWABLE at 09:24

## 2022-07-01 RX ADMIN — PANTOPRAZOLE SODIUM 40 MG: 40 TABLET, DELAYED RELEASE ORAL at 09:23

## 2022-07-01 NOTE — PROGRESS NOTES
Patient was in today and had 14 day ZIOXT placed per Dr. Rock Sullivan. Patient given instructions, questions answered. Patient verbalized understanding. Monitor #[id number]     KHADRA Bailey

## 2022-07-01 NOTE — DISCHARGE SUMMARY
Internal Medicine Progress Note     FRANK=[facility]     Anna Morales. Yousif Alcantara., JANICE.A.CWendyOWendyIWendy Santos D.O., JONELCWendyOWendyIWendy oCrrea D.O. Gabriella Ho, MSN, APRN, NP-C  Meaghan Garcia. Tala Salazar, MSN, [id number] [facility]       Internal Medicine  Discharge Summary    NAME: Mainor Corea  :  1955  MRN:  [id number]  Cyndee Davalos MD  ADMITTED: 2022      DISCHARGED: 22    ADMITTING PHYSICIAN: Anna Clark DO    CONSULTANT(S):   IP CONSULT TO CARDIOLOGY  IP CONSULT TO ORTHOPEDIC SURGERY  IP CONSULT TO PULMONOLOGY  IP CONSULT TO NEUROLOGY     ADMITTING DIAGNOSIS:   Syncope and collapse [R55]  Other closed nondisplaced fracture of proximal end of right humerus, initial encounter [S42.294A]     DISCHARGE DIAGNOSES:   1. Syncopal episode likely compatible with orthostatic changes culminating in a right humerus fracture  2. Right lung lower lobe nodules with history of bilateral lung transplant  3. Chronic normocytic anemia  4. Non-insulin-dependent diabetes mellitus type II  5. COPD  6. History of DVT on Eliquis  7. History of sleep apnea   8. History of gout  9. Hypertension  10. history of tobacco abuse      BRIEF HISTORY OF PRESENT ILLNESS:     Patient is 38-year-old male who presents to the ED following syncopal event. Patient states that he passed out yesterday twice. And additionally patient did have an episode of lightheadedness/dizziness while on the floors. Patient denies any chest pain or shortness of breath.       LABS[de-identified]  Lab Results   Component Value Date    WBC 6.0 2022    HGB 10.1 (L) 2022    HCT 32.9 (L) 2022     (L) 2022     2022    K 4.1 2022     2022    CREATININE 0.7 2022    BUN 16 2022    CO2 25 2022    GLUCOSE 111 (H) 2022    ALT 15 2022    AST 28 2022    INR 1.1 10/14/2020     Lab Results   Component Value Date    INR 1.1 10/14/2020 INR 1.0 09/20/2020    INR 1.1 09/29/2017    PROTIME 12.6 (H) 10/14/2020    PROTIME 10.9 09/20/2020    PROTIME 12.0 09/29/2017      Lab Results   Component Value Date    TSH 0.604 06/30/2022     Lab Results   Component Value Date    TRIG 113 06/30/2022    TRIG 286 (H) 08/06/2021    TRIG 158 (H) 07/28/2021     Lab Results   Component Value Date    HDL 48 06/30/2022    HDL 31 08/06/2021    HDL 36 07/28/2021     Lab Results   Component Value Date    LDLCALC 68 06/30/2022    LDLCALC 49 08/06/2021    LDLCALC 105 (H) 07/28/2021     Lab Results   Component Value Date    LABA1C 5.4 06/30/2022       IMAGING:  Echo Complete    Result Date: 6/30/2022  Transthoracic Echocardiography Report (TTE)  Demographics   Patient Name      Eber Lima  Gender              Male                    A   Medical Record    [id number]      Room Number         4849  Number   Account #         [de-identified]     Procedure Date      06/30/2022   Corporate ID                    Ordering Physician  Hay Page DO   Accession Number  [id number]    Referring Physician   Date of Birth     1955    [address] [place]                                                      [facility]   Age               69 year(s)    Interpreting        Hay Page DO                                  Physician                                   Any Other  Procedure Type of Study   TTE procedure  Procedure Date Date: 06/30/2022 Start: 09:39 AM Study Location: Echo Lab Technical Quality: Adequate visualization Indications:Syncope. Patient Status: Routine Height: 70 inches Weight: 180 pounds BSA: 2 m^2 BMI: 25.83 kg/m^2 Rhythm: Within normal limits HR: 76 bpm  Findings   Left Ventricle  Left ventricular size is grossly normal.  Mild left ventricular concentric hypertrophy noted. Ejection fraction is measured at 64%. No evidence of left ventricular mass or thrombus noted. No regional wall motion abnormalities seen.    Right Ventricle  Normal right ventricular size and function. Left Atrium  Left atrium is of normal size. Interatrial septum appears intact. Right Atrium  Normal right atrium size. Mitral Valve  Physiologic and/or trace mitral regurgitation is present. No mitral valve stenosis present. Tricuspid Valve  Physiologic and/or trace tricuspid regurgitation. Aortic Valve  Aortic valve opens well. The aortic valve is trileaflet. No hemodynamically significant aortic stenosis is present. Physiologic and/or trace aortic regurgitation is noted. Pulmonic Valve  Pulmonic valve is structurally normal.   Pericardial Effusion  No evidence of pericardial effusion. Aorta  The aorta is within normal limits. Miscellaneous  Regular rhythm. Conclusions   Summary  Left ventricular size is grossly normal.  Mild left ventricular concentric hypertrophy noted. Ejection fraction is measured at 64%. No evidence of left ventricular mass or thrombus noted. No regional wall motion abnormalities seen. Left atrium is of normal size. Interatrial septum appears intact. Physiologic and/or trace mitral regurgitation is present. No mitral valve stenosis present. Aortic valve opens well. The aortic valve is trileaflet. No hemodynamically significant aortic stenosis is present. Physiologic and/or trace aortic regurgitation is noted. Physiologic and/or trace tricuspid regurgitation. Regular rhythm.    Signature   ----------------------------------------------------------------  Electronically signed by Karina Broderick DO(Interpreting  physician) on 06/30/2022 06:11 PM  ----------------------------------------------------------------  M-Mode/2D Measurements & Calculations   LV Diastolic    LV Systolic Dimension: 2.4   AV Cusp Separation: 2.3 cmLA  Dimension: 3.6  cm                           Dimension: 3.7 cmAO Root  cm              LV Volume Diastolic: 01.1 ml Dimension: 3.6 cm  LV FS:33.3 %    LV Volume Systolic: 05.6 ml  LV PW           LV EDV/LV EDV Index: 56.3 Diastolic: 1.1  HR/72 YH/W^0TY ESV/LV ESV  cm              Index: 20.2 ml/10ml/ m^2     RV Diastolic Dimension: 2 cm  Septum          EF Calculated: 75.6 %  Diastolic: 1.2  LV Mass Index: 66 l/min*m^2  cm                                           LA volume/Index: 60.8 ml  CO: 5.3 l/min  CI: 2.65        LVOT: 2 cm  l/m*m^2  LV Mass: 132.66  g  Doppler Measurements & Calculations   MV Peak E-Wave: 0.97 AV Peak Velocity:     LVOT Peak Velocity: 0.98 m/s  m/s                  1.49 m/s              LVOT Mean Velocity: 0.68 m/s  MV Peak A-Wave: 0.81 AV Peak Gradient:     LVOT Peak Gradient: 3.8  m/s                  8.82 mmHg             mmHgLVOT Mean Gradient: 2.1  MV E/A Ratio: 1.21   AV Mean Velocity:     mmHg  MV Peak Gradient:    1.06 m/s  4.3 mmHg             AV Mean Gradient: 5.1  MV Mean Gradient:    mmHg  2.4 mmHg             AV VTI: 29.6 cm       TR Velocity:2.42 m/s  MV Mean Velocity:    AV Area               TR Gradient:23.35 mmHg  0.74 m/s             (Continuity):2.36     PV Peak Velocity: 0.82 m/s  MV Deceleration      cm^2                  PV Peak Gradient: 2.7 mmHg  Time: 210.1 msec                           PV Mean Velocity: 0.66 m/s  MV P1/2t: 86.1 msec  LVOT VTI: 22.2 cm     PV Mean Gradient: 1.8 mmHg  MVA by PHT:2.56 cm^2 IVRT: 86.5 msec  MV Area  (continuity): 2.8  cm^2  [email or web address] [id number]%3d    XR SHOULDER RIGHT (MIN 2 VIEWS)    Result Date: 6/30/2022  EXAMINATION: THREE XRAY VIEWS OF THE RIGHT SHOULDER; THREE XRAY VIEWS OF THE RIGHT ELBOW 6/30/2022 8:05 am COMPARISON: Right humerus 29 June 2022 HISTORY: ORDERING SYSTEM PROVIDED HISTORY: proximal humerus fracture TECHNOLOGIST PROVIDED HISTORY: Reason for exam:->proximal humerus fracture; ORDERING SYSTEM PROVIDED HISTORY: elbow pain after fall TECHNOLOGIST PROVIDED HISTORY: Reason for exam:->elbow pain after fall FINDINGS: Right shoulder: Mildly comminuted humeral neck fracture is stably aligned. No new abnormal bone or soft tissue findings. Right elbow: There is an olecranon spur. No definite fracture or dislocation. No definite joint effusion. Stable appearance of mildly comminuted humeral neck fracture. Right olecranon spur. XR HUMERUS RIGHT (MIN 2 VIEWS)    Result Date: 6/29/2022  EXAMINATION: TWO XRAY VIEWS OF THE RIGHT HUMERUS 6/29/2022 11:05 am COMPARISON: None. HISTORY: ORDERING SYSTEM PROVIDED HISTORY: midshaft pain TECHNOLOGIST PROVIDED HISTORY: Reason for exam:->midshaft pain FINDINGS: Four views of the left humerus were obtained. There is a comminuted fracture through the neck of the right humerus. The fracture extends into the greater tubercle. There are degenerative changes of the [facility] joint. Minimally displaced comminuted fracture through the neck of the right humerus. The fracture extends through the greater tuberosity. . There is no fracture of the distal humerus. XR ELBOW RIGHT (MIN 3 VIEWS)    Result Date: 6/30/2022  EXAMINATION: THREE XRAY VIEWS OF THE RIGHT SHOULDER; THREE XRAY VIEWS OF THE RIGHT ELBOW 6/30/2022 8:05 am COMPARISON: Right humerus 29 June 2022 HISTORY: ORDERING SYSTEM PROVIDED HISTORY: proximal humerus fracture TECHNOLOGIST PROVIDED HISTORY: Reason for exam:->proximal humerus fracture; ORDERING SYSTEM PROVIDED HISTORY: elbow pain after fall TECHNOLOGIST PROVIDED HISTORY: Reason for exam:->elbow pain after fall FINDINGS: Right shoulder: Mildly comminuted humeral neck fracture is stably aligned. No new abnormal bone or soft tissue findings. Right elbow: There is an olecranon spur. No definite fracture or dislocation. No definite joint effusion. Stable appearance of mildly comminuted humeral neck fracture. Right olecranon spur.      CT HEAD WO CONTRAST    Result Date: 6/29/2022  EXAMINATION: CT OF THE HEAD WITHOUT CONTRAST  6/29/2022 10:52 am TECHNIQUE: CT of the head was performed without the administration of intravenous contrast. Automated exposure control, iterative reconstruction, and/or weight based adjustment of the mA/kV was utilized to reduce the radiation dose to as low as reasonably achievable. COMPARISON: October 14, 2020 HISTORY: ORDERING SYSTEM PROVIDED HISTORY: fall on Eliquis TECHNOLOGIST PROVIDED HISTORY: Reason for exam:->fall on Eliquis Has a \"code stroke\" or \"stroke alert\" been called? ->No Decision Support Exception - unselect if not a suspected or confirmed emergency medical condition->Emergency Medical Condition (MA) FINDINGS: BRAIN/VENTRICLES: There is no acute intracranial hemorrhage, mass effect or midline shift. No abnormal extra-axial fluid collection. The gray-white differentiation is maintained without evidence of an acute infarct. There is no evidence of hydrocephalus. ORBITS: The visualized portion of the orbits demonstrate no acute abnormality. SINUSES: The visualized paranasal sinuses and mastoid air cells demonstrate no acute abnormality. There is post right mastoidectomy defect. SOFT TISSUES/SKULL:  No acute abnormality of the visualized skull or soft tissues. No acute intracranial abnormality. Mild cortical atrophy and periventricular leukomalacia. RECOMMENDATIONS: Unavailable     CT CERVICAL SPINE WO CONTRAST    Result Date: 6/29/2022  EXAMINATION: CT OF THE CERVICAL SPINE WITHOUT CONTRAST 6/29/2022 10:52 am TECHNIQUE: CT of the cervical spine was performed without the administration of intravenous contrast. Multiplanar reformatted images are provided for review. Automated exposure control, iterative reconstruction, and/or weight based adjustment of the mA/kV was utilized to reduce the radiation dose to as low as reasonably achievable. COMPARISON: None.  HISTORY: ORDERING SYSTEM PROVIDED HISTORY: fall, right sided pain TECHNOLOGIST PROVIDED HISTORY: Reason for exam:->fall, right sided pain Decision Support Exception - unselect if not a suspected or confirmed emergency medical condition->Emergency Medical Condition (MA) FINDINGS: BONES/ALIGNMENT: There is no acute fracture or traumatic malalignment. Partially visualized right mastoidectomy defect. DEGENERATIVE CHANGES: There are multilevel degenerative changes. SOFT TISSUES: There is no prevertebral soft tissue swelling. No acute abnormality of the cervical spine. Multilevel degenerative changes. RECOMMENDATIONS: Unavailable     XR CHEST 1 VIEW    Result Date: 6/29/2022  EXAMINATION: ONE XRAY VIEW OF THE CHEST 6/29/2022 11:05 am COMPARISON: None. HISTORY: ORDERING SYSTEM PROVIDED HISTORY: possible syncope TECHNOLOGIST PROVIDED HISTORY: Reason for exam:->possible syncope FINDINGS: The lungs are without acute focal process. There is no effusion or pneumothorax. The cardiomediastinal silhouette is without acute process. The osseous structures are without acute process. There is minimal atelectasis seen within the left lung base. There is no pneumothorax There is a comminuted nondisplaced fracture of the proximal humerus. There is no fracture of the right clavicle. No acute process. There is no pneumothorax Comminuted nondisplaced fracture of the proximal right humerus. CTA CHEST W CONTRAST    Result Date: 6/29/2022  EXAMINATION: CTA OF THE CHEST 6/29/2022 2:59 pm TECHNIQUE: CTA of the chest was performed after the administration of intravenous contrast.  Multiplanar reformatted images are provided for review. MIP images are provided for review. Automated exposure control, iterative reconstruction, and/or weight based adjustment of the mA/kV was utilized to reduce the radiation dose to as low as reasonably achievable. COMPARISON: CTA of the chest, 45557 CONTRAST: 75 cc Isovue-370 was injected intravenously.  HISTORY: ORDERING SYSTEM PROVIDED HISTORY: r/o PE TECHNOLOGIST PROVIDED HISTORY: Reason for exam:->r/o PE Decision Support Exception - unselect if not a suspected or confirmed emergency medical pulmonary, neurology, and orthopedic teams. He was found to be orthostatic and cardiac medications were adjusted. Pulmonary nodules were identified in the setting of known lung transplant and the pulmonary team provided consultation as well. He was evaluated by the orthopedic team in the setting of humerus fracture with plans for conservative measures secondary to being an overall poor surgical candidate. He became increasingly active and essentially returned to his baseline. Home medications were resumed. He was ultimately deemed acceptable for discharge home with close outpatient follow-up. He remains very appreciative the care he received throughout the hospitalization. BRIEF PHYSICAL EXAMINATION AND LABORATORIES ON DAY OF DISCHARGE:  VITALS:  /71   Pulse 77   Temp 99.1 °F (37.3 °C) (Oral)   Resp 16   Ht 5' 10\" (1.778 m)   Wt 180 lb (81.6 kg)   SpO2 96% Comment: 99  BMI 25.83 kg/m²     HEENT:  PERRLA. EOMI. Sclera clear. Buccal mucosa moist.    Neck:  Supple. Trachea midline. No thyromegaly. No JVD. No bruits. Heart:  Rhythm regular, rate controlled. No murmurs. Lungs:  Symmetrical. Clear to auscultation bilaterally. No wheezes. No rhonchi. No rales. Abdomen: Soft. Non-tender. Non-distended. Bowel sounds positive. No organomegaly or masses. No pain on palpation    Extremities:  Peripheral pulses present. No peripheral edema. No ulcers. Upper extremities appropriately immobilized in the setting of humerus fracture. Sling is in place. Neurologic:  Alert x 3. No focal deficit. Cranial nerves grossly intact. Skin:  No petechia. No hemorrhage. No wounds. DISPOSITION:  The patient's condition is good. At this time the patient is without objective evidence of an acute process requiring continuing hospitalization or inpatient management. They are stable for discharge with outpatient follow-up.     I have spoken with the patient and discussed the results of the 50+MEN) TABS Take by mouth daily             FOLLOW UP/INSTRUCTIONS:  · This patient is instructed to follow-up with his primary care physician. · Patient is instructed to follow-up with the consults listed above as directed by them. · he is instructed to resume home medications and take new medications as indicated in the list above. · If the patient has a recurrence of symptoms, he is instructed to go to the ED. Preparing for this patient's discharge, including paperwork, orders, instructions, and meeting with patient did require > 40 minutes.     Eric Bentley DO   7/1/2022  7:49 AM

## 2022-07-04 LAB
BLOOD CULTURE, ROUTINE: NORMAL
CULTURE, BLOOD 2: NORMAL

## 2022-08-02 ENCOUNTER — TELEPHONE (OUTPATIENT)
Dept: ADMINISTRATIVE | Age: 67
End: 2022-08-02

## 2022-08-02 NOTE — TELEPHONE ENCOUNTER
Pt called to e seen from an ED follow up 06/29/22 Howard Villegas, Other closed nondisplaced fracture of proximal end of right humerus, initial encounter [L74.671Q]. He stated he has not been seen with any other provider. Please contact.

## 2022-08-03 NOTE — PROGRESS NOTES
[facility] - Office Visit Follow-up    Date of Consultation: 8/18/2022    HISTORY OF PRESENT ILLNESS:   Patient is a 69year old WM known to Dr. Martin Ly. He is here today for post-hospital follow up and to discuss event monitor results. Admission June 2022 with falls and syncope. Likely due to orthostatic hypotension. Episode of NSVT noted on telemetry, asymptomatic. Evaluated by orthopedic surgery due to right proximal humerus fracture sustained s/p fall --> conservative therapy with outpatient follow up. Given IVFs with improvement. Discharged home with event monitor. Patient presented to the office today and states he has been doing well since hospital discharge. He denies any recurrence of lightheadedness, near-syncope or syncope since discharge. He denies palpitations as well. He states for several months now, he has been having daily episodes of chest discomfort. The chest discomfort is located on the left side of his chest, no radiation. Sharp in nature, with each episode lasting seconds before spontaneous resolution. Tender on palpation, and related to upper extremity movement. He believes it is musculoskeletal in nature. It is not related to exertion, meals, cough. He denies any change in shortness of breath, nausea, emesis, diaphoresis, PND, orthopnea or peripheral edema. Admits to medication compliance. Has a follow-up appointment with his pulmonologist at [facility] in the next 4 weeks. Please note: past medical records were reviewed per electronic medical record (EMR) - see detailed reports under Past Medical/ Surgical History. PAST MEDICAL HISTORY:    Obesity  History of illicit drug use  Longstanding tobacco abuse (1ppd for 40 years, quit in 2014)  Longstanding Idiopathic Pulmonary Fibrosis  Pulmonary hypertension  TTE 10/06/2017 (Dr. Isabel Mendez): Normal left ventricular size and function. Visually estimated LVEF is 60%.  There is septal flattening throughout systole consistent with elevated right ventricular systolic pressure. Normal aortic root and ascending aorta. No evidence of pericardial effusion. Severely enlarged right atrium with LAZARA of ~ 47 ml/m2. Severely enlarged right ventricle with severe systolic dysfunction, with prominent right ventricular apex and RV:LV of > 1.5. Normal tricuspid valve leaflets. Dilated annulus. Mild tricuspid regurgitation. RVSP is 47 mm Hg. This and right sided enlargement is consistent with pulmonary hypertension. Enlarged IVC of 2.6 cm, with no collapse. Estimated RAP is 15 mm Hg. Right Heart Catheterization (Dr. Andre Hoskins) on 07/26/2018: Elevated biventricular filling pressures. CVP:PCWP 0.88. Elisa 2.9. mPA 63, PVR 14.9 JACINTO. Elevated systemic vascular resistance. 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 Low cardiac indices. Erythropoiesis, in the setting of chronic hypoxemia (no other eval done yet to date) Recommendations: Referral to lung transplant center for evaluation, and possible enrollment into [facility] clinical trial for WHO group 3. Bumetanide 2 mg IV x 1 now. Add on erythropoietin levels to labs. MARYCARMEN-2 if abnormal only. Chronic Hypoxemia with LTOT, s/p Double lung transplant in 03/22/2020 [facility]) -- on Tacrolimus, Cellcept and prednisone therapy  TTE (Dr. Jordin Alcantara, 6/2022): Mild LVH. LVEF 64%. No WMA. No significant VHD. Coronary artery disease  Cardiac catheterization prior to lung transplant at [facility] 3/2020: Left main no significant disease. LAD and LCx no significant disease. Mid RCA 40% stenosis. Normal LV function, EF 55%. No MR  SVT/NSVT, PVCs  14 Day Event Monitor 8/2022: Brief episodes of SVT. NSVT episodes, brief. Ventricular ectopy. VIDAL (diagnosed in 10/2017 with CPAP initiated in 11/2017)  Gout  DVT on 1995 not attributed to anything, and was on Coumadin for one year only. 10/2017 VQ Scan: Low probability of PE -- ?   Chronic Eliquis therapy  Chronic anemia/thrombocytopenia  GERD  T2DM  Hx Kidney stones  S/p Tonsillectomy and vasectomy     PAST SURGICAL HISTORY:    Past Surgical History:   Procedure Laterality Date    CARDIAC CATHETERIZATION  07/26/2018    Dr. Devan Lawler - LE[facility] Parikh  03/2020    AT [address] [facility] [employer], [address]:  Prior to Admission medications    Medication Sig Start Date End Date Taking?  Authorizing Provider   metoprolol tartrate (LOPRESSOR) 25 MG tablet Take 0.5 tablets by mouth 2 times daily 7/1/22  Yes Jane Perera DO   empagliflozin (JARDIANCE) 25 MG tablet Take 25 mg by mouth daily   Yes Historical Provider, MD   docusate sodium (COLACE) 100 MG capsule Take 100 mg by mouth daily as needed for Constipation   Yes Historical Provider, MD   alendronate (FOSAMAX) 70 MG tablet Take 70 mg by mouth every 7 days Once weekly on sunday   Yes Historical Provider, MD   tacrolimus (PROGRAF) 1 MG capsule Take 6 mg by mouth 2 times daily    Yes Historical Provider, MD   mycophenolate (CELLCEPT) 250 MG capsule Take 250 mg by mouth 2 times daily   Yes Historical Provider, MD   predniSONE (DELTASONE) 20 MG tablet Take 5 mg by mouth daily    Yes Historical Provider, MD   dapsone 100 MG tablet Take 100 mg by mouth daily   Yes Historical Provider, MD   valGANciclovir (VALCYTE) 450 MG tablet Take 900 mg by mouth daily   Yes Historical Provider, MD   voriconazole (VFEND) 200 MG tablet Take 300 mg by mouth 2 times daily    Yes Historical Provider, MD   aspirin 81 MG chewable tablet Take 81 mg by mouth daily   Yes Historical Provider, MD   pantoprazole (PROTONIX) 40 MG tablet Take 40 mg by mouth daily Delayed release   Yes Historical Provider, MD   loratadine (CLARITIN) 10 MG capsule Take 10 mg by mouth daily   Yes Historical Provider, MD   magnesium oxide (MAG-OX) 400 MG tablet Take 400 mg by mouth 2 times daily    Yes Historical Provider, MD   apixaban (ELIQUIS) 5 MG TABS tablet Take 5 mg by mouth 2 times daily Yes Historical Provider, MD   DULoxetine (CYMBALTA) 30 MG extended release capsule Take 60 mg by mouth daily    Yes Historical Provider, MD   allopurinol (ZYLOPRIM) 300 MG tablet Take 300 mg by mouth daily  9/10/17  Yes Historical Provider, MD   Multiple Vitamins-Minerals (CENTRUM SILVER 50+MEN) TABS Take by mouth daily   Yes Historical Provider, MD   ferrous sulfate (IRON 325) 325 (65 Fe) MG tablet Take 325 mg by mouth every other day  Patient not taking: Reported on 8/18/2022    Historical Provider, MD       CURRENT MEDICATIONS:      Current Outpatient Medications:     metoprolol tartrate (LOPRESSOR) 25 MG tablet, Take 0.5 tablets by mouth 2 times daily, Disp: 60 tablet, Rfl: 3    empagliflozin (JARDIANCE) 25 MG tablet, Take 25 mg by mouth daily, Disp: , Rfl:     docusate sodium (COLACE) 100 MG capsule, Take 100 mg by mouth daily as needed for Constipation, Disp: , Rfl:     alendronate (FOSAMAX) 70 MG tablet, Take 70 mg by mouth every 7 days Once weekly on sunday, Disp: , Rfl:     tacrolimus (PROGRAF) 1 MG capsule, Take 6 mg by mouth 2 times daily , Disp: , Rfl:     mycophenolate (CELLCEPT) 250 MG capsule, Take 250 mg by mouth 2 times daily, Disp: , Rfl:     predniSONE (DELTASONE) 20 MG tablet, Take 5 mg by mouth daily , Disp: , Rfl:     dapsone 100 MG tablet, Take 100 mg by mouth daily, Disp: , Rfl:     valGANciclovir (VALCYTE) 450 MG tablet, Take 900 mg by mouth daily, Disp: , Rfl:     voriconazole (VFEND) 200 MG tablet, Take 300 mg by mouth 2 times daily , Disp: , Rfl:     aspirin 81 MG chewable tablet, Take 81 mg by mouth daily, Disp: , Rfl:     pantoprazole (PROTONIX) 40 MG tablet, Take 40 mg by mouth daily Delayed release, Disp: , Rfl:     loratadine (CLARITIN) 10 MG capsule, Take 10 mg by mouth daily, Disp: , Rfl:     magnesium oxide (MAG-OX) 400 MG tablet, Take 400 mg by mouth 2 times daily , Disp: , Rfl:     apixaban (ELIQUIS) 5 MG TABS tablet, Take 5 mg by mouth 2 times daily, Disp: , Rfl:     DULoxetine (CYMBALTA) 30 MG extended release capsule, Take 60 mg by mouth daily , Disp: , Rfl:     allopurinol (ZYLOPRIM) 300 MG tablet, Take 300 mg by mouth daily , Disp: , Rfl:     Multiple Vitamins-Minerals (CENTRUM SILVER 50+MEN) TABS, Take by mouth daily, Disp: , Rfl:     ferrous sulfate (IRON 325) 325 (65 Fe) MG tablet, Take 325 mg by mouth every other day (Patient not taking: Reported on 2022), Disp: , Rfl:       ALLERGIES:  Patient has no known allergies. SOCIAL HISTORY:    Social History     Socioeconomic History    Marital status:      Spouse name: Not on file    Number of children: Not on file    Years of education: Not on file    Highest education level: Not on file   Occupational History    Not on file   Tobacco Use    Smoking status: Former     Packs/day: 1.00     Years: 45.00     Pack years: 45.00     Types: Cigarettes     Quit date: 10/10/2014     Years since quittin.8    Smokeless tobacco: Never   Vaping Use    Vaping Use: Never used   Substance and Sexual Activity    Alcohol use: Not Currently     Alcohol/week: 2.0 standard drinks     Types: 1 Cans of beer, 1 Shots of liquor per week     Comment: none since 10/2018    Drug use: No     Types: Marijuana ([employer]), Cocaine     Comment: in his teens/20's; none since    Sexual activity: Not Currently     Partners: Female   Other Topics Concern    Not on file   Social History Narrative    He denies any family history of hypercoagulability, premature death, SCD, cardiomyopathies, pulmonary hypertension, Marfan's, connective tissue diseases. He quit smoking cigarettes but the development of current cough occurred prior to smoking cessation. He used to smoke 1 PPD x 40 years, quit 3 years ago in , when the onset of above chronic cough prompted him to quit.              Dr. Candance Chamorro who obtained repeat imaging which revealed unchanged fibrotic changes of the upper and lower lobes of both lungs, ultimately diagnosed last week with idiopathic pulmonary fibrosis. No tissue diagnosis at this time. Drinks 2 cups of coffee daily. Social Determinants of Health     Financial Resource Strain: Not on file   Food Insecurity: Not on file   Transportation Needs: Not on file   Physical Activity: Not on file   Stress: Not on file   Social Connections: Not on file   Intimate Partner Violence: Not on file   Housing Stability: Not on file       FAMILY HISTORY:   No family history on file. REVIEW OF SYSTEMS:     Negative except as noted above in HPI      PHYSICAL EXAM:   /68   Pulse 81   Resp 16   Ht 5' 10\" (1.778 m)   Wt 180 lb (81.6 kg)   BMI 25.83 kg/m²   CONST:  Well developed, well nourished WM who appears stated age. Awake, alert, cooperative, no apparent distress. HEENT:   Head- Normocephalic, atraumatic. Eyes- Conjunctivae pink, anicteric. Neck-  No stridor, trachea midline, no apparent jugular venous distention. CHEST: Chest symmetrical and non-tender to palpation. No accessory muscle use or intercostal retractions. RESPIRATORY: Lung sounds - clear throughout fields. No wheezing, rales or rhonchi. CARDIOVASCULAR:     No noted carotid bruit. Heart Ausculation- Regular rate and rhythm, questionable soft systolic murmur LSB, apex  PV: No lower extremity edema. Varicose veins. Pedal pulses palpable, no clubbing or cyanosis. ABDOMEN: Soft, non-tender to light palpation. Bowel sounds present. MS: Good muscle strength and tone. No atrophy or abnormal movements. : Deferred  SKIN: Warm and dry. No statis dermatitis or ulcers. NEURO / PSYCH: Oriented to person, place and time. Speech clear and appropriate. Follows all commands. Pleasant affect. DATA:    EKG:  Reviewed. Final read as noted under Cardiology tab.     Labs:     HgA1c:   Lab Results   Component Value Date    LABA1C 5.4 06/30/2022     FASTING LIPID PANEL:  Lab Results   Component Value Date/Time    CHOL 139 06/30/2022 05:54 AM    HDL 48 06/30/2022 05:54 AM    LDLCALC 68 06/30/2022 05:54 AM    TRIG 113 06/30/2022 05:54 AM       ASSESSMENT:  Chest pain, with atypical features  History of syncope in relation to orthostatic hypotension  NSVT during 6/2022 hospitalization, asymptomatic  14 Day Event Monitor 8/2022: Brief episodes of SVT. NSVT episodes, brief. Ventricular ectopy. No symptoms reported  Non-obstructive CAD on cardiac catheterization 3/2020 at [facility]. Clinically stable  Longstanding idiopathic pulmonary fibrosis/chronic hypoxemia with LTOT s/p double lung transplant at [facility] March 2020  Currently on Tacrolimus, CellCept and prednisone therapy  Follows with Pulmonology at [facility]  Former tobacco smoker  History of alcohol or illicit drug use  Obesity  VIDAL, compliant with CPAP  History of DVT, on chronic Eliquis therapy  Varicose veins  Thrombocytopenia  Chronic anemia  ? T2DM  GERD  Gout        PLAN:   Given no recurrent dizziness/syncope since discharge, EP evaluation deferred at this time  Will titrate Lopressor from 12.5 mg BID to 25 mg BID --> patient wishes to check with his pulmonologist at [facility] prior to changing the dose. I advised him to call the office with an update  Continue other cardiac medications the same at this time  Lexiscan stress test also recommended given SVT/NSVT/PVCs, chest pain with known CAD on cardiac catheterization in 2020. Patient also wishes to run this by his transplant/pulmonary physician before proceeding. He will call the office back with an answer, and order can be placed  Follow up in 6 months with Dr. Oziel Fan, or sooner if needed          The patient's current medication list, allergies, problem list, recent labs and diagnostic testing were reviewed at today's visit. NOTE: This report was transcribed using voice recognition software. Every effort was made to ensure accuracy; however, inadvertent computerized transcription errors may be present.     Isra Mcintosh, [address] [facility]

## 2022-08-05 DIAGNOSIS — M25.511 RIGHT SHOULDER PAIN, UNSPECIFIED CHRONICITY: Primary | ICD-10-CM

## 2022-08-05 DIAGNOSIS — R55 SYNCOPE AND COLLAPSE: ICD-10-CM

## 2022-08-08 ENCOUNTER — OFFICE VISIT (OUTPATIENT)
Dept: ORTHOPEDIC SURGERY | Age: 67
End: 2022-08-08

## 2022-08-08 VITALS — BODY MASS INDEX: 25.05 KG/M2 | WEIGHT: 175 LBS | HEIGHT: 70 IN

## 2022-08-08 DIAGNOSIS — S42.291A OTHER CLOSED DISPLACED FRACTURE OF PROXIMAL END OF RIGHT HUMERUS, INITIAL ENCOUNTER: Primary | ICD-10-CM

## 2022-08-08 PROCEDURE — 99024 POSTOP FOLLOW-UP VISIT: CPT | Performed by: ORTHOPAEDIC SURGERY

## 2022-08-08 SDOH — HEALTH STABILITY: PHYSICAL HEALTH: ON AVERAGE, HOW MANY DAYS PER WEEK DO YOU ENGAGE IN MODERATE TO STRENUOUS EXERCISE (LIKE A BRISK WALK)?: 0 DAYS

## 2022-08-08 NOTE — PROGRESS NOTES
Opal Gorman is a 69y.o. year old male who presents today for evaluation of a right shoulder injury which occurred on 6/29. The patient reports that this injury occurred when fall x2 after passing out. The patient denies any other injuries. Movement makes the pain worse, the splint and resting makes the pain better. The patient's past medical history, medications, and review of systems was reviewed. On Physical Exam, Opal Gorman is well-developed, well-nourished, and oriented to person, place and time. his gait is intact. On evaluation of his right upper extremity, there is not obvious deformity. There is swelling and is not ecchymosis. he is tender to palpation over the proximal shoulder, and otherwise nontender over the remainder of the extremity. Range of motion is decreased secondary to pain over the right shoulder. The skin overlying the right shoulder is intact without evidence of lesion, laceration or abrasion. Distal pulses are 2+ and symmetric bilaterally. Sensation is grossly intact to light touch and symmetric bilaterally. Xrays:  right proximal humerus fracture with healing starting    Impression:     Diagnosis Orders   1.  Other closed displaced fracture of proximal end of right humerus, initial encounter  Ambulatory referral to Physical Therapy            Plan: PT right shoulder to start 1 wk   Dc sling  No heavy lifting pushing or pulling  Fu in 4 weeks with xr

## 2022-08-16 ENCOUNTER — EVALUATION (OUTPATIENT)
Dept: PHYSICAL THERAPY | Age: 67
End: 2022-08-16
Payer: MEDICARE

## 2022-08-16 DIAGNOSIS — S42.291A FRACTURE OF HUMERAL HEAD, RIGHT, CLOSED, INITIAL ENCOUNTER: ICD-10-CM

## 2022-08-16 PROCEDURE — 97161 PT EVAL LOW COMPLEX 20 MIN: CPT | Performed by: PHYSICAL THERAPIST

## 2022-08-16 PROCEDURE — 97110 THERAPEUTIC EXERCISES: CPT | Performed by: PHYSICAL THERAPIST

## 2022-08-16 NOTE — PROGRESS NOTES
[address] [facility] OUTPATIENT REHABILITATION  PHYSICAL THERAPY INITIAL EVALUATION         Date:  2022   Patient: Cornelius Enriquez  : 1955  MRN: [id number]  Referring Provider: Oscar Gates [facility]     Medical Diagnosis:   P01.573R (ICD-10-CM) - Other closed displaced fracture of proximal end of right humerus, initial encounter      Physician Order: Eval and Treat      SUBJECTIVE:     Onset date: 2022    Onset: Sudden     History / Mechanism of Injury: syncopal episode at home. Patient is right handed. Interventions for current problem:  sling    Chief complaint:  can't do what I want to do    Behavior: condition is getting better    Pain: intermittent  Current: 0/10     Best: 0/10     Worst:8/10 (occurs with reaching up). Pain returns to baseline in moments    Symptom Type / Quality: sharp  Location[de-identified] from neck through shoulder to lat        Aggravated by: reaching overhead, reaching out, reaching behind back    Relieved by: rest, placing arm in sling position     Imaging results: XR SHOULDER RIGHT (MIN 2 VIEWS)    Result Date: 2022  EXAMINATION: THREE XRAY VIEWS OF THE RIGHT SHOULDER 2022 11:37 am COMPARISON: 2019. HISTORY: ORDERING SYSTEM PROVIDED HISTORY: Right shoulder pain, unspecified chronicity FINDINGS: Glenohumeral joint is normally aligned. Healing humeral neck fracture with increased callus formation compared to prior study. No abnormal periarticular calcifications. The [facility] joint is unremarkable in appearance. Visualized lung is unremarkable. Healing humeral neck fracture with increased callus formation compared to prior study. Past Medical History:  Past Medical History:   Diagnosis Date    COPD (chronic obstructive pulmonary disease) ([facility] [address]     Deep venous thrombosis ([facility] [address] 2017    Dx 1995. Was not attributed to anything. Was on oral anticoagulation for 1 year only.      Gout     History of tobacco use 11/8/2017     1 PPD x 40 years, quit in 2014    Hypertension     Idiopathic pulmonary fibrosis ([facility] [address] 9/26/2017    Interstitial lung disease ([facility] [address]     Kidney stones     Obesity 9/26/2017    Obstructive sleep apnea 11/8/2017    Dx 10/2017. Initiated CPAP 11/2017.      Sleep apnea     Tobacco abuse      Past Surgical History:   Procedure Laterality Date    CARDIAC CATHETERIZATION  07/26/2018    Dr. Shawn Amato - Poonam Jenkins, DOUBLE  03/2020    AT [facility] in [facility] [employer], [address],[facility] [address]      VASECTOMY         Medications:   Current Outpatient Medications   Medication Sig Dispense Refill    metoprolol tartrate (LOPRESSOR) 25 MG tablet Take 0.5 tablets by mouth 2 times daily 60 tablet 3    empagliflozin (JARDIANCE) 25 MG tablet Take 25 mg by mouth daily      docusate sodium (COLACE) 100 MG capsule Take 100 mg by mouth daily as needed for Constipation      alendronate (FOSAMAX) 70 MG tablet Take 70 mg by mouth every 7 days Once weekly on sunday      ferrous sulfate (IRON 325) 325 (65 Fe) MG tablet Take 325 mg by mouth every other day      tacrolimus (PROGRAF) 1 MG capsule Take 6 mg by mouth 2 times daily       mycophenolate (CELLCEPT) 250 MG capsule Take 250 mg by mouth 2 times daily      predniSONE (DELTASONE) 20 MG tablet Take 5 mg by mouth daily       dapsone 100 MG tablet Take 100 mg by mouth daily      valGANciclovir (VALCYTE) 450 MG tablet Take 900 mg by mouth daily      voriconazole (VFEND) 200 MG tablet Take 300 mg by mouth 2 times daily       aspirin 81 MG chewable tablet Take 81 mg by mouth daily      pantoprazole (PROTONIX) 40 MG tablet Take 40 mg by mouth daily Delayed release      loratadine (CLARITIN) 10 MG capsule Take 10 mg by mouth daily      magnesium oxide (MAG-OX) 400 MG tablet Take 400 mg by mouth 2 times daily       apixaban (ELIQUIS) 5 MG TABS tablet Take 5 mg by mouth 2 times daily      DULoxetine (CYMBALTA) 30 MG extended release capsule Take 60 mg by mouth daily       allopurinol (ZYLOPRIM) 300 MG tablet Take 300 mg by mouth daily       Multiple Vitamins-Minerals (CENTRUM SILVER 50+MEN) TABS Take by mouth daily       No current facility-administered medications for this visit. Occupation: retired.  at [employer]    Exercise regimen: weight training , cycling, belongs to [employer]: yard work, working around the house    Patient Goals: get back to normal, return to exercise regimen / fitness program    Precautions/Contraindications:  recent fracture    OBJECTIVE:     Estimated body mass index is 25.11 kg/m² as calculated from the following:    Height as of 8/8/22: 5' 10\" (1.778 m). Weight as of 8/8/22: 175 lb (79.4 kg). Observations: well nourished male, normal affect    Inspection: irregular scapulohumeral rhythm right shoulder, right scapular elevation      Joint/Motion:    Neck:  Neck AROM is WNL and non-provocative     Right Shoulder:  AROM: 80° Forward elevation,  25° ER in scapular plane,  IR to S2  PROM: 90° Forward elevation,  40° ER,  25° IR    Left Shoulder:  AROM: 120° Forward elevation,  45° ER in scapular plane,  IR to L4  PROM: 130° Forward elevation,  55° ER , 40° IR    Strength:  Right Shoulder: Flexion 3/5,  Abduction 3/5, ER 4/5, IR 4/5      Left Shoulder: Flexion 5-/5,  Abduction 5-/5, ER 5-/5, IR 5-/5     Palpation: Tender to palpation anterior shoulder, inferior to acromion.     Special Tests/Functional Screens:  NA  [] Khalida-Bradley []+ / [] -  [] Kootenai's []+ / [] -   []  Charleneson's []+ / [] -    []GH drawer []+ / [] -    [] Bicep Load []+ / [] -   [] Crank []+ / [] -  [] Serna Lion []+ / [] -   [] Elbow Varus []+ / [] -  [] Neer's []+ / [] -      [] Speed's []+ / [] -   []Sulcus Sign []+ / [] -   [] Apprehension []+ / [] -   [] Bicep Load II []+ / [] -   [] Ward Fisher []+ / [] -   [] Elbow Valgus []+ / [] -     [] Other: []+ / [] -         ASSESSMENT     Kalie Bueno has markedly stiff shoulder s/p fall resulting in proximal humeral fracture. He is limited on the noninvolved L shoulder as well so the goal is symmetrical AROM. Fair to good prognosis for accomplishment of goals. Outcome Measure:   QuickDASH (Disorders of the Arm, Shoulder, and Hand) 63% disability    Problems:   Pain 8/10 intermittent  ROM decreased  Strength decreased   Limitations with use of right upper extremity      [x] There are no barriers affecting plan of care or recovery    [] Barriers to this patient's plan of care or recovery include:    Domestic Concerns:  [x] No  [] Yes:    Short Term goals (2-3 weeks)  Pain 5/10  AROM 110/45/L5    Long Term goals (4-6 weeks)  Pain 2/10  /60/L4  Strength 4+-5-/5 in available ROM   Able to perform / complete the following functions / tasks: reach / lift / carry medium weighted items in performance of home or work demands  QuickDASH 35% disability  Independent with Home Exercise Programs    Rehab Potential: [x] Good  [] Fair  [] Poor    PLAN       Treatment Plan:   instruction in home exercise program   therapeutic exercise   therapeutic activity   manual therapy   cold / hot pack    The following CPT codes are likely to be used in the care of this patient:   62933 PT Evaluation: Low Complexity   05786 Therapeutic Exercise   [id number] Neuromuscular Re-Education   42061 Therapeutic Activities   80892 Manual Therapy     Suggested Professional Referral: [x] No  [] Yes:     Patient Education:  [x] Plans / Goals, Risks / Benefits discussed  [x] Home exercise program  Method of Education: [x] Verbal  [x] Demo  [x] Written  Comprehension of Education:  [x] Verbalizes understanding. [x] Demonstrates understanding. [] Needs Review. [] Demonstrates / verbalizes understanding of HEP / Connors Perches previously given. Frequency:  1-2 days per week for 4-6 weeks    Patient understands diagnosis/prognosis and consents to treatment, plan and goals: [x] Yes    [] No     Thank you for the opportunity to work with your patient.   If you have questions or comments, please contact me at [phone]; fax: [phone]. Electronically signed by: Camila Salazar PT    Medicare Patients Only     Please sign Physician's Certification and return to: [facility] PHYSICAL THERAPY  [address] [facility] [address]  Dept: [phone]  Dept Fax: [phone]  Loc: [phone] Certification / Comments     Frequency/Duration 1-2 days per week for 4-6 weeks. Certification period from 8/16/2022  to 11/10/2022. I have reviewed the Plan of Care established for skilled therapy services and certify that the services are required and that they will be provided while the patient is under my care.     Physician's Comments/Revisions:               Physician's Printed Name:                                           [de-identified] Signature:                                                               Date:

## 2022-08-16 NOTE — PROGRESS NOTES
Physical Therapy Daily Treatment Note    Date: 2022  Patient Name: Brett Bullock  : 1955   MRN: [id number]  DOInjury: 2022  DOSx: -   Referring Provider: Nichelle Swenson DO   [address]  [facility] Diagnosis:      Diagnosis Orders   1. Fracture of humeral head, right, closed, initial encounter            Outcome Measure:  QuickDASH  63% disability        X = TO BE PERFORMED NEXT VISIT  > = PROGRESS TO THIS    S: See eval  O: Discussed anatomy, physiology, body mechanics, principles of loading, and progressive loading/activity. Reviewed home exercise program extensively; written copy provided. Time 0703-2793     Visit 1 Repeat outcome measure at mid point and end. Pain Pain with activity 8/10     ROM AROM: 80° Forward elevation,  25° ER in scapular plane,  IR to S2  PROM: 90° Forward elevation,  40° ER,  25° IR Left Shoulder:  AROM: 120° Forward elevation,  45° ER in scapular plane,  IR to L4  PROM: 130° Forward elevation,  55° ER , 40° IR    Modalities       Use sparingly if at all. Prefer an active program.  MO   Manual         MT         Stretch      Table slides   TE   Wall Flexion   TE   Wall ER stretch   TE   Towel IR stretch   TE   IR reaching behind back   TE   Exercise      Shoulder scaption isometrics      Shoulder ER isometrics       Shrugs AROM   TE   Pendulum Ex   TE   Pulleys - flex   TE   Pulleys-IR   TE   Supine wand chest press 2 x 10 Given as part of HEP TE   Supine wand flex 2 x 10 Given as part of HEP TE   Supine wand ER/IR 2 x 10 Given as part of HEP TE   Standing wand behind back IR 2 x 10 Given as part of HEP TE   Supine flexion   TE   S-lying ER   TE   Standing wand flex   TE   Standing flexion   TE   ROWS: H   TA   ROWS: M   TA   ROWS: L   TA   ER   TE   IR   TE               A:  Tolerated well.     P: Continue with rehab plan  Gume Pereira PT    Treatment Charges: Mins Units   Initial Evaluation 20 1   Re-Evaluation     Ther Exercise TE 25 2   Manual Therapy     MT     Ther Activities        TA     Gait Training          GT     Neuro Re-education NR     Modalities     Non-Billable Service Time     Other     Total Time/Units 45 3

## 2022-08-18 ENCOUNTER — OFFICE VISIT (OUTPATIENT)
Dept: CARDIOLOGY CLINIC | Age: 67
End: 2022-08-18
Payer: MEDICARE

## 2022-08-18 ENCOUNTER — TREATMENT (OUTPATIENT)
Dept: PHYSICAL THERAPY | Age: 67
End: 2022-08-18
Payer: MEDICARE

## 2022-08-18 VITALS
RESPIRATION RATE: 16 BRPM | WEIGHT: 180 LBS | BODY MASS INDEX: 25.77 KG/M2 | HEART RATE: 81 BPM | HEIGHT: 70 IN | DIASTOLIC BLOOD PRESSURE: 68 MMHG | SYSTOLIC BLOOD PRESSURE: 112 MMHG

## 2022-08-18 DIAGNOSIS — R55 SYNCOPE AND COLLAPSE: Primary | ICD-10-CM

## 2022-08-18 DIAGNOSIS — S42.291A OTHER CLOSED DISPLACED FRACTURE OF PROXIMAL END OF RIGHT HUMERUS, INITIAL ENCOUNTER: Primary | ICD-10-CM

## 2022-08-18 PROCEDURE — 1123F ACP DISCUSS/DSCN MKR DOCD: CPT | Performed by: PHYSICIAN ASSISTANT

## 2022-08-18 PROCEDURE — 99214 OFFICE O/P EST MOD 30 MIN: CPT | Performed by: PHYSICIAN ASSISTANT

## 2022-08-18 PROCEDURE — 93000 ELECTROCARDIOGRAM COMPLETE: CPT | Performed by: INTERNAL MEDICINE

## 2022-08-18 PROCEDURE — 97110 THERAPEUTIC EXERCISES: CPT

## 2022-08-18 NOTE — PROGRESS NOTES
Physical Therapy Daily Treatment Note    Date: 2022  Patient Name: Radha Mcintosh  : 1955   MRN: [id number]  DOInjury: 2022  DOSx: none      Referring Provider:   Regan Resendiz DO  [address] [facility]:    Diagnosis Orders   1. Other closed displaced fracture of proximal end of right humerus, initial encounter          Outcome Measure:    X = TO BE PERFORMED NEXT VISIT  > = PROGRESS TO THIS    S: Patient reports no pain, only when exercising. O:   Time 7476-0442     Visit 2 Repeat outcome measure at mid point and end. Pain Pain with activity 0/10     ROM Right Shoulder:  AROM: 80° Forward elevation,  25° ER in scapular plane, IR to S2  PROM: 90° Forward elevation, 40° ER, 25° IR     Modalities       Use sparingly if at all. Prefer an active program.  MO   Manual         MT         Stretch      Table slides 10 sec hold x 10 New TE   Wall Flexion   TE   Wall ER stretch   TE   Towel IR stretch   TE   IR reaching behind back   TE   Exercise      Shoulder scaption isometrics      Shoulder ER isometrics       Shrugs AROM   TE   Pendulum Ex   TE   Pulleys - flex   TE   Pulleys-IR   TE   Supine wand chest press 3 x 10  TE   Supine wand flex 3 x 10  TE   Supine wand ER/IR 3 x 10  TE   Standing wand behind back IR 3 x 10  TE   Supine flexion   TE   S-lying ER   TE   Standing wand flex   TE   Standing flexion   TE   ROWS: H   TA   ROWS: M   TA   ROWS: L   TA   ER   TE   IR   TE               A:  Tolerated well. Discussed anatomy, physiology, body mechanics, principles of loading, and progressive loading/activity. Reviewed home exercise program extensively; written copy provided.    P: Continue with rehab plan  Mayra Combs PTA    Treatment Charges: Mins Units   Initial Evaluation     Re-Evaluation     Ther Exercise         TE 40 3   Manual Therapy     MT     Ther Activities        TA     Gait Training          GT     Neuro Re-education NR Modalities     Non-Billable Service Time     Other     Total Time/Units 40 3

## 2022-08-20 PROBLEM — S42.291A FRACTURE OF HUMERAL HEAD, RIGHT, CLOSED, INITIAL ENCOUNTER: Status: ACTIVE | Noted: 2022-08-20

## 2022-08-22 ENCOUNTER — TREATMENT (OUTPATIENT)
Dept: PHYSICAL THERAPY | Age: 67
End: 2022-08-22
Payer: MEDICARE

## 2022-08-22 DIAGNOSIS — S42.291A OTHER CLOSED DISPLACED FRACTURE OF PROXIMAL END OF RIGHT HUMERUS, INITIAL ENCOUNTER: Primary | ICD-10-CM

## 2022-08-22 DIAGNOSIS — S42.291A FRACTURE OF HUMERAL HEAD, RIGHT, CLOSED, INITIAL ENCOUNTER: ICD-10-CM

## 2022-08-22 PROCEDURE — 97110 THERAPEUTIC EXERCISES: CPT | Performed by: PHYSICAL THERAPIST

## 2022-08-22 NOTE — PROGRESS NOTES
Physical Therapy Daily Treatment Note    Date: 2022  Patient Name: Idania Henson  : 1955   MRN: [id number]  DOInjury: 2022  DOSx: none      Referring Provider:   Damaso Noguera DO  [address]  [facility] Diagnosis:    Diagnosis Orders   1. Other closed displaced fracture of proximal end of right humerus, initial encounter        2. Fracture of humeral head, right, closed, initial encounter            Outcome Measure:    X = TO BE PERFORMED NEXT VISIT  > = PROGRESS TO THIS    S: Patient reports no pain, only when exercising. O:   Time 9475-3622     Visit 3 Repeat outcome measure at mid point and end. Pain Pain with activity 0/10     ROM Right Shoulder:  AROM: 90° Forward elevation,  25° ER in scapular plane, IR to L4  PROM: 110° Forward elevation, 55° ER, 25° IR     Modalities       Use sparingly if at all. Prefer an active program.  MO   Manual         MT         Stretch      Table slides 10 sec hold x 10 New TE   Wall Flexion   TE   Wall ER stretch   TE   Towel IR stretch   TE   IR reaching behind back   TE   Exercise      Shoulder scaption isometrics      Shoulder ER isometrics       Shrugs AROM   TE   Pendulum Ex   TE   Pulleys - flex 4 min  TE   Pulleys-IR   TE   Supine wand chest press 2 x 15  TE   Supine wand flex                  2 x 15  TE   Supine wand ER/IR 2 x 15  TE   Standing wand behind back IR 2 x 15  TE   AROM reaching behind back X 10  TE   S-lying ER   TE   Standing wand press 3 x 10  TE   Standing flexion   TE   ROWS: H   TA   ROWS: M   TA   ROWS: L   TA   ER   TE   IR   TE               A:  Tolerated well.  improving  P: Continue with rehab plan  Amy Puga PT    Treatment Charges: Mins Units   Initial Evaluation     Re-Evaluation     Ther Exercise         TE 40 3   Manual Therapy     MT     Ther Activities        TA     Gait Training          GT     Neuro Re-education NR     Modalities     Non-Billable Service Time     Other     Total Time/Units 40 3

## 2022-08-24 ENCOUNTER — TREATMENT (OUTPATIENT)
Dept: PHYSICAL THERAPY | Age: 67
End: 2022-08-24
Payer: MEDICARE

## 2022-08-24 DIAGNOSIS — S42.291A FRACTURE OF HUMERAL HEAD, RIGHT, CLOSED, INITIAL ENCOUNTER: ICD-10-CM

## 2022-08-24 DIAGNOSIS — S42.291A OTHER CLOSED DISPLACED FRACTURE OF PROXIMAL END OF RIGHT HUMERUS, INITIAL ENCOUNTER: Primary | ICD-10-CM

## 2022-08-24 PROCEDURE — 97110 THERAPEUTIC EXERCISES: CPT

## 2022-08-24 NOTE — PROGRESS NOTES
Physical Therapy Daily Treatment Note    Date: 2022  Patient Name: Kaylee Kay                          \" call him William Yen \"   : 1955   MRN: [id number]  DOInjury: 2022  DOSx: none      Referring Provider:   Peyton Yin DO  [address] [facility]:    Diagnosis Orders   1. Other closed displaced fracture of proximal end of right humerus, initial encounter        2. Fracture of humeral head, right, closed, initial encounter            Outcome Measure:    X = TO BE PERFORMED NEXT VISIT  > = PROGRESS TO THIS    S: pt reports no new changes since last visit . O:   Time 6859-2890     Visit 4 Repeat outcome measure at mid point and end. Pain Pain with activity 0/10     ROM Right Shoulder:  AROM: 90° Forward elevation,  25° ER in scapular plane, IR to L4  PROM: 110° Forward elevation, 55° ER, 25° IR     Modalities       Use sparingly if at all. Prefer an active program.  MO   Manual         MT         Stretch      Table slides 10 sec hold x 10  TE   Wall Flexion   TE   Wall ER stretch   TE   Towel IR stretch   TE   IR reaching behind back   TE   Exercise      Shoulder scaption isometrics      Shoulder ER isometrics       Shrugs AROM   TE   Pendulum Ex   TE   Pulleys - flex 4 min  TE   Pulleys-IR   TE   Supine wand chest press 2 x 15  TE   Supine wand flex                  2 x 15  TE   Supine wand ER/IR 2 x 15  TE   Standing wand behind back IR 2 x 15  TE   AROM reaching behind back X 10  TE   S-lying ER   TE   Standing/ seated  wand press 3 x 10  TE   Standing/ seated wand flexion 2 x 15   TE   ROWS: H   TA   ROWS: M   TA   ROWS: L   TA   ER   TE   IR   TE               A:  Tolerated well. Cues needed for correct technique for ex's as listed above .   P: Continue with rehab plan  Kike Martinez PTA    Treatment Charges: Mins Units   Initial Evaluation     Re-Evaluation     Ther Exercise         TE 40 3   Manual Therapy     MT     Ther Activities TA     Gait Training          GT     Neuro Re-education NR     Modalities     Non-Billable Service Time     Other     Total Time/Units 40 3

## 2022-08-29 ENCOUNTER — TREATMENT (OUTPATIENT)
Dept: PHYSICAL THERAPY | Age: 67
End: 2022-08-29
Payer: MEDICARE

## 2022-08-29 DIAGNOSIS — S42.291A OTHER CLOSED DISPLACED FRACTURE OF PROXIMAL END OF RIGHT HUMERUS, INITIAL ENCOUNTER: Primary | ICD-10-CM

## 2022-08-29 DIAGNOSIS — S42.291A FRACTURE OF HUMERAL HEAD, RIGHT, CLOSED, INITIAL ENCOUNTER: ICD-10-CM

## 2022-08-29 PROCEDURE — 97110 THERAPEUTIC EXERCISES: CPT

## 2022-08-29 NOTE — PROGRESS NOTES
Physical Therapy Daily Treatment Note    Date: 2022  Patient Name: Willow Escamilla                          \" call him Jessie Ashley \"   : 1955   MRN: [id number]  DOInjury: 2022  DOSx: none      Referring Provider:   Jani Neumann DO  [address]  [facility] Diagnosis:    Diagnosis Orders   1. Other closed displaced fracture of proximal end of right humerus, initial encounter        2. Fracture of humeral head, right, closed, initial encounter            Outcome Measure:    X = TO BE PERFORMED NEXT VISIT  > = PROGRESS TO THIS    S: pt reports no new changes since last visit . O:   Time 9366-6977     Visit  Repeat outcome measure at mid point and end. Pain Pain with activity 0/10     ROM Right Shoulder:  AROM: 90° Forward elevation,  25° ER in scapular plane, IR to L4  PROM: 110° Forward elevation, 55° ER, 25° IR     Modalities       Use sparingly if at all. Prefer an active program.  MO   Manual         MT         Stretch      Table slides 10 sec hold x 10  TE   Wall Flexion   TE   Wall ER stretch   TE   Towel IR stretch   TE   IR reaching behind back   TE   Exercise      Shoulder scaption isometrics      Shoulder ER isometrics       Shrugs AROM   TE   Pendulum Ex   TE   Pulleys - flex 4 min  TE   Pulleys-IR   TE   Supine wand chest press 2 x 15  TE   Supine wand flex                  2 x 15  TE   Supine wand ER/IR 2 x 15  TE   Standing wand behind back IR 2 x 15  TE   AROM reaching behind back X 10  TE   S-lying ER   TE    seated  wand press 3 x 10  TE   / seated wand flexion 2 x 15   TE   ROWS: H   TA   ROWS: M   TA   ROWS: L   TA   ER   TE   IR   TE               A:  Tolerated well. Pt able to perform all ex's with good control however ,limited range of motion all plans . P: Continue with rehab plan  Kiran Dupont PTA    Treatment Charges: Mins Units   Initial Evaluation     Re-Evaluation     Ther Exercise         TE 40 3   Manual Therapy     MT     Ther Activities        TA     Gait Training          GT     Neuro Re-education NR     Modalities     Non-Billable Service Time     Other     Total Time/Units 40 3

## 2022-09-06 ENCOUNTER — TREATMENT (OUTPATIENT)
Dept: PHYSICAL THERAPY | Age: 67
End: 2022-09-06
Payer: MEDICARE

## 2022-09-06 DIAGNOSIS — S42.291A FRACTURE OF HUMERAL HEAD, RIGHT, CLOSED, INITIAL ENCOUNTER: ICD-10-CM

## 2022-09-06 DIAGNOSIS — S42.291A OTHER CLOSED DISPLACED FRACTURE OF PROXIMAL END OF RIGHT HUMERUS, INITIAL ENCOUNTER: Primary | ICD-10-CM

## 2022-09-06 PROCEDURE — 97110 THERAPEUTIC EXERCISES: CPT

## 2022-09-06 PROCEDURE — 97530 THERAPEUTIC ACTIVITIES: CPT

## 2022-09-06 NOTE — PROGRESS NOTES
Physical Therapy Daily Treatment Note    Date: 2022  Patient Name: Radha Mcintosh                          \" call him Chi Morales \"   : 1955   MRN: [id number]  DOInjury: 2022  DOSx: none      Referring Provider:   Regan Resendiz DO  [address] [facility] Diagnosis:    Diagnosis Orders   1. Other closed displaced fracture of proximal end of right humerus, initial encounter        2. Fracture of humeral head, right, closed, initial encounter            Outcome Measure:    X = TO BE PERFORMED NEXT VISIT  > = PROGRESS TO THIS    S: pt reports feeling ok today . O:   Time 1040- 1120 am     Visit  Repeat outcome measure at mid point and end. Pain Pain with activity 0/10     ROM Right Shoulder:  AROM: 110° Forward elevation,  25° ER in scapular plane, IR to L4  PROM: 2022     Modalities       Use sparingly if at all. Prefer an active program.  MO   Manual         MT         Stretch      Table slides 10 sec hold x 10  TE   Wall Flexion   TE   Wall ER stretch   TE   Towel IR stretch   TE   IR reaching behind back   TE   Exercise      Shoulder scaption isometrics      Shoulder ER isometrics       Shrugs AROM   TE   Pendulum Ex   TE   Pulleys - flex 4 min  TE   Pulleys-IR   TE   Supine wand chest press  TE   Supine wand flex  TE   / seated wand ER/IR 2 x 15  TE   Standing wand behind back IR 2 x 15  TE   AROM reaching behind back X 10  TE   S-lying ER   TE    seated  wand press 3 x 10  TE   / seated wand flexion 2 x 15   TE   ROWS: H   TA   ROWS: M Green 2 x 15 seated new   TA   ROWS: L Green 2 x 15 seated new   TA   ER   TE   IR   TE               A:  Tolerated well. Pt able to perform all ex's with good control however ,limited range of motion all plans . P: Continue with rehab plan  . Given green t tubing next visit .   Maricruz Bowden PTA    Treatment Charges: Mins Units   Initial Evaluation     Re-Evaluation     Ther Exercise         TE 30 2   Manual Therapy MT     Ther Activities        TA 10 1   Gait Training          GT     Neuro Re-education NR     Modalities     Non-Billable Service Time     Other     Total Time/Units 40 3

## 2022-09-07 DIAGNOSIS — S42.291A OTHER CLOSED DISPLACED FRACTURE OF PROXIMAL END OF RIGHT HUMERUS, INITIAL ENCOUNTER: Primary | ICD-10-CM

## 2022-09-08 ENCOUNTER — OFFICE VISIT (OUTPATIENT)
Dept: ORTHOPEDIC SURGERY | Age: 67
End: 2022-09-08

## 2022-09-08 ENCOUNTER — TREATMENT (OUTPATIENT)
Dept: PHYSICAL THERAPY | Age: 67
End: 2022-09-08
Payer: MEDICARE

## 2022-09-08 VITALS — BODY MASS INDEX: 25.05 KG/M2 | TEMPERATURE: 98 F | HEIGHT: 70 IN | WEIGHT: 175 LBS

## 2022-09-08 DIAGNOSIS — S42.291A OTHER CLOSED DISPLACED FRACTURE OF PROXIMAL END OF RIGHT HUMERUS, INITIAL ENCOUNTER: Primary | ICD-10-CM

## 2022-09-08 DIAGNOSIS — S42.291A FRACTURE OF HUMERAL HEAD, RIGHT, CLOSED, INITIAL ENCOUNTER: ICD-10-CM

## 2022-09-08 PROCEDURE — 97530 THERAPEUTIC ACTIVITIES: CPT

## 2022-09-08 PROCEDURE — 97110 THERAPEUTIC EXERCISES: CPT

## 2022-09-08 PROCEDURE — 99024 POSTOP FOLLOW-UP VISIT: CPT | Performed by: NURSE PRACTITIONER

## 2022-09-08 NOTE — PROGRESS NOTES
Physical Therapy Daily Treatment Note    Date: 2022  Patient Name: Yumiko Ortega                          \" call him Mark Anthony [address] \"   : 1955   MRN: [id number]  DOInjury: 2022  DOSx: none      Referring Provider:   Ezequiel Murphy DO  [address] [facility] Diagnosis:    Diagnosis Orders   1. Other closed displaced fracture of proximal end of right humerus, initial encounter        2. Fracture of humeral head, right, closed, initial encounter            Outcome Measure:    X = TO BE PERFORMED NEXT VISIT  > = PROGRESS TO THIS    S: pt reports feeling good  . O:   Time 5549-7525  am Pt arrived 15 min late 2022     Visit  Repeat outcome measure at mid point and end. Pain Pain with activity 0/10     ROM Right Shoulder:  AROM: 110° Forward elevation,  25° ER in scapular plane, IR to L4  PROM:     Modalities       Use sparingly if at all. Prefer an active program.  MO   Manual         MT         Stretch      Table slides 10 sec hold x 10  TE   Wall Flexion   TE   Wall ER stretch   TE   Towel IR stretch   TE   IR reaching behind back   TE   Exercise      Shoulder scaption isometrics      Shoulder ER isometrics       Shrugs AROM   TE   Pendulum Ex   TE   Pulleys - flex 4 min  TE   Pulleys-IR   TE   Supine wand chest press  TE   Supine wand flex  TE   / seated wand ER/IR 2 x 15  TE   Standing wand behind back IR 2 x 15  TE   AROM reaching behind back X 10  TE   S-lying ER   TE    seated  wand press 3 x 10  TE   / seated wand flexion 2 x 15   TE   ROWS: H   TA   ROWS: M Unable to due to lack of time  TA   ROWS: L  TA   ER   TE   IR   TE               A:  Tolerated well. Pt able to tolerate all ex's without increase in c/o. Clotmandy Koch P: Continue with rehab plan  . Given green t tubing next visit . Pt cancelled 2022 appt but did not want new copy of schedule sheet . Stated he will remember .   Gisele Denton PTA    Treatment Charges: Mins Units   Initial Evaluation Re-Evaluation     Ther Exercise         TE 20 1   Manual Therapy     MT     Ther Activities        TA 10 1   Gait Training          GT     Neuro Re-education NR     Modalities     Non-Billable Service Time     Other     Total Time/Units 30 2

## 2022-09-08 NOTE — PROGRESS NOTES
Tyrell Ray is a 69y.o. year old male who presents today for evaluation of a right shoulder injury which occurred on 6/29. The patient reports that this injury occurred when fall x2 after passing out. The patient denies any other injuries. He has been in PT, reports minimal to no pain daily. The patient's past medical history, medications, and review of systems was reviewed. On Physical Exam, Tyrell Ray is well-developed, well-nourished, and oriented to person, place and time. his gait is intact. On evaluation of his right upper extremity, there is not obvious deformity. There is noswelling and is not ecchymosis. he is non tender to palpation over the proximal shoulder, and otherwise nontender over the remainder of the extremity. Range of motion is 110/20/pl. The skin overlying the right shoulder is intact without evidence of lesion, laceration or abrasion. Distal pulses are 2+ and symmetric bilaterally. Sensation is grossly intact to light touch and symmetric bilaterally. Xrays:  right proximal humerus fracture with healing noted    Impression:     Diagnosis Orders   1.  Other closed displaced fracture of proximal end of right humerus, initial encounter              Plan:   Continue pt  Hep  Ok to start strengthening/resistive exercises  Fu in 2 months with xr

## 2022-09-12 ENCOUNTER — TREATMENT (OUTPATIENT)
Dept: PHYSICAL THERAPY | Age: 67
End: 2022-09-12
Payer: MEDICARE

## 2022-09-12 DIAGNOSIS — S42.291A FRACTURE OF HUMERAL HEAD, RIGHT, CLOSED, INITIAL ENCOUNTER: ICD-10-CM

## 2022-09-12 DIAGNOSIS — S42.291A OTHER CLOSED DISPLACED FRACTURE OF PROXIMAL END OF RIGHT HUMERUS, INITIAL ENCOUNTER: Primary | ICD-10-CM

## 2022-09-12 PROCEDURE — 97530 THERAPEUTIC ACTIVITIES: CPT

## 2022-09-12 PROCEDURE — 97110 THERAPEUTIC EXERCISES: CPT

## 2022-09-12 NOTE — PROGRESS NOTES
Physical Therapy Daily Treatment Note    Date: 2022  Patient Name: Jacky Whitmore                          \" call him Mariely Castillo \"   : 1955   MRN: [id number]  DOInjury: 2022  DOSx: none      Referring Provider:   Arelis Moeller DO  [address]  [facility] Diagnosis:    Diagnosis Orders   1. Other closed displaced fracture of proximal end of right humerus, initial encounter        2. Fracture of humeral head, right, closed, initial encounter          Outcome Measure:    X = TO BE PERFORMED NEXT VISIT  > = PROGRESS TO THIS    S: pt reports feeling good  . O: Access Code: [email or web address]  URL: [email or web address]  Date: 2022  Prepared by: Melita Blanco    Exercises  Standing Shoulder Flexion Full Range Single Arm - 3-4 x weekly - 3 sets - 10 reps  Standing High Row with Resistance - 3-4 x weekly - 2-3 sets - 15 reps  Mid Row with anchored resistance - 3-4 x weekly - 2-3 sets - 15 reps  Standing Floor Level Row with Resistance Band with PLB - 3-4 x weekly - 2-3 sets - 15 reps    Time 3005-4966  am Pt arrived 15 min late 2022     Visit  Repeat outcome measure at mid point and end. Pain Pain with activity 0/10     ROM Right Shoulder:  AROM: 120° Forward elevation,  70° ER in scapular plane, IR to L1  PROM: 22    Modalities       Use sparingly if at all.   Prefer an active program.  MO   Manual         MT         Stretch      Table slides  TE   Wall Flexion   TE   Wall ER stretch   TE   Towel IR stretch   TE   IR reaching behind back   TE   Exercise      Shoulder scaption isometrics      Shoulder ER isometrics       Shrugs AROM   TE   Pendulum Ex   TE   Pulleys - flex 4 min  TE   Pulleys-IR   TE   Supine wand chest press  TE   Supine wand flex  TE   / seated wand ER/IR  TE   Standing wand behind back IR  TE   AROM reaching behind back X 10  TE   S-lying ER   TE    seated  wand press 3 x 10  TE   / seated wand flexion 2 x 15   TE   Standing flexion 3 x 10     ROWS: H Green 2 x 15  TA   ROWS: M Green 2 x 15    TA   ROWS: L Green 2 x 15  TA   ER   TE   IR   TE               A:  Tolerated well.  Given HEP and green tubing  P: Continue with rehab plan   Candace Abdalla PTA    Treatment Charges: Mins Units   Initial Evaluation     Re-Evaluation     Ther Exercise         TE 25 2   Manual Therapy     MT     Ther Activities        TA 15 1   Gait Training          GT     Neuro Re-education NR     Modalities     Non-Billable Service Time     Other     Total Time/Units 40 2

## 2022-09-14 ENCOUNTER — TREATMENT (OUTPATIENT)
Dept: PHYSICAL THERAPY | Age: 67
End: 2022-09-14
Payer: MEDICARE

## 2022-09-14 DIAGNOSIS — S42.291A FRACTURE OF HUMERAL HEAD, RIGHT, CLOSED, INITIAL ENCOUNTER: ICD-10-CM

## 2022-09-14 DIAGNOSIS — S42.291A OTHER CLOSED DISPLACED FRACTURE OF PROXIMAL END OF RIGHT HUMERUS, INITIAL ENCOUNTER: Primary | ICD-10-CM

## 2022-09-14 PROCEDURE — 97110 THERAPEUTIC EXERCISES: CPT

## 2022-09-14 PROCEDURE — 97530 THERAPEUTIC ACTIVITIES: CPT

## 2022-09-14 NOTE — PROGRESS NOTES
Physical Therapy Daily Treatment Note    Date: 2022  Patient Name: Augustina Kamara                          \" call him [employer] \"   : 1955   MRN: [id number]  DOInjury: 2022  DOSx: none      Referring Provider:   Jason Franz, DO  [address] [facility]:    Diagnosis Orders   1. Other closed displaced fracture of proximal end of right humerus, initial encounter        2. Fracture of humeral head, right, closed, initial encounter            Outcome Measure:    X = TO BE PERFORMED NEXT VISIT  > = PROGRESS TO THIS    S: pt reports feeling good. Shoulder not really painful just gets weak as activities progress  O: Access Code: [email or web address]  URL: [email or web address]  Date: 2022  Prepared by: Leopold Reading    Exercises  Standing Shoulder Flexion Full Range Single Arm - 3-4 x weekly - 3 sets - 10 reps  Standing High Row with Resistance - 3-4 x weekly - 2-3 sets - 15 reps  Mid Row with anchored resistance - 3-4 x weekly - 2-3 sets - 15 reps  Standing Floor Level Row with Resistance Band with PLB - 3-4 x weekly - 2-3 sets - 15 reps    Time 7999-1750  am     Visit  Repeat outcome measure at mid point and end. Pain Pain with activity 0/10     ROM Right Shoulder:  AROM: 120° Forward elevation,  70° ER in scapular plane, IR to L1  PROM: 22    Modalities       Use sparingly if at all.   Prefer an active program.  MO   Manual         MT         Stretch      Table slides  TE   Wall Flexion   TE   Wall ER stretch   TE   Towel IR stretch   TE   IR reaching behind back   TE   Exercise      Shoulder scaption isometrics      Shoulder ER isometrics       Shrugs AROM   TE   Pendulum Ex   TE   Pulleys - flex 4 min  TE   Pulleys-IR   TE   Supine wand chest press  TE   Supine wand flex  TE   / seated wand ER/IR  TE   Standing wand behind back IR  TE   AROM reaching behind back X 10  TE   S-lying ER   TE    seated  wand press 3 x 10  TE   / seated wand flexion 2 x 15   TE   Standing flexion 3 x 10     ROWS: H Blue 2 x 15  TA   ROWS: M Blue 2 x 15    TA   ROWS: L Blue 2 x 15  TA   ER   TE   IR   TE               A:  Tolerated well. Increased resistance and given blue tubing for home.   P: Continue with rehab plan   Delfina Riojas PTA    Treatment Charges: Mins Units   Initial Evaluation     Re-Evaluation     Ther Exercise         TE 25 2   Manual Therapy     MT     Ther Activities        TA 15 1   Gait Training          GT     Neuro Re-education NR     Modalities     Non-Billable Service Time     Other     Total Time/Units 40 2

## 2022-09-19 ENCOUNTER — TREATMENT (OUTPATIENT)
Dept: PHYSICAL THERAPY | Age: 67
End: 2022-09-19
Payer: MEDICARE

## 2022-09-19 DIAGNOSIS — S42.291A OTHER CLOSED DISPLACED FRACTURE OF PROXIMAL END OF RIGHT HUMERUS, INITIAL ENCOUNTER: Primary | ICD-10-CM

## 2022-09-19 DIAGNOSIS — S42.291A FRACTURE OF HUMERAL HEAD, RIGHT, CLOSED, INITIAL ENCOUNTER: ICD-10-CM

## 2022-09-19 PROCEDURE — 97110 THERAPEUTIC EXERCISES: CPT

## 2022-09-19 PROCEDURE — 97530 THERAPEUTIC ACTIVITIES: CPT

## 2022-09-19 NOTE — PROGRESS NOTES
Physical Therapy Daily Treatment Note    Date: 2022  Patient Name: Henry Colon                          \" call him Loretta Finely \"   : 1955   MRN: [id number]  DOInjury: 2022  DOSx: none      Referring Provider:   Kayla Cueva DO  [address] [facility]       Medical Diagnosis:    Diagnosis Orders   1. Other closed displaced fracture of proximal end of right humerus, initial encounter        2. Fracture of humeral head, right, closed, initial encounter            Outcome Measure:    X = TO BE PERFORMED NEXT VISIT  > = PROGRESS TO THIS    S: pt reports still feeling good. O: Access Code: [email or web address]  URL: [email or web address]  Date: 2022  Prepared by: Earmon Gitelman    Exercises  Standing Shoulder Flexion Full Range Single Arm - 3-4 x weekly - 3 sets - 10 reps  Standing High Row with Resistance - 3-4 x weekly - 2-3 sets - 15 reps  Mid Row with anchored resistance - 3-4 x weekly - 2-3 sets - 15 reps  Standing Floor Level Row with Resistance Band with PLB - 3-4 x weekly - 2-3 sets - 15 reps    Time 1436-5131  am     Visit   Repeat outcome measure at mid point and end. Pain Pain with activity 0/10     ROM Right Shoulder:  AROM: 130° Forward elevation,  70° ER in scapular plane, IR to L1  PROM: 22    Modalities       Use sparingly if at all.   Prefer an active program.  MO   Manual         MT         Stretch      Table slides  TE   Wall Flexion   TE   Wall ER stretch   TE   Towel IR stretch   TE   IR reaching behind back   TE   Exercise      Shoulder scaption isometrics      Shoulder ER isometrics       Shrugs AROM   TE   Pendulum Ex   TE   Pulleys - flex 4 min  TE   Pulleys-IR   TE   Supine wand chest press  TE   Supine wand flex  TE   / seated wand ER/IR  TE   Standing wand behind back IR  TE   AROM reaching behind back X 10  TE   S-lying ER   TE    seated  wand press 2 x 15  TE   / seated wand flexion 2 x 15   TE   Standing flexion 2 x 15  Pt given blue / green t tubing for HEP . ROWS: H Blue 2 x 15  TA   ROWS: M Blue 2 x 15    TA   ROWS: L Blue 2 x 15 Pt given red t tubing for HEP . TA   ER Red 2 x 15 new   TE   IR Green  2 x 15 new   TE               A:  Tolerated well. Pt able to tolerate newly added ex's as listed above . ( Increased resistance and given blue tubing for home )   P: Continue with rehab plan x 3 more rx sessions then discharge with given HEP . Luis Elliott Fore, PTA    Treatment Charges: Mins Units   Initial Evaluation     Re-Evaluation     Ther Exercise         TE 25 2   Manual Therapy     MT     Ther Activities        TA 15 1   Gait Training          GT     Neuro Re-education NR     Modalities     Non-Billable Service Time     Other     Total Time/Units 40 3

## 2022-09-23 ENCOUNTER — TREATMENT (OUTPATIENT)
Dept: PHYSICAL THERAPY | Age: 67
End: 2022-09-23
Payer: MEDICARE

## 2022-09-23 DIAGNOSIS — S42.291A OTHER CLOSED DISPLACED FRACTURE OF PROXIMAL END OF RIGHT HUMERUS, INITIAL ENCOUNTER: Primary | ICD-10-CM

## 2022-09-23 DIAGNOSIS — S42.291A FRACTURE OF HUMERAL HEAD, RIGHT, CLOSED, INITIAL ENCOUNTER: ICD-10-CM

## 2022-09-23 PROCEDURE — 97530 THERAPEUTIC ACTIVITIES: CPT

## 2022-09-23 PROCEDURE — 97110 THERAPEUTIC EXERCISES: CPT

## 2022-09-23 NOTE — PROGRESS NOTES
Physical Therapy Daily Treatment Note    Date: 2022  Patient Name: Lana Brown                          \" call him Lucinda Meigs \"   : 1955   MRN: [id number]  DOInjury: 2022  DOSx: none      Referring Provider:   K[facility] Feldman DO  [address] [facility]       Medical Diagnosis:    Diagnosis Orders   1. Other closed displaced fracture of proximal end of right humerus, initial encounter        2. Fracture of humeral head, right, closed, initial encounter            Outcome Measure:    X = TO BE PERFORMED NEXT VISIT  > = PROGRESS TO THIS    S: pt reports still feeling good. O: Access Code: [email or web address]  URL: [email or web address]  Date: 2022  Prepared by: Vinod Pham    Exercises  Standing Shoulder Flexion Full Range Single Arm - 3-4 x weekly - 3 sets - 10 reps  Standing High Row with Resistance - 3-4 x weekly - 2-3 sets - 15 reps  Mid Row with anchored resistance - 3-4 x weekly - 2-3 sets - 15 reps  Standing Floor Level Row with Resistance Band with PLB - 3-4 x weekly - 2-3 sets - 15 reps    Time 4535-3959  am     Visit  10 /12 Repeat outcome measure at mid point and end. Pain Pain with activity 0/10     ROM Right Shoulder:  AROM: 130° Forward elevation,  70° ER in scapular plane, IR to L1  PROM: 22    Modalities       Use sparingly if at all.   Prefer an active program.  MO   Manual         MT         Stretch      Table slides  TE   Wall Flexion   TE   Wall ER stretch   TE   Towel IR stretch   TE   IR reaching behind back   TE   Exercise      Shoulder scaption isometrics      Shoulder ER isometrics       Shrugs AROM   TE   Pendulum Ex   TE   Pulleys - flex 4 min  TE   Pulleys-IR   TE   Supine wand chest press  TE   Supine wand flex  TE   / seated wand ER/IR  TE   Standing wand behind back IR  TE   AROM reaching behind back X 10  TE   S-lying ER   TE    seated  wand press 2 x 15  TE   / seated wand flexion 2 x 15   TE   Standing/ seated flexion 2 x 15  Pt given blue / green t tubing for HEP . ROWS: H Blue 2 x 15  TA   ROWS: M Blue 2 x 15    TA   ROWS: L Blue 2 x 15 Pt given red t tubing for HEP . TA   ER Red 2 x 15   TE   IR Green  2 x 15  TE               A:  Tolerated well. .( Increased resistance and given blue tubing for home )   P: Continue with rehab plan x 2 more rx sessions then discharge with given HEP . Adriana López, RADHA    Treatment Charges: Mins Units   Initial Evaluation     Re-Evaluation     Ther Exercise         TE 25 2   Manual Therapy     MT     Ther Activities        TA 15 1   Gait Training          GT     Neuro Re-education NR     Modalities     Non-Billable Service Time     Other     Total Time/Units 40 3

## 2022-09-26 ENCOUNTER — TREATMENT (OUTPATIENT)
Dept: PHYSICAL THERAPY | Age: 67
End: 2022-09-26
Payer: MEDICARE

## 2022-09-26 DIAGNOSIS — S42.291A FRACTURE OF HUMERAL HEAD, RIGHT, CLOSED, INITIAL ENCOUNTER: ICD-10-CM

## 2022-09-26 DIAGNOSIS — S42.291A OTHER CLOSED DISPLACED FRACTURE OF PROXIMAL END OF RIGHT HUMERUS, INITIAL ENCOUNTER: Primary | ICD-10-CM

## 2022-09-26 PROCEDURE — 97110 THERAPEUTIC EXERCISES: CPT

## 2022-09-26 PROCEDURE — 97530 THERAPEUTIC ACTIVITIES: CPT

## 2022-09-26 NOTE — PROGRESS NOTES
Physical Therapy Daily Treatment Note    Date: 2022  Patient Name: Mini Bryant                          \" call him Ann Marie Ruiz \"   : 1955   MRN: [id number]  DOInjury: 2022  DOSx: none      Referring Provider:   Eloy Boeck, DO  [address] [facility]:    Diagnosis Orders   1. Other closed displaced fracture of proximal end of right humerus, initial encounter        2. Fracture of humeral head, right, closed, initial encounter            Outcome Measure:    X = TO BE PERFORMED NEXT VISIT  > = PROGRESS TO THIS    S: pt reports still feeling good. No new changes since last visit . O: Access Code: [email or web address]  URL: [email or web address]  Date: 2022  Prepared by: Colin Boland    Exercises  Standing Shoulder Flexion Full Range Single Arm - 3-4 x weekly - 3 sets - 10 reps  Standing High Row with Resistance - 3-4 x weekly - 2-3 sets - 15 reps  Mid Row with anchored resistance - 3-4 x weekly - 2-3 sets - 15 reps  Standing Floor Level Row with Resistance Band with PLB - 3-4 x weekly - 2-3 sets - 15 reps    Time 7542-7413  am     Visit  10 /12 Repeat outcome measure at mid point and end. Pain Pain with activity 0/10     ROM Right Shoulder:  AROM: 130° Forward elevation,  70° ER in scapular plane, IR to L1  PROM: 22    Modalities       Use sparingly if at all.   Prefer an active program.  MO   Manual         MT         Stretch      Table slides  TE   Wall Flexion   TE   Wall ER stretch   TE   Towel IR stretch   TE   IR reaching behind back   TE   Exercise      Shoulder scaption isometrics      Shoulder ER isometrics       Shrugs AROM   TE   Pendulum Ex   TE   Pulleys - flex 4 min  TE   Pulleys-IR   TE   Supine wand chest press  TE   Supine wand flex  TE   / seated wand ER/IR  TE   Standing wand behind back IR  TE   AROM reaching behind back X 10  TE   S-lying ER   TE    seated  wand press 2 x 15  TE   / seated wand flexion 2 x 15   TE   Standing/ seated flexion 2 x 15  Pt given blue / green t tubing for HEP . ROWS: H Blue 2 x 15  TA   ROWS: M Blue 2 x 15    TA   ROWS: L Blue 2 x 15 Pt given red t tubing for HEP . TA   ER Red 2 x 15   TE   IR Green  2 x 15  TE   Bicep curls  Blue 2 x 15   TE   Tricep ext  Blue 2 x 15   TE         UBE fwds/bkds  X 2/2  End     A:  Tolerated well. .( Increased resistance and given blue tubing for home )   P: Continue with rehab plan x 1 more rx session then discharge with given HEP . Denton Bowden PTA    Treatment Charges: Mins Units   Initial Evaluation     Re-Evaluation     Ther Exercise         TE 25 2   Manual Therapy     MT     Ther Activities        TA 15 1   Gait Training          GT     Neuro Re-education NR     Modalities     Non-Billable Service Time     Other     Total Time/Units 40 3

## 2022-09-28 ENCOUNTER — TREATMENT (OUTPATIENT)
Dept: PHYSICAL THERAPY | Age: 67
End: 2022-09-28
Payer: MEDICARE

## 2022-09-28 DIAGNOSIS — S42.291A FRACTURE OF HUMERAL HEAD, RIGHT, CLOSED, INITIAL ENCOUNTER: ICD-10-CM

## 2022-09-28 DIAGNOSIS — S42.291A OTHER CLOSED DISPLACED FRACTURE OF PROXIMAL END OF RIGHT HUMERUS, INITIAL ENCOUNTER: Primary | ICD-10-CM

## 2022-09-28 PROCEDURE — 97530 THERAPEUTIC ACTIVITIES: CPT

## 2022-09-28 PROCEDURE — 97110 THERAPEUTIC EXERCISES: CPT

## 2022-09-28 NOTE — PROGRESS NOTES
Physical Therapy Daily Treatment Note    Date: 2022  Patient Name: Willow Escamilla                          \" call him Jessie Ashley \"   : 1955   MRN: [id number]  DOInjury: 2022  DOSx: none      Referring Provider:   J[facility] Neumann DO  [address] [facility]       Medical Diagnosis:    Diagnosis Orders   1. Other closed displaced fracture of proximal end of right humerus, initial encounter        2. Fracture of humeral head, right, closed, initial encounter           QuickDASH (Disorders of the Arm, Shoulder, and Hand) 63% disability  . End score 2022 = 30%  Outcome Measure:     X = TO BE PERFORMED NEXT VISIT  > = PROGRESS TO THIS    S: pt reports still feeling good. No new changes since last visit . O: Access Code: [email or web address]  URL: [email or web address]  Date: 2022  Prepared by: Paula Sanchez    Exercises  Standing Shoulder Flexion Full Range Single Arm - 3-4 x weekly - 3 sets - 10 reps  Standing High Row with Resistance - 3-4 x weekly - 2-3 sets - 15 reps  Mid Row with anchored resistance - 3-4 x weekly - 2-3 sets - 15 reps  Standing Floor Level Row with Resistance Band with PLB - 3-4 x weekly - 2-3 sets - 15 reps    Time 100-1040 am     Visit   Repeat outcome measure at mid point and end. Pain Pain with activity 0/10     ROM Right Shoulder:  AROM: 150° Forward elevation,  70° ER in scapular plane, IR to L1  PROM: 22    Modalities       Use sparingly if at all.   Prefer an active program.  MO   Manual         MT         Stretch      Table slides  TE   Wall Flexion   TE   Wall ER stretch   TE   Towel IR stretch   TE   IR reaching behind back   TE   Exercise      Shoulder scaption isometrics      Shoulder ER isometrics       Shrugs AROM   TE   Pendulum Ex   TE   Pulleys - flex 4 min  TE   Pulleys-IR   TE   Supine wand chest press  TE   Supine wand flex  TE   / seated wand ER/IR  TE   Standing wand behind back IR  TE   AROM reaching behind back X 10  TE S-lying ER   TE    seated  wand press 2 x 15  TE   / seated wand flexion 2 x 15   TE   Standing/ seated flexion 2 x 15  Pt given blue / green t tubing for HEP . ROWS: H Blue 2 x 15  TA   ROWS: M Blue 2 x 15    TA   ROWS: L Blue 2 x 15 Pt given red t tubing for HEP . TA   ER Red 2 x 15   TE   IR Green  2 x 15  TE   Bicep curls  Blue 2 x 15   TE   Tricep ext  Blue 2 x 15   TE         UBE fwds/bkds  X 2/2  End     A:  Tolerated well. Pt able to perform all ex's with good control, no questions or concerns. Pt able to reach all STG's/LTG' s  .( Increased resistance and given blue tubing for home )   P:  last rx session . Pt to continue with given HEP .  Bruno Babb PTA    Treatment Charges: Mins Units   Initial Evaluation     Re-Evaluation     Ther Exercise         TE 25 2   Manual Therapy     MT     Ther Activities        TA 15 1   Gait Training          GT     Neuro Re-education NR     Modalities     Non-Billable Service Time     Other     Total Time/Units 40 3

## 2023-03-01 LAB
ALANINE AMINOTRANSFERASE (SGPT) (U/L) IN SER/PLAS: 19 U/L (ref 10–52)
ALBUMIN (G/DL) IN SER/PLAS: 3.8 G/DL (ref 3.4–5)
ALBUMIN (MG/L) IN URINE: 10.2 MG/L
ALBUMIN/CREATININE (UG/MG) IN URINE: 18.3 UG/MG CRT (ref 0–30)
ALKALINE PHOSPHATASE (U/L) IN SER/PLAS: 183 U/L (ref 33–136)
ANION GAP IN SER/PLAS: 13 MMOL/L (ref 10–20)
ASPARTATE AMINOTRANSFERASE (SGOT) (U/L) IN SER/PLAS: 30 U/L (ref 9–39)
BASOPHILS (10*3/UL) IN BLOOD BY AUTOMATED COUNT: 0.04 X10E9/L (ref 0–0.1)
BASOPHILS/100 LEUKOCYTES IN BLOOD BY AUTOMATED COUNT: 0.7 % (ref 0–2)
BILIRUBIN TOTAL (MG/DL) IN SER/PLAS: 0.6 MG/DL (ref 0–1.2)
CALCIUM (MG/DL) IN SER/PLAS: 9.2 MG/DL (ref 8.6–10.3)
CARBON DIOXIDE, TOTAL (MMOL/L) IN SER/PLAS: 31 MMOL/L (ref 21–32)
CHLORIDE (MMOL/L) IN SER/PLAS: 102 MMOL/L (ref 98–107)
CHOLESTEROL (MG/DL) IN SER/PLAS: 143 MG/DL (ref 0–199)
CHOLESTEROL IN HDL (MG/DL) IN SER/PLAS: 44.1 MG/DL
CHOLESTEROL/HDL RATIO: 3.2
CREATININE (MG/DL) IN SER/PLAS: 0.96 MG/DL (ref 0.5–1.3)
CREATININE (MG/DL) IN URINE: 55.6 MG/DL (ref 20–370)
EOSINOPHILS (10*3/UL) IN BLOOD BY AUTOMATED COUNT: 0.11 X10E9/L (ref 0–0.7)
EOSINOPHILS/100 LEUKOCYTES IN BLOOD BY AUTOMATED COUNT: 2 % (ref 0–6)
ERYTHROCYTE DISTRIBUTION WIDTH (RATIO) BY AUTOMATED COUNT: 15.3 % (ref 11.5–14.5)
ERYTHROCYTE MEAN CORPUSCULAR HEMOGLOBIN CONCENTRATION (G/DL) BY AUTOMATED: 29.5 G/DL (ref 32–36)
ERYTHROCYTE MEAN CORPUSCULAR VOLUME (FL) BY AUTOMATED COUNT: 100 FL (ref 80–100)
ERYTHROCYTES (10*6/UL) IN BLOOD BY AUTOMATED COUNT: 4.22 X10E12/L (ref 4.5–5.9)
ESTIMATED AVERAGE GLUCOSE FOR HBA1C: 114 MG/DL
GFR MALE: 85 ML/MIN/1.73M2
GLUCOSE (MG/DL) IN SER/PLAS: 137 MG/DL (ref 74–99)
HEMATOCRIT (%) IN BLOOD BY AUTOMATED COUNT: 42.4 % (ref 41–52)
HEMOGLOBIN (G/DL) IN BLOOD: 12.5 G/DL (ref 13.5–17.5)
HEMOGLOBIN A1C/HEMOGLOBIN TOTAL IN BLOOD: 5.6 %
IMMATURE GRANULOCYTES/100 LEUKOCYTES IN BLOOD BY AUTOMATED COUNT: 0.5 % (ref 0–0.9)
LDL: 61 MG/DL (ref 0–99)
LEUKOCYTES (10*3/UL) IN BLOOD BY AUTOMATED COUNT: 5.5 X10E9/L (ref 4.4–11.3)
LYMPHOCYTES (10*3/UL) IN BLOOD BY AUTOMATED COUNT: 1.01 X10E9/L (ref 1.2–4.8)
LYMPHOCYTES/100 LEUKOCYTES IN BLOOD BY AUTOMATED COUNT: 18.3 % (ref 13–44)
MAGNESIUM (MG/DL) IN SER/PLAS: 1.85 MG/DL (ref 1.6–2.4)
MONOCYTES (10*3/UL) IN BLOOD BY AUTOMATED COUNT: 0.45 X10E9/L (ref 0.1–1)
MONOCYTES/100 LEUKOCYTES IN BLOOD BY AUTOMATED COUNT: 8.2 % (ref 2–10)
NEUTROPHILS (10*3/UL) IN BLOOD BY AUTOMATED COUNT: 3.87 X10E9/L (ref 1.2–7.7)
NEUTROPHILS/100 LEUKOCYTES IN BLOOD BY AUTOMATED COUNT: 70.3 % (ref 40–80)
PLATELETS (10*3/UL) IN BLOOD AUTOMATED COUNT: 146 X10E9/L (ref 150–450)
POTASSIUM (MMOL/L) IN SER/PLAS: 4.2 MMOL/L (ref 3.5–5.3)
PROTEIN TOTAL: 6.6 G/DL (ref 6.4–8.2)
SODIUM (MMOL/L) IN SER/PLAS: 142 MMOL/L (ref 136–145)
TACROLIMUS (NG/ML) IN BLOOD: 6.8 NG/ML (ref 2–15)
TRIGLYCERIDE (MG/DL) IN SER/PLAS: 188 MG/DL (ref 0–149)
UREA NITROGEN (MG/DL) IN SER/PLAS: 22 MG/DL (ref 6–23)
VLDL: 38 MG/DL (ref 0–40)

## 2023-03-02 LAB
CYTOMEGALOVIRUS DNA, PCR COMMENT: NORMAL
CYTOMEGALOVIRUS DNA, PCR IU/ML: NOT DETECTED IU/ML
CYTOMEGALOVIRUS DNA, PCR LOG IU/ML: NORMAL LOG IU/ML
EBV PCR PLASMA LOG IU/ML: NORMAL LOG IU/ML
EBV PCR, QUANT, PLASMA: NOT DETECTED IU/ML
IGG (MG/DL) IN SER/PLAS: 888 MG/DL (ref 700–1600)

## 2023-03-31 LAB
ALANINE AMINOTRANSFERASE (SGPT) (U/L) IN SER/PLAS: 21 U/L (ref 10–52)
ALBUMIN (G/DL) IN SER/PLAS: 3.8 G/DL (ref 3.4–5)
ALKALINE PHOSPHATASE (U/L) IN SER/PLAS: 189 U/L (ref 33–136)
ANION GAP IN SER/PLAS: 9 MMOL/L (ref 10–20)
ASPARTATE AMINOTRANSFERASE (SGOT) (U/L) IN SER/PLAS: 35 U/L (ref 9–39)
BASOPHILS (10*3/UL) IN BLOOD BY AUTOMATED COUNT: 0.04 X10E9/L (ref 0–0.1)
BASOPHILS/100 LEUKOCYTES IN BLOOD BY AUTOMATED COUNT: 0.8 % (ref 0–2)
BILIRUBIN TOTAL (MG/DL) IN SER/PLAS: 0.5 MG/DL (ref 0–1.2)
CALCIUM (MG/DL) IN SER/PLAS: 9.1 MG/DL (ref 8.6–10.3)
CARBON DIOXIDE, TOTAL (MMOL/L) IN SER/PLAS: 31 MMOL/L (ref 21–32)
CHLORIDE (MMOL/L) IN SER/PLAS: 107 MMOL/L (ref 98–107)
CREATININE (MG/DL) IN SER/PLAS: 0.81 MG/DL (ref 0.5–1.3)
EOSINOPHILS (10*3/UL) IN BLOOD BY AUTOMATED COUNT: 0.09 X10E9/L (ref 0–0.7)
EOSINOPHILS/100 LEUKOCYTES IN BLOOD BY AUTOMATED COUNT: 1.8 % (ref 0–6)
ERYTHROCYTE DISTRIBUTION WIDTH (RATIO) BY AUTOMATED COUNT: 15.7 % (ref 11.5–14.5)
ERYTHROCYTE MEAN CORPUSCULAR HEMOGLOBIN CONCENTRATION (G/DL) BY AUTOMATED: 29.4 G/DL (ref 32–36)
ERYTHROCYTE MEAN CORPUSCULAR VOLUME (FL) BY AUTOMATED COUNT: 98 FL (ref 80–100)
ERYTHROCYTES (10*6/UL) IN BLOOD BY AUTOMATED COUNT: 4.03 X10E12/L (ref 4.5–5.9)
GFR MALE: >90 ML/MIN/1.73M2
GLUCOSE (MG/DL) IN SER/PLAS: 88 MG/DL (ref 74–99)
HEMATOCRIT (%) IN BLOOD BY AUTOMATED COUNT: 39.4 % (ref 41–52)
HEMOGLOBIN (G/DL) IN BLOOD: 11.6 G/DL (ref 13.5–17.5)
IGG (MG/DL) IN SER/PLAS: 831 MG/DL (ref 700–1600)
IMMATURE GRANULOCYTES/100 LEUKOCYTES IN BLOOD BY AUTOMATED COUNT: 0.4 % (ref 0–0.9)
LEUKOCYTES (10*3/UL) IN BLOOD BY AUTOMATED COUNT: 5 X10E9/L (ref 4.4–11.3)
LYMPHOCYTES (10*3/UL) IN BLOOD BY AUTOMATED COUNT: 1 X10E9/L (ref 1.2–4.8)
LYMPHOCYTES/100 LEUKOCYTES IN BLOOD BY AUTOMATED COUNT: 20.2 % (ref 13–44)
MAGNESIUM (MG/DL) IN SER/PLAS: 1.86 MG/DL (ref 1.6–2.4)
MONOCYTES (10*3/UL) IN BLOOD BY AUTOMATED COUNT: 0.39 X10E9/L (ref 0.1–1)
MONOCYTES/100 LEUKOCYTES IN BLOOD BY AUTOMATED COUNT: 7.9 % (ref 2–10)
NEUTROPHILS (10*3/UL) IN BLOOD BY AUTOMATED COUNT: 3.42 X10E9/L (ref 1.2–7.7)
NEUTROPHILS/100 LEUKOCYTES IN BLOOD BY AUTOMATED COUNT: 68.9 % (ref 40–80)
PLATELETS (10*3/UL) IN BLOOD AUTOMATED COUNT: 136 X10E9/L (ref 150–450)
POTASSIUM (MMOL/L) IN SER/PLAS: 4.5 MMOL/L (ref 3.5–5.3)
PROTEIN TOTAL: 6.4 G/DL (ref 6.4–8.2)
SODIUM (MMOL/L) IN SER/PLAS: 142 MMOL/L (ref 136–145)
TACROLIMUS (NG/ML) IN BLOOD: 7.4 NG/ML (ref 2–15)
UREA NITROGEN (MG/DL) IN SER/PLAS: 17 MG/DL (ref 6–23)

## 2023-04-02 LAB
CYTOMEGALOVIRUS DNA, PCR COMMENT: NORMAL
CYTOMEGALOVIRUS DNA, PCR IU/ML: NOT DETECTED IU/ML
CYTOMEGALOVIRUS DNA, PCR LOG IU/ML: NORMAL LOG IU/ML
EBV PCR PLASMA LOG IU/ML: NORMAL LOG IU/ML
EBV PCR, QUANT, PLASMA: NOT DETECTED IU/ML

## 2023-04-11 LAB
ALANINE AMINOTRANSFERASE (SGPT) (U/L) IN SER/PLAS: 22 U/L (ref 10–52)
ALBUMIN (G/DL) IN SER/PLAS: 3.6 G/DL (ref 3.4–5)
ALKALINE PHOSPHATASE (U/L) IN SER/PLAS: 154 U/L (ref 33–136)
ANION GAP IN SER/PLAS: 9 MMOL/L (ref 10–20)
ASPARTATE AMINOTRANSFERASE (SGOT) (U/L) IN SER/PLAS: 30 U/L (ref 9–39)
BASOPHILS (10*3/UL) IN BLOOD BY AUTOMATED COUNT: 0.04 X10E9/L (ref 0–0.1)
BASOPHILS/100 LEUKOCYTES IN BLOOD BY AUTOMATED COUNT: 0.7 % (ref 0–2)
BILIRUBIN TOTAL (MG/DL) IN SER/PLAS: 0.5 MG/DL (ref 0–1.2)
CALCIUM (MG/DL) IN SER/PLAS: 8.5 MG/DL (ref 8.6–10.3)
CARBON DIOXIDE, TOTAL (MMOL/L) IN SER/PLAS: 31 MMOL/L (ref 21–32)
CHLORIDE (MMOL/L) IN SER/PLAS: 105 MMOL/L (ref 98–107)
CREATININE (MG/DL) IN SER/PLAS: 0.7 MG/DL (ref 0.5–1.3)
EOSINOPHILS (10*3/UL) IN BLOOD BY AUTOMATED COUNT: 0.13 X10E9/L (ref 0–0.7)
EOSINOPHILS/100 LEUKOCYTES IN BLOOD BY AUTOMATED COUNT: 2.4 % (ref 0–6)
ERYTHROCYTE DISTRIBUTION WIDTH (RATIO) BY AUTOMATED COUNT: 15.8 % (ref 11.5–14.5)
ERYTHROCYTE MEAN CORPUSCULAR HEMOGLOBIN CONCENTRATION (G/DL) BY AUTOMATED: 29.4 G/DL (ref 32–36)
ERYTHROCYTE MEAN CORPUSCULAR VOLUME (FL) BY AUTOMATED COUNT: 97 FL (ref 80–100)
ERYTHROCYTES (10*6/UL) IN BLOOD BY AUTOMATED COUNT: 3.91 X10E12/L (ref 4.5–5.9)
GFR MALE: >90 ML/MIN/1.73M2
GLUCOSE (MG/DL) IN SER/PLAS: 104 MG/DL (ref 74–99)
HEMATOCRIT (%) IN BLOOD BY AUTOMATED COUNT: 37.8 % (ref 41–52)
HEMOGLOBIN (G/DL) IN BLOOD: 11.1 G/DL (ref 13.5–17.5)
IMMATURE GRANULOCYTES/100 LEUKOCYTES IN BLOOD BY AUTOMATED COUNT: 0.5 % (ref 0–0.9)
LEUKOCYTES (10*3/UL) IN BLOOD BY AUTOMATED COUNT: 5.5 X10E9/L (ref 4.4–11.3)
LYMPHOCYTES (10*3/UL) IN BLOOD BY AUTOMATED COUNT: 0.92 X10E9/L (ref 1.2–4.8)
LYMPHOCYTES/100 LEUKOCYTES IN BLOOD BY AUTOMATED COUNT: 16.7 % (ref 13–44)
MAGNESIUM (MG/DL) IN SER/PLAS: 1.88 MG/DL (ref 1.6–2.4)
MONOCYTES (10*3/UL) IN BLOOD BY AUTOMATED COUNT: 0.5 X10E9/L (ref 0.1–1)
MONOCYTES/100 LEUKOCYTES IN BLOOD BY AUTOMATED COUNT: 9.1 % (ref 2–10)
NEUTROPHILS (10*3/UL) IN BLOOD BY AUTOMATED COUNT: 3.88 X10E9/L (ref 1.2–7.7)
NEUTROPHILS/100 LEUKOCYTES IN BLOOD BY AUTOMATED COUNT: 70.6 % (ref 40–80)
PLATELETS (10*3/UL) IN BLOOD AUTOMATED COUNT: 130 X10E9/L (ref 150–450)
POTASSIUM (MMOL/L) IN SER/PLAS: 3.9 MMOL/L (ref 3.5–5.3)
PROTEIN TOTAL: 5.7 G/DL (ref 6.4–8.2)
SODIUM (MMOL/L) IN SER/PLAS: 141 MMOL/L (ref 136–145)
TACROLIMUS (NG/ML) IN BLOOD: 2.5 NG/ML (ref 2–15)
UREA NITROGEN (MG/DL) IN SER/PLAS: 20 MG/DL (ref 6–23)

## 2023-04-12 LAB
CYTOMEGALOVIRUS DNA, PCR COMMENT: NORMAL
CYTOMEGALOVIRUS DNA, PCR IU/ML: NOT DETECTED IU/ML
CYTOMEGALOVIRUS DNA, PCR LOG IU/ML: NORMAL LOG IU/ML

## 2023-04-20 LAB
ALANINE AMINOTRANSFERASE (SGPT) (U/L) IN SER/PLAS: 20 U/L (ref 10–52)
ALBUMIN (G/DL) IN SER/PLAS: 3.8 G/DL (ref 3.4–5)
ALKALINE PHOSPHATASE (U/L) IN SER/PLAS: 162 U/L (ref 33–136)
ANION GAP IN SER/PLAS: 9 MMOL/L (ref 10–20)
ASPARTATE AMINOTRANSFERASE (SGOT) (U/L) IN SER/PLAS: 28 U/L (ref 9–39)
BASOPHILS (10*3/UL) IN BLOOD BY AUTOMATED COUNT: 0.03 X10E9/L (ref 0–0.1)
BASOPHILS/100 LEUKOCYTES IN BLOOD BY AUTOMATED COUNT: 0.6 % (ref 0–2)
BILIRUBIN TOTAL (MG/DL) IN SER/PLAS: 0.5 MG/DL (ref 0–1.2)
CALCIUM (MG/DL) IN SER/PLAS: 9 MG/DL (ref 8.6–10.6)
CARBON DIOXIDE, TOTAL (MMOL/L) IN SER/PLAS: 30 MMOL/L (ref 21–32)
CHLORIDE (MMOL/L) IN SER/PLAS: 110 MMOL/L (ref 98–107)
CREATININE (MG/DL) IN SER/PLAS: 0.92 MG/DL (ref 0.5–1.3)
EOSINOPHILS (10*3/UL) IN BLOOD BY AUTOMATED COUNT: 0.08 X10E9/L (ref 0–0.7)
EOSINOPHILS/100 LEUKOCYTES IN BLOOD BY AUTOMATED COUNT: 1.5 % (ref 0–6)
ERYTHROCYTE DISTRIBUTION WIDTH (RATIO) BY AUTOMATED COUNT: 16.6 % (ref 11.5–14.5)
ERYTHROCYTE MEAN CORPUSCULAR HEMOGLOBIN CONCENTRATION (G/DL) BY AUTOMATED: 28.1 G/DL (ref 32–36)
ERYTHROCYTE MEAN CORPUSCULAR VOLUME (FL) BY AUTOMATED COUNT: 99 FL (ref 80–100)
ERYTHROCYTES (10*6/UL) IN BLOOD BY AUTOMATED COUNT: 4.03 X10E12/L (ref 4.5–5.9)
GFR MALE: 90 ML/MIN/1.73M2
GLUCOSE (MG/DL) IN SER/PLAS: 88 MG/DL (ref 74–99)
HEMATOCRIT (%) IN BLOOD BY AUTOMATED COUNT: 39.8 % (ref 41–52)
HEMOGLOBIN (G/DL) IN BLOOD: 11.2 G/DL (ref 13.5–17.5)
IGG (MG/DL) IN SER/PLAS: 807 MG/DL (ref 700–1600)
IMMATURE GRANULOCYTES/100 LEUKOCYTES IN BLOOD BY AUTOMATED COUNT: 0.4 % (ref 0–0.9)
LEUKOCYTES (10*3/UL) IN BLOOD BY AUTOMATED COUNT: 5.2 X10E9/L (ref 4.4–11.3)
LYMPHOCYTES (10*3/UL) IN BLOOD BY AUTOMATED COUNT: 1.06 X10E9/L (ref 1.2–4.8)
LYMPHOCYTES/100 LEUKOCYTES IN BLOOD BY AUTOMATED COUNT: 20.5 % (ref 13–44)
MAGNESIUM (MG/DL) IN SER/PLAS: 1.88 MG/DL (ref 1.6–2.4)
MONOCYTES (10*3/UL) IN BLOOD BY AUTOMATED COUNT: 0.56 X10E9/L (ref 0.1–1)
MONOCYTES/100 LEUKOCYTES IN BLOOD BY AUTOMATED COUNT: 10.8 % (ref 2–10)
NEUTROPHILS (10*3/UL) IN BLOOD BY AUTOMATED COUNT: 3.42 X10E9/L (ref 1.2–7.7)
NEUTROPHILS/100 LEUKOCYTES IN BLOOD BY AUTOMATED COUNT: 66.2 % (ref 40–80)
NRBC (PER 100 WBCS) BY AUTOMATED COUNT: 0 /100 WBC (ref 0–0)
PLATELETS (10*3/UL) IN BLOOD AUTOMATED COUNT: 142 X10E9/L (ref 150–450)
POTASSIUM (MMOL/L) IN SER/PLAS: 4.4 MMOL/L (ref 3.5–5.3)
PROTEIN TOTAL: 6.1 G/DL (ref 6.4–8.2)
SODIUM (MMOL/L) IN SER/PLAS: 145 MMOL/L (ref 136–145)
TACROLIMUS (NG/ML) IN BLOOD: 21.1 NG/ML (ref 2–15)
UREA NITROGEN (MG/DL) IN SER/PLAS: 22 MG/DL (ref 6–23)

## 2023-04-21 LAB — TACROLIMUS (NG/ML) IN BLOOD: 15.5 NG/ML (ref 2–15)

## 2023-04-29 LAB
ALANINE AMINOTRANSFERASE (SGPT) (U/L) IN SER/PLAS: 20 U/L (ref 10–52)
ALANINE AMINOTRANSFERASE (SGPT) (U/L) IN SER/PLAS: ABNORMAL U/L (ref 10–52)
ALBUMIN (G/DL) IN SER/PLAS: 3.5 G/DL (ref 3.4–5)
ALBUMIN (G/DL) IN SER/PLAS: ABNORMAL G/DL (ref 3.4–5)
ALKALINE PHOSPHATASE (U/L) IN SER/PLAS: 135 U/L (ref 33–136)
ALKALINE PHOSPHATASE (U/L) IN SER/PLAS: ABNORMAL U/L (ref 33–136)
ANION GAP IN SER/PLAS: 9 MMOL/L (ref 10–20)
ASPARTATE AMINOTRANSFERASE (SGOT) (U/L) IN SER/PLAS: 28 U/L (ref 9–39)
ASPARTATE AMINOTRANSFERASE (SGOT) (U/L) IN SER/PLAS: ABNORMAL U/L (ref 9–39)
BILIRUBIN DIRECT (MG/DL) IN SER/PLAS: 0.1 MG/DL (ref 0–0.3)
BILIRUBIN TOTAL (MG/DL) IN SER/PLAS: 0.4 MG/DL (ref 0–1.2)
BILIRUBIN TOTAL (MG/DL) IN SER/PLAS: ABNORMAL MG/DL (ref 0–1.2)
CALCIDIOL (25 OH VITAMIN D3) (NG/ML) IN SER/PLAS: 74 NG/ML
CALCIUM (MG/DL) IN SER/PLAS: 8.9 MG/DL (ref 8.6–10.3)
CARBON DIOXIDE, TOTAL (MMOL/L) IN SER/PLAS: 28 MMOL/L (ref 21–32)
CHLORIDE (MMOL/L) IN SER/PLAS: 108 MMOL/L (ref 98–107)
CHOLESTEROL (MG/DL) IN SER/PLAS: 141 MG/DL (ref 0–199)
CHOLESTEROL IN HDL (MG/DL) IN SER/PLAS: 47.9 MG/DL
CHOLESTEROL/HDL RATIO: 2.9
CREATININE (MG/DL) IN SER/PLAS: 0.92 MG/DL (ref 0.5–1.3)
GFR MALE: 90 ML/MIN/1.73M2
GLUCOSE (MG/DL) IN SER/PLAS: 96 MG/DL (ref 74–99)
LDL: 71 MG/DL (ref 0–99)
POTASSIUM (MMOL/L) IN SER/PLAS: 4.2 MMOL/L (ref 3.5–5.3)
PROTEIN TOTAL: 6 G/DL (ref 6.4–8.2)
PROTEIN TOTAL: ABNORMAL G/DL (ref 6.4–8.2)
SODIUM (MMOL/L) IN SER/PLAS: 141 MMOL/L (ref 136–145)
TRIGLYCERIDE (MG/DL) IN SER/PLAS: 109 MG/DL (ref 0–149)
UREA NITROGEN (MG/DL) IN SER/PLAS: 21 MG/DL (ref 6–23)
VLDL: 22 MG/DL (ref 0–40)

## 2023-04-30 LAB — TACROLIMUS (NG/ML) IN BLOOD: 4.8 NG/ML (ref 2–15)

## 2023-05-01 LAB
HLA CLASS I SP ANTIBODY IDENTIFICATION, HIGH DEFINITION: NORMAL
HLA CLASS II SP ANTIBODY IDENTIFICATION, HIGH DEFINITION: NORMAL

## 2023-05-10 LAB — TACROLIMUS (NG/ML) IN BLOOD: 2.9 NG/ML (ref 2–15)

## 2023-05-23 LAB — TACROLIMUS (NG/ML) IN BLOOD: 4.3 NG/ML (ref 2–15)

## 2023-06-01 LAB
ALANINE AMINOTRANSFERASE (SGPT) (U/L) IN SER/PLAS: 25 U/L (ref 10–52)
ALBUMIN (G/DL) IN SER/PLAS: 3.7 G/DL (ref 3.4–5)
ALKALINE PHOSPHATASE (U/L) IN SER/PLAS: 135 U/L (ref 33–136)
ANION GAP IN SER/PLAS: 11 MMOL/L (ref 10–20)
ASPARTATE AMINOTRANSFERASE (SGOT) (U/L) IN SER/PLAS: 34 U/L (ref 9–39)
BASOPHILS (10*3/UL) IN BLOOD BY AUTOMATED COUNT: 0.07 X10E9/L (ref 0–0.1)
BASOPHILS/100 LEUKOCYTES IN BLOOD BY AUTOMATED COUNT: 1.2 % (ref 0–2)
BILIRUBIN TOTAL (MG/DL) IN SER/PLAS: 0.9 MG/DL (ref 0–1.2)
CALCIUM (MG/DL) IN SER/PLAS: 8.8 MG/DL (ref 8.6–10.3)
CARBON DIOXIDE, TOTAL (MMOL/L) IN SER/PLAS: 29 MMOL/L (ref 21–32)
CHLORIDE (MMOL/L) IN SER/PLAS: 105 MMOL/L (ref 98–107)
CREATININE (MG/DL) IN SER/PLAS: 0.78 MG/DL (ref 0.5–1.3)
EOSINOPHILS (10*3/UL) IN BLOOD BY AUTOMATED COUNT: 0.11 X10E9/L (ref 0–0.7)
EOSINOPHILS/100 LEUKOCYTES IN BLOOD BY AUTOMATED COUNT: 1.9 % (ref 0–6)
ERYTHROCYTE DISTRIBUTION WIDTH (RATIO) BY AUTOMATED COUNT: 18 % (ref 11.5–14.5)
ERYTHROCYTE MEAN CORPUSCULAR HEMOGLOBIN CONCENTRATION (G/DL) BY AUTOMATED: 29 G/DL (ref 32–36)
ERYTHROCYTE MEAN CORPUSCULAR VOLUME (FL) BY AUTOMATED COUNT: 96 FL (ref 80–100)
ERYTHROCYTES (10*6/UL) IN BLOOD BY AUTOMATED COUNT: 3.84 X10E12/L (ref 4.5–5.9)
GFR MALE: >90 ML/MIN/1.73M2
GLUCOSE (MG/DL) IN SER/PLAS: 87 MG/DL (ref 74–99)
HEMATOCRIT (%) IN BLOOD BY AUTOMATED COUNT: 36.9 % (ref 41–52)
HEMOGLOBIN (G/DL) IN BLOOD: 10.7 G/DL (ref 13.5–17.5)
IGG (MG/DL) IN SER/PLAS: 772 MG/DL (ref 700–1600)
IMMATURE GRANULOCYTES/100 LEUKOCYTES IN BLOOD BY AUTOMATED COUNT: 0.5 % (ref 0–0.9)
LEUKOCYTES (10*3/UL) IN BLOOD BY AUTOMATED COUNT: 5.7 X10E9/L (ref 4.4–11.3)
LYMPHOCYTES (10*3/UL) IN BLOOD BY AUTOMATED COUNT: 1 X10E9/L (ref 1.2–4.8)
LYMPHOCYTES/100 LEUKOCYTES IN BLOOD BY AUTOMATED COUNT: 17.4 % (ref 13–44)
MAGNESIUM (MG/DL) IN SER/PLAS: 1.78 MG/DL (ref 1.6–2.4)
MONOCYTES (10*3/UL) IN BLOOD BY AUTOMATED COUNT: 0.78 X10E9/L (ref 0.1–1)
MONOCYTES/100 LEUKOCYTES IN BLOOD BY AUTOMATED COUNT: 13.6 % (ref 2–10)
NEUTROPHILS (10*3/UL) IN BLOOD BY AUTOMATED COUNT: 3.75 X10E9/L (ref 1.2–7.7)
NEUTROPHILS/100 LEUKOCYTES IN BLOOD BY AUTOMATED COUNT: 65.4 % (ref 40–80)
PLATELETS (10*3/UL) IN BLOOD AUTOMATED COUNT: 134 X10E9/L (ref 150–450)
POTASSIUM (MMOL/L) IN SER/PLAS: 4.1 MMOL/L (ref 3.5–5.3)
PROTEIN TOTAL: 6.4 G/DL (ref 6.4–8.2)
SODIUM (MMOL/L) IN SER/PLAS: 141 MMOL/L (ref 136–145)
TACROLIMUS (NG/ML) IN BLOOD: 4.1 NG/ML (ref 2–15)
UREA NITROGEN (MG/DL) IN SER/PLAS: 19 MG/DL (ref 6–23)

## 2023-06-02 LAB
EBV PCR PLASMA LOG IU/ML: NORMAL LOG IU/ML
EBV PCR, QUANT, PLASMA: NOT DETECTED IU/ML

## 2023-06-13 LAB
ALANINE AMINOTRANSFERASE (SGPT) (U/L) IN SER/PLAS: 25 U/L (ref 10–52)
ALBUMIN (G/DL) IN SER/PLAS: 3.8 G/DL (ref 3.4–5)
ALKALINE PHOSPHATASE (U/L) IN SER/PLAS: 130 U/L (ref 33–136)
ANION GAP IN SER/PLAS: 9 MMOL/L (ref 10–20)
ASPARTATE AMINOTRANSFERASE (SGOT) (U/L) IN SER/PLAS: 28 U/L (ref 9–39)
BASOPHILS (10*3/UL) IN BLOOD BY AUTOMATED COUNT: 0.05 X10E9/L (ref 0–0.1)
BASOPHILS/100 LEUKOCYTES IN BLOOD BY AUTOMATED COUNT: 0.9 % (ref 0–2)
BILIRUBIN TOTAL (MG/DL) IN SER/PLAS: 0.7 MG/DL (ref 0–1.2)
CALCIUM (MG/DL) IN SER/PLAS: 9.2 MG/DL (ref 8.6–10.3)
CARBON DIOXIDE, TOTAL (MMOL/L) IN SER/PLAS: 31 MMOL/L (ref 21–32)
CHLORIDE (MMOL/L) IN SER/PLAS: 104 MMOL/L (ref 98–107)
CREATININE (MG/DL) IN SER/PLAS: 0.77 MG/DL (ref 0.5–1.3)
EOSINOPHILS (10*3/UL) IN BLOOD BY AUTOMATED COUNT: 0.15 X10E9/L (ref 0–0.7)
EOSINOPHILS/100 LEUKOCYTES IN BLOOD BY AUTOMATED COUNT: 2.7 % (ref 0–6)
ERYTHROCYTE DISTRIBUTION WIDTH (RATIO) BY AUTOMATED COUNT: 17.5 % (ref 11.5–14.5)
ERYTHROCYTE MEAN CORPUSCULAR HEMOGLOBIN CONCENTRATION (G/DL) BY AUTOMATED: 28.3 G/DL (ref 32–36)
ERYTHROCYTE MEAN CORPUSCULAR VOLUME (FL) BY AUTOMATED COUNT: 97 FL (ref 80–100)
ERYTHROCYTES (10*6/UL) IN BLOOD BY AUTOMATED COUNT: 3.79 X10E12/L (ref 4.5–5.9)
GFR MALE: >90 ML/MIN/1.73M2
GLUCOSE (MG/DL) IN SER/PLAS: 136 MG/DL (ref 74–99)
HEMATOCRIT (%) IN BLOOD BY AUTOMATED COUNT: 36.7 % (ref 41–52)
HEMOGLOBIN (G/DL) IN BLOOD: 10.4 G/DL (ref 13.5–17.5)
IGG (MG/DL) IN SER/PLAS: 833 MG/DL (ref 700–1600)
IMMATURE GRANULOCYTES/100 LEUKOCYTES IN BLOOD BY AUTOMATED COUNT: 0.5 % (ref 0–0.9)
LEUKOCYTES (10*3/UL) IN BLOOD BY AUTOMATED COUNT: 5.6 X10E9/L (ref 4.4–11.3)
LYMPHOCYTES (10*3/UL) IN BLOOD BY AUTOMATED COUNT: 0.76 X10E9/L (ref 1.2–4.8)
LYMPHOCYTES/100 LEUKOCYTES IN BLOOD BY AUTOMATED COUNT: 13.6 % (ref 13–44)
MAGNESIUM (MG/DL) IN SER/PLAS: 1.59 MG/DL (ref 1.6–2.4)
MONOCYTES (10*3/UL) IN BLOOD BY AUTOMATED COUNT: 0.59 X10E9/L (ref 0.1–1)
MONOCYTES/100 LEUKOCYTES IN BLOOD BY AUTOMATED COUNT: 10.6 % (ref 2–10)
NEUTROPHILS (10*3/UL) IN BLOOD BY AUTOMATED COUNT: 4.01 X10E9/L (ref 1.2–7.7)
NEUTROPHILS/100 LEUKOCYTES IN BLOOD BY AUTOMATED COUNT: 71.7 % (ref 40–80)
PLATELETS (10*3/UL) IN BLOOD AUTOMATED COUNT: 129 X10E9/L (ref 150–450)
POTASSIUM (MMOL/L) IN SER/PLAS: 4.5 MMOL/L (ref 3.5–5.3)
PROTEIN TOTAL: 6.4 G/DL (ref 6.4–8.2)
SODIUM (MMOL/L) IN SER/PLAS: 139 MMOL/L (ref 136–145)
TACROLIMUS (NG/ML) IN BLOOD: 6.8 NG/ML (ref 2–15)
UREA NITROGEN (MG/DL) IN SER/PLAS: 19 MG/DL (ref 6–23)

## 2023-06-27 LAB
ALANINE AMINOTRANSFERASE (SGPT) (U/L) IN SER/PLAS: 25 U/L (ref 10–52)
ALBUMIN (G/DL) IN SER/PLAS: 3.6 G/DL (ref 3.4–5)
ALKALINE PHOSPHATASE (U/L) IN SER/PLAS: 143 U/L (ref 33–136)
ANION GAP IN SER/PLAS: 7 MMOL/L (ref 10–20)
ASPARTATE AMINOTRANSFERASE (SGOT) (U/L) IN SER/PLAS: 31 U/L (ref 9–39)
BASOPHILS (10*3/UL) IN BLOOD BY AUTOMATED COUNT: 0.04 X10E9/L (ref 0–0.1)
BASOPHILS/100 LEUKOCYTES IN BLOOD BY AUTOMATED COUNT: 0.8 % (ref 0–2)
BILIRUBIN TOTAL (MG/DL) IN SER/PLAS: 0.7 MG/DL (ref 0–1.2)
CALCIUM (MG/DL) IN SER/PLAS: 9.2 MG/DL (ref 8.6–10.3)
CARBON DIOXIDE, TOTAL (MMOL/L) IN SER/PLAS: 32 MMOL/L (ref 21–32)
CHLORIDE (MMOL/L) IN SER/PLAS: 107 MMOL/L (ref 98–107)
CREATININE (MG/DL) IN SER/PLAS: 0.88 MG/DL (ref 0.5–1.3)
EOSINOPHILS (10*3/UL) IN BLOOD BY AUTOMATED COUNT: 0.13 X10E9/L (ref 0–0.7)
EOSINOPHILS/100 LEUKOCYTES IN BLOOD BY AUTOMATED COUNT: 2.5 % (ref 0–6)
ERYTHROCYTE DISTRIBUTION WIDTH (RATIO) BY AUTOMATED COUNT: 17.7 % (ref 11.5–14.5)
ERYTHROCYTE MEAN CORPUSCULAR HEMOGLOBIN CONCENTRATION (G/DL) BY AUTOMATED: 28.5 G/DL (ref 32–36)
ERYTHROCYTE MEAN CORPUSCULAR VOLUME (FL) BY AUTOMATED COUNT: 97 FL (ref 80–100)
ERYTHROCYTES (10*6/UL) IN BLOOD BY AUTOMATED COUNT: 3.77 X10E12/L (ref 4.5–5.9)
GFR MALE: >90 ML/MIN/1.73M2
GLUCOSE (MG/DL) IN SER/PLAS: 107 MG/DL (ref 74–99)
HEMATOCRIT (%) IN BLOOD BY AUTOMATED COUNT: 36.5 % (ref 41–52)
HEMOGLOBIN (G/DL) IN BLOOD: 10.4 G/DL (ref 13.5–17.5)
IGG (MG/DL) IN SER/PLAS: 838 MG/DL (ref 700–1600)
IMMATURE GRANULOCYTES/100 LEUKOCYTES IN BLOOD BY AUTOMATED COUNT: 0.6 % (ref 0–0.9)
LEUKOCYTES (10*3/UL) IN BLOOD BY AUTOMATED COUNT: 5.2 X10E9/L (ref 4.4–11.3)
LYMPHOCYTES (10*3/UL) IN BLOOD BY AUTOMATED COUNT: 1.04 X10E9/L (ref 1.2–4.8)
LYMPHOCYTES/100 LEUKOCYTES IN BLOOD BY AUTOMATED COUNT: 20.1 % (ref 13–44)
MAGNESIUM (MG/DL) IN SER/PLAS: 1.78 MG/DL (ref 1.6–2.4)
MONOCYTES (10*3/UL) IN BLOOD BY AUTOMATED COUNT: 0.4 X10E9/L (ref 0.1–1)
MONOCYTES/100 LEUKOCYTES IN BLOOD BY AUTOMATED COUNT: 7.7 % (ref 2–10)
NEUTROPHILS (10*3/UL) IN BLOOD BY AUTOMATED COUNT: 3.54 X10E9/L (ref 1.2–7.7)
NEUTROPHILS/100 LEUKOCYTES IN BLOOD BY AUTOMATED COUNT: 68.3 % (ref 40–80)
PLATELETS (10*3/UL) IN BLOOD AUTOMATED COUNT: 108 X10E9/L (ref 150–450)
POTASSIUM (MMOL/L) IN SER/PLAS: 4.3 MMOL/L (ref 3.5–5.3)
PROTEIN TOTAL: 6.2 G/DL (ref 6.4–8.2)
SODIUM (MMOL/L) IN SER/PLAS: 142 MMOL/L (ref 136–145)
UREA NITROGEN (MG/DL) IN SER/PLAS: 17 MG/DL (ref 6–23)

## 2023-06-28 LAB
CYTOMEGALOVIRUS DNA, PCR COMMENT: ABNORMAL
CYTOMEGALOVIRUS DNA, PCR IU/ML: ABNORMAL IU/ML
CYTOMEGALOVIRUS DNA, PCR LOG IU/ML: ABNORMAL LOG IU/ML
EBV PCR PLASMA LOG IU/ML: NORMAL LOG IU/ML
EBV PCR, QUANT, PLASMA: NOT DETECTED IU/ML
TACROLIMUS (NG/ML) IN BLOOD: 7.5 NG/ML (ref 2–15)

## 2023-07-26 LAB
ALANINE AMINOTRANSFERASE (SGPT) (U/L) IN SER/PLAS: 22 U/L (ref 10–52)
ALBUMIN (G/DL) IN SER/PLAS: 3.7 G/DL (ref 3.4–5)
ALKALINE PHOSPHATASE (U/L) IN SER/PLAS: 120 U/L (ref 33–136)
ANION GAP IN SER/PLAS: 9 MMOL/L (ref 10–20)
ASPARTATE AMINOTRANSFERASE (SGOT) (U/L) IN SER/PLAS: 27 U/L (ref 9–39)
BASOPHILS (10*3/UL) IN BLOOD BY AUTOMATED COUNT: 0.03 X10E9/L (ref 0–0.1)
BASOPHILS/100 LEUKOCYTES IN BLOOD BY AUTOMATED COUNT: 0.6 % (ref 0–2)
BILIRUBIN TOTAL (MG/DL) IN SER/PLAS: 0.7 MG/DL (ref 0–1.2)
CALCIUM (MG/DL) IN SER/PLAS: 8.6 MG/DL (ref 8.6–10.3)
CARBON DIOXIDE, TOTAL (MMOL/L) IN SER/PLAS: 30 MMOL/L (ref 21–32)
CHLORIDE (MMOL/L) IN SER/PLAS: 106 MMOL/L (ref 98–107)
CREATININE (MG/DL) IN SER/PLAS: 0.85 MG/DL (ref 0.5–1.3)
EOSINOPHILS (10*3/UL) IN BLOOD BY AUTOMATED COUNT: 0.08 X10E9/L (ref 0–0.7)
EOSINOPHILS/100 LEUKOCYTES IN BLOOD BY AUTOMATED COUNT: 1.6 % (ref 0–6)
ERYTHROCYTE DISTRIBUTION WIDTH (RATIO) BY AUTOMATED COUNT: 17.5 % (ref 11.5–14.5)
ERYTHROCYTE MEAN CORPUSCULAR HEMOGLOBIN CONCENTRATION (G/DL) BY AUTOMATED: 28.5 G/DL (ref 32–36)
ERYTHROCYTE MEAN CORPUSCULAR VOLUME (FL) BY AUTOMATED COUNT: 94 FL (ref 80–100)
ERYTHROCYTES (10*6/UL) IN BLOOD BY AUTOMATED COUNT: 3.63 X10E12/L (ref 4.5–5.9)
GFR MALE: >90 ML/MIN/1.73M2
GLUCOSE (MG/DL) IN SER/PLAS: 116 MG/DL (ref 74–99)
HEMATOCRIT (%) IN BLOOD BY AUTOMATED COUNT: 34 % (ref 41–52)
HEMOGLOBIN (G/DL) IN BLOOD: 9.7 G/DL (ref 13.5–17.5)
IGG (MG/DL) IN SER/PLAS: 802 MG/DL (ref 700–1600)
IMMATURE GRANULOCYTES/100 LEUKOCYTES IN BLOOD BY AUTOMATED COUNT: 0.4 % (ref 0–0.9)
LEUKOCYTES (10*3/UL) IN BLOOD BY AUTOMATED COUNT: 4.9 X10E9/L (ref 4.4–11.3)
LYMPHOCYTES (10*3/UL) IN BLOOD BY AUTOMATED COUNT: 0.98 X10E9/L (ref 1.2–4.8)
LYMPHOCYTES/100 LEUKOCYTES IN BLOOD BY AUTOMATED COUNT: 20 % (ref 13–44)
MAGNESIUM (MG/DL) IN SER/PLAS: 1.73 MG/DL (ref 1.6–2.4)
MONOCYTES (10*3/UL) IN BLOOD BY AUTOMATED COUNT: 0.4 X10E9/L (ref 0.1–1)
MONOCYTES/100 LEUKOCYTES IN BLOOD BY AUTOMATED COUNT: 8.1 % (ref 2–10)
NEUTROPHILS (10*3/UL) IN BLOOD BY AUTOMATED COUNT: 3.4 X10E9/L (ref 1.2–7.7)
NEUTROPHILS/100 LEUKOCYTES IN BLOOD BY AUTOMATED COUNT: 69.3 % (ref 40–80)
PLATELETS (10*3/UL) IN BLOOD AUTOMATED COUNT: 109 X10E9/L (ref 150–450)
POTASSIUM (MMOL/L) IN SER/PLAS: 4.4 MMOL/L (ref 3.5–5.3)
PROTEIN TOTAL: 5.9 G/DL (ref 6.4–8.2)
SODIUM (MMOL/L) IN SER/PLAS: 141 MMOL/L (ref 136–145)
TACROLIMUS (NG/ML) IN BLOOD: 7.4 NG/ML (ref 2–15)
UREA NITROGEN (MG/DL) IN SER/PLAS: 23 MG/DL (ref 6–23)

## 2023-08-05 LAB
ADENOVIRUS RVP, VIRC: NOT DETECTED
ENTEROVIRUS/RHINOVIRUS RVP, VIRC: NOT DETECTED
HUMAN BOCAVIRUS RVP, VIRC: NOT DETECTED
HUMAN CORONAVIRUS RVP, VIRC: NOT DETECTED
INFLUENZA A , VIRC: NOT DETECTED
INFLUENZA A H1N1-09 , VIRC: NOT DETECTED
INFLUENZA B PCR, VIRC: NOT DETECTED
METAPNEUMOVIRUS , VIRC: NOT DETECTED
PARAINFLUENZA PCR, VIRC: NOT DETECTED
RSV PCR, RVP, VIRC: NOT DETECTED

## 2023-08-24 LAB
ALANINE AMINOTRANSFERASE (SGPT) (U/L) IN SER/PLAS: 31 U/L (ref 10–52)
ALBUMIN (G/DL) IN SER/PLAS: 3.8 G/DL (ref 3.4–5)
ALKALINE PHOSPHATASE (U/L) IN SER/PLAS: 110 U/L (ref 33–136)
ANION GAP IN SER/PLAS: 9 MMOL/L (ref 10–20)
ASPARTATE AMINOTRANSFERASE (SGOT) (U/L) IN SER/PLAS: 41 U/L (ref 9–39)
BASOPHILS (10*3/UL) IN BLOOD BY AUTOMATED COUNT: 0.02 X10E9/L (ref 0–0.1)
BASOPHILS/100 LEUKOCYTES IN BLOOD BY AUTOMATED COUNT: 0.4 % (ref 0–2)
BILIRUBIN TOTAL (MG/DL) IN SER/PLAS: 0.7 MG/DL (ref 0–1.2)
CALCIDIOL (25 OH VITAMIN D3) (NG/ML) IN SER/PLAS: 54 NG/ML
CALCIUM (MG/DL) IN SER/PLAS: 8.8 MG/DL (ref 8.6–10.3)
CARBON DIOXIDE, TOTAL (MMOL/L) IN SER/PLAS: 28 MMOL/L (ref 21–32)
CHLORIDE (MMOL/L) IN SER/PLAS: 108 MMOL/L (ref 98–107)
CHOLESTEROL (MG/DL) IN SER/PLAS: 145 MG/DL (ref 0–199)
CHOLESTEROL IN HDL (MG/DL) IN SER/PLAS: 61 MG/DL
CHOLESTEROL/HDL RATIO: 2.4
CREATININE (MG/DL) IN SER/PLAS: 0.81 MG/DL (ref 0.5–1.3)
EOSINOPHILS (10*3/UL) IN BLOOD BY AUTOMATED COUNT: 0.09 X10E9/L (ref 0–0.7)
EOSINOPHILS/100 LEUKOCYTES IN BLOOD BY AUTOMATED COUNT: 1.6 % (ref 0–6)
ERYTHROCYTE DISTRIBUTION WIDTH (RATIO) BY AUTOMATED COUNT: 17.3 % (ref 11.5–14.5)
ERYTHROCYTE MEAN CORPUSCULAR HEMOGLOBIN CONCENTRATION (G/DL) BY AUTOMATED: 29.2 G/DL (ref 32–36)
ERYTHROCYTE MEAN CORPUSCULAR VOLUME (FL) BY AUTOMATED COUNT: 91 FL (ref 80–100)
ERYTHROCYTES (10*6/UL) IN BLOOD BY AUTOMATED COUNT: 3.82 X10E12/L (ref 4.5–5.9)
GFR MALE: >90 ML/MIN/1.73M2
GLUCOSE (MG/DL) IN SER/PLAS: 90 MG/DL (ref 74–99)
HEMATOCRIT (%) IN BLOOD BY AUTOMATED COUNT: 34.9 % (ref 41–52)
HEMOGLOBIN (G/DL) IN BLOOD: 10.2 G/DL (ref 13.5–17.5)
HLA CLASS I SP ANTIBODY IDENTIFICATION, HIGH DEFINITION: NORMAL
HLA CLASS II SP ANTIBODY IDENTIFICATION, HIGH DEFINITION: NORMAL
IGG (MG/DL) IN SER/PLAS: 783 MG/DL (ref 700–1600)
IMMATURE GRANULOCYTES/100 LEUKOCYTES IN BLOOD BY AUTOMATED COUNT: 0.5 % (ref 0–0.9)
LDL: 70 MG/DL (ref 0–99)
LEUKOCYTES (10*3/UL) IN BLOOD BY AUTOMATED COUNT: 5.6 X10E9/L (ref 4.4–11.3)
LYMPHOCYTES (10*3/UL) IN BLOOD BY AUTOMATED COUNT: 1.09 X10E9/L (ref 1.2–4.8)
LYMPHOCYTES/100 LEUKOCYTES IN BLOOD BY AUTOMATED COUNT: 19.4 % (ref 13–44)
MAGNESIUM (MG/DL) IN SER/PLAS: 1.8 MG/DL (ref 1.6–2.4)
MONOCYTES (10*3/UL) IN BLOOD BY AUTOMATED COUNT: 0.44 X10E9/L (ref 0.1–1)
MONOCYTES/100 LEUKOCYTES IN BLOOD BY AUTOMATED COUNT: 7.8 % (ref 2–10)
NEUTROPHILS (10*3/UL) IN BLOOD BY AUTOMATED COUNT: 3.94 X10E9/L (ref 1.2–7.7)
NEUTROPHILS/100 LEUKOCYTES IN BLOOD BY AUTOMATED COUNT: 70.3 % (ref 40–80)
PLATELETS (10*3/UL) IN BLOOD AUTOMATED COUNT: 114 X10E9/L (ref 150–450)
POTASSIUM (MMOL/L) IN SER/PLAS: 4.4 MMOL/L (ref 3.5–5.3)
PROTEIN TOTAL: 5.8 G/DL (ref 6.4–8.2)
SODIUM (MMOL/L) IN SER/PLAS: 141 MMOL/L (ref 136–145)
TACROLIMUS (NG/ML) IN BLOOD: 7.2 NG/ML (ref 2–15)
TRIGLYCERIDE (MG/DL) IN SER/PLAS: 71 MG/DL (ref 0–149)
UREA NITROGEN (MG/DL) IN SER/PLAS: 16 MG/DL (ref 6–23)
VLDL: 14 MG/DL (ref 0–40)

## 2023-08-25 ENCOUNTER — APPOINTMENT (OUTPATIENT)
Dept: GENERAL RADIOLOGY | Age: 68
DRG: 206 | End: 2023-08-25
Payer: MEDICARE

## 2023-08-25 ENCOUNTER — HOSPITAL ENCOUNTER (INPATIENT)
Age: 68
LOS: 2 days | Discharge: HOME | DRG: 206 | End: 2023-08-27
Attending: STUDENT IN AN ORGANIZED HEALTH CARE EDUCATION/TRAINING PROGRAM | Admitting: INTERNAL MEDICINE
Payer: MEDICARE

## 2023-08-25 DIAGNOSIS — D64.9 ACUTE ON CHRONIC ANEMIA: ICD-10-CM

## 2023-08-25 DIAGNOSIS — K92.2 GASTROINTESTINAL HEMORRHAGE, UNSPECIFIED GASTROINTESTINAL HEMORRHAGE TYPE: ICD-10-CM

## 2023-08-25 DIAGNOSIS — R07.9 ACUTE CHEST PAIN: Primary | ICD-10-CM

## 2023-08-25 LAB
ALBUMIN SERPL-MCNC: 3.6 G/DL (ref 3.5–5.2)
ALP SERPL-CCNC: 121 U/L (ref 40–129)
ALT SERPL-CCNC: 28 U/L (ref 0–40)
ANION GAP SERPL CALCULATED.3IONS-SCNC: 8 MMOL/L (ref 7–16)
AST SERPL-CCNC: 36 U/L (ref 0–39)
BACTERIA URNS QL MICRO: ABNORMAL
BASOPHILS # BLD: 0.02 K/UL (ref 0–0.2)
BASOPHILS NFR BLD: 0 % (ref 0–2)
BILIRUB SERPL-MCNC: 0.7 MG/DL (ref 0–1.2)
BILIRUB UR QL STRIP: NEGATIVE
BNP SERPL-MCNC: 456 PG/ML (ref 0–450)
BUN SERPL-MCNC: 16 MG/DL (ref 6–23)
CALCIUM SERPL-MCNC: 8.4 MG/DL (ref 8.6–10.2)
CHLORIDE SERPL-SCNC: 107 MMOL/L (ref 98–107)
CLARITY UR: CLEAR
CO2 SERPL-SCNC: 26 MMOL/L (ref 22–29)
COLOR UR: YELLOW
CREAT SERPL-MCNC: 0.9 MG/DL (ref 0.7–1.2)
EBV PCR PLASMA LOG IU/ML: NORMAL LOG IU/ML
EBV PCR, QUANT, PLASMA: NOT DETECTED IU/ML
EOSINOPHIL # BLD: 0.02 K/UL (ref 0.05–0.5)
EOSINOPHILS RELATIVE PERCENT: 0 % (ref 0–6)
EPI CELLS #/AREA URNS HPF: ABNORMAL /HPF
ERYTHROCYTE [DISTWIDTH] IN BLOOD BY AUTOMATED COUNT: 17.2 % (ref 11.5–15)
GFR SERPL CREATININE-BSD FRML MDRD: >60 ML/MIN/1.73M2
GLUCOSE SERPL-MCNC: 115 MG/DL (ref 74–99)
GLUCOSE UR STRIP-MCNC: >=1000 MG/DL
HCT VFR BLD AUTO: 30.1 % (ref 37–54)
HGB BLD-MCNC: 9 G/DL (ref 12.5–16.5)
HGB UR QL STRIP.AUTO: NEGATIVE
IMM GRANULOCYTES # BLD AUTO: <0.03 K/UL (ref 0–0.58)
IMM GRANULOCYTES NFR BLD: 0 % (ref 0–5)
INR PPP: 1.1
KETONES UR STRIP-MCNC: NEGATIVE MG/DL
LACTATE BLDV-SCNC: 1.6 MMOL/L (ref 0.5–2.2)
LEUKOCYTE ESTERASE UR QL STRIP: NEGATIVE
LIPASE SERPL-CCNC: 51 U/L (ref 13–60)
LYMPHOCYTES NFR BLD: 0.62 K/UL (ref 1.5–4)
LYMPHOCYTES RELATIVE PERCENT: 13 % (ref 20–42)
MAGNESIUM SERPL-MCNC: 1.6 MG/DL (ref 1.6–2.6)
MCH RBC QN AUTO: 27.4 PG (ref 26–35)
MCHC RBC AUTO-ENTMCNC: 29.9 G/DL (ref 32–34.5)
MCV RBC AUTO: 91.8 FL (ref 80–99.9)
MONOCYTES NFR BLD: 0.3 K/UL (ref 0.1–0.95)
MONOCYTES NFR BLD: 7 % (ref 2–12)
NEUTROPHILS NFR BLD: 79 % (ref 43–80)
NEUTS SEG NFR BLD: 3.63 K/UL (ref 1.8–7.3)
NITRITE UR QL STRIP: NEGATIVE
PH UR STRIP: 6.5 [PH] (ref 5–9)
PLATELET # BLD AUTO: 102 K/UL (ref 130–450)
PMV BLD AUTO: 12.3 FL (ref 7–12)
POTASSIUM SERPL-SCNC: 4.1 MMOL/L (ref 3.5–5)
PROT SERPL-MCNC: 5.8 G/DL (ref 6.4–8.3)
PROT UR STRIP-MCNC: NEGATIVE MG/DL
PROTHROMBIN TIME: 13 SEC (ref 9.3–12.4)
RBC # BLD AUTO: 3.28 M/UL (ref 3.8–5.8)
RBC #/AREA URNS HPF: ABNORMAL /HPF
SARS-COV-2 RDRP RESP QL NAA+PROBE: NOT DETECTED
SODIUM SERPL-SCNC: 141 MMOL/L (ref 132–146)
SP GR UR STRIP: 1.02 (ref 1–1.03)
SPECIMEN DESCRIPTION: NORMAL
TROPONIN I SERPL HS-MCNC: 19 NG/L (ref 0–11)
TROPONIN I SERPL HS-MCNC: 20 NG/L (ref 0–11)
UROBILINOGEN UR STRIP-ACNC: 0.2 EU/DL (ref 0–1)
WBC #/AREA URNS HPF: ABNORMAL /HPF
WBC OTHER # BLD: 4.6 K/UL (ref 4.5–11.5)

## 2023-08-25 PROCEDURE — 83880 ASSAY OF NATRIURETIC PEPTIDE: CPT

## 2023-08-25 PROCEDURE — 6360000002 HC RX W HCPCS: Performed by: STUDENT IN AN ORGANIZED HEALTH CARE EDUCATION/TRAINING PROGRAM

## 2023-08-25 PROCEDURE — 87635 SARS-COV-2 COVID-19 AMP PRB: CPT

## 2023-08-25 PROCEDURE — 80053 COMPREHEN METABOLIC PANEL: CPT

## 2023-08-25 PROCEDURE — 83605 ASSAY OF LACTIC ACID: CPT

## 2023-08-25 PROCEDURE — 2580000003 HC RX 258: Performed by: STUDENT IN AN ORGANIZED HEALTH CARE EDUCATION/TRAINING PROGRAM

## 2023-08-25 PROCEDURE — 85025 COMPLETE CBC W/AUTO DIFF WBC: CPT

## 2023-08-25 PROCEDURE — C9113 INJ PANTOPRAZOLE SODIUM, VIA: HCPCS | Performed by: STUDENT IN AN ORGANIZED HEALTH CARE EDUCATION/TRAINING PROGRAM

## 2023-08-25 PROCEDURE — 81001 URINALYSIS AUTO W/SCOPE: CPT

## 2023-08-25 PROCEDURE — 2060000000 HC ICU INTERMEDIATE R&B

## 2023-08-25 PROCEDURE — A4216 STERILE WATER/SALINE, 10 ML: HCPCS | Performed by: STUDENT IN AN ORGANIZED HEALTH CARE EDUCATION/TRAINING PROGRAM

## 2023-08-25 PROCEDURE — 83735 ASSAY OF MAGNESIUM: CPT

## 2023-08-25 PROCEDURE — 85610 PROTHROMBIN TIME: CPT

## 2023-08-25 PROCEDURE — 84484 ASSAY OF TROPONIN QUANT: CPT

## 2023-08-25 PROCEDURE — 93005 ELECTROCARDIOGRAM TRACING: CPT | Performed by: STUDENT IN AN ORGANIZED HEALTH CARE EDUCATION/TRAINING PROGRAM

## 2023-08-25 PROCEDURE — 71046 X-RAY EXAM CHEST 2 VIEWS: CPT

## 2023-08-25 PROCEDURE — 99285 EMERGENCY DEPT VISIT HI MDM: CPT

## 2023-08-25 PROCEDURE — 83690 ASSAY OF LIPASE: CPT

## 2023-08-25 RX ORDER — AZITHROMYCIN 250 MG/1
250 TABLET, FILM COATED ORAL
COMMUNITY

## 2023-08-25 RX ORDER — MYCOPHENOLATE MOFETIL 250 MG/1
250 CAPSULE ORAL ONCE
Status: COMPLETED | OUTPATIENT
Start: 2023-08-25 | End: 2023-08-25

## 2023-08-25 RX ORDER — TACROLIMUS 0.5 MG/1
1.5 CAPSULE ORAL NIGHTLY
COMMUNITY

## 2023-08-25 RX ORDER — MONTELUKAST SODIUM 10 MG/1
10 TABLET ORAL NIGHTLY
COMMUNITY

## 2023-08-25 RX ADMIN — MYCOPHENOLATE MOFETIL 250 MG: 250 CAPSULE ORAL at 22:55

## 2023-08-25 RX ADMIN — TACROLIMUS 1.5 MG: 1 CAPSULE ORAL at 22:54

## 2023-08-25 RX ADMIN — SODIUM CHLORIDE 80 MG: 9 INJECTION INTRAMUSCULAR; INTRAVENOUS; SUBCUTANEOUS at 22:42

## 2023-08-25 ASSESSMENT — PAIN SCALES - GENERAL: PAINLEVEL_OUTOF10: 0

## 2023-08-25 ASSESSMENT — ENCOUNTER SYMPTOMS
SHORTNESS OF BREATH: 1
DIARRHEA: 0
VOMITING: 0
COUGH: 0
NAUSEA: 0
BACK PAIN: 0
ABDOMINAL PAIN: 0

## 2023-08-25 NOTE — ED NOTES
Lung Transplant Team from [facility].  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      Physician: Dr. Kandis Arroyo, [phone]    On Call RN: [phone]     Angelina Antoine RN  08/25/23 3326

## 2023-08-25 NOTE — ED PROVIDER NOTES
[address] ENCOUNTER        Pt Name: Tika Traore  MRN: [id number]  [address] [facility] [place] 1955  Date of evaluation: 8/25/2023  Provider: So John DO  PCP: Jerry Bonilla MD  Note Started: 5:40 PM EDT 8/25/23    CHIEF COMPLAINT       Chief Complaint   Patient presents with    Chest Pain     Patient was working in the yard when he had chest pain and became light headed. Hx of double lung transplant in 2020. HISTORY OF PRESENT ILLNESS: 1 or more Elements     Limitations to history : None    Tika Traore is a 77 y.o. male with PMHx of prior DVT, on Eliquis 5 mg twice daily, heart failure with preserved infection fraction, and history of double lung transplant [facility] [place] in 2020, who presents for evaluation of chest pain that occurred today while he was doing yard work. States that he felt lightheaded, had chest pressure with shortness of breath, his symptoms have since resolved since sitting down. He had a heart catheterization in 2018, states he did not require any stents placed. He has not had cardiac evaluation since then. He has not had any falls or injuries, fevers, nausea vomiting or diarrhea, cough or sore throat, hemoptysis, abdominal pain, black or bloody stools or constipation. Denies any abnormal urinary symptoms, numbness or extremity weakness, headache or changes in vision or dizziness. Nursing Notes were all reviewed and agreed with or any disagreements were addressed in the HPI. REVIEW OF EXTERNAL NOTE :       Hemoglobin from outpatient lab work at [facility] on 8/24/2023, value was 10.2. Chart Review/External Note Review    Last Echo reviewed by Me:  Lab Results   Component Value Date    LVEF 64 06/30/2022    [address] [facility] Nuclear Medicine Scan 11/22/2017           Controlled Substance Monitoring:    Acute and Chronic Pain Monitoring:   No flowsheet data found.           REVIEW OF SYSTEMS :      Review of Systems

## 2023-08-26 PROBLEM — I20.9 ANGINA PECTORIS (HCC): Status: ACTIVE | Noted: 2023-08-26

## 2023-08-26 LAB
CYTOMEGALOVIRUS DNA, PCR COMMENT: NORMAL
CYTOMEGALOVIRUS DNA, PCR IU/ML: NOT DETECTED IU/ML
CYTOMEGALOVIRUS DNA, PCR LOG IU/ML: NORMAL LOG IU/ML
EKG ATRIAL RATE: 78 BPM
EKG P AXIS: 48 DEGREES
EKG P-R INTERVAL: 140 MS
EKG Q-T INTERVAL: 390 MS
EKG QRS DURATION: 100 MS
EKG QTC CALCULATION (BAZETT): 444 MS
EKG R AXIS: 19 DEGREES
EKG T AXIS: 50 DEGREES
EKG VENTRICULAR RATE: 78 BPM
GLUCOSE BLD-MCNC: 116 MG/DL (ref 74–99)
TROPONIN I SERPL HS-MCNC: 20 NG/L (ref 0–11)
TROPONIN I SERPL HS-MCNC: 20 NG/L (ref 0–11)

## 2023-08-26 PROCEDURE — 84484 ASSAY OF TROPONIN QUANT: CPT

## 2023-08-26 PROCEDURE — 2580000003 HC RX 258: Performed by: INTERNAL MEDICINE

## 2023-08-26 PROCEDURE — 6370000000 HC RX 637 (ALT 250 FOR IP): Performed by: INTERNAL MEDICINE

## 2023-08-26 PROCEDURE — 36415 COLL VENOUS BLD VENIPUNCTURE: CPT

## 2023-08-26 PROCEDURE — 93010 ELECTROCARDIOGRAM REPORT: CPT | Performed by: INTERNAL MEDICINE

## 2023-08-26 PROCEDURE — 2060000000 HC ICU INTERMEDIATE R&B

## 2023-08-26 PROCEDURE — 6360000002 HC RX W HCPCS: Performed by: INTERNAL MEDICINE

## 2023-08-26 PROCEDURE — 82962 GLUCOSE BLOOD TEST: CPT

## 2023-08-26 RX ORDER — ONDANSETRON 2 MG/ML
4 INJECTION INTRAMUSCULAR; INTRAVENOUS EVERY 6 HOURS PRN
Status: DISCONTINUED | OUTPATIENT
Start: 2023-08-26 | End: 2023-08-27 | Stop reason: HOSPADM

## 2023-08-26 RX ORDER — ACETAMINOPHEN 325 MG/1
650 TABLET ORAL EVERY 6 HOURS PRN
Status: DISCONTINUED | OUTPATIENT
Start: 2023-08-26 | End: 2023-08-27 | Stop reason: HOSPADM

## 2023-08-26 RX ORDER — CALCIUM CARBONATE 500(1250)
500 TABLET ORAL EVERY EVENING
Status: DISCONTINUED | OUTPATIENT
Start: 2023-08-26 | End: 2023-08-27 | Stop reason: HOSPADM

## 2023-08-26 RX ORDER — VITAMIN B COMPLEX
1000 TABLET ORAL EVERY EVENING
Status: DISCONTINUED | OUTPATIENT
Start: 2023-08-26 | End: 2023-08-27 | Stop reason: HOSPADM

## 2023-08-26 RX ORDER — MONTELUKAST SODIUM 10 MG/1
10 TABLET ORAL NIGHTLY
Status: DISCONTINUED | OUTPATIENT
Start: 2023-08-26 | End: 2023-08-27 | Stop reason: HOSPADM

## 2023-08-26 RX ORDER — ACETAMINOPHEN 650 MG/1
650 SUPPOSITORY RECTAL EVERY 6 HOURS PRN
Status: DISCONTINUED | OUTPATIENT
Start: 2023-08-26 | End: 2023-08-27 | Stop reason: HOSPADM

## 2023-08-26 RX ORDER — PROMETHAZINE HYDROCHLORIDE 25 MG/1
12.5 TABLET ORAL EVERY 6 HOURS PRN
Status: DISCONTINUED | OUTPATIENT
Start: 2023-08-26 | End: 2023-08-27 | Stop reason: HOSPADM

## 2023-08-26 RX ORDER — ALENDRONATE SODIUM 70 MG/1
70 TABLET ORAL
Status: DISCONTINUED | OUTPATIENT
Start: 2023-08-26 | End: 2023-08-26 | Stop reason: CLARIF

## 2023-08-26 RX ORDER — PREDNISONE 5 MG/1
5 TABLET ORAL DAILY
Status: DISCONTINUED | OUTPATIENT
Start: 2023-08-26 | End: 2023-08-27 | Stop reason: HOSPADM

## 2023-08-26 RX ORDER — TACROLIMUS 1 MG/1
2 CAPSULE ORAL DAILY
Status: DISCONTINUED | OUTPATIENT
Start: 2023-08-26 | End: 2023-08-27 | Stop reason: HOSPADM

## 2023-08-26 RX ORDER — DULOXETIN HYDROCHLORIDE 60 MG/1
60 CAPSULE, DELAYED RELEASE ORAL EVERY EVENING
Status: DISCONTINUED | OUTPATIENT
Start: 2023-08-26 | End: 2023-08-27 | Stop reason: HOSPADM

## 2023-08-26 RX ORDER — CETIRIZINE HYDROCHLORIDE 10 MG/1
10 TABLET ORAL DAILY
Status: DISCONTINUED | OUTPATIENT
Start: 2023-08-26 | End: 2023-08-27 | Stop reason: HOSPADM

## 2023-08-26 RX ORDER — M-VIT,TX,IRON,MINS/CALC/FOLIC 27MG-0.4MG
1 TABLET ORAL EVERY EVENING
Status: DISCONTINUED | OUTPATIENT
Start: 2023-08-26 | End: 2023-08-27 | Stop reason: HOSPADM

## 2023-08-26 RX ORDER — ATORVASTATIN CALCIUM 40 MG/1
40 TABLET, FILM COATED ORAL NIGHTLY
Status: DISCONTINUED | OUTPATIENT
Start: 2023-08-26 | End: 2023-08-27 | Stop reason: HOSPADM

## 2023-08-26 RX ORDER — MYCOPHENOLATE MOFETIL 250 MG/1
250 CAPSULE ORAL 2 TIMES DAILY
Status: DISCONTINUED | OUTPATIENT
Start: 2023-08-26 | End: 2023-08-27 | Stop reason: HOSPADM

## 2023-08-26 RX ORDER — SODIUM CHLORIDE 0.9 % (FLUSH) 0.9 %
5-40 SYRINGE (ML) INJECTION PRN
Status: DISCONTINUED | OUTPATIENT
Start: 2023-08-26 | End: 2023-08-27 | Stop reason: HOSPADM

## 2023-08-26 RX ORDER — ASPIRIN 81 MG/1
81 TABLET, CHEWABLE ORAL DAILY
Status: DISCONTINUED | OUTPATIENT
Start: 2023-08-27 | End: 2023-08-27 | Stop reason: HOSPADM

## 2023-08-26 RX ORDER — SODIUM CHLORIDE 0.9 % (FLUSH) 0.9 %
5-40 SYRINGE (ML) INJECTION EVERY 12 HOURS SCHEDULED
Status: DISCONTINUED | OUTPATIENT
Start: 2023-08-26 | End: 2023-08-27 | Stop reason: HOSPADM

## 2023-08-26 RX ORDER — MAGNESIUM OXIDE 400 MG/1
400 TABLET ORAL EVERY EVENING
Status: DISCONTINUED | OUTPATIENT
Start: 2023-08-26 | End: 2023-08-27 | Stop reason: HOSPADM

## 2023-08-26 RX ORDER — POLYETHYLENE GLYCOL 3350 17 G/17G
17 POWDER, FOR SOLUTION ORAL DAILY PRN
Status: DISCONTINUED | OUTPATIENT
Start: 2023-08-26 | End: 2023-08-27 | Stop reason: HOSPADM

## 2023-08-26 RX ORDER — DAPSONE 25 MG/1
100 TABLET ORAL DAILY
Status: DISCONTINUED | OUTPATIENT
Start: 2023-08-26 | End: 2023-08-27 | Stop reason: HOSPADM

## 2023-08-26 RX ORDER — ALLOPURINOL 300 MG/1
150 TABLET ORAL
Status: DISCONTINUED | OUTPATIENT
Start: 2023-08-26 | End: 2023-08-27 | Stop reason: HOSPADM

## 2023-08-26 RX ORDER — PANTOPRAZOLE SODIUM 40 MG/1
40 TABLET, DELAYED RELEASE ORAL DAILY
Status: DISCONTINUED | OUTPATIENT
Start: 2023-08-26 | End: 2023-08-27 | Stop reason: HOSPADM

## 2023-08-26 RX ORDER — SODIUM CHLORIDE 9 MG/ML
INJECTION, SOLUTION INTRAVENOUS PRN
Status: DISCONTINUED | OUTPATIENT
Start: 2023-08-26 | End: 2023-08-27 | Stop reason: HOSPADM

## 2023-08-26 RX ORDER — VALGANCICLOVIR 450 MG/1
900 TABLET, FILM COATED ORAL DAILY
Status: DISCONTINUED | OUTPATIENT
Start: 2023-08-26 | End: 2023-08-27 | Stop reason: HOSPADM

## 2023-08-26 RX ADMIN — EMPAGLIFLOZIN 12.5 MG: 25 TABLET, FILM COATED ORAL at 09:07

## 2023-08-26 RX ADMIN — ALLOPURINOL 150 MG: 300 TABLET ORAL at 17:14

## 2023-08-26 RX ADMIN — TACROLIMUS 2 MG: 1 CAPSULE ORAL at 09:08

## 2023-08-26 RX ADMIN — DAPSONE 100 MG: 25 TABLET ORAL at 09:08

## 2023-08-26 RX ADMIN — Medication 500 MG: at 17:15

## 2023-08-26 RX ADMIN — CETIRIZINE HYDROCHLORIDE 10 MG: 10 TABLET, FILM COATED ORAL at 09:07

## 2023-08-26 RX ADMIN — ATORVASTATIN CALCIUM 40 MG: 40 TABLET, FILM COATED ORAL at 20:41

## 2023-08-26 RX ADMIN — MULTIPLE VITAMINS W/ MINERALS TAB 1 TABLET: TAB at 17:15

## 2023-08-26 RX ADMIN — PREDNISONE 5 MG: 5 TABLET ORAL at 09:06

## 2023-08-26 RX ADMIN — DULOXETINE HYDROCHLORIDE 60 MG: 60 CAPSULE, DELAYED RELEASE ORAL at 17:14

## 2023-08-26 RX ADMIN — MAGNESIUM OXIDE 400 MG: 400 TABLET ORAL at 17:15

## 2023-08-26 RX ADMIN — SODIUM CHLORIDE, PRESERVATIVE FREE 10 ML: 5 INJECTION INTRAVENOUS at 20:45

## 2023-08-26 RX ADMIN — MYCOPHENOLATE MOFETIL 250 MG: 250 CAPSULE ORAL at 09:07

## 2023-08-26 RX ADMIN — SODIUM CHLORIDE, PRESERVATIVE FREE 10 ML: 5 INJECTION INTRAVENOUS at 09:06

## 2023-08-26 RX ADMIN — MONTELUKAST 10 MG: 10 TABLET, FILM COATED ORAL at 20:40

## 2023-08-26 RX ADMIN — POLYETHYLENE GLYCOL 3350 17 G: 17 POWDER, FOR SOLUTION ORAL at 09:06

## 2023-08-26 RX ADMIN — TACROLIMUS 1.5 MG: 1 CAPSULE ORAL at 20:41

## 2023-08-26 RX ADMIN — METOPROLOL TARTRATE 12.5 MG: 25 TABLET, FILM COATED ORAL at 20:41

## 2023-08-26 RX ADMIN — METOPROLOL TARTRATE 12.5 MG: 25 TABLET, FILM COATED ORAL at 09:06

## 2023-08-26 RX ADMIN — MYCOPHENOLATE MOFETIL 250 MG: 250 CAPSULE ORAL at 20:40

## 2023-08-26 RX ADMIN — VALGANCICLOVIR 900 MG: 450 TABLET, FILM COATED ORAL at 09:08

## 2023-08-26 RX ADMIN — EMPAGLIFLOZIN 12.5 MG: 25 TABLET, FILM COATED ORAL at 20:41

## 2023-08-26 RX ADMIN — Medication 1000 UNITS: at 17:14

## 2023-08-26 RX ADMIN — APIXABAN 2.5 MG: 2.5 TABLET, FILM COATED ORAL at 20:40

## 2023-08-26 RX ADMIN — APIXABAN 2.5 MG: 2.5 TABLET, FILM COATED ORAL at 09:07

## 2023-08-26 RX ADMIN — PANTOPRAZOLE SODIUM 40 MG: 40 TABLET, DELAYED RELEASE ORAL at 06:02

## 2023-08-26 ASSESSMENT — PAIN SCALES - GENERAL: PAINLEVEL_OUTOF10: 0

## 2023-08-26 NOTE — PROGRESS NOTES
Pharmacy Note    This patient was ordered Fosamax. Per the Gio, this medication is non-formulary and not stocked by pharmacy for the reason indicated below. The medication can be reordered at discharge.      Medications in which risks outweigh benefits during hospitalization:           -  oral bisphosphonates         -  raloxifene (Evista)        -  SGLT2 inhibitors (ordered in the hospital for an indication other than heart failure or chronic kidney disease)    Medications that lack necessity during an acute hospital stay:        -  nasal antihistamines        -  nasal ipratropium 0.03% and 0.06%        -  nasal miacalcin        -  acyclovir topical cream/ointment orders for herpes labialis (cold sores)

## 2023-08-26 NOTE — PLAN OF CARE
Problem: Discharge Planning  Goal: Discharge to home or other facility with appropriate resources  8/26/2023 1017 by Jose Young RN  Outcome: Progressing  Flowsheets (Taken 8/26/2023 0802)  Discharge to home or other facility with appropriate resources:   Identify barriers to discharge with patient and caregiver   Arrange for needed discharge resources and transportation as appropriate   Identify discharge learning needs (meds, wound care, etc)   Refer to discharge planning if patient needs post-hospital services based on physician order or complex needs related to functional status, cognitive ability or social support system  8/26/2023 0435 by Carlota Driscoll, RN  Outcome: Progressing     Problem: Safety - Adult  Goal: Free from fall injury  8/26/2023 1017 by Jose Young RN  Outcome: Progressing  8/26/2023 0435 by Carlota Driscoll RN  Outcome: Progressing     Problem: ABCDS Injury Assessment  Goal: Absence of physical injury  8/26/2023 1017 by Jose Young RN  Outcome: Progressing  8/26/2023 0435 by Carlota Driscoll, RN  Outcome: Progressing

## 2023-08-26 NOTE — PROGRESS NOTES
4 Eyes Skin Assessment     NAME:  Sherren Asters  YOB: 1955  MEDICAL RECORD NUMBER:  [id number]    The patient is being assessed for  Admission    I agree that at least one RN has performed a thorough Head to Toe Skin Assessment on the patient. ALL assessment sites listed below have been assessed. Areas assessed by both nurses:    Head, Face, Ears, Shoulders, Back, Chest, Arms, Elbows, Hands, Sacrum. Buttock, Coccyx, Ischium, and Legs. Feet and Heels  Bruises on BUE, Scab on LLQ of abdomen. Does the Patient have a Wound?  No noted wound(s)       Jerson Prevention initiated by RN: No  Wound Care Orders initiated by RN: No    Pressure Injury (Stage 3,4, Unstageable, DTI, NWPT, and Complex wounds) if present, place Wound referral order by RN under : No    New Ostomies, if present place, Ostomy referral order under : No     Nurse 1 eSignature: Electronically signed by Vazquez Waters RN on 8/26/23 at 12:05 AM EDT    **SHARE this note so that the co-signing nurse can place an eSignature**    Nurse 2 eSignature: {Esignature:[id number]}

## 2023-08-26 NOTE — CONSULTS
as  stated above. GASTROENTEROLOGY:  No abdominal pain. No vomiting. No diarrhea. GENITOURINARY:  No dysuria or frequency. No bladder or kidney tumor. NEURO:  No clear history of seizure or stroke. No syncope. HEMATOLOGY:  No bleeding disorder. No adenopathy. ENDOCRINOLOGY:  No polyuria or polydipsia. SKIN:  No skin disorder. MUSCULOSKELETAL:  Unremarkable. PSYCH:  No depression or suicidal ideation. PHYSICAL EXAMINATION:  GENERAL:  The patient was sitting upright, in no acute distress. VITAL SIGNS:  Blood pressure around 110/70, heart rate around 70. The  patient is afebrile. NECK:  No JVD. HEART:  Regular S1 and S2. No S3. Soft 1/6 systolic murmur heard best  at the left sternal border. LUNGS:  No rales, no wheezing. ABDOMEN:  Soft, nontender. EXTREMITIES:  No edema, cyanosis or clubbing. NEURO:  Alert and awake with no focal deficits. EKG showing sinus rhythm with normal axis and no significant ST-T wave  changes. LABS:  Initial troponin is 20. Repeat one is 19, third one is 20. ProBNP is 456. WBC 4.6, hemoglobin 9, platelet count 862. BUN 16,  creatinine 0.9, sodium 141, potassium 4.1. Chest x-ray showed no acute findings. IMPRESSION:  1. Chest pain. The patient had chest pain after he did some yard work. EKG is not showing acute ischemic changes. He had mild troponin  elevation around 20. He did not have significant up trending troponin  elevation. With his complaint of chest pain, I recommended coronary  workup with stress test.  The patient just ate breakfast and he will be  scheduled for stress test tomorrow. I discussed with patient other  possible etiology of his chest pain including musculoskeletal versus GI  related pain. 2.  History of DVT. The patient is on Eliquis. 3.  Idiopathic pulmonary fibrosis. Status post lung transplant 2020.         Jeet Ojeda MD    D: 08/26/2023 10:35:57       T: 08/26/2023 10:41:14     [email or web address]  Job#: [id number]

## 2023-08-26 NOTE — PROGRESS NOTES
Message left on Dr. Melita Isaacs voicemail for admission orders. Patient is comfortable, in bed, with no complaints at this time.

## 2023-08-26 NOTE — PROGRESS NOTES
Report received from nightshift RN. Patient awake and alert in bed, oriented x4, with no complaints of pain or discomfort at this time. Denies chest pain or shortness of breath. No reports of dizziness or light-headedness this morning. Vital signs reviewed. Labs and orders reviewed. Will assume care of patient.

## 2023-08-27 ENCOUNTER — APPOINTMENT (OUTPATIENT)
Dept: NUCLEAR MEDICINE | Age: 68
DRG: 206 | End: 2023-08-27
Payer: MEDICARE

## 2023-08-27 VITALS
OXYGEN SATURATION: 93 % | BODY MASS INDEX: 27.84 KG/M2 | WEIGHT: 194.5 LBS | HEIGHT: 70 IN | SYSTOLIC BLOOD PRESSURE: 135 MMHG | HEART RATE: 70 BPM | RESPIRATION RATE: 14 BRPM | DIASTOLIC BLOOD PRESSURE: 70 MMHG | TEMPERATURE: 97.9 F

## 2023-08-27 LAB
ERYTHROCYTE [DISTWIDTH] IN BLOOD BY AUTOMATED COUNT: 17 % (ref 11.5–15)
HCT VFR BLD AUTO: 31.5 % (ref 37–54)
HGB BLD-MCNC: 9.6 G/DL (ref 12.5–16.5)
LV EF: 77 %
LVEF MODALITY: NORMAL
MCH RBC QN AUTO: 27.8 PG (ref 26–35)
MCHC RBC AUTO-ENTMCNC: 30.5 G/DL (ref 32–34.5)
MCV RBC AUTO: 91.3 FL (ref 80–99.9)
PLATELET # BLD AUTO: 97 K/UL (ref 130–450)
PLATELET CONFIRMATION: NORMAL
PMV BLD AUTO: 11.4 FL (ref 7–12)
RBC # BLD AUTO: 3.45 M/UL (ref 3.8–5.8)
WBC OTHER # BLD: 4.6 K/UL (ref 4.5–11.5)

## 2023-08-27 PROCEDURE — 93017 CV STRESS TEST TRACING ONLY: CPT

## 2023-08-27 PROCEDURE — 78452 HT MUSCLE IMAGE SPECT MULT: CPT | Performed by: INTERNAL MEDICINE

## 2023-08-27 PROCEDURE — 85027 COMPLETE CBC AUTOMATED: CPT

## 2023-08-27 PROCEDURE — 6360000002 HC RX W HCPCS: Performed by: SPECIALIST

## 2023-08-27 PROCEDURE — 3430000000 HC RX DIAGNOSTIC RADIOPHARMACEUTICAL: Performed by: RADIOLOGY

## 2023-08-27 PROCEDURE — 6370000000 HC RX 637 (ALT 250 FOR IP): Performed by: INTERNAL MEDICINE

## 2023-08-27 PROCEDURE — 36415 COLL VENOUS BLD VENIPUNCTURE: CPT

## 2023-08-27 PROCEDURE — 2580000003 HC RX 258: Performed by: INTERNAL MEDICINE

## 2023-08-27 PROCEDURE — 78452 HT MUSCLE IMAGE SPECT MULT: CPT

## 2023-08-27 PROCEDURE — 6360000002 HC RX W HCPCS: Performed by: INTERNAL MEDICINE

## 2023-08-27 PROCEDURE — A9500 TC99M SESTAMIBI: HCPCS | Performed by: RADIOLOGY

## 2023-08-27 RX ORDER — REGADENOSON 0.08 MG/ML
0.4 INJECTION, SOLUTION INTRAVENOUS
Status: COMPLETED | OUTPATIENT
Start: 2023-08-27 | End: 2023-08-27

## 2023-08-27 RX ORDER — ATORVASTATIN CALCIUM 40 MG/1
40 TABLET, FILM COATED ORAL NIGHTLY
Qty: 30 TABLET | Refills: 3 | Status: SHIPPED | OUTPATIENT
Start: 2023-08-27

## 2023-08-27 RX ORDER — TETRAKIS(2-METHOXYISOBUTYLISOCYANIDE)COPPER(I) TETRAFLUOROBORATE 1 MG/ML
10 INJECTION, POWDER, LYOPHILIZED, FOR SOLUTION INTRAVENOUS
Status: COMPLETED | OUTPATIENT
Start: 2023-08-27 | End: 2023-08-27

## 2023-08-27 RX ORDER — TETRAKIS(2-METHOXYISOBUTYLISOCYANIDE)COPPER(I) TETRAFLUOROBORATE 1 MG/ML
30 INJECTION, POWDER, LYOPHILIZED, FOR SOLUTION INTRAVENOUS
Status: COMPLETED | OUTPATIENT
Start: 2023-08-27 | End: 2023-08-27

## 2023-08-27 RX ADMIN — SODIUM CHLORIDE, PRESERVATIVE FREE 10 ML: 5 INJECTION INTRAVENOUS at 09:55

## 2023-08-27 RX ADMIN — REGADENOSON 0.4 MG: 0.08 INJECTION, SOLUTION INTRAVENOUS at 08:43

## 2023-08-27 RX ADMIN — APIXABAN 2.5 MG: 2.5 TABLET, FILM COATED ORAL at 09:55

## 2023-08-27 RX ADMIN — TACROLIMUS 2 MG: 1 CAPSULE ORAL at 09:56

## 2023-08-27 RX ADMIN — VALGANCICLOVIR 900 MG: 450 TABLET, FILM COATED ORAL at 09:56

## 2023-08-27 RX ADMIN — MYCOPHENOLATE MOFETIL 250 MG: 250 CAPSULE ORAL at 09:55

## 2023-08-27 RX ADMIN — METOPROLOL TARTRATE 12.5 MG: 25 TABLET, FILM COATED ORAL at 09:55

## 2023-08-27 RX ADMIN — PREDNISONE 5 MG: 5 TABLET ORAL at 09:55

## 2023-08-27 RX ADMIN — Medication 10 MILLICURIE: at 07:28

## 2023-08-27 RX ADMIN — CETIRIZINE HYDROCHLORIDE 10 MG: 10 TABLET, FILM COATED ORAL at 09:57

## 2023-08-27 RX ADMIN — POLYETHYLENE GLYCOL 3350 17 G: 17 POWDER, FOR SOLUTION ORAL at 09:54

## 2023-08-27 RX ADMIN — Medication 30 MILLICURIE: at 08:44

## 2023-08-27 RX ADMIN — ASPIRIN 81 MG CHEWABLE TABLET 81 MG: 81 TABLET CHEWABLE at 09:54

## 2023-08-27 RX ADMIN — DAPSONE 100 MG: 25 TABLET ORAL at 09:55

## 2023-08-27 RX ADMIN — EMPAGLIFLOZIN 12.5 MG: 25 TABLET, FILM COATED ORAL at 09:56

## 2023-08-27 ASSESSMENT — PAIN SCALES - GENERAL
PAINLEVEL_OUTOF10: 0

## 2023-08-27 NOTE — DISCHARGE INSTR - DIET
Good nutrition is important when healing from an illness, injury, or surgery. Follow any nutrition recommendations given to you during your hospital stay. If you were given an oral nutrition supplement while in the hospital, continue to take this supplement at home. You can take it with meals, in-between meals, and/or before bedtime. These supplements can be purchased at most local grocery stores, pharmacies, and chain [employer]-stores. If you have any questions about your diet or nutrition, call the hospital and ask for the dietitian. Resume diet as tolerated.

## 2023-08-27 NOTE — PROGRESS NOTES
Report received from nightshift RN. Patient awake and alert this morning, oriented x4, with no complaints of pain or discomfort. NPO overnight.  Transferred by charge RN to NM for stress test.

## 2023-08-27 NOTE — PLAN OF CARE
Problem: Discharge Planning  Goal: Discharge to home or other facility with appropriate resources  8/27/2023 1535 by July Mathis RN  Outcome: Adequate for Discharge  8/27/2023 1027 by July Mathis RN  Outcome: Progressing  Flowsheets (Taken 8/27/2023 1023)  Discharge to home or other facility with appropriate resources:   Identify barriers to discharge with patient and caregiver   Arrange for needed discharge resources and transportation as appropriate   Identify discharge learning needs (meds, wound care, etc)   Refer to discharge planning if patient needs post-hospital services based on physician order or complex needs related to functional status, cognitive ability or social support system     Problem: Safety - Adult  Goal: Free from fall injury  8/27/2023 1535 by July Mathis RN  Outcome: Adequate for Discharge  8/27/2023 1027 by July Mathis RN  Outcome: Progressing     Problem: ABCDS Injury Assessment  Goal: Absence of physical injury  8/27/2023 1535 by July Mathis RN  Outcome: Adequate for Discharge  8/27/2023 1027 by July Mathis RN  Outcome: Progressing

## 2023-08-27 NOTE — PLAN OF CARE
Problem: Discharge Planning  Goal: Discharge to home or other facility with appropriate resources  8/27/2023 1027 by Yesica Peguero RN  Outcome: Progressing  Flowsheets (Taken 8/27/2023 1023)  Discharge to home or other facility with appropriate resources:   Identify barriers to discharge with patient and caregiver   Arrange for needed discharge resources and transportation as appropriate   Identify discharge learning needs (meds, wound care, etc)   Refer to discharge planning if patient needs post-hospital services based on physician order or complex needs related to functional status, cognitive ability or social support system  8/27/2023 0117 by Vicenta Up RN  Outcome: Progressing  Flowsheets (Taken 8/26/2023 2000)  Discharge to home or other facility with appropriate resources:   Identify barriers to discharge with patient and caregiver   Arrange for needed discharge resources and transportation as appropriate   Identify discharge learning needs (meds, wound care, etc)   Refer to discharge planning if patient needs post-hospital services based on physician order or complex needs related to functional status, cognitive ability or social support system     Problem: Safety - Adult  Goal: Free from fall injury  8/27/2023 1027 by Yesica Peguero RN  Outcome: Progressing  8/27/2023 0117 by Vicenta Up RN  Outcome: Progressing  Flowsheets (Taken 8/26/2023 2000)  Free From Fall Injury: Instruct family/caregiver on patient safety     Problem: ABCDS Injury Assessment  Goal: Absence of physical injury  8/27/2023 1027 by Yesica Peguero RN  Outcome: Progressing  8/27/2023 0117 by Vicenta Up RN  Outcome: Progressing  Flowsheets (Taken 8/26/2023 2000)  Absence of Physical Injury: Implement safety measures based on patient assessment

## 2023-08-27 NOTE — PROCEDURES
[address] [facility]                    [address] [place], [address] [place] [address]                              CARDIAC STRESS TEST    PATIENT NAME: Filemon Freed                     :        1955  MED REC NO:   [id number]                            ROOM:       [id number]  ACCOUNT NO:   [de-identified]                           ADMIT DATE: 2023  PROVIDER:     Clovis Oshea MD    CARDIOVASCULAR DIAGNOSTIC DEPARTMENT    DATE OF STUDY:  2023    STRESS TEST    REASON FOR TEST:  Chest pain. Lexiscan 0.4 mg IV was injected as per protocol. Resting heart rate is 62 a minute. Resting EKG showing sinus rhythm  with no significant ST-T wave changes. Heart rate in the recovery  period was up to about 78 a minute. EKG during and after the injection  did not show significant ST or T-wave changes from baseline EKG. No  significant arrhythmias are seen. The patient had no complaints of  chest pain. Nuclear agent was injected as per protocol. Nuclear imaging results are reported separately.         Clovis Oshea MD    D: 2023 8:53:41       T: 2023 8:57:20     [email or web address]  Job#: [id number]     Doc#: [id number]    CC:

## 2023-08-27 NOTE — PROGRESS NOTES
Patient discharge education and information provided; all questions addressed. PIV removed. Telemetry monitor removed and returned to nurse's station. Wife to provide transportation home.

## 2023-08-27 NOTE — H&P
Department of Internal Medicine  History and Physical    PCP: Dr. Ubaldo Sams  Admitting Physician: Dr. Brandie Louie  Consultants: Dr. Renée Petty:  chest pain    HISTORY OF PRESENT ILLNESS:    This is a very pleasant 49-year-old white male with past medical history which is significant for idiopathic pulmonary fibrosis, history of pulmonary hypertension, COPD, DVT, history of gout, history of tobacco abuse quit in 2014, hypertension, interstitial lung disease, history of kidney stones, affected sleep apnea. Patient was working in his yard for almost 3 hours before he started getting chest discomfort with shortness of breath he also felt lightheaded so he presented to the ER for further evaluation and treatment patient had a cardiac catheterization done in 2018 he did not require any stents at that time he did not have any cardiac evaluation since then no history of any fall injury fever nausea vomiting diarrhea cough sore throat hemoptysis abdominal pain and black or bloody stool or constipation denied any urinary symptoms. Currently when I saw him he is pain-free he is a double lung transplant recipient for interstitial pulmonary fibrosis. cardiology consult will be advised    PAST MEDICAL Hx:  Past Medical History:   Diagnosis Date    COPD (chronic obstructive pulmonary disease) ([address])     Deep venous thrombosis ([address]) 9/26/2017    Dx 1995. Was not attributed to anything. Was on oral anticoagulation for 1 year only. Gout     History of tobacco use 11/8/2017     1 PPD x 40 years, quit in 2014    Hypertension     Idiopathic pulmonary fibrosis ([address]) 9/26/2017    Interstitial lung disease ([address])     Kidney stones     Obesity 9/26/2017    Obstructive sleep apnea 11/8/2017    Dx 10/2017. Initiated CPAP 11/2017.      Sleep apnea     Tobacco abuse        PAST SURGICAL Hx:   Past Surgical History:   Procedure Laterality Date    CARDIAC CATHETERIZATION  07/26/2018    Dr. Eddie Mcgill [facility]

## 2023-08-28 NOTE — DISCHARGE SUMMARY
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 He was also reporting shortness of breath and feeling light headed. He has history of a double lung transplant due to interstitial pulmonary fibrosis. He was continued on Eliquis for history of DVT. Dr Rebeca Márquez was consulted and advised for stress test.  The stress test was unremarkable with an ejection fraction of 77%. Over the course of the stay he remained chest pain free without any recurrent episodes. He was seen and examined and offered no new complaints. He was discharged to home in stable condition and is to follow up with his cardiologist as outpatient. LABS[de-identified]  Lab Results   Component Value Date    WBC 4.6 2023    HGB 9.6 (L) 2023    HCT 31.5 (L) 2023    PLT 97 (L) 2023     2023    K 4.1 2023     2023    CREATININE 0.9 2023    BUN 16 2023    CO2 26 2023    GLUCOSE 115 (H) 2023    ALT 28 2023    AST 36 2023    INR 1.1 2023     Lab Results   Component Value Date    INR 1.1 2023    PROTIME 13.0 (H) 2023        IMAGING:  Stress/Rest NM Myocardial SPECT Exercise or RX   Final Result   1. The myocardial perfusion imaging was normal with attenuation   artifact.

## 2023-09-19 ENCOUNTER — HOSPITAL ENCOUNTER (OUTPATIENT)
Dept: DATA CONVERSION | Facility: HOSPITAL | Age: 68
End: 2023-09-19
Attending: INTERNAL MEDICINE | Admitting: INTERNAL MEDICINE
Payer: MEDICARE

## 2023-09-19 DIAGNOSIS — Z94.2 LUNG TRANSPLANT STATUS (MULTI): ICD-10-CM

## 2023-09-19 DIAGNOSIS — Z48.24 ENCOUNTER FOR AFTERCARE FOLLOWING LUNG TRANSPLANT (MULTI): ICD-10-CM

## 2023-09-20 LAB
COMPLETE PATHOLOGY REPORT: NORMAL
CONVERTED CLINICAL DIAGNOSIS-HISTORY: NORMAL
CONVERTED FINAL DIAGNOSIS: NORMAL
CONVERTED FINAL REPORT PDF LINK TO COPY AND PASTE: NORMAL
CONVERTED GROSS DESCRIPTION: NORMAL
FUNGITELL BETA-D GLUCAN PCR, QUANTITATIVE (NON-BLOOD SPECIMEN): <45 PG/ML

## 2023-09-21 LAB
ADENOVIRUS QPCR, VIRC: NOT DETECTED COPIES/ML
ADENOVIRUS RVP, VIRC: NOT DETECTED
ASPERGILLUS GALACTOMANNAN EIA (NON-BLOOD SPECIMEN): 0.09
CHLAMYDIA.PNEUMONIAE PCR, VIRC: NOT DETECTED
COMPLETE PATHOLOGY REPORT: NORMAL
CONVERTED CLINICAL DIAGNOSIS-HISTORY: NORMAL
CONVERTED DIAGNOSIS COMMENT: NORMAL
CONVERTED FINAL DIAGNOSIS: NORMAL
CONVERTED FINAL REPORT PDF LINK TO COPY AND PASTE: NORMAL
CONVERTED SPECIMEN DESCRIPTION: NORMAL
CYTOMEGALOVIRUS DNA PCR, (NON-BLOOD SPECI: NOT DETECTED IU/ML
ENTEROVIRUS/RHINOVIRUS RVP, VIRC: NOT DETECTED
GRAM STAIN: ABNORMAL
HUMAN BOCAVIRUS RVP, VIRC: NOT DETECTED
HUMAN CORONAVIRUS RVP, VIRC: NOT DETECTED
HUMAN HERPESVIRUS-6 DNA PCR, QUANTITATIVE (NON-BLOOD SPECIMEN): NOT DETECTED COPIES/ML
INFLUENZA A , VIRC: NOT DETECTED
INFLUENZA A H1N1-09 , VIRC: NOT DETECTED
INFLUENZA B PCR, VIRC: NOT DETECTED
LEGIONELLA PNEUMO PCR, VIRC: NOT DETECTED
METAPNEUMOVIRUS , VIRC: NOT DETECTED
MYCOPLASMA.PNEUMONIAE PCR, VIRC: NOT DETECTED
PAN.LEGIONELLA PCR, VIRC: NOT DETECTED
PARAINFLUENZA PCR, VIRC: NOT DETECTED
PNEUMOCYSTIS PCR,QUANTITATIVE (NON-BLOOD SPECIMEN): NOT DETECTED COPIES/ML
RESPIRATORY CULTURE/SMEAR: ABNORMAL
RESPIRATORY CULTURE/SMEAR: ABNORMAL
RSV PCR, RVP, VIRC: NOT DETECTED

## 2023-09-22 LAB
GRAM STAIN: ABNORMAL
RESPIRATORY CULTURE/SMEAR: ABNORMAL
RESPIRATORY CULTURE/SMEAR: ABNORMAL

## 2023-09-29 VITALS — WEIGHT: 198.41 LBS | HEIGHT: 70 IN | BODY MASS INDEX: 28.41 KG/M2

## 2023-09-30 PROBLEM — H90.A22 SENSORINEURAL HEARING LOSS (SNHL) OF LEFT EAR WITH RESTRICTED HEARING OF RIGHT EAR: Status: ACTIVE | Noted: 2023-09-30

## 2023-09-30 PROBLEM — H93.13 SUBJECTIVE TINNITUS OF BOTH EARS: Status: ACTIVE | Noted: 2023-09-30

## 2023-09-30 PROBLEM — I26.99 PULMONARY EMBOLI (MULTI): Status: ACTIVE | Noted: 2023-09-30

## 2023-09-30 PROBLEM — M81.0 OSTEOPOROSIS: Status: ACTIVE | Noted: 2023-09-30

## 2023-09-30 PROBLEM — R09.A2 GLOBUS SENSATION: Status: ACTIVE | Noted: 2023-09-30

## 2023-09-30 PROBLEM — H02.201 LAGOPHTHALMOS OF RIGHT UPPER EYELID: Status: ACTIVE | Noted: 2023-09-30

## 2023-09-30 PROBLEM — R73.9 STEROID-INDUCED HYPERGLYCEMIA: Status: ACTIVE | Noted: 2023-09-30

## 2023-09-30 PROBLEM — J38.00 VOCAL FOLD PARESIS: Status: ACTIVE | Noted: 2023-09-30

## 2023-09-30 PROBLEM — H02.539 EYELID RETRACTION OR LAG: Status: ACTIVE | Noted: 2023-09-30

## 2023-09-30 PROBLEM — G51.0 BELL'S PALSY: Status: ACTIVE | Noted: 2023-09-30

## 2023-09-30 PROBLEM — T38.0X5A STEROID-INDUCED HYPERGLYCEMIA: Status: ACTIVE | Noted: 2023-09-30

## 2023-09-30 PROBLEM — H70.091: Status: ACTIVE | Noted: 2023-09-30

## 2023-09-30 PROBLEM — R76.8 ELEVATED IGE LEVEL: Status: ACTIVE | Noted: 2023-09-30

## 2023-09-30 PROBLEM — H11.421 CHEMOSIS OF RIGHT CONJUNCTIVA: Status: ACTIVE | Noted: 2023-09-30

## 2023-09-30 PROBLEM — D22.5 MELANOCYTIC NEVI OF TRUNK: Status: ACTIVE | Noted: 2021-08-18

## 2023-09-30 PROBLEM — Z22.7 LATENT TUBERCULOSIS: Status: ACTIVE | Noted: 2023-09-30

## 2023-09-30 PROBLEM — L82.1 OTHER SEBORRHEIC KERATOSIS: Status: ACTIVE | Noted: 2021-08-18

## 2023-09-30 PROBLEM — Z86.59 HISTORY OF DEPRESSION: Status: ACTIVE | Noted: 2023-09-30

## 2023-09-30 PROBLEM — D84.9 IMMUNOSUPPRESSION (MULTI): Status: ACTIVE | Noted: 2023-09-30

## 2023-09-30 PROBLEM — E87.5 HYPERKALEMIA: Status: ACTIVE | Noted: 2023-09-30

## 2023-09-30 PROBLEM — R60.0 EDEMA, LEG: Status: ACTIVE | Noted: 2023-09-30

## 2023-09-30 PROBLEM — J31.0 CHRONIC RHINITIS: Status: ACTIVE | Noted: 2023-09-30

## 2023-09-30 PROBLEM — M86.9 OSTEOMYELITIS OF SKULL (MULTI): Status: ACTIVE | Noted: 2023-09-30

## 2023-09-30 PROBLEM — Z94.2: Status: ACTIVE | Noted: 2023-09-30

## 2023-09-30 PROBLEM — L81.4 OTHER MELANIN HYPERPIGMENTATION: Status: ACTIVE | Noted: 2021-08-18

## 2023-09-30 PROBLEM — I27.20 PULMONARY HYPERTENSION (MULTI): Status: ACTIVE | Noted: 2023-09-30

## 2023-09-30 PROBLEM — J84.112 IPF (IDIOPATHIC PULMONARY FIBROSIS) (MULTI): Status: ACTIVE | Noted: 2023-09-30

## 2023-09-30 PROBLEM — D18.01 HEMANGIOMA OF SKIN AND SUBCUTANEOUS TISSUE: Status: ACTIVE | Noted: 2021-08-18

## 2023-09-30 PROBLEM — D64.9 ANEMIA: Status: ACTIVE | Noted: 2023-09-30

## 2023-09-30 PROBLEM — R13.14 PHARYNGOESOPHAGEAL DYSPHAGIA: Status: ACTIVE | Noted: 2023-09-30

## 2023-09-30 PROBLEM — G51.9 ABNORMALITY OF FACIAL NERVE: Status: ACTIVE | Noted: 2023-09-30

## 2023-09-30 PROBLEM — I10 HYPERTENSION: Status: ACTIVE | Noted: 2023-09-30

## 2023-09-30 PROBLEM — H90.A31 MIXED CONDUCTIVE AND SENSORINEURAL HEARING LOSS, UNILATERAL, RIGHT EAR WITH RESTRICTED HEARING ON THE CONTRALATERAL SIDE: Status: ACTIVE | Noted: 2023-09-30

## 2023-09-30 PROBLEM — E87.8 ELECTROLYTE AND FLUID DISORDER: Status: ACTIVE | Noted: 2023-09-30

## 2023-09-30 PROBLEM — A41.01 MSSA (METHICILLIN SUSCEPTIBLE STAPHYLOCOCCUS AUREUS) SEPTICEMIA (MULTI): Status: ACTIVE | Noted: 2023-09-30

## 2023-09-30 PROBLEM — E78.5: Status: ACTIVE | Noted: 2023-09-30

## 2023-09-30 PROBLEM — Q67.0 FACIAL ASYMMETRIES: Status: ACTIVE | Noted: 2023-09-30

## 2023-09-30 PROBLEM — E11.9 TYPE 2 DIABETES MELLITUS (MULTI): Status: ACTIVE | Noted: 2023-09-30

## 2023-09-30 PROBLEM — R80.9 MICROALBUMINURIA: Status: ACTIVE | Noted: 2023-09-30

## 2023-09-30 PROBLEM — K21.9 GASTROESOPHAGEAL REFLUX DISEASE WITHOUT ESOPHAGITIS: Status: ACTIVE | Noted: 2023-09-30

## 2023-09-30 RX ORDER — VIT C/E/ZN/COPPR/LUTEIN/ZEAXAN 250MG-90MG
1 CAPSULE ORAL DAILY
COMMUNITY

## 2023-09-30 RX ORDER — ACETAMINOPHEN 325 MG/1
650 TABLET ORAL EVERY 6 HOURS PRN
COMMUNITY
Start: 2020-10-20 | End: 2024-04-03 | Stop reason: WASHOUT

## 2023-09-30 RX ORDER — ROSUVASTATIN CALCIUM 40 MG/1
40 TABLET, COATED ORAL DAILY
COMMUNITY
Start: 2023-08-28 | End: 2023-10-27 | Stop reason: SDUPTHER

## 2023-09-30 RX ORDER — TACROLIMUS 5 MG/1
CAPSULE ORAL
COMMUNITY
Start: 2021-07-08 | End: 2023-10-12 | Stop reason: WASHOUT

## 2023-09-30 RX ORDER — NAPROXEN SODIUM 220 MG/1
1 TABLET, FILM COATED ORAL DAILY
COMMUNITY
Start: 2020-05-04 | End: 2023-10-12 | Stop reason: WASHOUT

## 2023-09-30 RX ORDER — CALCIUM CARBONATE 300MG(750)
800 TABLET,CHEWABLE ORAL
COMMUNITY
Start: 2021-08-02 | End: 2024-03-04 | Stop reason: SDUPTHER

## 2023-09-30 RX ORDER — MULTIVIT-MIN/FA/LYCOPEN/LUTEIN .4-300-25
1 TABLET ORAL DAILY
COMMUNITY

## 2023-09-30 RX ORDER — VORICONAZOLE 200 MG/1
300 TABLET, FILM COATED ORAL EVERY 12 HOURS
COMMUNITY
Start: 2020-05-04 | End: 2023-10-13 | Stop reason: ALTCHOICE

## 2023-09-30 RX ORDER — INSULIN LISPRO 100 [IU]/ML
INJECTION, SOLUTION INTRAVENOUS; SUBCUTANEOUS
COMMUNITY
Start: 2020-09-29 | End: 2023-10-12 | Stop reason: WASHOUT

## 2023-09-30 RX ORDER — MUPIROCIN 20 MG/G
OINTMENT TOPICAL
COMMUNITY
Start: 2023-05-24 | End: 2023-10-12 | Stop reason: WASHOUT

## 2023-09-30 RX ORDER — DOCUSATE SODIUM 100 MG/1
100 CAPSULE, LIQUID FILLED ORAL 2 TIMES DAILY PRN
COMMUNITY
Start: 2021-09-23 | End: 2023-10-13 | Stop reason: ALTCHOICE

## 2023-09-30 RX ORDER — CLINDAMYCIN PHOSPHATE 11.9 MG/ML
SOLUTION TOPICAL
COMMUNITY
Start: 2023-05-24 | End: 2023-10-12 | Stop reason: WASHOUT

## 2023-10-02 ENCOUNTER — TELEPHONE (OUTPATIENT)
Dept: TRANSPLANT | Facility: HOSPITAL | Age: 68
End: 2023-10-02

## 2023-10-02 ENCOUNTER — LAB (OUTPATIENT)
Dept: LAB | Facility: LAB | Age: 68
End: 2023-10-02
Payer: COMMERCIAL

## 2023-10-02 DIAGNOSIS — Z94.2 S/P LUNG TRANSPLANT (MULTI): ICD-10-CM

## 2023-10-02 DIAGNOSIS — Z94.2 LUNG TRANSPLANT STATUS (MULTI): ICD-10-CM

## 2023-10-02 DIAGNOSIS — Z94.2 LUNG TRANSPLANT STATUS (MULTI): Primary | ICD-10-CM

## 2023-10-02 LAB
ALBUMIN SERPL BCP-MCNC: 3.7 G/DL (ref 3.4–5)
ALP SERPL-CCNC: 105 U/L (ref 33–136)
ALT SERPL W P-5'-P-CCNC: 25 U/L (ref 10–52)
ANION GAP SERPL CALC-SCNC: 10 MMOL/L (ref 10–20)
AST SERPL W P-5'-P-CCNC: 34 U/L (ref 9–39)
BASOPHILS # BLD AUTO: 0.04 X10*3/UL (ref 0–0.1)
BASOPHILS NFR BLD AUTO: 0.8 %
BILIRUB SERPL-MCNC: 0.7 MG/DL (ref 0–1.2)
BUN SERPL-MCNC: 15 MG/DL (ref 6–23)
CALCIUM SERPL-MCNC: 8.5 MG/DL (ref 8.6–10.3)
CHLORIDE SERPL-SCNC: 107 MMOL/L (ref 98–107)
CO2 SERPL-SCNC: 28 MMOL/L (ref 21–32)
CREAT SERPL-MCNC: 0.86 MG/DL (ref 0.5–1.3)
EOSINOPHIL # BLD AUTO: 0.09 X10*3/UL (ref 0–0.7)
EOSINOPHIL NFR BLD AUTO: 1.7 %
GFR SERPL CREATININE-BSD FRML MDRD: >90 ML/MIN/1.73M*2
GLUCOSE SERPL-MCNC: 114 MG/DL (ref 74–99)
IMM GRANULOCYTES # BLD AUTO: 0.02 X10*3/UL (ref 0–0.7)
IMM GRANULOCYTES NFR BLD AUTO: 0.4 % (ref 0–0.9)
LYMPHOCYTES # BLD AUTO: 0.77 X10*3/UL (ref 1.2–4.8)
LYMPHOCYTES NFR BLD AUTO: 14.5 %
MAGNESIUM SERPL-MCNC: 1.68 MG/DL (ref 1.6–2.4)
MONOCYTES # BLD AUTO: 0.47 X10*3/UL (ref 0.1–1)
MONOCYTES NFR BLD AUTO: 8.9 %
NEUTROPHILS # BLD AUTO: 3.91 X10*3/UL (ref 1.2–7.7)
NEUTROPHILS NFR BLD AUTO: 73.7 %
POTASSIUM SERPL-SCNC: 4.2 MMOL/L (ref 3.5–5.3)
PROT SERPL-MCNC: 5.9 G/DL (ref 6.4–8.2)
SODIUM SERPL-SCNC: 141 MMOL/L (ref 136–145)

## 2023-10-02 PROCEDURE — 36415 COLL VENOUS BLD VENIPUNCTURE: CPT

## 2023-10-02 NOTE — TELEPHONE ENCOUNTER
Leslie Jones called stating that his nebulizer is making him itch a lot.  Please call    Denisa Ricks

## 2023-10-03 LAB
CMV DNA SERPL NAA+PROBE-LOG IU: NORMAL {LOG_IU}/ML
EBV DNA SPEC NAA+PROBE-LOG#: NORMAL {LOG_COPIES}/ML
IGG SERPL-MCNC: 732 MG/DL (ref 700–1600)
LABORATORY COMMENT REPORT: NOT DETECTED
LABORATORY COMMENT REPORT: NOT DETECTED
TACROLIMUS BLD-MCNC: 7.5 NG/ML

## 2023-10-04 ENCOUNTER — TELEPHONE (OUTPATIENT)
Dept: TRANSPLANT | Facility: HOSPITAL | Age: 68
End: 2023-10-04

## 2023-10-04 NOTE — TELEPHONE ENCOUNTER
CBC not done, diff showed ANC 3.91    Cr: 0.86  Potassium: 4.2  Magnesium: 1.68  Prograf 7.5, goal 6-8    Labs reviewed by Dr. Boone. No changes, she asked for repeat labs on 10/23. Updated Leslie who agreed to the plan.

## 2023-10-09 LAB
FUNGAL CULTURE/SMEAR: NORMAL
FUNGAL CULTURE/SMEAR: NORMAL
FUNGAL SMEAR: NORMAL
FUNGAL SMEAR: NORMAL

## 2023-10-13 ENCOUNTER — OFFICE VISIT (OUTPATIENT)
Dept: TRANSPLANT | Facility: HOSPITAL | Age: 68
End: 2023-10-13
Payer: COMMERCIAL

## 2023-10-13 ENCOUNTER — HOSPITAL ENCOUNTER (OUTPATIENT)
Dept: RESPIRATORY THERAPY | Facility: HOSPITAL | Age: 68
Discharge: HOME | End: 2023-10-13
Payer: COMMERCIAL

## 2023-10-13 ENCOUNTER — HOSPITAL ENCOUNTER (OUTPATIENT)
Dept: RADIOLOGY | Facility: HOSPITAL | Age: 68
Discharge: HOME | End: 2023-10-13
Payer: COMMERCIAL

## 2023-10-13 VITALS
BODY MASS INDEX: 29.75 KG/M2 | TEMPERATURE: 97.4 F | OXYGEN SATURATION: 95 % | DIASTOLIC BLOOD PRESSURE: 64 MMHG | WEIGHT: 207.8 LBS | HEIGHT: 70 IN | SYSTOLIC BLOOD PRESSURE: 116 MMHG | HEART RATE: 66 BPM

## 2023-10-13 DIAGNOSIS — H70.091: ICD-10-CM

## 2023-10-13 DIAGNOSIS — H90.A22 SENSORINEURAL HEARING LOSS (SNHL) OF LEFT EAR WITH RESTRICTED HEARING OF RIGHT EAR: ICD-10-CM

## 2023-10-13 DIAGNOSIS — D84.9 IMMUNOSUPPRESSION (MULTI): ICD-10-CM

## 2023-10-13 DIAGNOSIS — I26.99 PULMONARY EMBOLISM, UNSPECIFIED CHRONICITY, UNSPECIFIED PULMONARY EMBOLISM TYPE, UNSPECIFIED WHETHER ACUTE COR PULMONALE PRESENT (MULTI): ICD-10-CM

## 2023-10-13 DIAGNOSIS — Z94.2 LUNG TRANSPLANTED (MULTI): Primary | ICD-10-CM

## 2023-10-13 DIAGNOSIS — Z94.2 S/P LUNG TRANSPLANT (MULTI): ICD-10-CM

## 2023-10-13 DIAGNOSIS — I10 PRIMARY HYPERTENSION: ICD-10-CM

## 2023-10-13 DIAGNOSIS — M81.0 OSTEOPOROSIS WITHOUT CURRENT PATHOLOGICAL FRACTURE, UNSPECIFIED OSTEOPOROSIS TYPE: ICD-10-CM

## 2023-10-13 DIAGNOSIS — J31.0 CHRONIC RHINITIS: ICD-10-CM

## 2023-10-13 DIAGNOSIS — Z94.2 LUNG TRANSPLANT STATUS (MULTI): ICD-10-CM

## 2023-10-13 PROBLEM — I27.20 PULMONARY HYPERTENSION (MULTI): Status: RESOLVED | Noted: 2023-09-30 | Resolved: 2023-10-13

## 2023-10-13 PROBLEM — M86.9 OSTEOMYELITIS OF SKULL (MULTI): Status: RESOLVED | Noted: 2023-09-30 | Resolved: 2023-10-13

## 2023-10-13 PROCEDURE — 1160F RVW MEDS BY RX/DR IN RCRD: CPT | Performed by: INTERNAL MEDICINE

## 2023-10-13 PROCEDURE — 71046 X-RAY EXAM CHEST 2 VIEWS: CPT | Performed by: RADIOLOGY

## 2023-10-13 PROCEDURE — 3078F DIAST BP <80 MM HG: CPT | Performed by: INTERNAL MEDICINE

## 2023-10-13 PROCEDURE — 99215 OFFICE O/P EST HI 40 MIN: CPT | Performed by: INTERNAL MEDICINE

## 2023-10-13 PROCEDURE — 3044F HG A1C LEVEL LT 7.0%: CPT | Performed by: INTERNAL MEDICINE

## 2023-10-13 PROCEDURE — 3074F SYST BP LT 130 MM HG: CPT | Performed by: INTERNAL MEDICINE

## 2023-10-13 PROCEDURE — 71046 X-RAY EXAM CHEST 2 VIEWS: CPT | Mod: FY

## 2023-10-13 PROCEDURE — 1159F MED LIST DOCD IN RCRD: CPT | Performed by: INTERNAL MEDICINE

## 2023-10-13 PROCEDURE — 1126F AMNT PAIN NOTED NONE PRSNT: CPT | Performed by: INTERNAL MEDICINE

## 2023-10-13 RX ORDER — LEVALBUTEROL INHALATION SOLUTION 0.63 MG/3ML
1 SOLUTION RESPIRATORY (INHALATION) 2 TIMES DAILY
Qty: 180 ML | Refills: 11 | Status: SHIPPED | OUTPATIENT
Start: 2023-10-13 | End: 2024-02-05 | Stop reason: SDUPTHER

## 2023-10-13 ASSESSMENT — PAIN SCALES - GENERAL: PAINLEVEL: 0-NO PAIN

## 2023-10-13 NOTE — PROGRESS NOTES
Mr. Patel is a 67-year-old M s/p DLTx for IPF and pulmonary hypertension 3/22/2020 with course complicated by pleural bleed with washout 3/23 and need for trach discharged on 5/8/2020. Post hospital course is also been complicated by septic shock with MSSA 8/2020 with PE on lifelong anticoagulation (remote VTE 1993) he was also COVID-positive 12/2022.    Presenting for a follow-up visit.    Current Outpatient Medications:     acetaminophen (Tylenol) 325 mg tablet, Take 2 tablets (650 mg) by mouth every 6 hours if needed. As Needed for pain. Call transplant if you have a temprature., Disp: , Rfl:     albuterol 2.5 mg /3 mL (0.083 %) nebulizer solution, USE 1 VIAL IN NEBULIZER EVERY 12 HOURS AS NEEDED FOR PREMEDICATION FOR TOBRAMYCIN., Disp: 90 mL, Rfl: 11    alendronate (Fosamax) 70 mg tablet, TAKE ONE (1) TABLET BY MOUTH ONCE A WEEK, AS DIRECTED, Disp: 12 tablet, Rfl: 3    allopurinol (Zyloprim) 300 mg tablet, TAKE ONE (1) TABLET BY MOUTH DAILY., Disp: 30 tablet, Rfl: 11    apixaban (Eliquis) 5 mg tablet, Take 1 tablet (5 mg) by mouth every 12 hours., Disp: , Rfl:     azithromycin (Zithromax) 250 mg tablet, TAKE ONE (1) TABLET BY MOUTH ON MONDAY, WEDNESDAY, AND FRIDAY., Disp: 12 tablet, Rfl: 11    blood sugar diagnostic strip, USE AS DIRECTED TO TEST BLOOD GLUCOSE THREE TIMES DAILY, Disp: 100 each, Rfl: 11    cholecalciferol (Vitamin D-3) 25 MCG (1000 UT) capsule, Take 1 capsule (25 mcg) by mouth once daily., Disp: , Rfl:     dapsone 100 mg tablet, TAKE ONE (1) TABLET BY MOUTH ONCE DAILY., Disp: 90 tablet, Rfl: 3    DULoxetine (Cymbalta) 60 mg DR capsule, TAKE ONE (1) CAPSULE BY MOUTH DAILY, Disp: 30 capsule, Rfl: 11    levalbuterol (Xopenex) 0.63 mg/3 mL nebulizer solution, Take 1 ampule by nebulization 2 times a day., Disp: 72 mL, Rfl: 11    loratadine (Claritin) 10 mg tablet, TAKE ONE (1) TABLET BY MOUTH ONCE DAILY., Disp: 90 tablet, Rfl: 3    magnesium oxide (Mag-Ox) 400 mg tablet, Take 1 tablet (400 mg) by mouth  once daily. At 1 pm, Disp: , Rfl:     metoprolol tartrate (Lopressor) 25 mg tablet, TAKE ONE-HALF (0.5) TABLET BY MOUTH EVERY 12 HOURS., Disp: 30 tablet, Rfl: 11    montelukast (Singulair) 10 mg tablet, TAKE ONE (1) TABLET BY MOUTH ONCE DAILY AT BEDTIME., Disp: 30 tablet, Rfl: 11    multivitamin with minerals iron-free (Centrum Silver), Take 1 tablet by mouth once daily., Disp: , Rfl:     mycophenolate (Cellcept) 250 mg capsule, TAKE ONE (1) CAPSULE BY MOUTH 2 TIMES DAILY., Disp: 60 capsule, Rfl: 11    pantoprazole (ProtoNix) 40 mg EC tablet, TAKE ONE (1) TABLET BY MOUTH ONCE DAILY., Disp: 30 tablet, Rfl: 11    predniSONE (Deltasone) 5 mg tablet, TAKE ONE (1) TABLET BY MOUTH EVERY DAY, Disp: 30 tablet, Rfl: 11    rosuvastatin (Crestor) 40 mg tablet, Take 1 tablet (40 mg) by mouth once daily., Disp: , Rfl:     tacrolimus (Prograf) 0.5 mg capsule, TAKE FOUR (4) CAPSULES BY MOUTH EVERY MORNING AND THREE (3) CAPSULES BY MOUTH AT NIGHT., Disp: 210 capsule, Rfl: 11    tobramycin (Vicente 300) 300 mg/5 mL nebulizer solution, INHALE 5 ML (1 VIAL) VIA NEBULIZER TWICE A DAY AS DIRECTED, Disp: 840 mL, Rfl: 3    valGANciclovir (Valcyte) 450 mg tablet, TAKE TWO (2) TABLETS BY MOUTH ONCE DAILY, Disp: 180 tablet, Rfl: 3    10/13/23: Bronchoscopy on 9/19/23 with multiple strains of Pseudomonas, completed a 14 day course of Ciprofloxacin and started on inhaled VICENTE, some tremors with albuterol. TBBX with no ACR or OB. Feels breathing and cough is overall improved, no MERINO. No nausea or vomiting, tolerating po intake and moving bowels.    9/7/23: Seen in ER 8/25/23 for chest pain, sharp increased with movement. Workup negative including nuclear stress test. Started on Crestor. Still present at times but improved, feels musculoskeletal in nature. Breathing overall stable, still has a cough with brownish sputum despite course of Ciprofloxacin after last visit, RVP negative. No nausea or vomiting, moving bowels regularly.    8/2/23:  Breathing overall stable, walking daily. Cough with brownish sputum for the past 2 weeks, no fevers, chills. No nausea or vomiting, moving bowels, no LE edema, maintaining adequate po fluid intake.    6/12/23: Breathing stable, working on weight loss. No nausea or vomiting, moving bowels. No LE edema. No longer working due to hours. Trying to be more active.    4/5/23: Feels that breathing is overall stable, no MERINO or cough. Tolerating PO diet, no nausea or vomiting, moving bowels, no LE edema. Notes 15lb weight gain self reported due to inactivity - no orthopnea or PND. Notes skin lesion on scalp present for last few months, not yet evaluated by dermatology. Blood glucose range , 115 on 12.5mg Jardiance bid, BP max 115 at home. Started new job part-time in [place] at [email or web address] 3x weekly for 6hrs/day.        ROS:      Constitutional: no chills, no fever, no night sweats, not feeling poorly and not feeling tired.   Eyes: no blurred vision, no eyesight problems, no dryness of the eyes and no eye pain.   ENT: hearing loss, but no nasal congestion, no nasal discharge, no hoarseness, no sore throat, no earache, the ears do not feel full, no tinnitus, no nosebleeds, no postnasal drip, no rhinorrhea and no sinus pressure.   Neck: no mass(es) and no swelling.   Cardiovascular: no chest pain, no intermittent leg claudication, no lower extremity edema, no palpitations, no syncope, no tachycardia and no bradycardia.   Respiratory: no cough, no shortness of breath during exertion, no shortness of breath at rest, no wheezing, non dry cough, non productive cough and no hemoptysis.   Gastrointestinal: no abdominal pain, no bloating, no nausea and no vomiting.   Musculoskeletal: no arthralgias, no back pain, no myalgias, no history of falls, no localized joint pain, no joint redness, no joint stiffness, no joint swelling, no limb pain, no limb swelling and no neck pain.   Integumentary: no rashes.   Neurological: no convulsions, no  difficulty walking, no diplopia, no dizziness, no dysarthria, no facial weakness, no headache, no limb weakness, no memory changes, no numbness, no speech difficulties, no tingling, no confusion and no syncope.   Psychiatric: no anxiety, no depression, no anhedonia, no substance use disorders, no personality change, no sleep disturbances and no suicidal ideations.   Endocrine: no changes in appetite, no feelings of weakness, no hair thinning or loss, no heat/cold intolerance, no polydipsia, no deepening of the voice, no polyuria, no muscle weakness and no proptosis.   Hematologic/Lymphatic: a tendency for easy bruising and a tendency for easy bleeding, but no swollen glands and no recurrent infections.   All other systems have been reviewed and are negative for complaint.          PE: VSS: 116/63, 66, 12, 36.3, 95% on RA  Constitutional: Alert and in no acute distress. Well developed, well nourished.   Head and Face: Head and face: Normal.  (Moon facies from steroid, right facial droop unchanged).   Ears, Nose, Mouth, and Throat: External inspection of ears and nose: Normal.  Nasal mucosa, septum, and turbinates: Normal.  Lips, teeth, and gums: Normal.    Neck: No neck mass was observed. Supple.   Pulmonary: Chest: Normal to inspection.  (No wound) Respiratory effort: No increased work of breathing or signs of respiratory distress.  Auscultation of lungs: Clear to auscultation bilaterally.    Cardiovascular: Heart rate and rhythm were normal, normal S1 and S2, no gallops, no murmurs and no pericardial rub. Pedal pulses: Normal. No peripheral edema.   Abdomen: Soft, nontender; no abdominal mass palpated. The abdomen was obese. Normal bowel sounds.   Lymphatic: No lymphadenopathy.   Musculoskeletal: No joint swelling seen, normal movements of all extremities. Digits and nails: Abnormal.  Examination of the extremities revealed fingernail clubbing.   Neurologic: cranial nerves 2-12 were intact.   Psychiatric: Judgment  and insight: Intact. Alert and oriented to person, place and time. Mood and affect:      Labwork:  Na 141, potassium 4.2, Cr 0.86, Mag 1.68, LFT's stable  WBC 5.6, Hg 10, Hematocrit 34, platelets 114, ANC acceptable  Prograf 7.5 , CMV PCR negative, EBV PCR negative on 10/2/23  Vitamin D 54, IgG 732, HLA Ab negative 8/24/23  Lipid panel 8/24/23 cholesterol 145 TG 71        CXR: 10/13/23 reviewed, clear lung fields bilaterally, no acute infiltrates.    Results:  10/13/23 Spirometry:  FVC: 2.29 52%  FEV1: 1.35 42%  25-75: 0.88 36%    9/7/23 Spirometry:  FVC: 2.25, 53%  FEV1: 1.41, 44%  25-75: 0.82, 34%    8/2/23 Spirometry:  FVC: 2.48, 59%  FEV1: 1.52, 47%  25-75: 1.03, 42%    6/12/23 Spirometry:  FVC 2.65 62%  FEV1: 1.48, 46%  25-75: 0.89, 36%    5/2/23 Spirometry:  FVC: 2.58, 61%  FEV1: 1.51, 47%  25-75: 0.88, 36%    4/5/23 Spirometry:  FVC: 2.47 58%  FEV1: 1.50 46%  FEF 25-75: 31%    Assessment/Plan:    Mr. Patel is a 68 yo M with history of IPF/PAH s/p DLTx 3/22/20, GERD, hypertension, DM, hyperlipidemia who presents for follow-up visit for lung transplant. Overall doing well. Seen in ER 8/25/23 for chest pain with negative workup.    1. Pulmonary- s/p DLTx for IPF/PAH on 3/22/20, CMV mismatch, EBV positive recipient  Allograft function:  -Initial complications- minimal PGD ,pleural bleed with washout 3/23 and need for trach, discharged on 5/8/2020.   -Modest PE at same presentation as septic shock 8/2020 - lifelong anticoagulation given second episode. (Remote VTE 1993). Currently on Eliquis BID.  -TBBX in 4/20 with A2, treated with pulse Solumedrol, TBBX in 5 and 6/20 with minimal ACR, subsequent TBBX negative, last 2/23/21.  -History of MSSA, Klebsiella, Corynebacterium from 4/20 cultures, Enterobacter from 6/20 cultures, sensitive to fluoroquinolones.  -Bronchoscopy on 9/19/23, cultures with multiple strains of Pseudomonas, completed a 14 day course of Ciprofloxacin and started on inhaled VICENTE. TBBX with no ACR  or OB.   -PFT's today with FEV1 1.35 (42%); FEF 25-75 0.88(36%), stable from last exam but decreased from prior, continue to follow serial exams, continue to work on weight loss and conditioning, has gained 3 more labs and not as active as in the past.  -CXR reviewed, clear lung fields bilaterally, continue to follow serial exams. HRCT on 9/18/23 with relatively clear lung fields bilaterally, multifocal expiratory air trapping, suggestive of CLAD.  -SARA- continue with nightly CPAP  -HLA Ab negative 8/24/23, no significant history of DSA post transplant, follow serially, every 3 months.  -Continue Azithromycin 250 mg qM,W,F for ROSSY prophylaxis along with Singulair 10 mg QHS.    Immunosuppression:  -Prograf goal 6-8, continue 2/1.5, level 7.5. Continue to follow closely, repeat monthly, next on 10/23/23.  -Cellcept 250 mg BID   -Prednisone 5 mg daily    Prophylaxis:  -Dapsone for PJP prophylaxis (no known prior intolerance to Bactrim, has not taken other agents)  -CMV mismatch- continues on Valcyte 900 mg daily, CMV PCR's negativ, last 10/2/23, no history of viremia.  -Previously on voriconazole for anti-fungal prophylaxis; no fungal infections, LFT's stable with elevated alk phos and normal MRI of liver; discontinued voriconazole 4/5/23 with improvement of alkaline phosphatase  -IgG level 732, adequate, continue to follow serial levels, goal greater than 600.    2. CV  -History of hypertension- on Lopressor 12.5 mg BID. Advised to continue to follow BP closely and call us if greater than 130/80 consistently.  -Recently seen in ER 8/25/23 for chest pain, workup negative, including nuclear stress test. Overall improved, appears musculoskeletal in nature. TTE 9/26/23 with EF 65%, normal LV and RV function  -Hyperlipidemia- Lipid panel on 8/24/23 with acceptable Cholesterol and TG, started on Crestor 40 mg daily (for cardioprotective effect), will repeat in 3 months.    3. Renal  -Cr overall stable at 0.86, advised to  maintain adequate po hydration.  -Magnesium 1.7, continue 400 mg daily.  -Renally dose all medications and avoid nephrotoxins  -History of nephrolithiasis in the past    4. GI  -Continue Protonix 40 mg daily before meals   -Persistently elevated AP- MRI lever 2/9/23 with normal appearance; improved after discontinuation of voriconazole, will follow.   -Will follow after start of Crestor as above.    5. ID  -History of septic shock with MSSA 8/2020. Received prolonged course of IV Abx with resolution.  -Otitis external starting 9/8/2020 with ENT involvement , multiple rounds ab and wick insertion, developed mastoiditis  -Sternal wound infection- 2 episodes of Staph s/p debridement, wound vac removed. Repeat episode 7/19-7/23/2021, no debridement, was discharged on Abx and completed a 8 week course.  -COVID 12/22, recovered and overall improved.  -Bronchoscopy on 9/19/23 cultures with multiple strains of Pseudomonas, completed a 14 day course of Ciprofloxacin and started on inhaled VICENTE, tolerating well.  -Afebrile, no signs of new infection currently.    6. Endo  -History of DM- BG levels well-controlled, on Jardiance per endocrinology  -History of osteoporosis- on Fosamax. Also on vitamin D, level acceptable at 54.    7. Heme  -History of PE at same presentation as septic shock 8/2020 - lifelong anticoagulation given second episode. (Remote VTE 1993). On Eliquis, increased to 5 mg BID as off voriconazole. Also has IVF filter in place.   -H/H stable at 10/34 and no signs of bleeding currently.  -WBC intact, platelets stable at 114, chronic thrombocytopenia    8. ENT  -Otitis extena with facial nerve involvement. 10/17 Right tympanomastoidectomy, mastoid washout while inpatient. 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  -Chronic hearing loss with hearing aids.    8. Prophylaxis  -UTD on vaccines (Influenza, will need again this year, PNA, COVID, RSV). Following  with Dermatology, scalp lesion consistent with folliculitis, healed well. Last colonoscopy 4/22. DEXA 6/10/22.    Continue to work on weight loss and increased activity. RTC in 2 weeks with PFT's, CXR. Bronchoscopy only as clinically indicated. Plan of care discussed at length with patient, all questions answered, total of 45 minutes spent regarding counseling and coordination of care.

## 2023-10-13 NOTE — PROGRESS NOTES
No breathing complaints. Feels like he gets a sore throat after his breathing treatments and gets a bit dizzy with the albuterol. Asking about duration of breathing treatments.

## 2023-10-16 PROCEDURE — RXMED WILLOW AMBULATORY MEDICATION CHARGE

## 2023-10-17 ENCOUNTER — PHARMACY VISIT (OUTPATIENT)
Dept: PHARMACY | Facility: CLINIC | Age: 68
End: 2023-10-17
Payer: COMMERCIAL

## 2023-10-17 ENCOUNTER — SPECIALTY PHARMACY (OUTPATIENT)
Dept: PHARMACY | Facility: CLINIC | Age: 68
End: 2023-10-17

## 2023-10-17 PROCEDURE — RXMED WILLOW AMBULATORY MEDICATION CHARGE

## 2023-10-17 RX ORDER — ALENDRONATE SODIUM 70 MG/1
TABLET ORAL
Qty: 12 TABLET | Refills: 3 | Status: CANCELLED | OUTPATIENT
Start: 2023-10-17 | End: 2024-10-15

## 2023-10-23 ENCOUNTER — TELEPHONE (OUTPATIENT)
Dept: TRANSPLANT | Facility: HOSPITAL | Age: 68
End: 2023-10-23

## 2023-10-23 ENCOUNTER — SPECIALTY PHARMACY (OUTPATIENT)
Dept: PHARMACY | Facility: CLINIC | Age: 68
End: 2023-10-23

## 2023-10-23 ENCOUNTER — LAB (OUTPATIENT)
Dept: LAB | Facility: LAB | Age: 68
End: 2023-10-23
Payer: COMMERCIAL

## 2023-10-23 DIAGNOSIS — Z94.2 S/P LUNG TRANSPLANT (MULTI): ICD-10-CM

## 2023-10-23 DIAGNOSIS — M81.8 OTHER OSTEOPOROSIS, UNSPECIFIED PATHOLOGICAL FRACTURE PRESENCE: Primary | ICD-10-CM

## 2023-10-23 LAB
ALBUMIN SERPL BCP-MCNC: 3.8 G/DL (ref 3.4–5)
ALP SERPL-CCNC: 108 U/L (ref 33–136)
ALT SERPL W P-5'-P-CCNC: 26 U/L (ref 10–52)
ANION GAP SERPL CALC-SCNC: 11 MMOL/L (ref 10–20)
AST SERPL W P-5'-P-CCNC: 30 U/L (ref 9–39)
BASOPHILS # BLD AUTO: 0.03 X10*3/UL (ref 0–0.1)
BASOPHILS NFR BLD AUTO: 0.5 %
BILIRUB SERPL-MCNC: 0.7 MG/DL (ref 0–1.2)
BUN SERPL-MCNC: 22 MG/DL (ref 6–23)
CALCIUM SERPL-MCNC: 8.7 MG/DL (ref 8.6–10.3)
CHLORIDE SERPL-SCNC: 105 MMOL/L (ref 98–107)
CO2 SERPL-SCNC: 27 MMOL/L (ref 21–32)
CREAT SERPL-MCNC: 0.81 MG/DL (ref 0.5–1.3)
EOSINOPHIL # BLD AUTO: 0.1 X10*3/UL (ref 0–0.7)
EOSINOPHIL NFR BLD AUTO: 1.7 %
ERYTHROCYTE [DISTWIDTH] IN BLOOD BY AUTOMATED COUNT: 18.8 % (ref 11.5–14.5)
GFR SERPL CREATININE-BSD FRML MDRD: >90 ML/MIN/1.73M*2
GLUCOSE SERPL-MCNC: 160 MG/DL (ref 74–99)
HCT VFR BLD AUTO: 35.5 % (ref 41–52)
HGB BLD-MCNC: 10.4 G/DL (ref 13.5–17.5)
IMM GRANULOCYTES # BLD AUTO: 0.02 X10*3/UL (ref 0–0.7)
IMM GRANULOCYTES NFR BLD AUTO: 0.3 % (ref 0–0.9)
LYMPHOCYTES # BLD AUTO: 1.13 X10*3/UL (ref 1.2–4.8)
LYMPHOCYTES NFR BLD AUTO: 18.8 %
MAGNESIUM SERPL-MCNC: 1.71 MG/DL (ref 1.6–2.4)
MCH RBC QN AUTO: 27.6 PG (ref 26–34)
MCHC RBC AUTO-ENTMCNC: 29.3 G/DL (ref 32–36)
MCV RBC AUTO: 94 FL (ref 80–100)
MONOCYTES # BLD AUTO: 0.54 X10*3/UL (ref 0.1–1)
MONOCYTES NFR BLD AUTO: 9 %
NEUTROPHILS # BLD AUTO: 4.18 X10*3/UL (ref 1.2–7.7)
NEUTROPHILS NFR BLD AUTO: 69.7 %
NRBC BLD-RTO: 0 /100 WBCS (ref 0–0)
PLATELET # BLD AUTO: 118 X10*3/UL (ref 150–450)
PMV BLD AUTO: 12.5 FL (ref 7.5–11.5)
POTASSIUM SERPL-SCNC: 4.4 MMOL/L (ref 3.5–5.3)
PROT SERPL-MCNC: 6 G/DL (ref 6.4–8.2)
RBC # BLD AUTO: 3.77 X10*6/UL (ref 4.5–5.9)
SODIUM SERPL-SCNC: 139 MMOL/L (ref 136–145)
WBC # BLD AUTO: 6 X10*3/UL (ref 4.4–11.3)

## 2023-10-23 PROCEDURE — 80197 ASSAY OF TACROLIMUS: CPT

## 2023-10-23 PROCEDURE — 36415 COLL VENOUS BLD VENIPUNCTURE: CPT

## 2023-10-23 PROCEDURE — 80053 COMPREHEN METABOLIC PANEL: CPT

## 2023-10-23 PROCEDURE — 83735 ASSAY OF MAGNESIUM: CPT

## 2023-10-23 PROCEDURE — 85025 COMPLETE CBC W/AUTO DIFF WBC: CPT

## 2023-10-23 NOTE — TELEPHONE ENCOUNTER
Received a voicemail from Leslie stating that he is out of dapsone and his delivery did not come on time.    I returned his call and Leslie stated he is waiting to hear back from  Specialty and will let me know when he finds out when he is supposed to get his delivery.

## 2023-10-24 ENCOUNTER — PHARMACY VISIT (OUTPATIENT)
Dept: PHARMACY | Facility: CLINIC | Age: 68
End: 2023-10-24
Payer: COMMERCIAL

## 2023-10-24 LAB
CMV DNA SERPL NAA+PROBE-LOG IU: NORMAL {LOG_IU}/ML
LABORATORY COMMENT REPORT: NOT DETECTED
TACROLIMUS BLD-MCNC: 6.6 NG/ML

## 2023-10-24 PROCEDURE — RXMED WILLOW AMBULATORY MEDICATION CHARGE

## 2023-10-24 RX ORDER — ALENDRONATE SODIUM 70 MG/1
TABLET ORAL
Qty: 12 TABLET | Refills: 3 | Status: SHIPPED | OUTPATIENT
Start: 2023-10-24 | End: 2024-02-05 | Stop reason: SDUPTHER

## 2023-10-24 NOTE — PROGRESS NOTES
Mr. Patel is a 67-year-old M s/p DLTx for IPF and pulmonary hypertension 3/22/2020 with course complicated by pleural bleed with washout 3/23 and need for trach discharged on 5/8/2020. Post hospital course is also been complicated by septic shock with MSSA 8/2020 with PE on lifelong anticoagulation (remote VTE 1993) he was also COVID-positive 12/2022.    Presenting for a follow-up visit.    Current Outpatient Medications:     acetaminophen (Tylenol) 325 mg tablet, Take 2 tablets (650 mg) by mouth every 6 hours if needed. As Needed for pain. Call transplant if you have a temprature., Disp: , Rfl:     albuterol 2.5 mg /3 mL (0.083 %) nebulizer solution, USE 1 VIAL IN NEBULIZER EVERY 12 HOURS AS NEEDED FOR PREMEDICATION FOR TOBRAMYCIN., Disp: 90 mL, Rfl: 11    alendronate (Fosamax) 70 mg tablet, TAKE ONE (1) TABLET BY MOUTH ONCE A WEEK, AS DIRECTED, Disp: 12 tablet, Rfl: 3    allopurinol (Zyloprim) 300 mg tablet, TAKE ONE (1) TABLET BY MOUTH DAILY., Disp: 30 tablet, Rfl: 11    apixaban (Eliquis) 5 mg tablet, Take 1 tablet (5 mg) by mouth every 12 hours., Disp: , Rfl:     azithromycin (Zithromax) 250 mg tablet, TAKE ONE (1) TABLET BY MOUTH ON MONDAY, WEDNESDAY, AND FRIDAY., Disp: 12 tablet, Rfl: 11    blood sugar diagnostic strip, USE AS DIRECTED TO TEST BLOOD GLUCOSE THREE TIMES DAILY, Disp: 100 each, Rfl: 11    cholecalciferol (Vitamin D-3) 25 MCG (1000 UT) capsule, Take 1 capsule (25 mcg) by mouth once daily., Disp: , Rfl:     dapsone 100 mg tablet, TAKE ONE (1) TABLET BY MOUTH ONCE DAILY., Disp: 90 tablet, Rfl: 3    DULoxetine (Cymbalta) 60 mg DR capsule, TAKE ONE (1) CAPSULE BY MOUTH DAILY, Disp: 30 capsule, Rfl: 11    levalbuterol (Xopenex) 0.63 mg/3 mL nebulizer solution, Inhale one (1) ampule via nebulizer twice daily., Disp: 180 mL, Rfl: 11    loratadine (Claritin) 10 mg tablet, TAKE ONE (1) TABLET BY MOUTH ONCE DAILY., Disp: 90 tablet, Rfl: 3    magnesium oxide (Mag-Ox) 400 mg tablet, Take 1 tablet (400 mg) by  mouth once daily. At 1 pm, Disp: , Rfl:     metoprolol tartrate (Lopressor) 25 mg tablet, TAKE ONE-HALF (0.5) TABLET BY MOUTH EVERY 12 HOURS., Disp: 30 tablet, Rfl: 11    montelukast (Singulair) 10 mg tablet, TAKE ONE (1) TABLET BY MOUTH ONCE DAILY AT BEDTIME., Disp: 30 tablet, Rfl: 11    multivitamin with minerals iron-free (Centrum Silver), Take 1 tablet by mouth once daily., Disp: , Rfl:     mycophenolate (Cellcept) 250 mg capsule, TAKE ONE (1) CAPSULE BY MOUTH 2 TIMES DAILY., Disp: 60 capsule, Rfl: 11    pantoprazole (ProtoNix) 40 mg EC tablet, TAKE ONE (1) TABLET BY MOUTH ONCE DAILY., Disp: 30 tablet, Rfl: 11    predniSONE (Deltasone) 5 mg tablet, TAKE ONE (1) TABLET BY MOUTH EVERY DAY, Disp: 30 tablet, Rfl: 11    rosuvastatin (Crestor) 40 mg tablet, Take 1 tablet (40 mg) by mouth once daily., Disp: , Rfl:     tacrolimus (Prograf) 0.5 mg capsule, TAKE FOUR (4) CAPSULES BY MOUTH EVERY MORNING AND THREE (3) CAPSULES BY MOUTH AT NIGHT., Disp: 210 capsule, Rfl: 11    tobramycin (Vicente 300) 300 mg/5 mL nebulizer solution, INHALE 5 ML (1 VIAL) VIA NEBULIZER TWICE A DAY AS DIRECTED, Disp: 840 mL, Rfl: 3    valGANciclovir (Valcyte) 450 mg tablet, TAKE TWO (2) TABLETS BY MOUTH ONCE DAILY, Disp: 180 tablet, Rfl: 3    10/27/23: Breathing overall stable on RA, tolerating inhaled VICENTE. Decreased palpitations with Xopenex. Tolerating po diet and moving bowels, no LE edema. Feeling well.    10/13/23: Bronchoscopy on 9/19/23 with multiple strains of Pseudomonas, completed a 14 day course of Ciprofloxacin and started on inhaled VICENTE, some tremors with albuterol. TBBX with no ACR or OB. Feels breathing and cough is overall improved, no MERINO. No nausea or vomiting, tolerating po intake and moving bowels.    9/7/23: Seen in ER 8/25/23 for chest pain, sharp increased with movement. Workup negative including nuclear stress test. Started on Crestor. Still present at times but improved, feels musculoskeletal in nature. Breathing overall  stable, still has a cough with brownish sputum despite course of Ciprofloxacin after last visit, RVP negative. No nausea or vomiting, moving bowels regularly.    8/2/23: Breathing overall stable, walking daily. Cough with brownish sputum for the past 2 weeks, no fevers, chills. No nausea or vomiting, moving bowels, no LE edema, maintaining adequate po fluid intake.    4/5/23: Feels that breathing is overall stable, no MERINO or cough. Tolerating PO diet, no nausea or vomiting, moving bowels, no LE edema. Notes 15lb weight gain self reported due to inactivity - no orthopnea or PND. Notes skin lesion on scalp present for last few months, not yet evaluated by dermatology. Blood glucose range , 115 on 12.5mg Jardiance bid, BP max 115 at home. Started new job part-time in [employer] at [employer] weekly for 6hrs/day.        ROS:      Constitutional: no chills, no fever, no night sweats, not feeling poorly and not feeling tired.   Eyes: no blurred vision, no eyesight problems, no dryness of the eyes and no eye pain.   ENT: hearing loss, but no nasal congestion, no nasal discharge, no hoarseness, no sore throat, no earache, the ears do not feel full, no tinnitus, no nosebleeds, no postnasal drip, no rhinorrhea and no sinus pressure.   Neck: no mass(es) and no swelling.   Cardiovascular: no chest pain, no intermittent leg claudication, no lower extremity edema, no palpitations, no syncope, no tachycardia and no bradycardia.   Respiratory: no cough, no shortness of breath during exertion, no shortness of breath at rest, no wheezing, non dry cough, non productive cough and no hemoptysis.   Gastrointestinal: no abdominal pain, no bloating, no nausea and no vomiting.   Musculoskeletal: no arthralgias, no back pain, no myalgias, no history of falls, no localized joint pain, no joint redness, no joint stiffness, no joint swelling, no limb pain, no limb swelling and no neck pain.   Integumentary: no rashes.   Neurological: no  convulsions, no difficulty walking, no diplopia, no dizziness, no dysarthria, no facial weakness, no headache, no limb weakness, no memory changes, no numbness, no speech difficulties, no tingling, no confusion and no syncope.   Psychiatric: no anxiety, no depression, no anhedonia, no substance use disorders, no personality change, no sleep disturbances and no suicidal ideations.   Endocrine: no changes in appetite, no feelings of weakness, no hair thinning or loss, no heat/cold intolerance, no polydipsia, no deepening of the voice, no polyuria, no muscle weakness and no proptosis.   Hematologic/Lymphatic: a tendency for easy bruising and a tendency for easy bleeding, but no swollen glands and no recurrent infections.   All other systems have been reviewed and are negative for complaint.          PE: VSS: 138/73, 72,12, 94% on RA, temp 36.2  Constitutional: Alert and in no acute distress. Well developed, well nourished.   Head and Face: Head and face: Normal.  (Moon facies from steroid, right facial droop unchanged).   Ears, Nose, Mouth, and Throat: External inspection of ears and nose: Normal.  Nasal mucosa, septum, and turbinates: Normal.  Lips, teeth, and gums: Normal.    Neck: No neck mass was observed. Supple.   Pulmonary: Chest: Normal to inspection.  (No wound) Respiratory effort: No increased work of breathing or signs of respiratory distress.  Auscultation of lungs: Clear to auscultation bilaterally.    Cardiovascular: Heart rate and rhythm were normal, normal S1 and S2, no gallops, no murmurs and no pericardial rub. Pedal pulses: Normal. No peripheral edema.   Abdomen: Soft, nontender; no abdominal mass palpated. The abdomen was obese. Normal bowel sounds.   Lymphatic: No lymphadenopathy.   Musculoskeletal: No joint swelling seen, normal movements of all extremities. Digits and nails: Abnormal.  Examination of the extremities revealed fingernail clubbing.   Neurologic: cranial nerves 2-12 were intact.    Psychiatric: Judgment and insight: Intact. Alert and oriented to person, place and time. Mood and affect:    Labwork:  Na 139, potassium 4.4, Cr 0.81, Mag 1.71, LFT's stable  WBC 6.0, Hg 10, Hematocrit 35, platelets 118, ANC acceptable  Prograf 6.6 , CMV PCR negative 10/23/23, EBV PCR negative on 10/2/23  Vitamin D 54, IgG 732, HLA Ab negative 8/24/23  Lipid panel 8/24/23 cholesterol 145 TG 71        CXR: 10/27/23 reviewed, clear lung fields bilaterally, no acute infiltrates.    Results  10/27/23 Spirometry:  FVC: 2.24 53%  FEV1: 1.34 42%  25-75: 0.82 34%%    10/13/23 Spirometry:  FVC: 2.29 52%  FEV1: 1.35 42%  25-75: 0.88 36%    9/7/23 Spirometry:  FVC: 2.25, 53%  FEV1: 1.41, 44%  25-75: 0.82, 34%    8/2/23 Spirometry:  FVC: 2.48, 59%  FEV1: 1.52, 47%  25-75: 1.03, 42%    6/12/23 Spirometry:  FVC 2.65 62%  FEV1: 1.48, 46%  25-75: 0.89, 36%    5/2/23 Spirometry:  FVC: 2.58, 61%  FEV1: 1.51, 47%  25-75: 0.88, 36%    4/5/23 Spirometry:  FVC: 2.47 58%  FEV1: 1.50 46%  FEF 25-75: 31%    Assessment/Plan:  Mr. Patel is a 68 yo M with history of IPF/PAH s/p DLTx 3/22/20, GERD, hypertension, DM, hyperlipidemia who presents for follow-up visit for lung transplant. Overall doing well. Seen in ER 8/25/23 for chest pain with negative workup.    1. Pulmonary- s/p DLTx for IPF/PAH on 3/22/20, CMV mismatch, EBV positive recipient  Allograft function:  -Initial complications- minimal PGD ,pleural bleed with washout 3/23 and need for trach, discharged on 5/8/2020.   -Modest PE at same presentation as septic shock 8/2020 - lifelong anticoagulation given second episode. (Remote VTE 1993). Currently on Eliquis BID.  -TBBX in 4/20 with A2, treated with pulse Solumedrol, TBBX in 5 and 6/20 with minimal ACR, subsequent TBBX negative, last 2/23/21.  -History of MSSA, Klebsiella, Corynebacterium from 4/20 cultures, Enterobacter from 6/20 cultures, sensitive to fluoroquinolones.  -Bronchoscopy on 9/19/23, cultures with multiple strains of  Pseudomonas, completed a 14 day course of Ciprofloxacin and started on inhaled VICENTE. TBBX with no ACR or OB.   -PFT's 10/13/23 with FEV1 1.35 (42%); FEF 25-75 0.88(36%), stable from last exam but decreased from prior, 10/27/23 stable FEV1 1.34(42%), continue to follow serial exams, continue to work on weight loss and conditioning, not as active as in the past.  -CXR reviewed, clear lung fields bilaterally, continue to follow serial exams. HRCT on 9/18/23 with relatively clear lung fields bilaterally, multifocal expiratory air trapping, suggestive of CLAD.  -SARA- continue with nightly CPAP  -HLA Ab negative 8/24/23, no significant history of DSA post transplant, follow serially, every 3 months.  -Continue Azithromycin 250 mg qM,W,F for ROSSY prophylaxis along with Singulair 10 mg QHS. Will also add Symbicort 2 puffs BID.    Immunosuppression:  -Prograf goal 6-8, continue 2/1.5, level 6.6. Continue to follow closely, repeat monthly, next on 11/27/23.  -Cellcept 250 mg BID   -Prednisone 5 mg daily    Prophylaxis:  -Dapsone for PJP prophylaxis (no known prior intolerance to Bactrim, has not taken other agents)  -CMV mismatch- continues on Valcyte 900 mg daily, CMV PCR's negative, last 10/23/23, no history of viremia.  -Previously on voriconazole for anti-fungal prophylaxis; no fungal infections, LFT's stable with elevated alk phos and normal MRI of liver; discontinued voriconazole 4/5/23 with improvement of alkaline phosphatase  -IgG level 732, adequate, continue to follow serial levels, goal greater than 600.    2. CV  -History of hypertension- on Lopressor 12.5 mg BID. Advised to continue to follow BP closely and call us if greater than 130/80 consistently.  -Recently seen in ER 8/25/23 for chest pain, workup negative, including nuclear stress test. Overall improved, appears musculoskeletal in nature. TTE 9/26/23 with EF 65%, normal LV and RV function  -Hyperlipidemia- Lipid panel on 8/24/23 with acceptable Cholesterol  and TG, started on Crestor 40 mg daily (for cardioprotective effect), will repeat in one month.    3. Renal  -Cr overall stable at 0.81, advised to maintain adequate po hydration.  -Magnesium 1.7, continue 400 mg daily.  -Renally dose all medications and avoid nephrotoxins  -History of nephrolithiasis in the past    4. GI  -Continue Protonix 40 mg daily before meals   -Persistently elevated AP- MRI lever 2/9/23 with normal appearance; improved after discontinuation of voriconazole, will follow.   -LFT's stable after start of Crestor, will follow.    5. ID  -History of septic shock with MSSA 8/2020. Received prolonged course of IV Abx with resolution.  -Otitis external starting 9/8/2020 with ENT involvement , multiple rounds ab and wick insertion, developed mastoiditis  -Sternal wound infection- 2 episodes of Staph s/p debridement, wound vac removed. Repeat episode 7/19-7/23/2021, no debridement, was discharged on Abx and completed a 8 week course.  -COVID 12/22, recovered and overall improved.  -Bronchoscopy on 9/19/23 cultures with multiple strains of Pseudomonas, completed a 14 day course of Ciprofloxacin and started on inhaled VICENTE, tolerating well.  -Afebrile, no signs of new infection currently.    6. Endo  -History of DM- BG levels well-controlled, on Jardiance per endocrinology  -History of osteoporosis- on Fosamax. Also on vitamin D, level acceptable at 54.    7. Heme  -History of PE at same presentation as septic shock 8/2020 - lifelong anticoagulation given second episode. (Remote VTE 1993). On Eliquis, increased to 5 mg BID as off voriconazole. Also has IVF filter in place.   -H/H stable at 10/34 and no signs of bleeding currently.  -WBC intact, platelets stable at 118, chronic thrombocytopenia    8. ENT  -Otitis extena with facial nerve involvement. 10/17 Right tympanomastoidectomy, mastoid washout while inpatient. 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  -Chronic hearing loss with hearing aids.    8. Prophylaxis  -UTD on vaccines (Influenza 2023, PNA, COVID 2023, to receive RSV 11/23). Following with Dermatology, scalp lesion consistent with folliculitis, healed well. Last colonoscopy 4/22. DEXA 6/10/22.    Continue to work on weight loss and increased activity. Plan of care discussed at length with patient, all questions answered, total of 45 minutes spent regarding counseling and coordination of care.  RTC in one month with PFT's, CXR. Bronchoscopy only as clinically indicated.

## 2023-10-26 DIAGNOSIS — Z94.2 LUNG TRANSPLANTED (MULTI): Primary | ICD-10-CM

## 2023-10-27 ENCOUNTER — OFFICE VISIT (OUTPATIENT)
Dept: TRANSPLANT | Facility: HOSPITAL | Age: 68
End: 2023-10-27
Payer: COMMERCIAL

## 2023-10-27 ENCOUNTER — HOSPITAL ENCOUNTER (OUTPATIENT)
Dept: RADIOLOGY | Facility: HOSPITAL | Age: 68
Discharge: HOME | End: 2023-10-27
Payer: COMMERCIAL

## 2023-10-27 ENCOUNTER — HOSPITAL ENCOUNTER (OUTPATIENT)
Dept: RESPIRATORY THERAPY | Facility: HOSPITAL | Age: 68
Discharge: HOME | End: 2023-10-27
Payer: COMMERCIAL

## 2023-10-27 VITALS
DIASTOLIC BLOOD PRESSURE: 73 MMHG | OXYGEN SATURATION: 94 % | TEMPERATURE: 97.2 F | BODY MASS INDEX: 29.56 KG/M2 | SYSTOLIC BLOOD PRESSURE: 138 MMHG | WEIGHT: 206 LBS | HEART RATE: 72 BPM

## 2023-10-27 DIAGNOSIS — Z94.2 LUNG TRANSPLANTED (MULTI): Primary | ICD-10-CM

## 2023-10-27 DIAGNOSIS — Z94.2 LUNG TRANSPLANTED (MULTI): ICD-10-CM

## 2023-10-27 DIAGNOSIS — D61.89 ANEMIA DUE TO OTHER BONE MARROW FAILURE (MULTI): ICD-10-CM

## 2023-10-27 DIAGNOSIS — I10 ESSENTIAL (PRIMARY) HYPERTENSION: ICD-10-CM

## 2023-10-27 DIAGNOSIS — R73.9 STEROID-INDUCED HYPERGLYCEMIA: ICD-10-CM

## 2023-10-27 DIAGNOSIS — D84.9 IMMUNOSUPPRESSION (MULTI): ICD-10-CM

## 2023-10-27 DIAGNOSIS — M81.0 OSTEOPOROSIS WITHOUT CURRENT PATHOLOGICAL FRACTURE, UNSPECIFIED OSTEOPOROSIS TYPE: ICD-10-CM

## 2023-10-27 DIAGNOSIS — T38.0X5A STEROID-INDUCED HYPERGLYCEMIA: ICD-10-CM

## 2023-10-27 DIAGNOSIS — E78.49 OTHER HYPERLIPIDEMIA: ICD-10-CM

## 2023-10-27 DIAGNOSIS — E55.9 VITAMIN D DEFICIENCY, UNSPECIFIED: ICD-10-CM

## 2023-10-27 DIAGNOSIS — J31.0 CHRONIC RHINITIS: ICD-10-CM

## 2023-10-27 PROBLEM — J84.112 IPF (IDIOPATHIC PULMONARY FIBROSIS) (MULTI): Status: RESOLVED | Noted: 2023-09-30 | Resolved: 2023-10-27

## 2023-10-27 PROBLEM — E11.9 TYPE 2 DIABETES MELLITUS (MULTI): Status: RESOLVED | Noted: 2023-09-30 | Resolved: 2023-10-27

## 2023-10-27 PROCEDURE — 1159F MED LIST DOCD IN RCRD: CPT | Performed by: INTERNAL MEDICINE

## 2023-10-27 PROCEDURE — 1160F RVW MEDS BY RX/DR IN RCRD: CPT | Performed by: INTERNAL MEDICINE

## 2023-10-27 PROCEDURE — 3078F DIAST BP <80 MM HG: CPT | Performed by: INTERNAL MEDICINE

## 2023-10-27 PROCEDURE — 3075F SYST BP GE 130 - 139MM HG: CPT | Performed by: INTERNAL MEDICINE

## 2023-10-27 PROCEDURE — 94010 BREATHING CAPACITY TEST: CPT

## 2023-10-27 PROCEDURE — 99215 OFFICE O/P EST HI 40 MIN: CPT | Mod: 25 | Performed by: INTERNAL MEDICINE

## 2023-10-27 PROCEDURE — 71046 X-RAY EXAM CHEST 2 VIEWS: CPT | Performed by: RADIOLOGY

## 2023-10-27 PROCEDURE — 71046 X-RAY EXAM CHEST 2 VIEWS: CPT | Mod: FY

## 2023-10-27 PROCEDURE — 99215 OFFICE O/P EST HI 40 MIN: CPT | Performed by: INTERNAL MEDICINE

## 2023-10-27 PROCEDURE — 1126F AMNT PAIN NOTED NONE PRSNT: CPT | Performed by: INTERNAL MEDICINE

## 2023-10-27 RX ORDER — ROSUVASTATIN CALCIUM 40 MG/1
40 TABLET, COATED ORAL DAILY
Qty: 30 TABLET | Refills: 11 | Status: SHIPPED | OUTPATIENT
Start: 2023-10-27 | End: 2024-02-05 | Stop reason: SDUPTHER

## 2023-10-27 RX ORDER — BUDESONIDE AND FORMOTEROL FUMARATE DIHYDRATE 160; 4.5 UG/1; UG/1
2 AEROSOL RESPIRATORY (INHALATION)
Qty: 10.2 G | Refills: 11 | Status: SHIPPED | OUTPATIENT
Start: 2023-10-27 | End: 2024-02-05 | Stop reason: SDUPTHER

## 2023-10-27 RX ORDER — LORATADINE 10 MG/1
10 TABLET ORAL DAILY
Qty: 90 TABLET | Refills: 3 | Status: SHIPPED | OUTPATIENT
Start: 2023-10-27 | End: 2024-02-05 | Stop reason: SDUPTHER

## 2023-10-27 ASSESSMENT — PAIN SCALES - GENERAL: PAINLEVEL: 0-NO PAIN

## 2023-10-27 NOTE — PATIENT INSTRUCTIONS
RTC in one month with spirometry and CXR  Labs on 11/27/23.   Start new inhaler Symbicort 2 puffs twice, rinse mouth out after use.

## 2023-11-01 ENCOUNTER — PHARMACY VISIT (OUTPATIENT)
Dept: PHARMACY | Facility: CLINIC | Age: 68
End: 2023-11-01
Payer: COMMERCIAL

## 2023-11-01 PROCEDURE — RXMED WILLOW AMBULATORY MEDICATION CHARGE

## 2023-11-07 ENCOUNTER — SPECIALTY PHARMACY (OUTPATIENT)
Dept: PHARMACY | Facility: CLINIC | Age: 68
End: 2023-11-07

## 2023-11-07 ENCOUNTER — LAB (OUTPATIENT)
Dept: LAB | Facility: LAB | Age: 68
End: 2023-11-07
Payer: COMMERCIAL

## 2023-11-07 DIAGNOSIS — I10 ESSENTIAL (PRIMARY) HYPERTENSION: ICD-10-CM

## 2023-11-07 DIAGNOSIS — Z94.2 LUNG TRANSPLANTED (MULTI): ICD-10-CM

## 2023-11-07 DIAGNOSIS — E55.9 VITAMIN D DEFICIENCY, UNSPECIFIED: ICD-10-CM

## 2023-11-07 LAB
25(OH)D3 SERPL-MCNC: 53 NG/ML (ref 30–100)
ALBUMIN SERPL BCP-MCNC: 3.8 G/DL (ref 3.4–5)
ALP SERPL-CCNC: 108 U/L (ref 33–136)
ALT SERPL W P-5'-P-CCNC: 25 U/L (ref 10–52)
ANION GAP SERPL CALC-SCNC: 10 MMOL/L (ref 10–20)
AST SERPL W P-5'-P-CCNC: 31 U/L (ref 9–39)
BASOPHILS # BLD AUTO: 0.03 X10*3/UL (ref 0–0.1)
BASOPHILS NFR BLD AUTO: 0.6 %
BILIRUB SERPL-MCNC: 0.6 MG/DL (ref 0–1.2)
BUN SERPL-MCNC: 21 MG/DL (ref 6–23)
CALCIUM SERPL-MCNC: 8.7 MG/DL (ref 8.6–10.3)
CHLORIDE SERPL-SCNC: 107 MMOL/L (ref 98–107)
CHOLEST SERPL-MCNC: 106 MG/DL (ref 0–199)
CHOLESTEROL/HDL RATIO: 1.8
CO2 SERPL-SCNC: 28 MMOL/L (ref 21–32)
CREAT SERPL-MCNC: 0.88 MG/DL (ref 0.5–1.3)
EOSINOPHIL # BLD AUTO: 0.11 X10*3/UL (ref 0–0.7)
EOSINOPHIL NFR BLD AUTO: 2.3 %
ERYTHROCYTE [DISTWIDTH] IN BLOOD BY AUTOMATED COUNT: 18.3 % (ref 11.5–14.5)
GFR SERPL CREATININE-BSD FRML MDRD: >90 ML/MIN/1.73M*2
GLUCOSE SERPL-MCNC: 105 MG/DL (ref 74–99)
HCT VFR BLD AUTO: 34.2 % (ref 41–52)
HDLC SERPL-MCNC: 57.7 MG/DL
HGB BLD-MCNC: 9.9 G/DL (ref 13.5–17.5)
IGG SERPL-MCNC: 827 MG/DL (ref 700–1600)
IMM GRANULOCYTES # BLD AUTO: 0.02 X10*3/UL (ref 0–0.7)
IMM GRANULOCYTES NFR BLD AUTO: 0.4 % (ref 0–0.9)
LDLC SERPL CALC-MCNC: 33 MG/DL
LYMPHOCYTES # BLD AUTO: 0.91 X10*3/UL (ref 1.2–4.8)
LYMPHOCYTES NFR BLD AUTO: 19 %
MAGNESIUM SERPL-MCNC: 1.68 MG/DL (ref 1.6–2.4)
MCH RBC QN AUTO: 27.6 PG (ref 26–34)
MCHC RBC AUTO-ENTMCNC: 28.9 G/DL (ref 32–36)
MCV RBC AUTO: 95 FL (ref 80–100)
MONOCYTES # BLD AUTO: 0.44 X10*3/UL (ref 0.1–1)
MONOCYTES NFR BLD AUTO: 9.2 %
NEUTROPHILS # BLD AUTO: 3.29 X10*3/UL (ref 1.2–7.7)
NEUTROPHILS NFR BLD AUTO: 68.5 %
NON HDL CHOLESTEROL: 48 MG/DL (ref 0–149)
NRBC BLD-RTO: 0 /100 WBCS (ref 0–0)
PLATELET # BLD AUTO: 109 X10*3/UL (ref 150–450)
POTASSIUM SERPL-SCNC: 4.5 MMOL/L (ref 3.5–5.3)
PROT SERPL-MCNC: 5.9 G/DL (ref 6.4–8.2)
RBC # BLD AUTO: 3.59 X10*6/UL (ref 4.5–5.9)
SODIUM SERPL-SCNC: 140 MMOL/L (ref 136–145)
TRIGL SERPL-MCNC: 75 MG/DL (ref 0–149)
VLDL: 15 MG/DL (ref 0–40)
WBC # BLD AUTO: 4.8 X10*3/UL (ref 4.4–11.3)

## 2023-11-07 PROCEDURE — 80061 LIPID PANEL: CPT

## 2023-11-07 PROCEDURE — 80053 COMPREHEN METABOLIC PANEL: CPT

## 2023-11-07 PROCEDURE — 86833 HLA CLASS II HIGH DEFIN QUAL: CPT

## 2023-11-07 PROCEDURE — 83735 ASSAY OF MAGNESIUM: CPT

## 2023-11-07 PROCEDURE — 80197 ASSAY OF TACROLIMUS: CPT

## 2023-11-07 PROCEDURE — 85025 COMPLETE CBC W/AUTO DIFF WBC: CPT

## 2023-11-07 PROCEDURE — 82306 VITAMIN D 25 HYDROXY: CPT

## 2023-11-07 PROCEDURE — 82784 ASSAY IGA/IGD/IGG/IGM EACH: CPT

## 2023-11-07 PROCEDURE — 86832 HLA CLASS I HIGH DEFIN QUAL: CPT

## 2023-11-07 PROCEDURE — 36415 COLL VENOUS BLD VENIPUNCTURE: CPT

## 2023-11-08 ENCOUNTER — PHARMACY VISIT (OUTPATIENT)
Dept: PHARMACY | Facility: CLINIC | Age: 68
End: 2023-11-08
Payer: COMMERCIAL

## 2023-11-08 LAB
AFB CULTURE: NORMAL
AFB CULTURE: NORMAL
AFB STAIN: NORMAL
AFB STAIN: NORMAL
HLA RESULTS: NORMAL
HLA-A+B+C AB NFR SER: NORMAL %
HLA-DP+DQ+DR AB NFR SER: NORMAL %
TACROLIMUS BLD-MCNC: 7 NG/ML

## 2023-11-08 PROCEDURE — RXMED WILLOW AMBULATORY MEDICATION CHARGE

## 2023-11-09 DIAGNOSIS — Z94.2 LUNG TRANSPLANTED (MULTI): ICD-10-CM

## 2023-11-09 LAB
CMV DNA SERPL NAA+PROBE-LOG IU: NORMAL {LOG_IU}/ML
LABORATORY COMMENT REPORT: NOT DETECTED

## 2023-11-10 ENCOUNTER — APPOINTMENT (OUTPATIENT)
Dept: TRANSPLANT | Facility: HOSPITAL | Age: 68
End: 2023-11-10

## 2023-11-10 ENCOUNTER — HOSPITAL ENCOUNTER (OUTPATIENT)
Dept: RADIOLOGY | Facility: HOSPITAL | Age: 68
Discharge: HOME | End: 2023-11-10
Payer: COMMERCIAL

## 2023-11-10 ENCOUNTER — OFFICE VISIT (OUTPATIENT)
Dept: TRANSPLANT | Facility: HOSPITAL | Age: 68
End: 2023-11-10
Payer: COMMERCIAL

## 2023-11-10 ENCOUNTER — HOSPITAL ENCOUNTER (OUTPATIENT)
Dept: RESPIRATORY THERAPY | Facility: HOSPITAL | Age: 68
Discharge: HOME | End: 2023-11-10
Payer: COMMERCIAL

## 2023-11-10 VITALS
HEART RATE: 65 BPM | OXYGEN SATURATION: 95 % | WEIGHT: 206 LBS | BODY MASS INDEX: 29.56 KG/M2 | SYSTOLIC BLOOD PRESSURE: 112 MMHG | DIASTOLIC BLOOD PRESSURE: 63 MMHG | TEMPERATURE: 97.6 F

## 2023-11-10 DIAGNOSIS — K21.9 GASTROESOPHAGEAL REFLUX DISEASE WITHOUT ESOPHAGITIS: ICD-10-CM

## 2023-11-10 DIAGNOSIS — Z94.2 S/P LUNG TRANSPLANT (MULTI): Primary | ICD-10-CM

## 2023-11-10 DIAGNOSIS — Z94.2 LUNG TRANSPLANTED (MULTI): ICD-10-CM

## 2023-11-10 DIAGNOSIS — R73.9 STEROID-INDUCED HYPERGLYCEMIA: ICD-10-CM

## 2023-11-10 DIAGNOSIS — H02.539 EYELID RETRACTION OR LAG: ICD-10-CM

## 2023-11-10 DIAGNOSIS — I10 PRIMARY HYPERTENSION: ICD-10-CM

## 2023-11-10 DIAGNOSIS — D84.9 IMMUNOSUPPRESSION (MULTI): ICD-10-CM

## 2023-11-10 DIAGNOSIS — I26.99 PULMONARY EMBOLISM, UNSPECIFIED CHRONICITY, UNSPECIFIED PULMONARY EMBOLISM TYPE, UNSPECIFIED WHETHER ACUTE COR PULMONALE PRESENT (MULTI): ICD-10-CM

## 2023-11-10 DIAGNOSIS — J84.10 PULMONARY FIBROSIS (MULTI): ICD-10-CM

## 2023-11-10 DIAGNOSIS — T38.0X5A STEROID-INDUCED HYPERGLYCEMIA: ICD-10-CM

## 2023-11-10 DIAGNOSIS — D22.5 MELANOCYTIC NEVI OF TRUNK: ICD-10-CM

## 2023-11-10 DIAGNOSIS — M81.0 OSTEOPOROSIS WITHOUT CURRENT PATHOLOGICAL FRACTURE, UNSPECIFIED OSTEOPOROSIS TYPE: ICD-10-CM

## 2023-11-10 PROCEDURE — 1160F RVW MEDS BY RX/DR IN RCRD: CPT | Performed by: INTERNAL MEDICINE

## 2023-11-10 PROCEDURE — 1159F MED LIST DOCD IN RCRD: CPT | Performed by: INTERNAL MEDICINE

## 2023-11-10 PROCEDURE — 1126F AMNT PAIN NOTED NONE PRSNT: CPT | Performed by: INTERNAL MEDICINE

## 2023-11-10 PROCEDURE — 71046 X-RAY EXAM CHEST 2 VIEWS: CPT | Performed by: RADIOLOGY

## 2023-11-10 PROCEDURE — 3078F DIAST BP <80 MM HG: CPT | Performed by: INTERNAL MEDICINE

## 2023-11-10 PROCEDURE — 3074F SYST BP LT 130 MM HG: CPT | Performed by: INTERNAL MEDICINE

## 2023-11-10 PROCEDURE — 71046 X-RAY EXAM CHEST 2 VIEWS: CPT

## 2023-11-10 PROCEDURE — 99215 OFFICE O/P EST HI 40 MIN: CPT | Performed by: INTERNAL MEDICINE

## 2023-11-10 PROCEDURE — 71046 X-RAY EXAM CHEST 2 VIEWS: CPT | Mod: FY

## 2023-11-10 PROCEDURE — 94010 BREATHING CAPACITY TEST: CPT

## 2023-11-10 ASSESSMENT — PAIN SCALES - GENERAL: PAINLEVEL: 0-NO PAIN

## 2023-11-10 NOTE — PROGRESS NOTES
Mr. Patel is a 67-year-old M s/p DLTx for IPF and pulmonary hypertension 3/22/2020 with course complicated by pleural bleed with washout 3/23 and need for trach discharged on 5/8/2020. Post hospital course is also been complicated by septic shock with MSSA 8/2020 with PE on lifelong anticoagulation (remote VTE 1993) he was also COVID-positive 12/2022.    Presenting for a follow-up visit.    Current Outpatient Medications:     acetaminophen (Tylenol) 325 mg tablet, Take 2 tablets (650 mg) by mouth every 6 hours if needed. As Needed for pain. Call transplant if you have a temprature., Disp: , Rfl:     albuterol 2.5 mg /3 mL (0.083 %) nebulizer solution, USE 1 VIAL IN NEBULIZER EVERY 12 HOURS AS NEEDED FOR PREMEDICATION FOR TOBRAMYCIN., Disp: 90 mL, Rfl: 11    alendronate (Fosamax) 70 mg tablet, TAKE ONE (1) TABLET BY MOUTH ONCE A WEEK, AS DIRECTED, Disp: 12 tablet, Rfl: 3    allopurinol (Zyloprim) 300 mg tablet, TAKE ONE (1) TABLET BY MOUTH DAILY., Disp: 30 tablet, Rfl: 11    apixaban (Eliquis) 5 mg tablet, Take 1 tablet (5 mg) by mouth every 12 hours., Disp: , Rfl:     azithromycin (Zithromax) 250 mg tablet, TAKE ONE (1) TABLET BY MOUTH ON MONDAY, WEDNESDAY, AND FRIDAY., Disp: 12 tablet, Rfl: 11    blood sugar diagnostic strip, USE AS DIRECTED TO TEST BLOOD GLUCOSE THREE TIMES DAILY, Disp: 100 each, Rfl: 11    budesonide-formoteroL (Symbicort) 160-4.5 mcg/actuation inhaler, Inhale 2 puffs by mouth 2 times a day. Rinse mouth with water after use to reduce aftertaste and incidence of candidiasis. Do not swallow., Disp: 10.2 g, Rfl: 11    cholecalciferol (Vitamin D-3) 25 MCG (1000 UT) capsule, Take 1 capsule (25 mcg) by mouth once daily., Disp: , Rfl:     dapsone 100 mg tablet, TAKE ONE (1) TABLET BY MOUTH ONCE DAILY., Disp: 90 tablet, Rfl: 3    DULoxetine (Cymbalta) 60 mg DR capsule, TAKE ONE (1) CAPSULE BY MOUTH DAILY, Disp: 30 capsule, Rfl: 11    levalbuterol (Xopenex) 0.63 mg/3 mL nebulizer solution, Inhale one (1)  ampule via nebulizer twice daily., Disp: 180 mL, Rfl: 11    loratadine (Claritin) 10 mg tablet, TAKE ONE (1) TABLET BY MOUTH ONCE DAILY., Disp: 90 tablet, Rfl: 3    magnesium oxide (Mag-Ox) 400 mg tablet, Take 1 tablet (400 mg) by mouth once daily. At 1 pm, Disp: , Rfl:     metoprolol tartrate (Lopressor) 25 mg tablet, TAKE ONE-HALF (0.5) TABLET BY MOUTH EVERY 12 HOURS., Disp: 30 tablet, Rfl: 11    montelukast (Singulair) 10 mg tablet, TAKE ONE (1) TABLET BY MOUTH ONCE DAILY AT BEDTIME., Disp: 30 tablet, Rfl: 11    multivitamin with minerals iron-free (Centrum Silver), Take 1 tablet by mouth once daily., Disp: , Rfl:     mycophenolate (Cellcept) 250 mg capsule, TAKE ONE (1) CAPSULE BY MOUTH 2 TIMES DAILY., Disp: 60 capsule, Rfl: 11    pantoprazole (ProtoNix) 40 mg EC tablet, TAKE ONE (1) TABLET BY MOUTH ONCE DAILY., Disp: 30 tablet, Rfl: 11    predniSONE (Deltasone) 5 mg tablet, TAKE ONE (1) TABLET BY MOUTH EVERY DAY, Disp: 30 tablet, Rfl: 11    rosuvastatin (Crestor) 40 mg tablet, Take 1 tablet (40 mg) by mouth once daily., Disp: 30 tablet, Rfl: 11    tacrolimus (Prograf) 0.5 mg capsule, TAKE FOUR (4) CAPSULES BY MOUTH EVERY MORNING AND THREE (3) CAPSULES BY MOUTH AT NIGHT., Disp: 210 capsule, Rfl: 11    tobramycin (Vicente 300) 300 mg/5 mL nebulizer solution, INHALE 5 ML (1 VIAL) VIA NEBULIZER TWICE A DAY AS DIRECTED, Disp: 840 mL, Rfl: 3    valGANciclovir (Valcyte) 450 mg tablet, TAKE TWO (2) TABLETS BY MOUTH ONCE DAILY, Disp: 180 tablet, Rfl: 3        11/10/23: Breathing overall stable on RA, no nausea or vomiting, moving bowels. No LE edema. Maintaining adequate po fluid intake. New lesion on forehead, planning to see Dermatology soon.    10/13/23: Bronchoscopy on 9/19/23 with multiple strains of Pseudomonas, completed a 14 day course of Ciprofloxacin and started on inhaled VICENTE, some tremors with albuterol. TBBX with no ACR or OB. Feels breathing and cough is overall improved, no MERINO. No nausea or vomiting,  tolerating po intake and moving bowels.    9/7/23: Seen in ER 8/25/23 for chest pain, sharp increased with movement. Workup negative including nuclear stress test. Started on Crestor. Still present at times but improved, feels musculoskeletal in nature. Breathing overall stable, still has a cough with brownish sputum despite course of Ciprofloxacin after last visit, RVP negative. No nausea or vomiting, moving bowels regularly.    8/2/23: Breathing overall stable, walking daily. Cough with brownish sputum for the past 2 weeks, no fevers, chills. No nausea or vomiting, moving bowels, no LE edema, maintaining adequate po fluid intake.    6/12/23: Breathing stable, working on weight loss. No nausea or vomiting, moving bowels. No LE edema. No longer working due to hours. Trying to be more active.    4/5/23: Feels that breathing is overall stable, no MERINO or cough. Tolerating PO diet, no nausea or vomiting, moving bowels, no LE edema. Notes 15lb weight gain self reported due to inactivity - no orthopnea or PND. Notes skin lesion on scalp present for last few months, not yet evaluated by dermatology. Blood glucose range , 115 on 12.5mg Jardiance bid, BP max 115 at home. Started new job part-time in [place] at [employer] weekly for 6hrs/day.        ROS:      Constitutional: no chills, no fever, no night sweats, not feeling poorly and not feeling tired.   Eyes: no blurred vision, no eyesight problems, no dryness of the eyes and no eye pain.   ENT: hearing loss, but no nasal congestion, no nasal discharge, no hoarseness, no sore throat, no earache, the ears do not feel full, no tinnitus, no nosebleeds, no postnasal drip, no rhinorrhea and no sinus pressure.   Neck: no mass(es) and no swelling.   Cardiovascular: no chest pain, no intermittent leg claudication, no lower extremity edema, no palpitations, no syncope, no tachycardia and no bradycardia.   Respiratory: no cough, no shortness of breath during exertion, no shortness  of breath at rest, no wheezing, non dry cough, non productive cough and no hemoptysis.   Gastrointestinal: no abdominal pain, no bloating, no nausea and no vomiting.   Musculoskeletal: no arthralgias, no back pain, no myalgias, no history of falls, no localized joint pain, no joint redness, no joint stiffness, no joint swelling, no limb pain, no limb swelling and no neck pain.   Integumentary: no rashes.   Neurological: no convulsions, no difficulty walking, no diplopia, no dizziness, no dysarthria, no facial weakness, no headache, no limb weakness, no memory changes, no numbness, no speech difficulties, no tingling, no confusion and no syncope.   Psychiatric: no anxiety, no depression, no anhedonia, no substance use disorders, no personality change, no sleep disturbances and no suicidal ideations.   Endocrine: no changes in appetite, no feelings of weakness, no hair thinning or loss, no heat/cold intolerance, no polydipsia, no deepening of the voice, no polyuria, no muscle weakness and no proptosis.   Hematologic/Lymphatic: a tendency for easy bruising and a tendency for easy bleeding, but no swollen glands and no recurrent infections.   All other systems have been reviewed and are negative for complaint.          PE: VSS: 112/63, 65, 12, 36.4, 95% on RA  Constitutional: Alert and in no acute distress. Well developed, well nourished.   Head and Face: Head and face: Normal.  (Moon facies from steroid, right facial droop unchanged).   Ears, Nose, Mouth, and Throat: External inspection of ears and nose: Normal.  Nasal mucosa, septum, and turbinates: Normal.  Lips, teeth, and gums: Normal.    Neck: No neck mass was observed. Supple.   Pulmonary: Chest: Normal to inspection.  (No wound) Respiratory effort: No increased work of breathing or signs of respiratory distress.  Auscultation of lungs: Clear to auscultation bilaterally.    Cardiovascular: Heart rate and rhythm were normal, normal S1 and S2, no gallops, no  murmurs and no pericardial rub. Pedal pulses: Normal. No peripheral edema.   Abdomen: Soft, nontender; no abdominal mass palpated. The abdomen was obese. Normal bowel sounds.   Lymphatic: No lymphadenopathy.   Musculoskeletal: No joint swelling seen, normal movements of all extremities. Digits and nails: Abnormal.  Examination of the extremities revealed fingernail clubbing.   Neurologic: cranial nerves 2-12 were intact.   Psychiatric: Judgment and insight: Intact. Alert and oriented to person, place and time. Mood and affect:      Labwork:  Na 140, potassium 4.5, Cr 0.88, Mag 1.68, LFT's stable  WBC 4.8, Hg 9.9, Hematocrit 34, platelets 109, ANC acceptable  Prograf 7.0, CMV PCR negative 11/7/23, EBV PCR negative on 10/2/23  Vitamin D 53, IgG 827, HLA Ab negative 11/7/23  Lipid panel 11/7/23 cholesterol 106 TG 75        CXR: 11/10/23 reviewed, clear lung fields bilaterally, no acute infiltrates.    Results:  11/10/23 Spirometry:  FVC: 2.33, 55%  FEV1: 1.47 46%  25-75: 0.95 39%    10/13/23 Spirometry:  FVC: 2.29 52%  FEV1: 1.35 42%  25-75: 0.88 36%    9/7/23 Spirometry:  FVC: 2.25, 53%  FEV1: 1.41, 44%  25-75: 0.82, 34%    8/2/23 Spirometry:  FVC: 2.48, 59%  FEV1: 1.52, 47%  25-75: 1.03, 42%    6/12/23 Spirometry:  FVC 2.65 62%  FEV1: 1.48, 46%  25-75: 0.89, 36%    5/2/23 Spirometry:  FVC: 2.58, 61%  FEV1: 1.51, 47%  25-75: 0.88, 36%    4/5/23 Spirometry:  FVC: 2.47 58%  FEV1: 1.50 46%  FEF 25-75: 31%    Assessment/Plan:  Mr. Patel is a 68 yo M with history of IPF/PAH s/p DLTx 3/22/20, GERD, hypertension, DM, hyperlipidemia who presents for follow-up visit for lung transplant. Overall doing well. Seen in ER 8/25/23 for chest pain with negative workup.    1. Pulmonary- s/p DLTx for IPF/PAH on 3/22/20, CMV mismatch, EBV positive recipient  Allograft function:  -Initial complications- minimal PGD ,pleural bleed with washout 3/23 and need for trach, discharged on 5/8/2020.   -Modest PE at same presentation as septic shock  8/2020 - lifelong anticoagulation given second episode. (Remote VTE 1993). Currently on Eliquis BID.  -TBBX in 4/20 with A2, treated with pulse Solumedrol, TBBX in 5 and 6/20 with minimal ACR, subsequent TBBX negative, last 2/23/21.  -History of MSSA, Klebsiella, Corynebacterium from 4/20 cultures, Enterobacter from 6/20 cultures, sensitive to fluoroquinolones.  -Bronchoscopy on 9/19/23, cultures with multiple strains of Pseudomonas, completed a 14 day course of Ciprofloxacin and started on inhaled VICENTE. TBBX with no ACR or OB.   -PFT's today improved FEV1 1.47(46%); FEF 25-75 0.96(39%),continue to follow serial exams, continue to work on weight loss and conditioning, has gained 3 more labs and not as active as in the past.  -CXR reviewed, clear lung fields bilaterally, continue to follow serial exams. HRCT on 9/18/23 with relatively clear lung fields bilaterally, multifocal expiratory air trapping, suggestive of CLAD.  -SARA- continue with nightly CPAP  -HLA Ab negative 11/7/23,, no significant history of DSA post transplant, follow serially, every 3 months.  -Continue Azithromycin 250 mg qM,W,F for ROSSY prophylaxis along with Singulair 10 mg QHS. Will also add Symbicort BID.    Immunosuppression:  -Prograf goal 6-8, continue 2/1.5, level 7.0. Continue to follow closely, repeat monthly, next on 11/27/23.  -Cellcept 250 mg BID   -Prednisone 5 mg daily    Prophylaxis:  -Dapsone for PJP prophylaxis (no known prior intolerance to Bactrim, has not taken other agents)  -CMV mismatch- continues on Valcyte 900 mg daily, CMV PCR's negative, last 11/7/23, no history of viremia.  -Previously on voriconazole for anti-fungal prophylaxis; no fungal infections, LFT's stable with elevated alk phos and normal MRI of liver; discontinued voriconazole 4/5/23 with improvement of alkaline phosphatase  -IgG level 827, adequate, continue to follow serial levels, goal greater than 600.    2. CV  -History of hypertension- on Lopressor 12.5 mg  BID. Advised to continue to follow BP closely and call us if greater than 130/80 consistently.  -Recently seen in ER 8/25/23 for chest pain, workup negative, including nuclear stress test. Overall improved, appears musculoskeletal in nature. TTE 9/26/23 with EF 65%, normal LV and RV function  -Hyperlipidemia- Lipid panel on 11/7/23 with acceptable Cholesterol and TG, started on Crestor 40 mg daily (for cardioprotective effect), will repeat in 3 months.    3. Renal  -Cr overall stable at 0.88, advised to maintain adequate po hydration.  -Magnesium 1.7, continue 400 mg daily.  -Renally dose all medications and avoid nephrotoxins  -History of nephrolithiasis in the past    4. GI  -Continue Protonix 40 mg daily before meals   -Persistently elevated AP- MRI lever 2/9/23 with normal appearance; improved after discontinuation of voriconazole, will follow.   -Will follow after start of Crestor as above, stable.    5. ID  -History of septic shock with MSSA 8/2020. Received prolonged course of IV Abx with resolution.  -Otitis external starting 9/8/2020 with ENT involvement , multiple rounds ab and wick insertion, developed mastoiditis  -Sternal wound infection- 2 episodes of Staph s/p debridement, wound vac removed. Repeat episode 7/19-7/23/2021, no debridement, was discharged on Abx and completed a 8 week course.  -COVID 12/22, recovered and overall improved.  -Bronchoscopy on 9/19/23 cultures with multiple strains of Pseudomonas, completed a 14 day course of Ciprofloxacin and started on inhaled VICENTE, tolerating well.  -Afebrile, no signs of new infection currently.    6. Endo  -History of DM- BG levels well-controlled, on Jardiance per endocrinology  -History of osteoporosis- on Fosamax. Also on vitamin D, level acceptable at 53.    7. Heme  -History of PE at same presentation as septic shock 8/2020 - lifelong anticoagulation given second episode. (Remote VTE 1993). On Eliquis, increased to 5 mg BID as off voriconazole.  Also has IVF filter in place.   -H/H stable at 10/34 and no signs of bleeding currently.  -WBC intact, platelets stable at 109, chronic thrombocytopenia    8. ENT  -Otitis extena with facial nerve involvement. 10/17 Right tympanomastoidectomy, mastoid washout while inpatient. 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  -Chronic hearing loss with hearing aids.    8. Prophylaxis  -UTD on vaccines (Influenza, PNA, COVID, RSV). Following with Dermatology, scalp lesion consistent with folliculitis, healed well, new lesion on forehead, scheduled to see them soon. Last colonoscopy 4/22. DEXA 6/10/22.    Continue to work on weight loss and increased activity. RTC in 2 months with PFT's, CXR. Bronchoscopy only as clinically indicated.   Plan of care discussed at length with patient, all questions answered, total of 45 minutes spent regarding counseling and coordination of care.

## 2023-11-13 ENCOUNTER — PHARMACY VISIT (OUTPATIENT)
Dept: PHARMACY | Facility: CLINIC | Age: 68
End: 2023-11-13
Payer: COMMERCIAL

## 2023-11-13 DIAGNOSIS — E11.9 TYPE 2 DIABETES MELLITUS WITHOUT COMPLICATION, UNSPECIFIED WHETHER LONG TERM INSULIN USE (MULTI): Primary | ICD-10-CM

## 2023-11-13 PROCEDURE — RXMED WILLOW AMBULATORY MEDICATION CHARGE

## 2023-11-13 RX ORDER — BLOOD-GLUCOSE METER
1 EACH MISCELLANEOUS
Qty: 300 EACH | Refills: 3 | Status: SHIPPED | OUTPATIENT
Start: 2023-11-13 | End: 2023-12-11 | Stop reason: SDUPTHER

## 2023-11-28 ENCOUNTER — LAB (OUTPATIENT)
Dept: LAB | Facility: LAB | Age: 68
End: 2023-11-28
Payer: COMMERCIAL

## 2023-11-28 DIAGNOSIS — Z94.2 LUNG TRANSPLANTED (MULTI): ICD-10-CM

## 2023-11-28 LAB
ALBUMIN SERPL BCP-MCNC: 3.8 G/DL (ref 3.4–5)
ALP SERPL-CCNC: 101 U/L (ref 33–136)
ALT SERPL W P-5'-P-CCNC: 24 U/L (ref 10–52)
ANION GAP SERPL CALC-SCNC: 10 MMOL/L (ref 10–20)
AST SERPL W P-5'-P-CCNC: 26 U/L (ref 9–39)
BASOPHILS # BLD AUTO: 0.05 X10*3/UL (ref 0–0.1)
BASOPHILS NFR BLD AUTO: 0.9 %
BILIRUB SERPL-MCNC: 0.9 MG/DL (ref 0–1.2)
BUN SERPL-MCNC: 22 MG/DL (ref 6–23)
CALCIUM SERPL-MCNC: 8.5 MG/DL (ref 8.6–10.3)
CHLORIDE SERPL-SCNC: 106 MMOL/L (ref 98–107)
CO2 SERPL-SCNC: 27 MMOL/L (ref 21–32)
CREAT SERPL-MCNC: 0.83 MG/DL (ref 0.5–1.3)
EOSINOPHIL # BLD AUTO: 0.15 X10*3/UL (ref 0–0.7)
EOSINOPHIL NFR BLD AUTO: 2.6 %
ERYTHROCYTE [DISTWIDTH] IN BLOOD BY AUTOMATED COUNT: 17.9 % (ref 11.5–14.5)
GFR SERPL CREATININE-BSD FRML MDRD: >90 ML/MIN/1.73M*2
GLUCOSE SERPL-MCNC: 86 MG/DL (ref 74–99)
HCT VFR BLD AUTO: 34.6 % (ref 41–52)
HGB BLD-MCNC: 9.9 G/DL (ref 13.5–17.5)
IGG SERPL-MCNC: 898 MG/DL (ref 700–1600)
IMM GRANULOCYTES # BLD AUTO: 0.02 X10*3/UL (ref 0–0.7)
IMM GRANULOCYTES NFR BLD AUTO: 0.4 % (ref 0–0.9)
LYMPHOCYTES # BLD AUTO: 1.1 X10*3/UL (ref 1.2–4.8)
LYMPHOCYTES NFR BLD AUTO: 19.4 %
MAGNESIUM SERPL-MCNC: 1.74 MG/DL (ref 1.6–2.4)
MCH RBC QN AUTO: 27.3 PG (ref 26–34)
MCHC RBC AUTO-ENTMCNC: 28.6 G/DL (ref 32–36)
MCV RBC AUTO: 95 FL (ref 80–100)
MONOCYTES # BLD AUTO: 0.45 X10*3/UL (ref 0.1–1)
MONOCYTES NFR BLD AUTO: 7.9 %
NEUTROPHILS # BLD AUTO: 3.91 X10*3/UL (ref 1.2–7.7)
NEUTROPHILS NFR BLD AUTO: 68.8 %
NRBC BLD-RTO: 0 /100 WBCS (ref 0–0)
PLATELET # BLD AUTO: 103 X10*3/UL (ref 150–450)
POTASSIUM SERPL-SCNC: 4.4 MMOL/L (ref 3.5–5.3)
PROT SERPL-MCNC: 6 G/DL (ref 6.4–8.2)
RBC # BLD AUTO: 3.63 X10*6/UL (ref 4.5–5.9)
SODIUM SERPL-SCNC: 139 MMOL/L (ref 136–145)
TACROLIMUS BLD-MCNC: 5.6 NG/ML
WBC # BLD AUTO: 5.7 X10*3/UL (ref 4.4–11.3)

## 2023-11-28 PROCEDURE — 87799 DETECT AGENT NOS DNA QUANT: CPT

## 2023-11-28 PROCEDURE — 83735 ASSAY OF MAGNESIUM: CPT

## 2023-11-28 PROCEDURE — 82784 ASSAY IGA/IGD/IGG/IGM EACH: CPT

## 2023-11-28 PROCEDURE — 85025 COMPLETE CBC W/AUTO DIFF WBC: CPT

## 2023-11-28 PROCEDURE — 36415 COLL VENOUS BLD VENIPUNCTURE: CPT

## 2023-11-28 PROCEDURE — 80053 COMPREHEN METABOLIC PANEL: CPT

## 2023-11-28 PROCEDURE — 80197 ASSAY OF TACROLIMUS: CPT

## 2023-11-29 DIAGNOSIS — Z94.2 LUNG TRANSPLANTED (MULTI): ICD-10-CM

## 2023-11-30 LAB
CMV DNA SERPL NAA+PROBE-LOG IU: NORMAL {LOG_IU}/ML
EBV DNA SERPL NAA+PROBE-ACNC: 197 IU/ML
EBV DNA SPEC NAA+PROBE-LOG#: 2.29 LOG IU/ML
LABORATORY COMMENT REPORT: DETECTED
LABORATORY COMMENT REPORT: NOT DETECTED

## 2023-12-04 ENCOUNTER — PHARMACY VISIT (OUTPATIENT)
Dept: PHARMACY | Facility: CLINIC | Age: 68
End: 2023-12-04
Payer: COMMERCIAL

## 2023-12-04 PROCEDURE — RXMED WILLOW AMBULATORY MEDICATION CHARGE

## 2023-12-11 DIAGNOSIS — E11.9 TYPE 2 DIABETES MELLITUS WITHOUT COMPLICATION, UNSPECIFIED WHETHER LONG TERM INSULIN USE (MULTI): ICD-10-CM

## 2023-12-11 PROCEDURE — RXMED WILLOW AMBULATORY MEDICATION CHARGE

## 2023-12-11 RX ORDER — BLOOD-GLUCOSE METER
1 EACH MISCELLANEOUS
Qty: 300 EACH | Refills: 3 | Status: SHIPPED | OUTPATIENT
Start: 2023-12-11 | End: 2024-12-10

## 2023-12-11 NOTE — TELEPHONE ENCOUNTER
Received call from  [facility].  They are unable to fill OneTouch test strips for patient and request that we send the prescription to his local pharmacy.  Pended for approval.

## 2023-12-12 PROCEDURE — RXMED WILLOW AMBULATORY MEDICATION CHARGE

## 2023-12-15 ENCOUNTER — SPECIALTY PHARMACY (OUTPATIENT)
Dept: PHARMACY | Facility: CLINIC | Age: 68
End: 2023-12-15

## 2023-12-15 ENCOUNTER — PHARMACY VISIT (OUTPATIENT)
Dept: PHARMACY | Facility: CLINIC | Age: 68
End: 2023-12-15
Payer: COMMERCIAL

## 2023-12-15 DIAGNOSIS — Z94.2 LUNG TRANSPLANTED (MULTI): Primary | ICD-10-CM

## 2023-12-15 PROCEDURE — RXMED WILLOW AMBULATORY MEDICATION CHARGE

## 2023-12-15 RX ORDER — METOPROLOL TARTRATE 25 MG/1
TABLET, FILM COATED ORAL
Qty: 30 TABLET | Refills: 11 | Status: SHIPPED | OUTPATIENT
Start: 2023-12-15 | End: 2024-02-05 | Stop reason: SDUPTHER

## 2023-12-15 RX ORDER — PANTOPRAZOLE SODIUM 40 MG/1
40 TABLET, DELAYED RELEASE ORAL DAILY
Qty: 30 TABLET | Refills: 11 | Status: SHIPPED | OUTPATIENT
Start: 2023-12-15 | End: 2024-02-05 | Stop reason: SDUPTHER

## 2023-12-15 RX ORDER — MYCOPHENOLATE MOFETIL 250 MG/1
250 CAPSULE ORAL 2 TIMES DAILY
Qty: 60 CAPSULE | Refills: 11 | Status: SHIPPED | OUTPATIENT
Start: 2023-12-15 | End: 2024-02-05 | Stop reason: SDUPTHER

## 2023-12-19 ENCOUNTER — PHARMACY VISIT (OUTPATIENT)
Dept: PHARMACY | Facility: CLINIC | Age: 68
End: 2023-12-19
Payer: COMMERCIAL

## 2023-12-21 ENCOUNTER — LAB (OUTPATIENT)
Dept: LAB | Facility: LAB | Age: 68
End: 2023-12-21
Payer: COMMERCIAL

## 2023-12-21 DIAGNOSIS — Z94.2 LUNG TRANSPLANTED (MULTI): ICD-10-CM

## 2023-12-21 LAB
ALBUMIN SERPL BCP-MCNC: 3.7 G/DL (ref 3.4–5)
ALP SERPL-CCNC: 88 U/L (ref 33–136)
ALT SERPL W P-5'-P-CCNC: 24 U/L (ref 10–52)
ANION GAP SERPL CALC-SCNC: 9 MMOL/L (ref 10–20)
AST SERPL W P-5'-P-CCNC: 27 U/L (ref 9–39)
BASOPHILS # BLD AUTO: 0.03 X10*3/UL (ref 0–0.1)
BASOPHILS NFR BLD AUTO: 0.6 %
BILIRUB SERPL-MCNC: 0.8 MG/DL (ref 0–1.2)
BUN SERPL-MCNC: 24 MG/DL (ref 6–23)
CALCIUM SERPL-MCNC: 9 MG/DL (ref 8.6–10.3)
CHLORIDE SERPL-SCNC: 108 MMOL/L (ref 98–107)
CO2 SERPL-SCNC: 30 MMOL/L (ref 21–32)
CREAT SERPL-MCNC: 0.86 MG/DL (ref 0.5–1.3)
EOSINOPHIL # BLD AUTO: 0.12 X10*3/UL (ref 0–0.7)
EOSINOPHIL NFR BLD AUTO: 2.6 %
ERYTHROCYTE [DISTWIDTH] IN BLOOD BY AUTOMATED COUNT: 18.1 % (ref 11.5–14.5)
GFR SERPL CREATININE-BSD FRML MDRD: >90 ML/MIN/1.73M*2
GLUCOSE SERPL-MCNC: 101 MG/DL (ref 74–99)
HCT VFR BLD AUTO: 33.6 % (ref 41–52)
HGB BLD-MCNC: 9.7 G/DL (ref 13.5–17.5)
IGG SERPL-MCNC: 849 MG/DL (ref 700–1600)
IMM GRANULOCYTES # BLD AUTO: 0.02 X10*3/UL (ref 0–0.7)
IMM GRANULOCYTES NFR BLD AUTO: 0.4 % (ref 0–0.9)
LYMPHOCYTES # BLD AUTO: 0.9 X10*3/UL (ref 1.2–4.8)
LYMPHOCYTES NFR BLD AUTO: 19.1 %
MAGNESIUM SERPL-MCNC: 1.82 MG/DL (ref 1.6–2.4)
MCH RBC QN AUTO: 28.2 PG (ref 26–34)
MCHC RBC AUTO-ENTMCNC: 28.9 G/DL (ref 32–36)
MCV RBC AUTO: 98 FL (ref 80–100)
MONOCYTES # BLD AUTO: 0.39 X10*3/UL (ref 0.1–1)
MONOCYTES NFR BLD AUTO: 8.3 %
NEUTROPHILS # BLD AUTO: 3.24 X10*3/UL (ref 1.2–7.7)
NEUTROPHILS NFR BLD AUTO: 69 %
NRBC BLD-RTO: 0 /100 WBCS (ref 0–0)
PLATELET # BLD AUTO: 104 X10*3/UL (ref 150–450)
POTASSIUM SERPL-SCNC: 4.3 MMOL/L (ref 3.5–5.3)
PROT SERPL-MCNC: 5.8 G/DL (ref 6.4–8.2)
RBC # BLD AUTO: 3.44 X10*6/UL (ref 4.5–5.9)
SODIUM SERPL-SCNC: 143 MMOL/L (ref 136–145)
TACROLIMUS BLD-MCNC: 5.6 NG/ML
WBC # BLD AUTO: 4.7 X10*3/UL (ref 4.4–11.3)

## 2023-12-21 PROCEDURE — 80197 ASSAY OF TACROLIMUS: CPT

## 2023-12-21 PROCEDURE — 85025 COMPLETE CBC W/AUTO DIFF WBC: CPT

## 2023-12-21 PROCEDURE — 87799 DETECT AGENT NOS DNA QUANT: CPT

## 2023-12-21 PROCEDURE — 83735 ASSAY OF MAGNESIUM: CPT

## 2023-12-21 PROCEDURE — 80053 COMPREHEN METABOLIC PANEL: CPT

## 2023-12-21 PROCEDURE — 36415 COLL VENOUS BLD VENIPUNCTURE: CPT

## 2023-12-21 PROCEDURE — 82784 ASSAY IGA/IGD/IGG/IGM EACH: CPT

## 2023-12-22 ENCOUNTER — SPECIALTY PHARMACY (OUTPATIENT)
Dept: PHARMACY | Facility: CLINIC | Age: 68
End: 2023-12-22

## 2023-12-22 DIAGNOSIS — Z94.2 LUNG TRANSPLANTED (MULTI): Primary | ICD-10-CM

## 2023-12-22 PROCEDURE — RXMED WILLOW AMBULATORY MEDICATION CHARGE

## 2023-12-22 NOTE — PROGRESS NOTES
Labs reviewed by Dr. Boone, no changes. Repeat labs 1/8/24, orders placed by this provider. Everlater message sent to patient

## 2023-12-25 LAB
CMV DNA SERPL NAA+PROBE-LOG IU: NORMAL {LOG_IU}/ML
EBV DNA SPEC NAA+PROBE-LOG#: NORMAL {LOG_COPIES}/ML
LABORATORY COMMENT REPORT: NOT DETECTED
LABORATORY COMMENT REPORT: NOT DETECTED

## 2023-12-26 ENCOUNTER — PHARMACY VISIT (OUTPATIENT)
Dept: PHARMACY | Facility: CLINIC | Age: 68
End: 2023-12-26
Payer: COMMERCIAL

## 2024-01-02 PROCEDURE — RXMED WILLOW AMBULATORY MEDICATION CHARGE

## 2024-01-04 ENCOUNTER — PHARMACY VISIT (OUTPATIENT)
Dept: PHARMACY | Facility: CLINIC | Age: 69
End: 2024-01-04
Payer: COMMERCIAL

## 2024-01-08 PROCEDURE — RXMED WILLOW AMBULATORY MEDICATION CHARGE

## 2024-01-09 ENCOUNTER — LAB (OUTPATIENT)
Dept: LAB | Facility: LAB | Age: 69
End: 2024-01-09
Payer: COMMERCIAL

## 2024-01-09 ENCOUNTER — PHARMACY VISIT (OUTPATIENT)
Dept: PHARMACY | Facility: CLINIC | Age: 69
End: 2024-01-09
Payer: COMMERCIAL

## 2024-01-09 ENCOUNTER — SPECIALTY PHARMACY (OUTPATIENT)
Dept: PHARMACY | Facility: CLINIC | Age: 69
End: 2024-01-09

## 2024-01-09 DIAGNOSIS — Z94.2 LUNG TRANSPLANTED (MULTI): ICD-10-CM

## 2024-01-09 LAB
ALBUMIN SERPL BCP-MCNC: 3.7 G/DL (ref 3.4–5)
ALP SERPL-CCNC: 88 U/L (ref 33–136)
ALT SERPL W P-5'-P-CCNC: 24 U/L (ref 10–52)
ANION GAP SERPL CALC-SCNC: 9 MMOL/L (ref 10–20)
AST SERPL W P-5'-P-CCNC: 25 U/L (ref 9–39)
BASOPHILS # BLD AUTO: 0.04 X10*3/UL (ref 0–0.1)
BASOPHILS NFR BLD AUTO: 0.7 %
BILIRUB SERPL-MCNC: 0.7 MG/DL (ref 0–1.2)
BUN SERPL-MCNC: 20 MG/DL (ref 6–23)
CALCIUM SERPL-MCNC: 8.5 MG/DL (ref 8.6–10.3)
CHLORIDE SERPL-SCNC: 105 MMOL/L (ref 98–107)
CO2 SERPL-SCNC: 30 MMOL/L (ref 21–32)
CREAT SERPL-MCNC: 1.02 MG/DL (ref 0.5–1.3)
DACRYOCYTES BLD QL SMEAR: NORMAL
EGFRCR SERPLBLD CKD-EPI 2021: 79 ML/MIN/1.73M*2
EOSINOPHIL # BLD AUTO: 0.11 X10*3/UL (ref 0–0.7)
EOSINOPHIL NFR BLD AUTO: 1.9 %
ERYTHROCYTE [DISTWIDTH] IN BLOOD BY AUTOMATED COUNT: 16.9 % (ref 11.5–14.5)
GLUCOSE SERPL-MCNC: 80 MG/DL (ref 74–99)
HCT VFR BLD AUTO: 35 % (ref 41–52)
HGB BLD-MCNC: 10.3 G/DL (ref 13.5–17.5)
IGG SERPL-MCNC: 922 MG/DL (ref 700–1600)
IMM GRANULOCYTES # BLD AUTO: 0.03 X10*3/UL (ref 0–0.7)
IMM GRANULOCYTES NFR BLD AUTO: 0.5 % (ref 0–0.9)
LYMPHOCYTES # BLD AUTO: 1.03 X10*3/UL (ref 1.2–4.8)
LYMPHOCYTES NFR BLD AUTO: 17.4 %
MAGNESIUM SERPL-MCNC: 1.83 MG/DL (ref 1.6–2.4)
MCH RBC QN AUTO: 28.4 PG (ref 26–34)
MCHC RBC AUTO-ENTMCNC: 29.4 G/DL (ref 32–36)
MCV RBC AUTO: 96 FL (ref 80–100)
MONOCYTES # BLD AUTO: 0.55 X10*3/UL (ref 0.1–1)
MONOCYTES NFR BLD AUTO: 9.3 %
NEUTROPHILS # BLD AUTO: 4.16 X10*3/UL (ref 1.2–7.7)
NEUTROPHILS NFR BLD AUTO: 70.2 %
NRBC BLD-RTO: 0 /100 WBCS (ref 0–0)
OVALOCYTES BLD QL SMEAR: NORMAL
PLATELET # BLD AUTO: 95 X10*3/UL (ref 150–450)
POTASSIUM SERPL-SCNC: 4.3 MMOL/L (ref 3.5–5.3)
PROT SERPL-MCNC: 6 G/DL (ref 6.4–8.2)
RBC # BLD AUTO: 3.63 X10*6/UL (ref 4.5–5.9)
RBC MORPH BLD: NORMAL
SODIUM SERPL-SCNC: 140 MMOL/L (ref 136–145)
TACROLIMUS BLD-MCNC: 6.1 NG/ML
WBC # BLD AUTO: 5.9 X10*3/UL (ref 4.4–11.3)

## 2024-01-09 PROCEDURE — 82784 ASSAY IGA/IGD/IGG/IGM EACH: CPT

## 2024-01-09 PROCEDURE — 80197 ASSAY OF TACROLIMUS: CPT

## 2024-01-09 PROCEDURE — 83735 ASSAY OF MAGNESIUM: CPT

## 2024-01-09 PROCEDURE — 36415 COLL VENOUS BLD VENIPUNCTURE: CPT

## 2024-01-09 PROCEDURE — 85025 COMPLETE CBC W/AUTO DIFF WBC: CPT

## 2024-01-09 PROCEDURE — RXMED WILLOW AMBULATORY MEDICATION CHARGE

## 2024-01-09 PROCEDURE — 80053 COMPREHEN METABOLIC PANEL: CPT

## 2024-01-09 NOTE — PROGRESS NOTES
Mr. Patel is a 67-year-old M s/p DLTx for IPF and pulmonary hypertension 3/22/2020 with course complicated by pleural bleed with washout 3/23 and need for trach discharged on 5/8/2020. Post hospital course is also been complicated by septic shock with MSSA 8/2020 with PE on lifelong anticoagulation (remote VTE 1993) he was also COVID-positive 12/2022.    Presenting for a follow-up visit.    Current Outpatient Medications:     acetaminophen (Tylenol) 325 mg tablet, Take 2 tablets (650 mg) by mouth every 6 hours if needed. As Needed for pain. Call transplant if you have a temprature., Disp: , Rfl:     albuterol 2.5 mg /3 mL (0.083 %) nebulizer solution, USE 1 VIAL IN NEBULIZER EVERY 12 HOURS AS NEEDED FOR PREMEDICATION FOR TOBRAMYCIN., Disp: 90 mL, Rfl: 11    alendronate (Fosamax) 70 mg tablet, TAKE ONE (1) TABLET BY MOUTH ONCE A WEEK, AS DIRECTED, Disp: 12 tablet, Rfl: 3    allopurinol (Zyloprim) 300 mg tablet, TAKE ONE (1) TABLET BY MOUTH DAILY., Disp: 30 tablet, Rfl: 11    apixaban (Eliquis) 5 mg tablet, Take 1 tablet (5 mg) by mouth every 12 hours., Disp: , Rfl:     azithromycin (Zithromax) 250 mg tablet, TAKE ONE (1) TABLET BY MOUTH ON MONDAY, WEDNESDAY, AND FRIDAY., Disp: 12 tablet, Rfl: 11    blood sugar diagnostic (OneTouch Verio test strips) strip, Inject 1 each into the skin 3 times a day before meals., Disp: 300 each, Rfl: 3    budesonide-formoteroL (Symbicort) 160-4.5 mcg/actuation inhaler, Inhale 2 puffs by mouth 2 times a day. Rinse mouth with water after use to reduce aftertaste and incidence of candidiasis. Do not swallow., Disp: 10.2 g, Rfl: 11    cholecalciferol (Vitamin D-3) 25 MCG (1000 UT) capsule, Take 1 capsule (25 mcg) by mouth once daily., Disp: , Rfl:     dapsone 100 mg tablet, TAKE ONE (1) TABLET BY MOUTH ONCE DAILY., Disp: 90 tablet, Rfl: 3    DULoxetine (Cymbalta) 60 mg DR capsule, TAKE ONE (1) CAPSULE BY MOUTH DAILY, Disp: 30 capsule, Rfl: 11    levalbuterol (Xopenex) 0.63 mg/3 mL  nebulizer solution, Inhale one (1) ampule via nebulizer twice daily., Disp: 180 mL, Rfl: 11    loratadine (Claritin) 10 mg tablet, TAKE ONE (1) TABLET BY MOUTH ONCE DAILY., Disp: 90 tablet, Rfl: 3    magnesium oxide (Mag-Ox) 400 mg tablet, Take 1 tablet (400 mg) by mouth once daily. At 1 pm, Disp: , Rfl:     metoprolol tartrate (Lopressor) 25 mg tablet, TAKE ONE-HALF (0.5) TABLET BY MOUTH EVERY 12 HOURS., Disp: 30 tablet, Rfl: 11    montelukast (Singulair) 10 mg tablet, TAKE ONE (1) TABLET BY MOUTH ONCE DAILY AT BEDTIME., Disp: 30 tablet, Rfl: 11    multivitamin with minerals iron-free (Centrum Silver), Take 1 tablet by mouth once daily., Disp: , Rfl:     mycophenolate (Cellcept) 250 mg capsule, TAKE ONE (1) CAPSULE BY MOUTH 2 TIMES DAILY., Disp: 60 capsule, Rfl: 11    pantoprazole (ProtoNix) 40 mg EC tablet, TAKE ONE (1) TABLET BY MOUTH ONCE DAILY., Disp: 30 tablet, Rfl: 11    predniSONE (Deltasone) 5 mg tablet, TAKE ONE (1) TABLET BY MOUTH EVERY DAY, Disp: 30 tablet, Rfl: 11    rosuvastatin (Crestor) 40 mg tablet, Take one (1) tablet by mouth once daily., Disp: 30 tablet, Rfl: 11    tacrolimus (Prograf) 0.5 mg capsule, TAKE FOUR (4) CAPSULES BY MOUTH EVERY MORNING AND THREE (3) CAPSULES BY MOUTH AT NIGHT., Disp: 210 capsule, Rfl: 11    tobramycin (Cody 300) 300 mg/5 mL nebulizer solution, INHALE 5 ML (1 VIAL) VIA NEBULIZER TWICE A DAY AS DIRECTED, Disp: 840 mL, Rfl: 3    valGANciclovir (Valcyte) 450 mg tablet, TAKE TWO (2) TABLETS BY MOUTH ONCE DAILY, Disp: 180 tablet, Rfl: 3    1/10/24: Breathing overall stable in RA, no nausea or vomiting, moving bowels. No LE edema. Feeling well. Saw Dermatology lesion on forehead consistent with SCC, removed, no further lesions. Gained 10 lbs since last visit, BMI 30.    11/10/23: Breathing overall stable on RA, no nausea or vomiting, moving bowels. No LE edema. Maintaining adequate po fluid intake. New lesion on forehead, planning to see Dermatology soon.    10/13/23:  Bronchoscopy on 9/19/23 with multiple strains of Pseudomonas, completed a 14 day course of Ciprofloxacin and started on inhaled VICENTE, some tremors with albuterol. TBBX with no ACR or OB. Feels breathing and cough is overall improved, no MERINO. No nausea or vomiting, tolerating po intake and moving bowels.    9/7/23: Seen in ER 8/25/23 for chest pain, sharp increased with movement. Workup negative including nuclear stress test. Started on Crestor. Still present at times but improved, feels musculoskeletal in nature. Breathing overall stable, still has a cough with brownish sputum despite course of Ciprofloxacin after last visit, RVP negative. No nausea or vomiting, moving bowels regularly.    8/2/23: Breathing overall stable, walking daily. Cough with brownish sputum for the past 2 weeks, no fevers, chills. No nausea or vomiting, moving bowels, no LE edema, maintaining adequate po fluid intake.    6/12/23: Breathing stable, working on weight loss. No nausea or vomiting, moving bowels. No LE edema. No longer working due to hours. Trying to be more active.    4/5/23: Feels that breathing is overall stable, no MERINO or cough. Tolerating PO diet, no nausea or vomiting, moving bowels, no LE edema. Notes 15lb weight gain self reported due to inactivity - no orthopnea or PND. Notes skin lesion on scalp present for last few months, not yet evaluated by dermatology. Blood glucose range , 115 on 12.5mg Jardiance bid, BP max 115 at home. Started new job part-time in [employer] at [employer] 3x weekly for 6hrs/day.        ROS:      Constitutional: no chills, no fever, no night sweats, not feeling poorly and not feeling tired.   Eyes: no blurred vision, no eyesight problems, no dryness of the eyes and no eye pain.   ENT: hearing loss, but no nasal congestion, no nasal discharge, no hoarseness, no sore throat, no earache, the ears do not feel full, no tinnitus, no nosebleeds, no postnasal drip, no rhinorrhea and no sinus pressure.   Neck:  no mass(es) and no swelling.   Cardiovascular: no chest pain, no intermittent leg claudication, no lower extremity edema, no palpitations, no syncope, no tachycardia and no bradycardia.   Respiratory: no cough, no shortness of breath during exertion, no shortness of breath at rest, no wheezing, non dry cough, non productive cough and no hemoptysis.   Gastrointestinal: no abdominal pain, no bloating, no nausea and no vomiting.   Musculoskeletal: no arthralgias, no back pain, no myalgias, no history of falls, no localized joint pain, no joint redness, no joint stiffness, no joint swelling, no limb pain, no limb swelling and no neck pain.   Integumentary: no rashes.   Neurological: no convulsions, no difficulty walking, no diplopia, no dizziness, no dysarthria, no facial weakness, no headache, no limb weakness, no memory changes, no numbness, no speech difficulties, no tingling, no confusion and no syncope.   Psychiatric: no anxiety, no depression, no anhedonia, no substance use disorders, no personality change, no sleep disturbances and no suicidal ideations.   Endocrine: no changes in appetite, no feelings of weakness, no hair thinning or loss, no heat/cold intolerance, no polydipsia, no deepening of the voice, no polyuria, no muscle weakness and no proptosis.   Hematologic/Lymphatic: a tendency for easy bruising and a tendency for easy bleeding, but no swollen glands and no recurrent infections.   All other systems have been reviewed and are negative for complaint.          PE: VSS: 108/55, 81, 12, 94% on RA, temp 36.7  Constitutional: Alert and in no acute distress. Well developed, well nourished.   Head and Face: Head and face: Normal.  (Moon facies from steroid, right facial droop unchanged).   Ears, Nose, Mouth, and Throat: External inspection of ears and nose: Normal.  Nasal mucosa, septum, and turbinates: Normal.  Lips, teeth, and gums: Normal.    Neck: No neck mass was observed. Supple.   Pulmonary: Chest:  Normal to inspection.  (No wound) Respiratory effort: No increased work of breathing or signs of respiratory distress.  Auscultation of lungs: Clear to auscultation bilaterally.    Cardiovascular: Heart rate and rhythm were normal, normal S1 and S2, no gallops, no murmurs and no pericardial rub. Pedal pulses: Normal. No peripheral edema.   Abdomen: Soft, nontender; no abdominal mass palpated. The abdomen was obese. Normal bowel sounds.   Lymphatic: No lymphadenopathy.   Musculoskeletal: No joint swelling seen, normal movements of all extremities. Digits and nails: Abnormal.  Examination of the extremities revealed fingernail clubbing.   Neurologic: cranial nerves 2-12 were intact.   Psychiatric: Judgment and insight: Intact. Alert and oriented to person, place and time. Mood and affect:    Labwork:  Na 140, potassium 4.3, Cr 1.02, Mag 1.83, LFT's stable  WBC 5.9, Hg 10, Hematocrit 35, platelets 95, ANC acceptable  Prograf 6.1, CMV PCR negative 1/9/24, EBV PCR negative on 12/21/23.  Vitamin D 53, IgG 827, HLA Ab negative 11/7/23  Lipid panel 11/7/23 cholesterol 106 TG 75        CXR: 1/10/24 reviewed, clear lung fields bilaterally, no acute infiltrates.    Results:  1/10/24: Spirometry:  FVC: 2.45 60%  FEV1: 1.55 50%  25-75: 0.97 39%    11/10/23 Spirometry:  FVC: 2.33, 55%  FEV1: 1.47 46%  25-75: 0.95 39%    10/13/23 Spirometry:  FVC: 2.29 52%  FEV1: 1.35 42%  25-75: 0.88 36%    9/7/23 Spirometry:  FVC: 2.25, 53%  FEV1: 1.41, 44%  25-75: 0.82, 34%    8/2/23 Spirometry:  FVC: 2.48, 59%  FEV1: 1.52, 47%  25-75: 1.03, 42%    6/12/23 Spirometry:  FVC 2.65 62%  FEV1: 1.48, 46%  25-75: 0.89, 36%    5/2/23 Spirometry:  FVC: 2.58, 61%  FEV1: 1.51, 47%  25-75: 0.88, 36%    4/5/23 Spirometry:  FVC: 2.47 58%  FEV1: 1.50 46%  FEF 25-75: 31%    Assessment/Plan:  Mr. Patel is a 68 yo M with history of IPF/PAH s/p DLTx 3/22/20, GERD, hypertension, DM, hyperlipidemia who presents for follow-up visit for lung transplant. Overall doing  well. Seen in ER 8/25/23 for chest pain with negative workup.    1. Pulmonary- s/p DLTx for IPF/PAH on 3/22/20, CMV mismatch, EBV positive recipient  Allograft function:  -Initial complications- minimal PGD ,pleural bleed with washout 3/23 and need for trach, discharged on 5/8/2020.   -Modest PE at same presentation as septic shock 8/2020 - lifelong anticoagulation given second episode. (Remote VTE 1993). Currently on Eliquis BID.  -TBBX in 4/20 with A2, treated with pulse Solumedrol, TBBX in 5 and 6/20 with minimal ACR, subsequent TBBX negative, last 2/23/21.  -History of MSSA, Klebsiella, Corynebacterium from 4/20 cultures, Enterobacter from 6/20 cultures, sensitive to fluoroquinolones.  -Bronchoscopy on 9/19/23, cultures with multiple strains of Pseudomonas, completed a 14 day course of Ciprofloxacin and started on inhaled VICENTE. TBBX with no ACR or OB.   -PFT's today stable FEV11.55 (50%); FEF 25-75 0.97(39%),continue to follow serial exams, continue to work on weight loss and conditioning, has gained 10 more lbs, BMI 30 and not as active as in the past.  -CXR reviewed, clear lung fields bilaterally, continue to follow serial exams. HRCT on 9/18/23 with relatively clear lung fields bilaterally, multifocal expiratory air trapping, suggestive of CLAD.  -SARA- continue with nightly CPAP  -HLA Ab negative 11/7/23,, no significant history of DSA post transplant, follow serially, every 3 months, repeat with next labwork.  -Continue Azithromycin 250 mg qM,W,F for ROSSY prophylaxis along with Singulair 10 mg QHS. Also added Symbicort BID.    Immunosuppression:  -Prograf goal 6-8, continue 2/1.5, level 6.1, stable. Continue to follow closely, repeat monthly, next on 2/5/24.  -Cellcept 250 mg BID   -Prednisone 5 mg daily    Prophylaxis:  -Dapsone for PJP prophylaxis (no known prior intolerance to Bactrim, has not taken other agents)  -CMV mismatch- continues on Valcyte 900 mg daily, CMV PCR's negative, last 1/9/24, no history  of viremia.  -Previously on voriconazole for anti-fungal prophylaxis; no fungal infections, LFT's stable with elevated alk phos and normal MRI of liver; discontinued voriconazole 4/5/23 with improvement of alkaline phosphatase  -IgG level 827, adequate, continue to follow serial levels, goal greater than 600.    2. CV  -History of hypertension- on Lopressor 12.5 mg BID. Advised to continue to follow BP closely and call us if greater than 130/80 consistently.  -Recently seen in ER 8/25/23 for chest pain, workup negative, including nuclear stress test. Overall improved, appears musculoskeletal in nature. TTE 9/26/23 with EF 65%, normal LV and RV function  -Hyperlipidemia- Lipid panel on 11/7/23 with acceptable Cholesterol and TG, started on Crestor 40 mg daily (for cardioprotective effect), will repeat with next labwork.    3. Renal  -Cr overall stable at 1.02, advised to maintain adequate po hydration.  -Magnesium 1.8, continue 400 mg daily.  -Renally dose all medications and avoid nephrotoxins  -History of nephrolithiasis in the past    4. GI  -Continue Protonix 40 mg daily before meals   -Persistently elevated AP- MRI lever 2/9/23 with normal appearance; improved after discontinuation of voriconazole, will follow.   -Will follow after start of Crestor as above, stable.    5. ID  -History of septic shock with MSSA 8/2020. Received prolonged course of IV Abx with resolution.  -Otitis external starting 9/8/2020 with ENT involvement , multiple rounds ab and wick insertion, developed mastoiditis  -Sternal wound infection- 2 episodes of Staph s/p debridement, wound vac removed. Repeat episode 7/19-7/23/2021, no debridement, was discharged on Abx and completed a 8 week course.  -COVID 12/22, recovered and overall improved.  -Bronchoscopy on 9/19/23 cultures with multiple strains of Pseudomonas, completed a 14 day course of Ciprofloxacin and started on inhaled VICENTE, tolerating well.  -Afebrile, no signs of new infection  currently.    6. Endo  -History of DM- BG levels well-controlled, on Jardiance per endocrinology  -History of osteoporosis- on Fosamax. Also on vitamin D, level acceptable at 53, repeat pending.    7. Heme  -History of PE at same presentation as septic shock 8/2020 - lifelong anticoagulation given second episode. (Remote VTE 1993). On Eliquis, increased to 5 mg BID as off voriconazole. Also has IVF filter in place.   -H/H stable at 10/35 and no signs of bleeding currently.  -WBC intact, platelets stable at 95, chronic thrombocytopenia    8. ENT  -Otitis extena with facial nerve involvement. 10/17 Right tympanomastoidectomy, mastoid washout while inpatient. 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  -Chronic hearing loss with hearing aids.    8. Prophylaxis  -UTD on vaccines (Influenza, PNA, COVID, RSV). Following with Dermatology, scalp lesion consistent with folliculitis, healed well, new lesion on forehead, SCC, removed, no further reoccurrences.  Last colonoscopy 4/22. DEXA 6/10/22.    Continue to work on weight loss and increased activity. RTC in 3 months with PFT's, CXR. Bronchoscopy only as clinically indicated.   Plan of care discussed at length with patient, all questions answered, total of 45 minutes spent regarding counseling and coordination of care.

## 2024-01-10 ENCOUNTER — DOCUMENTATION (OUTPATIENT)
Dept: TRANSPLANT | Facility: HOSPITAL | Age: 69
End: 2024-01-10

## 2024-01-10 ENCOUNTER — HOSPITAL ENCOUNTER (OUTPATIENT)
Dept: RADIOLOGY | Facility: HOSPITAL | Age: 69
Discharge: HOME | End: 2024-01-10
Payer: COMMERCIAL

## 2024-01-10 ENCOUNTER — PHARMACY VISIT (OUTPATIENT)
Dept: PHARMACY | Facility: CLINIC | Age: 69
End: 2024-01-10
Payer: COMMERCIAL

## 2024-01-10 ENCOUNTER — HOSPITAL ENCOUNTER (OUTPATIENT)
Dept: RESPIRATORY THERAPY | Facility: HOSPITAL | Age: 69
Discharge: HOME | End: 2024-01-10
Payer: COMMERCIAL

## 2024-01-10 ENCOUNTER — OFFICE VISIT (OUTPATIENT)
Dept: TRANSPLANT | Facility: HOSPITAL | Age: 69
End: 2024-01-10
Payer: COMMERCIAL

## 2024-01-10 VITALS
SYSTOLIC BLOOD PRESSURE: 108 MMHG | TEMPERATURE: 98 F | OXYGEN SATURATION: 94 % | WEIGHT: 216 LBS | HEART RATE: 81 BPM | DIASTOLIC BLOOD PRESSURE: 55 MMHG | BODY MASS INDEX: 30.99 KG/M2

## 2024-01-10 DIAGNOSIS — M89.9 DISORDER OF BONE, UNSPECIFIED: ICD-10-CM

## 2024-01-10 DIAGNOSIS — T38.0X5A STEROID-INDUCED HYPERGLYCEMIA: ICD-10-CM

## 2024-01-10 DIAGNOSIS — Z94.2 LUNG TRANSPLANTED (MULTI): ICD-10-CM

## 2024-01-10 DIAGNOSIS — Z94.2 S/P LUNG TRANSPLANT (MULTI): Primary | ICD-10-CM

## 2024-01-10 DIAGNOSIS — R73.9 STEROID-INDUCED HYPERGLYCEMIA: ICD-10-CM

## 2024-01-10 DIAGNOSIS — H90.A22 SENSORINEURAL HEARING LOSS (SNHL) OF LEFT EAR WITH RESTRICTED HEARING OF RIGHT EAR: ICD-10-CM

## 2024-01-10 DIAGNOSIS — I10 ESSENTIAL (PRIMARY) HYPERTENSION: ICD-10-CM

## 2024-01-10 DIAGNOSIS — I10 PRIMARY HYPERTENSION: ICD-10-CM

## 2024-01-10 DIAGNOSIS — J31.0 CHRONIC RHINITIS: ICD-10-CM

## 2024-01-10 DIAGNOSIS — G51.0 BELL'S PALSY: ICD-10-CM

## 2024-01-10 DIAGNOSIS — K21.9 GASTROESOPHAGEAL REFLUX DISEASE WITHOUT ESOPHAGITIS: ICD-10-CM

## 2024-01-10 DIAGNOSIS — E87.5 HYPERKALEMIA: ICD-10-CM

## 2024-01-10 DIAGNOSIS — J84.10 PULMONARY FIBROSIS (MULTI): ICD-10-CM

## 2024-01-10 DIAGNOSIS — D84.9 IMMUNOSUPPRESSION (MULTI): ICD-10-CM

## 2024-01-10 DIAGNOSIS — M81.0 OSTEOPOROSIS WITHOUT CURRENT PATHOLOGICAL FRACTURE, UNSPECIFIED OSTEOPOROSIS TYPE: ICD-10-CM

## 2024-01-10 DIAGNOSIS — D61.89 ANEMIA DUE TO OTHER BONE MARROW FAILURE (MULTI): ICD-10-CM

## 2024-01-10 LAB
CMV DNA SERPL NAA+PROBE-LOG IU: NORMAL {LOG_IU}/ML
LABORATORY COMMENT REPORT: NOT DETECTED
MGC ASCENT PFT - FEV1 - PRE: 1.55
MGC ASCENT PFT - FEV1 - PREDICTED: 3.05
MGC ASCENT PFT - FVC - PRE: 2.45
MGC ASCENT PFT - FVC - PREDICTED: 4.04

## 2024-01-10 PROCEDURE — 1159F MED LIST DOCD IN RCRD: CPT | Performed by: INTERNAL MEDICINE

## 2024-01-10 PROCEDURE — 3078F DIAST BP <80 MM HG: CPT | Performed by: INTERNAL MEDICINE

## 2024-01-10 PROCEDURE — 99215 OFFICE O/P EST HI 40 MIN: CPT | Performed by: INTERNAL MEDICINE

## 2024-01-10 PROCEDURE — 3074F SYST BP LT 130 MM HG: CPT | Performed by: INTERNAL MEDICINE

## 2024-01-10 PROCEDURE — 1126F AMNT PAIN NOTED NONE PRSNT: CPT | Performed by: INTERNAL MEDICINE

## 2024-01-10 PROCEDURE — 71046 X-RAY EXAM CHEST 2 VIEWS: CPT

## 2024-01-10 PROCEDURE — 71046 X-RAY EXAM CHEST 2 VIEWS: CPT | Performed by: RADIOLOGY

## 2024-01-10 PROCEDURE — 94010 BREATHING CAPACITY TEST: CPT | Performed by: STUDENT IN AN ORGANIZED HEALTH CARE EDUCATION/TRAINING PROGRAM

## 2024-01-10 PROCEDURE — 94010 BREATHING CAPACITY TEST: CPT

## 2024-01-10 PROCEDURE — 1160F RVW MEDS BY RX/DR IN RCRD: CPT | Performed by: INTERNAL MEDICINE

## 2024-01-10 NOTE — PROGRESS NOTES
Saw pt in clinic today with Dr. Boone   Patient feeling well. 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

## 2024-01-12 PROCEDURE — RXMED WILLOW AMBULATORY MEDICATION CHARGE

## 2024-01-15 ENCOUNTER — PHARMACY VISIT (OUTPATIENT)
Dept: PHARMACY | Facility: CLINIC | Age: 69
End: 2024-01-15
Payer: COMMERCIAL

## 2024-01-15 PROCEDURE — RXMED WILLOW AMBULATORY MEDICATION CHARGE

## 2024-01-16 ENCOUNTER — PHARMACY VISIT (OUTPATIENT)
Dept: PHARMACY | Facility: CLINIC | Age: 69
End: 2024-01-16
Payer: COMMERCIAL

## 2024-01-16 ENCOUNTER — TELEPHONE (OUTPATIENT)
Dept: TRANSPLANT | Facility: HOSPITAL | Age: 69
End: 2024-01-16
Payer: MEDICARE

## 2024-01-16 DIAGNOSIS — Z94.2 S/P LUNG TRANSPLANT (MULTI): ICD-10-CM

## 2024-01-16 NOTE — TELEPHONE ENCOUNTER
Patient called asking for Eliquis be sent to [facility] pharmacy instead of  Specialty.

## 2024-01-18 ENCOUNTER — PHARMACY VISIT (OUTPATIENT)
Dept: PHARMACY | Facility: CLINIC | Age: 69
End: 2024-01-18
Payer: COMMERCIAL

## 2024-01-18 PROCEDURE — RXMED WILLOW AMBULATORY MEDICATION CHARGE

## 2024-01-31 ENCOUNTER — PHARMACY VISIT (OUTPATIENT)
Dept: PHARMACY | Facility: CLINIC | Age: 69
End: 2024-01-31
Payer: COMMERCIAL

## 2024-01-31 PROCEDURE — RXMED WILLOW AMBULATORY MEDICATION CHARGE

## 2024-02-02 ENCOUNTER — TELEPHONE (OUTPATIENT)
Dept: TRANSPLANT | Facility: HOSPITAL | Age: 69
End: 2024-02-02
Payer: MEDICARE

## 2024-02-02 ENCOUNTER — PHARMACY VISIT (OUTPATIENT)
Dept: PHARMACY | Facility: CLINIC | Age: 69
End: 2024-02-02
Payer: COMMERCIAL

## 2024-02-02 PROCEDURE — RXMED WILLOW AMBULATORY MEDICATION CHARGE

## 2024-02-02 NOTE — TELEPHONE ENCOUNTER
Coordinator called patient due to receiving a message of 12 medications sent to [facility] that were sent to me as medication not found. I spoke with [facility] and they are unsure about the requests for these medications as they came through electronically and they were unable to process these requests. I was able to verify from the patient that he requested these refills today from his phone. Patient stated that he has minimum 2 weeks left on his medications and he is not going to run out if we begin to look into switching his pharmacy from  specialty to [facility] next week. I told him that Dee and PANCHO will work on this next week. Patient has no other concerns or questions at this time.

## 2024-02-05 DIAGNOSIS — Z94.2 LUNG TRANSPLANTED (MULTI): ICD-10-CM

## 2024-02-05 DIAGNOSIS — Z94.2 S/P LUNG TRANSPLANT (MULTI): ICD-10-CM

## 2024-02-05 DIAGNOSIS — M81.8 OTHER OSTEOPOROSIS, UNSPECIFIED PATHOLOGICAL FRACTURE PRESENCE: ICD-10-CM

## 2024-02-05 RX ORDER — PANTOPRAZOLE SODIUM 40 MG/1
40 TABLET, DELAYED RELEASE ORAL DAILY
Qty: 30 TABLET | Refills: 11 | Status: SHIPPED | OUTPATIENT
Start: 2024-02-05 | End: 2025-02-03

## 2024-02-05 RX ORDER — LORATADINE 10 MG/1
10 TABLET ORAL DAILY
Qty: 90 TABLET | Refills: 3 | Status: SHIPPED | OUTPATIENT
Start: 2024-02-05 | End: 2024-05-24 | Stop reason: SDUPTHER

## 2024-02-05 RX ORDER — TOBRAMYCIN INHALATION SOLUTION 300 MG/5ML
300 INHALANT RESPIRATORY (INHALATION)
Qty: 840 ML | Refills: 3 | Status: SHIPPED | OUTPATIENT
Start: 2024-02-05 | End: 2025-02-04

## 2024-02-05 RX ORDER — ROSUVASTATIN CALCIUM 40 MG/1
40 TABLET, COATED ORAL DAILY
Qty: 30 TABLET | Refills: 11 | Status: SHIPPED | OUTPATIENT
Start: 2024-02-05 | End: 2025-02-04

## 2024-02-05 RX ORDER — LEVALBUTEROL INHALATION SOLUTION 0.63 MG/3ML
1 SOLUTION RESPIRATORY (INHALATION) 2 TIMES DAILY
Qty: 180 ML | Refills: 11 | Status: SHIPPED | OUTPATIENT
Start: 2024-02-05 | End: 2025-02-04

## 2024-02-05 RX ORDER — PREDNISONE 5 MG/1
5 TABLET ORAL DAILY
Qty: 30 TABLET | Refills: 11 | Status: SHIPPED | OUTPATIENT
Start: 2024-02-05 | End: 2025-02-03

## 2024-02-05 RX ORDER — AZITHROMYCIN 250 MG/1
250 TABLET, FILM COATED ORAL
Qty: 12 TABLET | Refills: 11 | Status: SHIPPED | OUTPATIENT
Start: 2024-02-05 | End: 2025-02-04

## 2024-02-05 RX ORDER — BUDESONIDE AND FORMOTEROL FUMARATE DIHYDRATE 160; 4.5 UG/1; UG/1
2 AEROSOL RESPIRATORY (INHALATION)
Qty: 10.2 G | Refills: 11 | Status: SHIPPED | OUTPATIENT
Start: 2024-02-05 | End: 2025-02-04

## 2024-02-05 RX ORDER — VALGANCICLOVIR 450 MG/1
900 TABLET, FILM COATED ORAL DAILY
Qty: 180 TABLET | Refills: 3 | Status: SHIPPED | OUTPATIENT
Start: 2024-02-05 | End: 2025-02-04

## 2024-02-05 RX ORDER — TACROLIMUS 0.5 MG/1
CAPSULE ORAL
Qty: 210 CAPSULE | Refills: 11 | Status: SHIPPED | OUTPATIENT
Start: 2024-02-05 | End: 2024-02-12 | Stop reason: DRUGHIGH

## 2024-02-05 RX ORDER — DAPSONE 100 MG/1
1 TABLET ORAL DAILY
Qty: 90 TABLET | Refills: 3 | Status: SHIPPED | OUTPATIENT
Start: 2024-02-05 | End: 2025-02-04

## 2024-02-05 RX ORDER — MONTELUKAST SODIUM 10 MG/1
10 TABLET ORAL NIGHTLY
Qty: 30 TABLET | Refills: 11 | Status: SHIPPED | OUTPATIENT
Start: 2024-02-05 | End: 2025-02-04

## 2024-02-05 RX ORDER — METOPROLOL TARTRATE 25 MG/1
TABLET, FILM COATED ORAL
Qty: 30 TABLET | Refills: 11 | Status: SHIPPED | OUTPATIENT
Start: 2024-02-05 | End: 2024-04-03 | Stop reason: ALTCHOICE

## 2024-02-05 RX ORDER — ALENDRONATE SODIUM 70 MG/1
TABLET ORAL
Qty: 12 TABLET | Refills: 3 | Status: SHIPPED | OUTPATIENT
Start: 2024-02-05 | End: 2025-02-03

## 2024-02-05 RX ORDER — MYCOPHENOLATE MOFETIL 250 MG/1
250 CAPSULE ORAL 2 TIMES DAILY
Qty: 60 CAPSULE | Refills: 11 | Status: SHIPPED | OUTPATIENT
Start: 2024-02-05 | End: 2025-02-03

## 2024-02-06 ENCOUNTER — DOCUMENTATION (OUTPATIENT)
Dept: TRANSPLANT | Facility: HOSPITAL | Age: 69
End: 2024-02-06
Payer: MEDICARE

## 2024-02-06 NOTE — PROGRESS NOTES
Refill request received from [facility] for Fosamax, Cellcept, Protonix, and Metoprolol    Refills authorized for 30 day supply with 11 refills and faxed back to [facility]

## 2024-02-08 ENCOUNTER — TELEPHONE (OUTPATIENT)
Dept: TRANSPLANT | Facility: HOSPITAL | Age: 69
End: 2024-02-08
Payer: MEDICARE

## 2024-02-08 NOTE — TELEPHONE ENCOUNTER
Pt stated he has plenty of medications and received a phone call from [facility] stating to call the transplant team. All patients medications were sent to [facility] on 2/5/24, unclear what the issue is. The patient was not concerned about running out of medications, just wanted clarification. Patient verbalized understanding and asked about his return to clinic date which is 4/3/24.

## 2024-02-09 ENCOUNTER — TELEPHONE (OUTPATIENT)
Dept: TRANSPLANT | Facility: HOSPITAL | Age: 69
End: 2024-02-09
Payer: MEDICARE

## 2024-02-09 ENCOUNTER — DOCUMENTATION (OUTPATIENT)
Dept: TRANSPLANT | Facility: HOSPITAL | Age: 69
End: 2024-02-09

## 2024-02-09 PROCEDURE — 83735 ASSAY OF MAGNESIUM: CPT

## 2024-02-09 PROCEDURE — 85025 COMPLETE CBC W/AUTO DIFF WBC: CPT

## 2024-02-09 PROCEDURE — 80053 COMPREHEN METABOLIC PANEL: CPT

## 2024-02-09 PROCEDURE — 82784 ASSAY IGA/IGD/IGG/IGM EACH: CPT

## 2024-02-09 PROCEDURE — 80061 LIPID PANEL: CPT

## 2024-02-09 PROCEDURE — 86833 HLA CLASS II HIGH DEFIN QUAL: CPT

## 2024-02-09 PROCEDURE — 82306 VITAMIN D 25 HYDROXY: CPT

## 2024-02-09 PROCEDURE — 86832 HLA CLASS I HIGH DEFIN QUAL: CPT

## 2024-02-09 PROCEDURE — 80197 ASSAY OF TACROLIMUS: CPT

## 2024-02-09 NOTE — PROGRESS NOTES
Labs reviewed on 2/9/24  WBC 5.8 ANC 4.26  Hbg 9.3  Platelets 86  Creatinine 1.17  Magnesium 1.60  Prograf pending Goal 6-8 Dose 2/1.5mg    -No changes made  -Repeat labs 2/12/24

## 2024-02-09 NOTE — TELEPHONE ENCOUNTER
Called and spoke with Lori about patients labs, patient did not get labs this week and will go on Monday 2/12

## 2024-02-12 ENCOUNTER — TELEPHONE (OUTPATIENT)
Dept: TRANSPLANT | Facility: HOSPITAL | Age: 69
End: 2024-02-12
Payer: MEDICARE

## 2024-02-12 DIAGNOSIS — Z94.2 S/P LUNG TRANSPLANT (MULTI): ICD-10-CM

## 2024-02-12 RX ORDER — TACROLIMUS 1 MG/1
2 CAPSULE ORAL 2 TIMES DAILY
Qty: 120 CAPSULE | Refills: 11 | Status: SHIPPED | OUTPATIENT
Start: 2024-02-12 | End: 2025-02-11

## 2024-02-12 NOTE — TELEPHONE ENCOUNTER
Called and left voicemail for patient. [employer] messages sent. Medication list updated and medications sent for refill.

## 2024-02-27 ENCOUNTER — TELEPHONE (OUTPATIENT)
Dept: TRANSPLANT | Facility: HOSPITAL | Age: 69
End: 2024-02-27
Payer: MEDICARE

## 2024-02-27 DIAGNOSIS — Z94.2 LUNG TRANSPLANTED (MULTI): ICD-10-CM

## 2024-03-01 ENCOUNTER — LAB (OUTPATIENT)
Dept: LAB | Facility: LAB | Age: 69
End: 2024-03-01
Payer: COMMERCIAL

## 2024-03-01 DIAGNOSIS — Z94.2 LUNG TRANSPLANTED (MULTI): ICD-10-CM

## 2024-03-01 LAB
ALBUMIN SERPL BCP-MCNC: 3.6 G/DL (ref 3.4–5)
ALP SERPL-CCNC: 77 U/L (ref 33–136)
ALT SERPL W P-5'-P-CCNC: 18 U/L (ref 10–52)
ANION GAP SERPL CALC-SCNC: 8 MMOL/L (ref 10–20)
AST SERPL W P-5'-P-CCNC: 25 U/L (ref 9–39)
BASOPHILS # BLD AUTO: 0.05 X10*3/UL (ref 0–0.1)
BASOPHILS NFR BLD AUTO: 0.9 %
BILIRUB SERPL-MCNC: 0.8 MG/DL (ref 0–1.2)
BUN SERPL-MCNC: 25 MG/DL (ref 6–23)
CALCIUM SERPL-MCNC: 8.6 MG/DL (ref 8.6–10.3)
CHLORIDE SERPL-SCNC: 107 MMOL/L (ref 98–107)
CO2 SERPL-SCNC: 29 MMOL/L (ref 21–32)
CREAT SERPL-MCNC: 1.12 MG/DL (ref 0.5–1.3)
EGFRCR SERPLBLD CKD-EPI 2021: 71 ML/MIN/1.73M*2
EOSINOPHIL # BLD AUTO: 0.07 X10*3/UL (ref 0–0.7)
EOSINOPHIL NFR BLD AUTO: 1.3 %
ERYTHROCYTE [DISTWIDTH] IN BLOOD BY AUTOMATED COUNT: 17.1 % (ref 11.5–14.5)
GLUCOSE SERPL-MCNC: 90 MG/DL (ref 74–99)
HCT VFR BLD AUTO: 31.8 % (ref 41–52)
HGB BLD-MCNC: 9.2 G/DL (ref 13.5–17.5)
IGG SERPL-MCNC: 729 MG/DL (ref 700–1600)
IMM GRANULOCYTES # BLD AUTO: 0.03 X10*3/UL (ref 0–0.7)
IMM GRANULOCYTES NFR BLD AUTO: 0.6 % (ref 0–0.9)
LYMPHOCYTES # BLD AUTO: 0.98 X10*3/UL (ref 1.2–4.8)
LYMPHOCYTES NFR BLD AUTO: 18 %
MAGNESIUM SERPL-MCNC: 1.53 MG/DL (ref 1.6–2.4)
MCH RBC QN AUTO: 27.5 PG (ref 26–34)
MCHC RBC AUTO-ENTMCNC: 28.9 G/DL (ref 32–36)
MCV RBC AUTO: 95 FL (ref 80–100)
MONOCYTES # BLD AUTO: 0.46 X10*3/UL (ref 0.1–1)
MONOCYTES NFR BLD AUTO: 8.5 %
NEUTROPHILS # BLD AUTO: 3.84 X10*3/UL (ref 1.2–7.7)
NEUTROPHILS NFR BLD AUTO: 70.7 %
NRBC BLD-RTO: 0 /100 WBCS (ref 0–0)
PLATELET # BLD AUTO: 99 X10*3/UL (ref 150–450)
POTASSIUM SERPL-SCNC: 4.7 MMOL/L (ref 3.5–5.3)
PROT SERPL-MCNC: 5.8 G/DL (ref 6.4–8.2)
RBC # BLD AUTO: 3.35 X10*6/UL (ref 4.5–5.9)
SODIUM SERPL-SCNC: 139 MMOL/L (ref 136–145)
TACROLIMUS BLD-MCNC: 6.5 NG/ML
WBC # BLD AUTO: 5.4 X10*3/UL (ref 4.4–11.3)

## 2024-03-01 PROCEDURE — 83735 ASSAY OF MAGNESIUM: CPT

## 2024-03-01 PROCEDURE — 80053 COMPREHEN METABOLIC PANEL: CPT

## 2024-03-01 PROCEDURE — 36415 COLL VENOUS BLD VENIPUNCTURE: CPT

## 2024-03-01 PROCEDURE — 82784 ASSAY IGA/IGD/IGG/IGM EACH: CPT

## 2024-03-01 PROCEDURE — 85025 COMPLETE CBC W/AUTO DIFF WBC: CPT

## 2024-03-01 PROCEDURE — 80197 ASSAY OF TACROLIMUS: CPT

## 2024-03-03 LAB
CMV DNA SERPL NAA+PROBE-LOG IU: NORMAL {LOG_IU}/ML
LABORATORY COMMENT REPORT: NOT DETECTED

## 2024-03-04 ENCOUNTER — TELEPHONE (OUTPATIENT)
Dept: TRANSPLANT | Facility: HOSPITAL | Age: 69
End: 2024-03-04
Payer: MEDICARE

## 2024-03-04 ENCOUNTER — DOCUMENTATION (OUTPATIENT)
Dept: TRANSPLANT | Facility: HOSPITAL | Age: 69
End: 2024-03-04
Payer: MEDICARE

## 2024-03-04 DIAGNOSIS — Z94.2 LUNG TRANSPLANTED (MULTI): ICD-10-CM

## 2024-03-04 RX ORDER — CALCIUM CARBONATE 300MG(750)
800 TABLET,CHEWABLE ORAL
Qty: 180 TABLET | Refills: 3 | Status: SHIPPED | OUTPATIENT
Start: 2024-03-04 | End: 2024-04-23

## 2024-03-04 NOTE — TELEPHONE ENCOUNTER
Called patient back went to voicemail, voicemail left. Patient to increase magnesium to 800mg daily. Script entered and sent to  specialty

## 2024-03-04 NOTE — TELEPHONE ENCOUNTER
Patient left  message, returning call, said yes, he is taking magnesium as usual.  If needed, pls call him back

## 2024-03-04 NOTE — PROGRESS NOTES
Labs reviewed on 3/4/24  WBC 5.4 ANC 3.84  Hbg 9.2  Platelets 99  Creatinine 1.12  Magnesium 1.53  Prograf 6.5 Goal 6-8 Dose 2mg BID    -Changes made: Called patient to verify magnesium dose, no answer. If patient not taking magnesium will keep at 400mg, if patient is taking magnesium, will increase dose to 800mg daily  -Labs due next 4/1/24

## 2024-03-14 ENCOUNTER — SPECIALTY PHARMACY (OUTPATIENT)
Dept: PHARMACY | Facility: CLINIC | Age: 69
End: 2024-03-14

## 2024-03-14 ENCOUNTER — TELEPHONE (OUTPATIENT)
Dept: TRANSPLANT | Facility: HOSPITAL | Age: 69
End: 2024-03-14
Payer: MEDICARE

## 2024-03-14 PROCEDURE — RXMED WILLOW AMBULATORY MEDICATION CHARGE

## 2024-03-14 NOTE — TELEPHONE ENCOUNTER
Called patient to inform him that he needs to call  specialty for his medication, patient verbalized understanding.

## 2024-03-14 NOTE — TELEPHONE ENCOUNTER
Pt left  requesting a 30 day supply of azithromycin (Zithromax) 250 mg tablet pt asked if it could be ordered through [facility] in [place].

## 2024-03-15 ENCOUNTER — PHARMACY VISIT (OUTPATIENT)
Dept: PHARMACY | Facility: CLINIC | Age: 69
End: 2024-03-15
Payer: COMMERCIAL

## 2024-03-18 ENCOUNTER — SPECIALTY PHARMACY (OUTPATIENT)
Dept: PHARMACY | Facility: CLINIC | Age: 69
End: 2024-03-18

## 2024-03-27 ENCOUNTER — LAB (OUTPATIENT)
Dept: LAB | Facility: LAB | Age: 69
End: 2024-03-27
Payer: COMMERCIAL

## 2024-03-27 DIAGNOSIS — Z94.2 LUNG TRANSPLANTED (MULTI): ICD-10-CM

## 2024-03-27 LAB
ALBUMIN SERPL BCP-MCNC: 3.5 G/DL (ref 3.4–5)
ALP SERPL-CCNC: 77 U/L (ref 33–136)
ALT SERPL W P-5'-P-CCNC: 19 U/L (ref 10–52)
ANION GAP SERPL CALC-SCNC: 8 MMOL/L (ref 10–20)
AST SERPL W P-5'-P-CCNC: 24 U/L (ref 9–39)
BASOPHILS # BLD AUTO: 0.03 X10*3/UL (ref 0–0.1)
BASOPHILS NFR BLD AUTO: 0.6 %
BILIRUB SERPL-MCNC: 0.6 MG/DL (ref 0–1.2)
BUN SERPL-MCNC: 20 MG/DL (ref 6–23)
CALCIUM SERPL-MCNC: 8.3 MG/DL (ref 8.6–10.3)
CHLORIDE SERPL-SCNC: 108 MMOL/L (ref 98–107)
CO2 SERPL-SCNC: 28 MMOL/L (ref 21–32)
CREAT SERPL-MCNC: 1.05 MG/DL (ref 0.5–1.3)
DACRYOCYTES BLD QL SMEAR: NORMAL
EGFRCR SERPLBLD CKD-EPI 2021: 76 ML/MIN/1.73M*2
EOSINOPHIL # BLD AUTO: 0.07 X10*3/UL (ref 0–0.7)
EOSINOPHIL NFR BLD AUTO: 1.4 %
ERYTHROCYTE [DISTWIDTH] IN BLOOD BY AUTOMATED COUNT: 17.5 % (ref 11.5–14.5)
GLUCOSE SERPL-MCNC: 155 MG/DL (ref 74–99)
HCT VFR BLD AUTO: 32.5 % (ref 41–52)
HGB BLD-MCNC: 9.6 G/DL (ref 13.5–17.5)
IGG SERPL-MCNC: 773 MG/DL (ref 700–1600)
IMM GRANULOCYTES # BLD AUTO: 0.03 X10*3/UL (ref 0–0.7)
IMM GRANULOCYTES NFR BLD AUTO: 0.6 % (ref 0–0.9)
LYMPHOCYTES # BLD AUTO: 0.9 X10*3/UL (ref 1.2–4.8)
LYMPHOCYTES NFR BLD AUTO: 18.6 %
MAGNESIUM SERPL-MCNC: 1.63 MG/DL (ref 1.6–2.4)
MCH RBC QN AUTO: 28.6 PG (ref 26–34)
MCHC RBC AUTO-ENTMCNC: 29.5 G/DL (ref 32–36)
MCV RBC AUTO: 97 FL (ref 80–100)
MONOCYTES # BLD AUTO: 0.51 X10*3/UL (ref 0.1–1)
MONOCYTES NFR BLD AUTO: 10.5 %
NEUTROPHILS # BLD AUTO: 3.3 X10*3/UL (ref 1.2–7.7)
NEUTROPHILS NFR BLD AUTO: 68.3 %
NRBC BLD-RTO: 0 /100 WBCS (ref 0–0)
OVALOCYTES BLD QL SMEAR: NORMAL
PLATELET # BLD AUTO: 98 X10*3/UL (ref 150–450)
POLYCHROMASIA BLD QL SMEAR: NORMAL
POTASSIUM SERPL-SCNC: 4.3 MMOL/L (ref 3.5–5.3)
PROT SERPL-MCNC: 5.7 G/DL (ref 6.4–8.2)
RBC # BLD AUTO: 3.36 X10*6/UL (ref 4.5–5.9)
RBC MORPH BLD: NORMAL
SODIUM SERPL-SCNC: 140 MMOL/L (ref 136–145)
TACROLIMUS BLD-MCNC: 6.5 NG/ML
WBC # BLD AUTO: 4.8 X10*3/UL (ref 4.4–11.3)

## 2024-03-27 PROCEDURE — 36415 COLL VENOUS BLD VENIPUNCTURE: CPT

## 2024-03-27 PROCEDURE — 85025 COMPLETE CBC W/AUTO DIFF WBC: CPT

## 2024-03-27 PROCEDURE — 83735 ASSAY OF MAGNESIUM: CPT

## 2024-03-27 PROCEDURE — 82784 ASSAY IGA/IGD/IGG/IGM EACH: CPT

## 2024-03-27 PROCEDURE — 80197 ASSAY OF TACROLIMUS: CPT

## 2024-03-27 PROCEDURE — 80053 COMPREHEN METABOLIC PANEL: CPT

## 2024-03-28 ENCOUNTER — PATIENT MESSAGE (OUTPATIENT)
Dept: TRANSPLANT | Facility: HOSPITAL | Age: 69
End: 2024-03-28
Payer: MEDICARE

## 2024-03-28 ENCOUNTER — DOCUMENTATION (OUTPATIENT)
Dept: TRANSPLANT | Facility: HOSPITAL | Age: 69
End: 2024-03-28
Payer: MEDICARE

## 2024-03-28 DIAGNOSIS — Z94.2 LUNG TRANSPLANTED (MULTI): ICD-10-CM

## 2024-03-28 LAB
CMV DNA SERPL NAA+PROBE-LOG IU: NORMAL {LOG_IU}/ML
LABORATORY COMMENT REPORT: NOT DETECTED

## 2024-03-28 NOTE — PROGRESS NOTES
Labs reviewed on 3/28/24  WBC 4.8 ANC 3.30  Hbg 9.6  Platelets 98  Creatinine 1.05  Magnesium 1.63  Prograf 6.5 Goal 6-8 Dose 2mg BID     -Changes made: None  -Labs due next 4/22    [employer] message sent to patient regarding labs.

## 2024-03-28 NOTE — PROGRESS NOTES
Mr. Patel is a 67-year-old M s/p DLTx for IPF and pulmonary hypertension 3/22/2020 with course complicated by pleural bleed with washout 3/23 and need for trach discharged on 5/8/2020. Post hospital course is also been complicated by septic shock with MSSA 8/2020 with PE on lifelong anticoagulation (remote VTE 1993) he was also COVID-positive 12/2022.    Presenting for a follow-up visit.    Interval Medical History:  4/3/24: Breathing overall stable on RA, no nausea or vomiting, moving bowels, feeling well. Trying to maintain adequate po fluid intake, no LE edema. Working on weight loss but not having much success, BMI 33. Depressed about weight gain. Trying to increase activity, knows he needs to go back to the gym. BP lower 100's systolic.    1/10/24: Breathing overall stable in RA, no nausea or vomiting, moving bowels. No LE edema. Feeling well. Saw Dermatology lesion on forehead consistent with SCC, removed, no further lesions. Gained 10 lbs since last visit, BMI 30.    11/10/23: Breathing overall stable on RA, no nausea or vomiting, moving bowels. No LE edema. Maintaining adequate po fluid intake. New lesion on forehead, planning to see Dermatology soon.    10/13/23: Bronchoscopy on 9/19/23 with multiple strains of Pseudomonas, completed a 14 day course of Ciprofloxacin and started on inhaled VICENTE, some tremors with albuterol. TBBX with no ACR or OB. Feels breathing and cough is overall improved, no MERINO. No nausea or vomiting, tolerating po intake and moving bowels.    9/7/23: Seen in ER 8/25/23 for chest pain, sharp increased with movement. Workup negative including nuclear stress test. Started on Crestor. Still present at times but improved, feels musculoskeletal in nature. Breathing overall stable, still has a cough with brownish sputum despite course of Ciprofloxacin after last visit, RVP negative. No nausea or vomiting, moving bowels regularly.    8/2/23: Breathing overall stable, walking daily. Cough with  brownish sputum for the past 2 weeks, no fevers, chills. No nausea or vomiting, moving bowels, no LE edema, maintaining adequate po fluid intake.    6/12/23: Breathing stable, working on weight loss. No nausea or vomiting, moving bowels. No LE edema. No longer working due to hours. Trying to be more active.    4/5/23: Feels that breathing is overall stable, no MERINO or cough. Tolerating PO diet, no nausea or vomiting, moving bowels, no LE edema. Notes 15lb weight gain self reported due to inactivity - no orthopnea or PND. Notes skin lesion on scalp present for last few months, not yet evaluated by dermatology. Blood glucose range , 115 on 12.5mg Jardiance bid, BP max 115 at home. Started new job part-time in [employer] at [employer] 3x weekly for 6hrs/day.    Current Outpatient Medications:     acetaminophen (Tylenol) 325 mg tablet, Take 2 tablets (650 mg) by mouth every 6 hours if needed. As Needed for pain. Call transplant if you have a temprature., Disp: , Rfl:     alendronate (Fosamax) 70 mg tablet, TAKE ONE (1) TABLET BY MOUTH ONCE A WEEK, AS DIRECTED, Disp: 12 tablet, Rfl: 3    apixaban (Eliquis) 5 mg tablet, Take 1 tablet (5 mg) by mouth every 12 hours., Disp: 180 tablet, Rfl: 3    azithromycin (Zithromax) 250 mg tablet, Take 1 tablet (250 mg) by mouth once a day on Monday, Wednesday, and Friday., Disp: 12 tablet, Rfl: 11    blood sugar diagnostic (OneTouch Verio test strips) strip, Inject 1 each into the skin 3 times a day before meals., Disp: 300 each, Rfl: 3    budesonide-formoteroL (Symbicort) 160-4.5 mcg/actuation inhaler, Inhale 2 puffs by mouth 2 times a day. Rinse mouth with water after use to reduce aftertaste and incidence of candidiasis. Do not swallow., Disp: 10.2 g, Rfl: 11    cholecalciferol (Vitamin D-3) 25 MCG (1000 UT) capsule, Take 1 capsule (25 mcg) by mouth once daily., Disp: , Rfl:     dapsone 100 mg tablet, TAKE ONE (1) TABLET BY MOUTH ONCE DAILY., Disp: 90 tablet, Rfl: 3    levalbuterol  (Xopenex) 0.63 mg/3 mL nebulizer solution, Inhale one (1) ampule via nebulizer twice daily., Disp: 180 mL, Rfl: 11    loratadine (Claritin) 10 mg tablet, TAKE ONE (1) TABLET BY MOUTH ONCE DAILY., Disp: 90 tablet, Rfl: 3    magnesium oxide (Mag-Ox) 400 mg tablet, Take 2 tablets (800 mg) by mouth once daily. At 1 pm, Disp: 180 tablet, Rfl: 3    metoprolol tartrate (Lopressor) 25 mg tablet, TAKE ONE-HALF (0.5) TABLET BY MOUTH EVERY 12 HOURS., Disp: 30 tablet, Rfl: 11    montelukast (Singulair) 10 mg tablet, TAKE ONE (1) TABLET BY MOUTH ONCE DAILY AT BEDTIME., Disp: 30 tablet, Rfl: 11    multivitamin with minerals iron-free (Centrum Silver), Take 1 tablet by mouth once daily., Disp: , Rfl:     mycophenolate (Cellcept) 250 mg capsule, TAKE ONE (1) CAPSULE BY MOUTH 2 TIMES DAILY., Disp: 60 capsule, Rfl: 11    pantoprazole (ProtoNix) 40 mg EC tablet, TAKE ONE (1) TABLET BY MOUTH ONCE DAILY., Disp: 30 tablet, Rfl: 11    predniSONE (Deltasone) 5 mg tablet, TAKE ONE (1) TABLET BY MOUTH EVERY DAY, Disp: 30 tablet, Rfl: 11    rosuvastatin (Crestor) 40 mg tablet, Take one (1) tablet by mouth once daily., Disp: 30 tablet, Rfl: 11    tacrolimus (Prograf) 1 mg capsule, Take 2 capsules (2 mg) by mouth 2 times a day., Disp: 120 capsule, Rfl: 11    tobramycin (Cody 300) 300 mg/5 mL nebulizer solution, Take 5 mL (300 mg) by nebulization 2 times a day., Disp: 840 mL, Rfl: 3    valGANciclovir (Valcyte) 450 mg tablet, TAKE TWO (2) TABLETS BY MOUTH ONCE DAILY, Disp: 180 tablet, Rfl: 3        ROS:      Constitutional: no chills, no fever, no night sweats, not feeling poorly and not feeling tired.   Eyes: no blurred vision, no eyesight problems, no dryness of the eyes and no eye pain.   ENT: hearing loss, but no nasal congestion, no nasal discharge, no hoarseness, no sore throat, no earache, the ears do not feel full, no tinnitus, no nosebleeds, no postnasal drip, no rhinorrhea and no sinus pressure.   Neck: no mass(es) and no swelling.    Cardiovascular: no chest pain, no intermittent leg claudication, no lower extremity edema, no palpitations, no syncope, no tachycardia and no bradycardia.   Respiratory: no cough, no shortness of breath during exertion, no shortness of breath at rest, no wheezing, non dry cough, non productive cough and no hemoptysis.   Gastrointestinal: no abdominal pain, no bloating, no nausea and no vomiting.   Musculoskeletal: no arthralgias, no back pain, no myalgias, no history of falls, no localized joint pain, no joint redness, no joint stiffness, no joint swelling, no limb pain, no limb swelling and no neck pain.   Integumentary: no rashes.   Neurological: no convulsions, no difficulty walking, no diplopia, no dizziness, no dysarthria, no facial weakness, no headache, no limb weakness, no memory changes, no numbness, no speech difficulties, no tingling, no confusion and no syncope.   Psychiatric: no anxiety, no depression, no anhedonia, no substance use disorders, no personality change, no sleep disturbances and no suicidal ideations.   Endocrine: no changes in appetite, no feelings of weakness, no hair thinning or loss, no heat/cold intolerance, no polydipsia, no deepening of the voice, no polyuria, no muscle weakness and no proptosis.   Hematologic/Lymphatic: a tendency for easy bruising and a tendency for easy bleeding, but no swollen glands and no recurrent infections.   All other systems have been reviewed and are negative for complaint.          PE: VSS: 102/57, 77, 12, 92% on RA, temp 36.5  Constitutional: Alert and in no acute distress. Well developed, well nourished.   Head and Face: Head and face: Normal.  (Moon facies from steroid, right facial droop unchanged).   Ears, Nose, Mouth, and Throat: External inspection of ears and nose: Normal.  Nasal mucosa, septum, and turbinates: Normal.  Lips, teeth, and gums: Normal.    Neck: No neck mass was observed. Supple.   Pulmonary: Chest: Normal to inspection.  (No  wound) Respiratory effort: No increased work of breathing or signs of respiratory distress.  Auscultation of lungs: Clear to auscultation bilaterally.    Cardiovascular: Heart rate and rhythm were normal, normal S1 and S2, no gallops, no murmurs and no pericardial rub. Pedal pulses: Normal. No peripheral edema.   Abdomen: Soft, nontender; no abdominal mass palpated. The abdomen was obese. Normal bowel sounds.   Lymphatic: No lymphadenopathy.   Musculoskeletal: No joint swelling seen, normal movements of all extremities. Digits and nails: Abnormal.  Examination of the extremities revealed fingernail clubbing.   Neurologic: cranial nerves 2-12 were intact.   Psychiatric: Judgment and insight: Intact. Alert and oriented to person, place and time. Mood and affect:    Labwork:  Na 140, potassium 4.3, Cr 1.05, Mag 1.63, LFT's stable  WBC 4.8, Hg 9.6, Hematocrit 32.5, platelets 98, ANC acceptable  Prograf 6.5, CMV PCR negative 3/27/24, EBV PCR negative on 12/21/23.  Vitamin D 47, IgG 773, HLA Ab negative 2/9/24  Lipid panel 2/9/24 cholesterol 106 TG 80        CXR: 4/3/24 reviewed, clear lung fields bilaterally, no acute infiltrates, elevated left hemidiaphragm (chronic).    Results:  4/3/24: Spirometry:  FVC:  2.41 59%  FEV1: 1.48 48%  25-75: 0.78 32%    1/10/24: Spirometry:  FVC: 2.45 60%  FEV1: 1.55 50%  25-75: 0.97 39%    11/10/23 Spirometry:  FVC: 2.33, 55%  FEV1: 1.47 46%  25-75: 0.95 39%    10/13/23 Spirometry:  FVC: 2.29 52%  FEV1: 1.35 42%  25-75: 0.88 36%    9/7/23 Spirometry:  FVC: 2.25, 53%  FEV1: 1.41, 44%  25-75: 0.82, 34%    8/2/23 Spirometry:  FVC: 2.48, 59%  FEV1: 1.52, 47%  25-75: 1.03, 42%    6/12/23 Spirometry:  FVC 2.65 62%  FEV1: 1.48, 46%  25-75: 0.89, 36%    5/2/23 Spirometry:  FVC: 2.58, 61%  FEV1: 1.51, 47%  25-75: 0.88, 36%    4/5/23 Spirometry:  FVC: 2.47 58%  FEV1: 1.50 46%  FEF 25-75: 31%    Assessment/Plan:  Mr. Patel is a 68 yo M with history of IPF/PAH s/p DLTx 3/22/20, GERD, hypertension,  DM, hyperlipidemia who presents for follow-up visit for lung transplant. Overall doing well. Seen in ER 8/25/23 for chest pain with negative workup.    1. Pulmonary- s/p DLTx for IPF/PAH on 3/22/20, CMV mismatch, EBV positive recipient  Allograft function:  -Initial complications- minimal PGD ,pleural bleed with washout 3/23 and need for trach, discharged on 5/8/2020.   -Modest PE at same presentation as septic shock 8/2020 - lifelong anticoagulation given second episode. (Remote VTE 1993). Currently on Eliquis BID.  -TBBX in 4/20 with A2, treated with pulse Solumedrol, TBBX in 5 and 6/20 with minimal ACR, subsequent TBBX negative, last 2/23/21.  -History of MSSA, Klebsiella, Corynebacterium from 4/20 cultures, Enterobacter from 6/20 cultures, sensitive to fluoroquinolones.  -Bronchoscopy on 9/19/23, cultures with multiple strains of Pseudomonas, completed a 14 day course of Ciprofloxacin and started on inhaled VICENTE. TBBX with no ACR or OB.   -PFT's today stable FEV1 1.48(48%); FEF 25-75 0.78(32%),continue to follow serial exams, continue to work on weight loss and conditioning, has gained 5 more lbs, BMI increased to 33 and not as active as in the past.  -CXR reviewed, clear lung fields bilaterally, continue to follow serial exams. HRCT on 9/18/23 with relatively clear lung fields bilaterally, multifocal expiratory air trapping, suggestive of CLAD.  -SARA- continue with nightly CPAP  -HLA Ab negative 2/9/24, no significant history of DSA post transplant, follow serially, every 3 months.  -Continue Azithromycin 250 mg qM,W,F for ROSSY prophylaxis along with Singulair 10 mg QHS. Also added Symbicort BID.    Immunosuppression:  -Prograf goal 6-8, continue 2/1.5, level 6.5, stable. Continue to follow closely, repeat monthly, next on 4/22/24.  -Cellcept 250 mg BID   -Prednisone 5 mg daily    Prophylaxis:  -Dapsone for PJP prophylaxis (no known prior intolerance to Bactrim, has not taken other agents)  -CMV mismatch-  continues on Valcyte 900 mg daily, CMV PCR's negative, last 3/27/24, no history of viremia.  -Previously on voriconazole for anti-fungal prophylaxis; no fungal infections, LFT's stable with elevated alk phos and normal MRI of liver; discontinued voriconazole 4/5/23 with improvement of alkaline phosphatase  -IgG level 773, adequate, continue to follow serial levels, goal greater than 600.    2. CV  -History of hypertension- on Lopressor 12.5 mg BID, 's systolic, will hold and follow response. Advised to continue to follow BP closely and call us if greater than 130/80 or less than 100/60 consistently. Will call next week with readings.  -Recently seen in ER 8/25/23 for chest pain, workup negative, including nuclear stress test. Overall improved, appears musculoskeletal in nature. TTE 9/26/23 with EF 65%, normal LV and RV function  -Hyperlipidemia- Lipid panel on 2/9/24 with acceptable Cholesterol and TG, started on Crestor 40 mg daily (for cardioprotective effect), will repeat in 3 months.    3. Renal  -Cr overall stable at 1.05, advised to maintain adequate po hydration.  -Magnesium 1.63, continue 400 mg daily.  -Renally dose all medications and avoid nephrotoxins  -History of nephrolithiasis in the past    4. GI  -Continue Protonix 40 mg daily before meals   -Persistently elevated AP- MRI lever 2/9/23 with normal appearance; improved after discontinuation of voriconazole, will follow.   -Will follow after start of Crestor as above, stable.    5. ID  -History of septic shock with MSSA 8/2020. Received prolonged course of IV Abx with resolution.  -Otitis external starting 9/8/2020 with ENT involvement , multiple rounds ab and wick insertion, developed mastoiditis  -Sternal wound infection- 2 episodes of Staph s/p debridement, wound vac removed. Repeat episode 7/19-7/23/2021, no debridement, was discharged on Abx and completed a 8 week course.  -COVID 12/22, recovered and overall improved.  -Bronchoscopy on  9/19/23 cultures with multiple strains of Pseudomonas, completed a 14 day course of Ciprofloxacin and started on inhaled VICENTE, tolerating well.  -Afebrile, no signs of new infection currently.    6. Endo  -History of DM- BG levels well-controlled, on Jardiance per endocrinology  -History of osteoporosis- on Fosamax. Also on vitamin D, level acceptable at 47.    7. Heme  -History of PE at same presentation as septic shock 8/2020 - lifelong anticoagulation given second episode. (Remote VTE 1993). On Eliquis, increased to 5 mg BID as off voriconazole. Also has IVF filter in place.   -H/H stable at 9.6/32.5 and no signs of bleeding currently.  -WBC intact, platelets stable at 98, chronic thrombocytopenia    8. ENT  -Otitis extena with facial nerve involvement. 10/17 Right tympanomastoidectomy, mastoid washout while inpatient. 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  -Chronic hearing loss with hearing aids.    8. Prophylaxis  -UTD on vaccines (Influenza, PNA, COVID, RSV). Following with Dermatology, scalp lesion consistent with folliculitis, healed well, new lesion on forehead, SCC, removed, no further reoccurrences,to see again soon.  Last colonoscopy 4/22. DEXA 6/10/22.     Continue to work on weight loss and increased activity, will have patient see nutrition for further evaluation. RTC in 2 months with PFT's, CXR. Bronchoscopy only as clinically indicated.   Plan of care discussed at length with patient, all questions answered, total of 45 minutes spent regarding counseling and coordination of care.

## 2024-04-03 ENCOUNTER — OFFICE VISIT (OUTPATIENT)
Dept: TRANSPLANT | Facility: HOSPITAL | Age: 69
End: 2024-04-03
Payer: COMMERCIAL

## 2024-04-03 ENCOUNTER — HOSPITAL ENCOUNTER (OUTPATIENT)
Dept: RESPIRATORY THERAPY | Facility: HOSPITAL | Age: 69
Discharge: HOME | End: 2024-04-03
Payer: COMMERCIAL

## 2024-04-03 ENCOUNTER — HOSPITAL ENCOUNTER (OUTPATIENT)
Dept: RADIOLOGY | Facility: HOSPITAL | Age: 69
Discharge: HOME | End: 2024-04-03
Payer: COMMERCIAL

## 2024-04-03 ENCOUNTER — SOCIAL WORK (OUTPATIENT)
Dept: TRANSPLANT | Facility: HOSPITAL | Age: 69
End: 2024-04-03
Payer: COMMERCIAL

## 2024-04-03 VITALS
DIASTOLIC BLOOD PRESSURE: 57 MMHG | SYSTOLIC BLOOD PRESSURE: 102 MMHG | BODY MASS INDEX: 33.49 KG/M2 | HEART RATE: 77 BPM | OXYGEN SATURATION: 92 % | HEIGHT: 68 IN | WEIGHT: 221 LBS | TEMPERATURE: 97.7 F

## 2024-04-03 DIAGNOSIS — Z94.2 LUNG TRANSPLANTED (MULTI): ICD-10-CM

## 2024-04-03 DIAGNOSIS — G51.0 BELL'S PALSY: ICD-10-CM

## 2024-04-03 DIAGNOSIS — F33.9 DEPRESSION, RECURRENT (CMS-HCC): ICD-10-CM

## 2024-04-03 DIAGNOSIS — Z94.2 S/P LUNG TRANSPLANT (MULTI): ICD-10-CM

## 2024-04-03 DIAGNOSIS — M81.0 OSTEOPOROSIS WITHOUT CURRENT PATHOLOGICAL FRACTURE, UNSPECIFIED OSTEOPOROSIS TYPE: ICD-10-CM

## 2024-04-03 DIAGNOSIS — Z94.2 LUNG TRANSPLANTED (MULTI): Primary | ICD-10-CM

## 2024-04-03 DIAGNOSIS — T38.0X5A STEROID-INDUCED HYPERGLYCEMIA: ICD-10-CM

## 2024-04-03 DIAGNOSIS — I10 PRIMARY HYPERTENSION: ICD-10-CM

## 2024-04-03 DIAGNOSIS — Z86.59 HISTORY OF DEPRESSION: ICD-10-CM

## 2024-04-03 DIAGNOSIS — I26.99 PULMONARY EMBOLISM, UNSPECIFIED CHRONICITY, UNSPECIFIED PULMONARY EMBOLISM TYPE, UNSPECIFIED WHETHER ACUTE COR PULMONALE PRESENT (MULTI): ICD-10-CM

## 2024-04-03 DIAGNOSIS — D84.9 IMMUNOSUPPRESSION (MULTI): ICD-10-CM

## 2024-04-03 DIAGNOSIS — R73.9 STEROID-INDUCED HYPERGLYCEMIA: ICD-10-CM

## 2024-04-03 DIAGNOSIS — R39.12 POOR URINARY STREAM: ICD-10-CM

## 2024-04-03 LAB
MGC ASCENT PFT - FEV1 - PRE: 1.48
MGC ASCENT PFT - FEV1 - PRE: 1.48
MGC ASCENT PFT - FEV1 - PREDICTED: 3.04
MGC ASCENT PFT - FEV1 - PREDICTED: 3.04
MGC ASCENT PFT - FVC - PRE: 2.41
MGC ASCENT PFT - FVC - PRE: 2.41
MGC ASCENT PFT - FVC - PREDICTED: 4.03
MGC ASCENT PFT - FVC - PREDICTED: 4.03

## 2024-04-03 PROCEDURE — 1159F MED LIST DOCD IN RCRD: CPT | Performed by: INTERNAL MEDICINE

## 2024-04-03 PROCEDURE — 71046 X-RAY EXAM CHEST 2 VIEWS: CPT | Performed by: RADIOLOGY

## 2024-04-03 PROCEDURE — 94010 BREATHING CAPACITY TEST: CPT | Performed by: INTERNAL MEDICINE

## 2024-04-03 PROCEDURE — 99215 OFFICE O/P EST HI 40 MIN: CPT | Performed by: INTERNAL MEDICINE

## 2024-04-03 PROCEDURE — 71046 X-RAY EXAM CHEST 2 VIEWS: CPT

## 2024-04-03 PROCEDURE — 1160F RVW MEDS BY RX/DR IN RCRD: CPT | Performed by: INTERNAL MEDICINE

## 2024-04-03 PROCEDURE — 94010 BREATHING CAPACITY TEST: CPT

## 2024-04-03 PROCEDURE — 3074F SYST BP LT 130 MM HG: CPT | Performed by: INTERNAL MEDICINE

## 2024-04-03 PROCEDURE — 99215 OFFICE O/P EST HI 40 MIN: CPT | Mod: 25 | Performed by: INTERNAL MEDICINE

## 2024-04-03 PROCEDURE — 3078F DIAST BP <80 MM HG: CPT | Performed by: INTERNAL MEDICINE

## 2024-04-03 ASSESSMENT — ENCOUNTER SYMPTOMS
CHANGE IN BOWEL HABIT: 0
NUMBNESS: 1
NAUSEA: 0
SHORTNESS OF BREATH: 1
DIZZINESS: 1
COUGH: 1
SUSPICIOUS LESIONS: 1
DIARRHEA: 0
EYES NEGATIVE: 1
HEADACHES: 0
CONSTIPATION: 0
HEMATURIA: 0
MUSCLE CRAMPS: 1
CARDIOVASCULAR NEGATIVE: 1
FALLS: 0
WEIGHT GAIN: 1
VOMITING: 0
TREMORS: 0
WEAKNESS: 1
PARESTHESIAS: 1
SPUTUM PRODUCTION: 0

## 2024-04-03 ASSESSMENT — ANXIETY QUESTIONNAIRES
2. NOT BEING ABLE TO STOP OR CONTROL WORRYING: MORE THAN HALF THE DAYS
5. BEING SO RESTLESS THAT IT IS HARD TO SIT STILL: NOT AT ALL
3. WORRYING TOO MUCH ABOUT DIFFERENT THINGS: MORE THAN HALF THE DAYS
6. BECOMING EASILY ANNOYED OR IRRITABLE: SEVERAL DAYS
GAD7 TOTAL SCORE: 6
IF YOU CHECKED OFF ANY PROBLEMS ON THIS QUESTIONNAIRE, HOW DIFFICULT HAVE THESE PROBLEMS MADE IT FOR YOU TO DO YOUR WORK, TAKE CARE OF THINGS AT HOME, OR GET ALONG WITH OTHER PEOPLE: SOMEWHAT DIFFICULT
4. TROUBLE RELAXING: NOT AT ALL
7. FEELING AFRAID AS IF SOMETHING AWFUL MIGHT HAPPEN: NOT AT ALL
1. FEELING NERVOUS, ANXIOUS, OR ON EDGE: SEVERAL DAYS

## 2024-04-03 ASSESSMENT — PATIENT HEALTH QUESTIONNAIRE - PHQ9
6. FEELING BAD ABOUT YOURSELF - OR THAT YOU ARE A FAILURE OR HAVE LET YOURSELF OR YOUR FAMILY DOWN: SEVERAL DAYS
4. FEELING TIRED OR HAVING LITTLE ENERGY: MORE THAN HALF THE DAYS
SUM OF ALL RESPONSES TO PHQ9 QUESTIONS 1 & 2: 2
8. MOVING OR SPEAKING SO SLOWLY THAT OTHER PEOPLE COULD HAVE NOTICED. OR THE OPPOSITE, BEING SO FIGETY OR RESTLESS THAT YOU HAVE BEEN MOVING AROUND A LOT MORE THAN USUAL: NOT AT ALL
3. TROUBLE FALLING OR STAYING ASLEEP OR SLEEPING TOO MUCH: NOT AT ALL
7. TROUBLE CONCENTRATING ON THINGS, SUCH AS READING THE NEWSPAPER OR WATCHING TELEVISION: NOT AT ALL
2. FEELING DOWN, DEPRESSED OR HOPELESS: SEVERAL DAYS
5. POOR APPETITE OR OVEREATING: SEVERAL DAYS
1. LITTLE INTEREST OR PLEASURE IN DOING THINGS: SEVERAL DAYS
10. IF YOU CHECKED OFF ANY PROBLEMS, HOW DIFFICULT HAVE THESE PROBLEMS MADE IT FOR YOU TO DO YOUR WORK, TAKE CARE OF THINGS AT HOME, OR GET ALONG WITH OTHER PEOPLE: SOMEWHAT DIFFICULT
9. THOUGHTS THAT YOU WOULD BE BETTER OFF DEAD, OR OF HURTING YOURSELF: NOT AT ALL
SUM OF ALL RESPONSES TO PHQ QUESTIONS 1-9: 6

## 2024-04-03 NOTE — PROGRESS NOTES
SW met with pt in [place] [address] for a follow up visit. Pt was pleasant and active and engaged during visit. Pt confirmed his information remains the same including address, phone and insurance. Pt confirmed his wife, dtr and sister are his primary support people. Pt shared his mood has been a bit up and down lately. Pt feels he hasn't progressed in his recovery as much as he was hoping/expecting. Pt shared that about three years ago, after his tx, he experienced new symptoms like facial paralysis that he still has residuals from. Pt shared his eye sight has been changing, received eye drops from [facility] and felt like they were too helpful. SW had pt complete PHQ-9 and CATHY. Pt scored a 6 on both meaning mild clinical depression and mild anxiety symptoms at this time. Pt shared he would be willing to try counseling services. SW to provide resources for services, potentially with new tx psychology provider. Pt confirmed he has been doing good with his medications. Pt confirmed he doesn't have any trouble with getting medications from his pharmacy. SW confirmed pt does not have any questions or concerns at this time. SW will follow up with pt in 3 months or sooner as needed.    [facility]

## 2024-04-03 NOTE — PROGRESS NOTES
Review of Systems   Constitutional: Positive for malaise/fatigue and weight gain.   Eyes: Negative.    Cardiovascular: Negative.    Respiratory:  Positive for cough and shortness of breath. Negative for sputum production.    Skin:  Positive for suspicious lesions (Pt to see derm soon and is following up on this).   Musculoskeletal:  Positive for muscle cramps. Negative for falls and muscle weakness.   Gastrointestinal:  Negative for change in bowel habit, constipation, diarrhea, nausea and vomiting.   Genitourinary:  Negative for hematuria.   Neurological:  Positive for dizziness, numbness, paresthesias and weakness. Negative for headaches and tremors.     Patient seen in clinic today, has complaints of weight gain, about 15lbs, cramping in LE/UE with tingling. Patient feels that when he had bells palsy 3 years ago, he has not fully recovered from that. Patient also have complaints of cramping in the neck, will notify Dr. Boone. Patient states that he feels depressed, social work reaching out to our psychiatrist for counseling. Nutritionist to see patient regarding weight loss. Patient to return to clinic in 2 months with cxray and jelly.

## 2024-04-03 NOTE — PATIENT INSTRUCTIONS
-Repeat routine labs on 4/22    -Social work is reaching out about counseling for you, you should be hearing from someone about this within the next week or so    -Will have you meet with nutrition to work on weight loss    -Stop metoprolol, record blood pressure and pulse, please call us next week Wed/thurs with results.

## 2024-04-03 NOTE — Clinical Note
Return to clinic about 6/3/24 to see Dr. LOYA with cxray and jelly. ASAP schedule appointment with nutrition to assist with weight loss

## 2024-04-04 ENCOUNTER — PHARMACY VISIT (OUTPATIENT)
Dept: PHARMACY | Facility: CLINIC | Age: 69
End: 2024-04-04
Payer: COMMERCIAL

## 2024-04-04 ENCOUNTER — SPECIALTY PHARMACY (OUTPATIENT)
Dept: PHARMACY | Facility: CLINIC | Age: 69
End: 2024-04-04

## 2024-04-04 PROBLEM — F33.9 DEPRESSION, RECURRENT (CMS-HCC): Status: ACTIVE | Noted: 2024-04-04

## 2024-04-04 PROCEDURE — RXMED WILLOW AMBULATORY MEDICATION CHARGE

## 2024-04-09 ENCOUNTER — NUTRITION (OUTPATIENT)
Dept: TRANSPLANT | Facility: HOSPITAL | Age: 69
End: 2024-04-09
Payer: MEDICARE

## 2024-04-09 NOTE — PROGRESS NOTES
Reason for Nutrition Visit:  Pt is a 68 y.o. male referred for MNT on weight management. Pt post status DDLT on 3/22/2020.    Past Medical Hx:  Patient Active Problem List   Diagnosis    Abnormality of facial nerve    Acute mastoiditis of right side with complication    Chronic rhinitis    Anemia    Mixed conductive and sensorineural hearing loss, unilateral, right ear with restricted hearing on the contralateral side    Bell's palsy    Chemosis of right conjunctiva    Edema, leg    Electrolyte and fluid disorder    Elevated IgE level    Eyelid retraction or lag    Facial asymmetries    Gastroesophageal reflux disease without esophagitis    Globus sensation    Hemangioma of skin and subcutaneous tissue    History of depression    Hyperkalemia    Hyperlipemia, retention    Hypertension    Immunosuppression (CMS/HCC)    Lagophthalmos of right upper eyelid    Latent tuberculosis    Melanocytic nevi of trunk    Microalbuminuria    MSSA (methicillin susceptible Staphylococcus aureus) septicemia (CMS/HCC)    Osteoporosis    Other melanin hyperpigmentation    Other seborrheic keratosis    Pharyngoesophageal dysphagia    Pulmonary emboli (CMS/HCC)    S/P lung transplant (CMS/[facility])    Sensorineural hearing loss (SNHL) of left ear with restricted hearing of right ear    Steroid-induced hyperglycemia    Subjective tinnitus of both ears    Vocal fold paresis    Depression, recurrent (CMS/HCC)        Food and Nutrition Hx:  Pt reports really good appetite, has gained about 20# over the past few months. He reports he would like to lose weight and get back down to  185-190#. He reports he thinks his late night snacking after dinner is his down fall.   24 Diet Recall:  Meal 1: 10 am, coffee and bagel (blueberry bagel/white bagel), cream cheese/ butter   Meal 2: 2:00-3:00pm, sometimes pt reports he doesn't eat anything, if he does eat it might be a sandwich, (ham/cheese or roastbeef) with fruit (mixture, all different) tuna salad    Meal 3: 6:30-7:00pm, everything, all different kinds of dinners,  chicken/steak (varies), potato, salad/vegetable, tacos/taco salad, enchiladas, rice/beans, hamburgers, spaghetti, meatballs, roast (potato/carrots). Reports wife is a very good cook.   Snacks: potato chips, ice cream, brownies, cookies, everything all kinds of snacks   Beverages: coffee, water, lemonade (low calorie), coke ( maybe 3 in a week, not very often)    WEIGHT HISTORY  CW: 221# (100 kg), pt reports gained at least 20#, would like to get back to 185-190#  BMI: 33.60 kg/m (Type 1 Obese)  Weight change:  Yes gained weight   Significant Weight Change: No    Lab Results   Component Value Date    HGBA1C 5.6 03/01/2023    HGBA1C 5.4 06/30/2022    CHOL 106 02/09/2024    LDLF 70 08/24/2023    TRIG 80 02/09/2024    BUN 20 03/27/2024    PHOS 3.2 01/13/2023    K 4.3 03/27/2024          Food Preparation: Partner/Spouse  Cooking Skills/Barriers: None reported  Grocery Shopping: Partner/Spouse        Allergies: None  Intolerance: None  Appetite: Good  Intake: >75%  GI Symptoms : None   Swallowing Difficulty: No problems with swallowing  Dentition : own    Eating Out Type: Lunch, Dinner, Fast Food, Restaurant, and Take Out  3-4 times per week  Convenience Foods: Denies     Types of Activities: Mostly Sedentary, pt reports he does own a stationary bike and weights, would like to try and get on a routine. He reports once the weather gets nicer out he will spend time outside in his yard.     Sleep duration/quality : 5-6 hours, 7+ hours, and continuous sleep    Supplements: Multivitamin and Vitamin C daily      Nutrition Focused Physical Exam:    Performed/Deferred: Deferred as pt visually appears well-nourished with no signs of malnutrition    Malnutrition Present: No    Estimated Energy Needs:-    Estimated Needs:  gms  protein and 7176-6986 kcal for weight loss    Nutrition Diagnosis:    Diagnosis Statement 1:  Diagnosis Status: New  Diagnosis : Obese  related to  limited physical activity with excessive portions  as evidenced by  diet recall, BMI >30      Nutrition Interventions:  Weight management-pt would like to lose about 30-35#, RD agrees that would be good, current BMI 33 (type 1 Obese).   Dicussed plate method: fill 1/2 plate with non starchy vegetables(broccoli, carrots, cauliflower, salad greens, cucumbers, tomatoes); Fill 1/4 plate with lean protein (boneless chicken, skinless chicken breast or turkey, fish, egg, tofu); Fill 1/4 plate with carbohydrates/starches (grains, breads, fruits, starchy vegetables, beans, yogurt, milk). Use a 9 inch plate.  Eat more protein. Aim to get at least 20 to 35 percent of your total calorie intake from protein. Always include a moderate portion of protein at every meal and snack.  Do not go long periods of time without eating. Try to eat at least 3 balanced meals with snacks in between. It's okay to listen to your body when it's hungry and eat healthy snacks in between meals.   Reduce amount of eating out at restaurants/fast food from 3-4 to 1 day a week.   Dicussed doing 30 minutes of exercise per day, 5 days per week. Pt reports he has a stationary bike and weights at home, as well as he belongs to a gym. He thinks he can do this.     Nutrition Goals:  Nutrition Goals : Carbohydrate consistency  Consistent meal/snack pattern  Weight Loss  Eat 3 meals consistently  Eat breakfast consistently  Consume 64 oz water  Exercise 30 minutes per day for 5 days per week.  Consume meal or snack every 3-4 hours  Include both protein and starch at all meals and snacks    Nutrition Recommendations:  1) None at this time.     Educational Handouts: ADA Placemat, High Protein Snack Ideas, High Protein Meal Ideas, and [facility] Weight Loss & Metabolism

## 2024-04-11 ENCOUNTER — TELEPHONE (OUTPATIENT)
Dept: TRANSPLANT | Facility: HOSPITAL | Age: 69
End: 2024-04-11
Payer: MEDICARE

## 2024-04-11 NOTE — TELEPHONE ENCOUNTER
Called patient to check in on vitals after stopping metoprolol, patient states his blood pressure has been stable as well as his heart rate. His HR has been in the 80's, blood pressures are noted below.  109/66  100/48   111/64  114/68  121/62  124/68  108/60  112/65  108/59  124/64  Patient asked about an appointment with Dr. Vincent, will get that schedule for the patient. Patient has no other concerns at this time.

## 2024-04-17 NOTE — PATIENT INSTRUCTIONS
Thank you for choosing [facility]  for your health care needs.  If you have any questions, concerns or medical needs, please feel free to contact our office at [phone].    Please ensure you complete your blood work one week before the next scheduled appointment.    The A1C is at goal   For follow up in 6 months with glucose log

## 2024-04-17 NOTE — PROGRESS NOTES
"Subjective   Mal NGUYEN Caro is a 68 y.o. male who presents for follow-up of Type 2 diabetes mellitus.     He is to see a psychologist next week as he has been depressed during hte witner     Known complications due to diabetes included obesity    Cardiovascular risk factors include advanced age (older than 55 for men, 65 for women), diabetes mellitus, male gender, and obesity (BMI >= 30 kg/m2). The patient is not on an ACE inhibitor or angiotensin II receptor blocker.  The patient has not been previously hospitalized due to diabetic ketoacidosis.     Current symptoms/problems include none. His clinical course has been stable.     The patient is currently checking the blood glucose.    Patient is using: glucometer    Hypoglycemia frequency: Denies   Hypoglycemia awareness: N/A     Current Medication Regimen  None      MEALS:   Brunch -bagel with cream cheese   Lunch - hamburger, rarely salad, fast food; can omit   Dinner-  italian food, mexican food, rice and beans as he is [facility]   Snacks - fruit, vegetables, crackers with peanut butter   Beverages - coffee, water, sparkling water      Exercise regimen -denies     Review of Systems   Constitutional:  Positive for unexpected weight change (Weight gain).   Eyes:  Positive for visual disturbance.   Genitourinary:         Lack of erection    Neurological:  Positive for tremors.        Tingling    Psychiatric/Behavioral:  The patient is nervous/anxious.    All other systems reviewed and are negative.      Objective   /72 (BP Location: Right arm, Patient Position: Sitting, BP Cuff Size: Adult)   Pulse 73   Ht 1.727 m (5' 8\")   BMI 33.60 kg/m²   Physical Exam  Vitals and nursing note reviewed.   Constitutional:       General: He is not in acute distress.     Appearance: Normal appearance. He is obese.   HENT:      Head: Normocephalic and atraumatic.      Nose: Nose normal.      Mouth/Throat:      Mouth: Mucous membranes are moist.   Eyes:      Extraocular " Movements: Extraocular movements intact.   Cardiovascular:      Rate and Rhythm: Normal rate and regular rhythm.   Pulmonary:      Effort: Pulmonary effort is normal.      Breath sounds: Normal breath sounds.   Musculoskeletal:         General: Normal range of motion.   Skin:     General: Skin is warm.   Neurological:      Mental Status: He is alert and oriented to person, place, and time.   Psychiatric:         Mood and Affect: Mood normal.         Lab Review  Glucose (mg/dL)   Date Value   03/27/2024 155 (H)   03/01/2024 90   02/09/2024 99     Hemoglobin A1C (%)   Date Value   03/01/2023 5.6   06/30/2022 5.4   02/28/2022 4.8   09/17/2021 6.1 (A)     Bicarbonate (mmol/L)   Date Value   03/27/2024 28   03/01/2024 29   02/09/2024 30     Urea Nitrogen (mg/dL)   Date Value   03/27/2024 20   03/01/2024 25 (H)   02/09/2024 26 (H)     Creatinine (mg/dL)   Date Value   03/27/2024 1.05   03/01/2024 1.12   02/09/2024 1.17     Lab Results   Component Value Date    CHOL 106 02/09/2024    CHOL 106 11/07/2023    CHOL 145 08/24/2023     Lab Results   Component Value Date    HDL 54.4 02/09/2024    HDL 57.7 11/07/2023    HDL 61.0 08/24/2023     Lab Results   Component Value Date    LDLCALC 36 02/09/2024    LDLCALC 33 11/07/2023     Lab Results   Component Value Date    TRIG 80 02/09/2024    TRIG 75 11/07/2023    TRIG 71 08/24/2023       Health Maintenance:   Foot Exam:   Eye Exam: 2022  Urine Albumin: March 1, 2023    Assessment/Plan   68-year-old male presents for follow up. 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 He is currently on 5mg of prednisone daily. His blood pressure is at goal. His hemoglobin A1c was found to be 5.3% as of today.  All insulin and oral agents have since been stopped    Steroid-induced hyperglycemia  The A1C is at goal   For follow up in 6 months with glucose log

## 2024-04-18 ENCOUNTER — OFFICE VISIT (OUTPATIENT)
Dept: ENDOCRINOLOGY | Facility: CLINIC | Age: 69
End: 2024-04-18
Payer: MEDICARE

## 2024-04-18 VITALS
WEIGHT: 215 LBS | BODY MASS INDEX: 32.58 KG/M2 | HEART RATE: 73 BPM | DIASTOLIC BLOOD PRESSURE: 72 MMHG | SYSTOLIC BLOOD PRESSURE: 146 MMHG | HEIGHT: 68 IN

## 2024-04-18 DIAGNOSIS — T38.0X5A STEROID-INDUCED HYPERGLYCEMIA: Primary | ICD-10-CM

## 2024-04-18 DIAGNOSIS — R73.9 STEROID-INDUCED HYPERGLYCEMIA: Primary | ICD-10-CM

## 2024-04-18 LAB — POC HEMOGLOBIN A1C: 5.3 % (ref 4.2–6.5)

## 2024-04-18 PROCEDURE — 99213 OFFICE O/P EST LOW 20 MIN: CPT | Performed by: INTERNAL MEDICINE

## 2024-04-18 PROCEDURE — 1159F MED LIST DOCD IN RCRD: CPT | Performed by: INTERNAL MEDICINE

## 2024-04-18 PROCEDURE — 3078F DIAST BP <80 MM HG: CPT | Performed by: INTERNAL MEDICINE

## 2024-04-18 PROCEDURE — 1160F RVW MEDS BY RX/DR IN RCRD: CPT | Performed by: INTERNAL MEDICINE

## 2024-04-18 PROCEDURE — 3077F SYST BP >= 140 MM HG: CPT | Performed by: INTERNAL MEDICINE

## 2024-04-18 PROCEDURE — 83036 HEMOGLOBIN GLYCOSYLATED A1C: CPT | Performed by: INTERNAL MEDICINE

## 2024-04-19 ASSESSMENT — ENCOUNTER SYMPTOMS
UNEXPECTED WEIGHT CHANGE: 1
TREMORS: 1
NERVOUS/ANXIOUS: 1

## 2024-04-23 ENCOUNTER — LAB (OUTPATIENT)
Dept: LAB | Facility: LAB | Age: 69
End: 2024-04-23
Payer: COMMERCIAL

## 2024-04-23 ENCOUNTER — TELEPHONE (OUTPATIENT)
Dept: TRANSPLANT | Facility: HOSPITAL | Age: 69
End: 2024-04-23

## 2024-04-23 ENCOUNTER — DOCUMENTATION (OUTPATIENT)
Dept: TRANSPLANT | Facility: HOSPITAL | Age: 69
End: 2024-04-23

## 2024-04-23 DIAGNOSIS — Z94.2 LUNG TRANSPLANTED (MULTI): ICD-10-CM

## 2024-04-23 DIAGNOSIS — R39.12 POOR URINARY STREAM: ICD-10-CM

## 2024-04-23 LAB
ALBUMIN SERPL BCP-MCNC: 3.6 G/DL (ref 3.4–5)
ALP SERPL-CCNC: 69 U/L (ref 33–136)
ALT SERPL W P-5'-P-CCNC: 17 U/L (ref 10–52)
ANION GAP SERPL CALC-SCNC: 9 MMOL/L (ref 10–20)
AST SERPL W P-5'-P-CCNC: 21 U/L (ref 9–39)
BASOPHILS # BLD AUTO: 0.02 X10*3/UL (ref 0–0.1)
BASOPHILS NFR BLD AUTO: 0.4 %
BILIRUB SERPL-MCNC: 0.9 MG/DL (ref 0–1.2)
BUN SERPL-MCNC: 22 MG/DL (ref 6–23)
CALCIUM SERPL-MCNC: 8.5 MG/DL (ref 8.6–10.3)
CHLORIDE SERPL-SCNC: 107 MMOL/L (ref 98–107)
CO2 SERPL-SCNC: 29 MMOL/L (ref 21–32)
CREAT SERPL-MCNC: 0.95 MG/DL (ref 0.5–1.3)
EGFRCR SERPLBLD CKD-EPI 2021: 86 ML/MIN/1.73M*2
EOSINOPHIL # BLD AUTO: 0.04 X10*3/UL (ref 0–0.7)
EOSINOPHIL NFR BLD AUTO: 0.8 %
ERYTHROCYTE [DISTWIDTH] IN BLOOD BY AUTOMATED COUNT: 17.2 % (ref 11.5–14.5)
GLUCOSE SERPL-MCNC: 102 MG/DL (ref 74–99)
HCT VFR BLD AUTO: 31.7 % (ref 41–52)
HGB BLD-MCNC: 9.5 G/DL (ref 13.5–17.5)
IGG SERPL-MCNC: 690 MG/DL (ref 700–1600)
IMM GRANULOCYTES # BLD AUTO: 0.02 X10*3/UL (ref 0–0.7)
IMM GRANULOCYTES NFR BLD AUTO: 0.4 % (ref 0–0.9)
LYMPHOCYTES # BLD AUTO: 0.89 X10*3/UL (ref 1.2–4.8)
LYMPHOCYTES NFR BLD AUTO: 16.8 %
MAGNESIUM SERPL-MCNC: 1.49 MG/DL (ref 1.6–2.4)
MCH RBC QN AUTO: 28.6 PG (ref 26–34)
MCHC RBC AUTO-ENTMCNC: 30 G/DL (ref 32–36)
MCV RBC AUTO: 96 FL (ref 80–100)
MONOCYTES # BLD AUTO: 0.52 X10*3/UL (ref 0.1–1)
MONOCYTES NFR BLD AUTO: 9.8 %
NEUTROPHILS # BLD AUTO: 3.81 X10*3/UL (ref 1.2–7.7)
NEUTROPHILS NFR BLD AUTO: 71.8 %
NRBC BLD-RTO: 0 /100 WBCS (ref 0–0)
PLATELET # BLD AUTO: 98 X10*3/UL (ref 150–450)
POTASSIUM SERPL-SCNC: 4.1 MMOL/L (ref 3.5–5.3)
PROT SERPL-MCNC: 5.8 G/DL (ref 6.4–8.2)
PSA SERPL-MCNC: 4.88 NG/ML
RBC # BLD AUTO: 3.32 X10*6/UL (ref 4.5–5.9)
SODIUM SERPL-SCNC: 141 MMOL/L (ref 136–145)
TACROLIMUS BLD-MCNC: 7 NG/ML
WBC # BLD AUTO: 5.3 X10*3/UL (ref 4.4–11.3)

## 2024-04-23 PROCEDURE — 84153 ASSAY OF PSA TOTAL: CPT

## 2024-04-23 PROCEDURE — 83735 ASSAY OF MAGNESIUM: CPT

## 2024-04-23 PROCEDURE — 85025 COMPLETE CBC W/AUTO DIFF WBC: CPT

## 2024-04-23 PROCEDURE — 80197 ASSAY OF TACROLIMUS: CPT

## 2024-04-23 PROCEDURE — 80053 COMPREHEN METABOLIC PANEL: CPT

## 2024-04-23 PROCEDURE — 82784 ASSAY IGA/IGD/IGG/IGM EACH: CPT

## 2024-04-23 PROCEDURE — 36415 COLL VENOUS BLD VENIPUNCTURE: CPT

## 2024-04-23 RX ORDER — LANOLIN ALCOHOL/MO/W.PET/CERES
1200 CREAM (GRAM) TOPICAL DAILY
Qty: 270 TABLET | Refills: 3 | Status: SHIPPED | OUTPATIENT
Start: 2024-04-23 | End: 2025-04-23

## 2024-04-23 NOTE — TELEPHONE ENCOUNTER
Per Dr. Boone, patient okay to see his established urologist and will have patient increase magnesium to 1200mg a day. Called and spoke with patient, patient verbalized understanding.

## 2024-04-23 NOTE — TELEPHONE ENCOUNTER
Called patient in regards to labs, patient has elevated PSA, referral for urology was placed. Patient stated that he has seen a urologist for over 20 years and is asking to see him, will confirm with Dr. Boone. Patients magnesium 1.49, patient stated take he is 800mg a day, will discuss plan with. Dr. Boone.

## 2024-04-24 ENCOUNTER — TELEPHONE (OUTPATIENT)
Dept: TRANSPLANT | Facility: HOSPITAL | Age: 69
End: 2024-04-24
Payer: MEDICARE

## 2024-04-24 DIAGNOSIS — Z94.2 LUNG TRANSPLANTED (MULTI): ICD-10-CM

## 2024-04-24 LAB
CMV DNA SERPL NAA+PROBE-LOG IU: NORMAL {LOG_IU}/ML
LABORATORY COMMENT REPORT: NOT DETECTED

## 2024-04-24 NOTE — TELEPHONE ENCOUNTER
Labs reviewed on 2/24/24  WBC 5.3 ANC 3.81  Hbg 9.5  Platelets 98  Creatinine 0.95  Magnesium 1.49  Prograf 7 Goal 6-8 Dose 2mg BID    -Changes made: Increase magnesium to 1200mg, referral for urology due to elevated PSA  -Labs due next 5/20/24    Called patient regarding when to repeat, voicemail left for patient to return call if he has any questions.

## 2024-04-25 ENCOUNTER — TELEPHONE (OUTPATIENT)
Dept: TRANSPLANT | Facility: HOSPITAL | Age: 69
End: 2024-04-25
Payer: MEDICARE

## 2024-04-25 NOTE — TELEPHONE ENCOUNTER
Called patients urologist office, spoke with , stated that they need all previous PSA labs and the last office note. Fax to [phone] will fax over results today.

## 2024-05-02 ENCOUNTER — SPECIALTY PHARMACY (OUTPATIENT)
Dept: PHARMACY | Facility: CLINIC | Age: 69
End: 2024-05-02

## 2024-05-02 PROCEDURE — RXMED WILLOW AMBULATORY MEDICATION CHARGE

## 2024-05-03 ENCOUNTER — TELEMEDICINE (OUTPATIENT)
Dept: TRANSPLANT | Facility: HOSPITAL | Age: 69
End: 2024-05-03
Payer: MEDICARE

## 2024-05-03 DIAGNOSIS — Z94.2 LUNG REPLACED BY TRANSPLANT (MULTI): Primary | ICD-10-CM

## 2024-05-03 DIAGNOSIS — F33.40 SEASONAL AFFECTIVE DISORDER IN REMISSION (CMS-HCC): ICD-10-CM

## 2024-05-03 PROCEDURE — 90791 PSYCH DIAGNOSTIC EVALUATION: CPT | Performed by: PSYCHOLOGIST

## 2024-05-03 PROCEDURE — 1160F RVW MEDS BY RX/DR IN RCRD: CPT | Performed by: PSYCHOLOGIST

## 2024-05-03 PROCEDURE — RXMED WILLOW AMBULATORY MEDICATION CHARGE

## 2024-05-03 PROCEDURE — 1159F MED LIST DOCD IN RCRD: CPT | Performed by: PSYCHOLOGIST

## 2024-05-03 NOTE — PROGRESS NOTES
BEHAVIORAL HEALTH: PSYCHOLOGICAL ASSESSMENT FOR TRANSPLANT [facility]    Identifying Information  Name: Mal NGUYEN Caro                                    : 1955  Assessment date: 5/3/2024  Diagnoses: lung replaced by transplant: seasonal affective disorder  Location: outpatient [facility]  Time in: 11;00 am  Time out: 11:45 am    Presenting information: Mal NGUYEN Caro was seen for this follow-up meeting at the request of the transplant  for as assessment of mood and potential need for counseling. He received a lung transplant on 3/22/2020. His recovery was complicated with other medical issues, including a severe ear infection that required surgery and ultimately resulted in Bell's palsy, and he also had an infection in his chest that took a long time to clear.    Subjective  Mr. Patel related today that in addition to his ear and chest infections, he has had a couple of spots of melanoma needing treatment. He admitted that he initially had not wanted the transplant but his wife ( 37 years) and daughter (technically not his biological child but he considers her and his wife's son his own children) convinced him he should pursue it. He said he is grateful for his life and the gift of the lungs. He acknowledged that he was feeling down when he met with the  but he is feeling much better now with the onset of spring. He is planning to go golfing soon, which he said he hasn't done in 7 years.    He is on steroids and when the dose was higher he felt his mood was negatively impacted. The current dose is tolerable to him. He also reported that his sleep is good.    Objective  Previously Diagnosed Psychological Disorders:   Mood Disorders: none, though he did describe fall/winter low moods that would be consistent with seasonal affective disorder  Developmental/Behavioral Disorders: none  Cognitive Disorders: none  Personality Disorders: none  Psychotic Disorders: none      Treatment for Psychological Disorders:  Outpatient counseling: none  Pharmacological management: none  -previous psychotropic medications used: He believes he has been tried on something in the past.  -current psychotropic medications: none  -prescribing provider: N/A  Inpatient treatment: none    Suicidal/Homicidal Tendencies: denied    Coping Strategies: He reported enjoying being outdoors and he has activities he is trying to become reinvolved with, like golf.    Suicide Risk Assessment:  Risk factors: none  Protective factors: marriage/partnership, children, good social support, expressed desire to live    Mental Status Exam;  Appearance: Well groomed   Psychomotor:: WNL   Attention span and concentration: attentive   Behavior: Calm, cooperative   Speech: normal   Mood: euthymic   Affect: normal   Thought Process: WNL   Thought Content: Appropriate to the content of conversation   Orientation: x3   Cognition: intact   Insight: intact   Judgment: intact   Estimate of Intelligence: average   Fund of knowledge: intact   Memory: intact   Abnormal movements: none                 Gait and station normal     Assessment/Plan  Mr. Patel described symptoms consistent with season affective disorder. With the onset of spring, his low mood has dissipated. He said this cycle has continued for years regardless of transplant status. He is not interested in counseling at this time. He is aware that he might be appropriate for pharmacological intervention (a trial of bupropion could be beneficial if not medically contraindicated) if he observes the sinking of his mood again next fall.    Meghann Vincent, PhD  Clinical Psychologist, [facility]  Clinical Professor, Department of Psychiatry  [facility]

## 2024-05-14 ENCOUNTER — SPECIALTY PHARMACY (OUTPATIENT)
Dept: PHARMACY | Facility: CLINIC | Age: 69
End: 2024-05-14

## 2024-05-15 ENCOUNTER — PHARMACY VISIT (OUTPATIENT)
Dept: PHARMACY | Facility: CLINIC | Age: 69
End: 2024-05-15
Payer: COMMERCIAL

## 2024-05-15 PROCEDURE — RXMED WILLOW AMBULATORY MEDICATION CHARGE

## 2024-05-16 ENCOUNTER — PHARMACY VISIT (OUTPATIENT)
Dept: PHARMACY | Facility: CLINIC | Age: 69
End: 2024-05-16
Payer: COMMERCIAL

## 2024-05-22 ENCOUNTER — PATIENT MESSAGE (OUTPATIENT)
Dept: TRANSPLANT | Facility: HOSPITAL | Age: 69
End: 2024-05-22
Payer: MEDICARE

## 2024-05-22 NOTE — TELEPHONE ENCOUNTER
Called patient in regards to voicemail that patient left. Patient is in need of Claritin script, current script is at [facility]. Voicemail left for patient to return call.

## 2024-05-24 DIAGNOSIS — Z94.2 LUNG TRANSPLANTED (MULTI): ICD-10-CM

## 2024-05-24 RX ORDER — LORATADINE 10 MG/1
10 TABLET ORAL DAILY
Qty: 90 TABLET | Refills: 3 | Status: SHIPPED | OUTPATIENT
Start: 2024-05-24 | End: 2025-05-24

## 2024-05-28 ENCOUNTER — LAB (OUTPATIENT)
Dept: LAB | Facility: LAB | Age: 69
End: 2024-05-28
Payer: MEDICARE

## 2024-05-28 DIAGNOSIS — Z94.2 LUNG TRANSPLANTED (MULTI): ICD-10-CM

## 2024-05-28 DIAGNOSIS — Z94.2 S/P LUNG TRANSPLANT (MULTI): ICD-10-CM

## 2024-05-28 LAB
ALBUMIN SERPL BCP-MCNC: 3.6 G/DL (ref 3.4–5)
ALP SERPL-CCNC: 76 U/L (ref 33–136)
ALT SERPL W P-5'-P-CCNC: 20 U/L (ref 10–52)
ANION GAP SERPL CALC-SCNC: 9 MMOL/L (ref 10–20)
AST SERPL W P-5'-P-CCNC: 24 U/L (ref 9–39)
BASOPHILS # BLD AUTO: 0.04 X10*3/UL (ref 0–0.1)
BASOPHILS NFR BLD AUTO: 0.7 %
BILIRUB SERPL-MCNC: 0.8 MG/DL (ref 0–1.2)
BUN SERPL-MCNC: 14 MG/DL (ref 6–23)
CALCIUM SERPL-MCNC: 8.5 MG/DL (ref 8.6–10.3)
CHLORIDE SERPL-SCNC: 107 MMOL/L (ref 98–107)
CO2 SERPL-SCNC: 29 MMOL/L (ref 21–32)
CREAT SERPL-MCNC: 0.96 MG/DL (ref 0.5–1.3)
EGFRCR SERPLBLD CKD-EPI 2021: 85 ML/MIN/1.73M*2
EOSINOPHIL # BLD AUTO: 0.06 X10*3/UL (ref 0–0.4)
EOSINOPHIL NFR BLD AUTO: 1.1 %
ERYTHROCYTE [DISTWIDTH] IN BLOOD BY AUTOMATED COUNT: 17.3 % (ref 11.5–14.5)
GLUCOSE SERPL-MCNC: 104 MG/DL (ref 74–99)
HCT VFR BLD AUTO: 31.6 % (ref 41–52)
HGB BLD-MCNC: 9.2 G/DL (ref 13.5–17.5)
IGG SERPL-MCNC: 713 MG/DL (ref 700–1600)
IMM GRANULOCYTES # BLD AUTO: 0.02 X10*3/UL (ref 0–0.5)
IMM GRANULOCYTES NFR BLD AUTO: 0.4 % (ref 0–0.9)
LYMPHOCYTES # BLD AUTO: 0.8 X10*3/UL (ref 0.8–3)
LYMPHOCYTES NFR BLD AUTO: 14.6 %
MAGNESIUM SERPL-MCNC: 1.65 MG/DL (ref 1.6–2.4)
MCH RBC QN AUTO: 28.3 PG (ref 26–34)
MCHC RBC AUTO-ENTMCNC: 29.1 G/DL (ref 32–36)
MCV RBC AUTO: 97 FL (ref 80–100)
MONOCYTES # BLD AUTO: 0.43 X10*3/UL (ref 0.05–0.8)
MONOCYTES NFR BLD AUTO: 7.9 %
NEUTROPHILS # BLD AUTO: 4.12 X10*3/UL (ref 1.6–5.5)
NEUTROPHILS NFR BLD AUTO: 75.3 %
NRBC BLD-RTO: 0 /100 WBCS (ref 0–0)
PLATELET # BLD AUTO: 99 X10*3/UL (ref 150–450)
POTASSIUM SERPL-SCNC: 4.3 MMOL/L (ref 3.5–5.3)
PROT SERPL-MCNC: 5.7 G/DL (ref 6.4–8.2)
RBC # BLD AUTO: 3.25 X10*6/UL (ref 4.5–5.9)
SODIUM SERPL-SCNC: 141 MMOL/L (ref 136–145)
TACROLIMUS BLD-MCNC: 5.9 NG/ML
WBC # BLD AUTO: 5.5 X10*3/UL (ref 4.4–11.3)

## 2024-05-28 PROCEDURE — 80197 ASSAY OF TACROLIMUS: CPT

## 2024-05-28 PROCEDURE — 83735 ASSAY OF MAGNESIUM: CPT

## 2024-05-28 PROCEDURE — 36415 COLL VENOUS BLD VENIPUNCTURE: CPT

## 2024-05-28 PROCEDURE — 82784 ASSAY IGA/IGD/IGG/IGM EACH: CPT

## 2024-05-28 PROCEDURE — 80053 COMPREHEN METABOLIC PANEL: CPT

## 2024-05-28 PROCEDURE — 85025 COMPLETE CBC W/AUTO DIFF WBC: CPT

## 2024-05-28 PROCEDURE — 80200 ASSAY OF TOBRAMYCIN: CPT

## 2024-05-29 ENCOUNTER — TELEPHONE (OUTPATIENT)
Dept: TRANSPLANT | Facility: HOSPITAL | Age: 69
End: 2024-05-29
Payer: MEDICARE

## 2024-05-29 ENCOUNTER — TELEPHONE (OUTPATIENT)
Dept: RESPIRATORY THERAPY | Facility: HOSPITAL | Age: 69
End: 2024-05-29

## 2024-05-29 DIAGNOSIS — I10 ESSENTIAL (PRIMARY) HYPERTENSION: ICD-10-CM

## 2024-05-29 DIAGNOSIS — Z94.2 LUNG TRANSPLANTED (MULTI): ICD-10-CM

## 2024-05-29 DIAGNOSIS — M89.9 DISORDER OF BONE, UNSPECIFIED: ICD-10-CM

## 2024-05-29 LAB
CMV DNA SERPL NAA+PROBE-LOG IU: NORMAL {LOG_IU}/ML
LABORATORY COMMENT REPORT: NOT DETECTED

## 2024-05-29 NOTE — TELEPHONE ENCOUNTER
Labs reviewed on 5/29/24  WBC 5.5 ANC 4.12  Hbg 9.2  Platelets 99  Creatinine 0.96  Magnesium 1.65  Prograf 5.9 Goal 6-8 Dose 2mg BID    -Changes made: None  -Labs due next 6/24    Called patient to discuss labs and when to repeat, patient did not answer, voicemail left, patient advised to call back with any questions.

## 2024-06-03 ENCOUNTER — OFFICE VISIT (OUTPATIENT)
Dept: TRANSPLANT | Facility: HOSPITAL | Age: 69
End: 2024-06-03
Payer: MEDICARE

## 2024-06-03 ENCOUNTER — HOSPITAL ENCOUNTER (OUTPATIENT)
Dept: RADIOLOGY | Facility: HOSPITAL | Age: 69
Discharge: HOME | End: 2024-06-03
Payer: MEDICARE

## 2024-06-03 ENCOUNTER — HOSPITAL ENCOUNTER (OUTPATIENT)
Dept: RESPIRATORY THERAPY | Facility: HOSPITAL | Age: 69
Discharge: HOME | End: 2024-06-03
Payer: MEDICARE

## 2024-06-03 VITALS
DIASTOLIC BLOOD PRESSURE: 56 MMHG | OXYGEN SATURATION: 95 % | TEMPERATURE: 97.4 F | HEART RATE: 84 BPM | SYSTOLIC BLOOD PRESSURE: 103 MMHG | BODY MASS INDEX: 32.23 KG/M2 | WEIGHT: 212 LBS

## 2024-06-03 DIAGNOSIS — Z94.2 LUNG TRANSPLANTED (MULTI): ICD-10-CM

## 2024-06-03 DIAGNOSIS — I10 PRIMARY HYPERTENSION: ICD-10-CM

## 2024-06-03 DIAGNOSIS — J84.10 PULMONARY FIBROSIS (MULTI): ICD-10-CM

## 2024-06-03 DIAGNOSIS — A41.01 MSSA (METHICILLIN SUSCEPTIBLE STAPHYLOCOCCUS AUREUS) SEPTICEMIA (MULTI): ICD-10-CM

## 2024-06-03 DIAGNOSIS — T38.0X5A STEROID-INDUCED HYPERGLYCEMIA: ICD-10-CM

## 2024-06-03 DIAGNOSIS — M81.0 OSTEOPOROSIS WITHOUT CURRENT PATHOLOGICAL FRACTURE, UNSPECIFIED OSTEOPOROSIS TYPE: ICD-10-CM

## 2024-06-03 DIAGNOSIS — E78.49 OTHER HYPERLIPIDEMIA: ICD-10-CM

## 2024-06-03 DIAGNOSIS — I26.99 PULMONARY EMBOLISM, UNSPECIFIED CHRONICITY, UNSPECIFIED PULMONARY EMBOLISM TYPE, UNSPECIFIED WHETHER ACUTE COR PULMONALE PRESENT (MULTI): ICD-10-CM

## 2024-06-03 DIAGNOSIS — Z94.2 S/P LUNG TRANSPLANT (MULTI): Primary | ICD-10-CM

## 2024-06-03 DIAGNOSIS — R73.9 STEROID-INDUCED HYPERGLYCEMIA: ICD-10-CM

## 2024-06-03 DIAGNOSIS — J38.00 VOCAL FOLD PARESIS: ICD-10-CM

## 2024-06-03 DIAGNOSIS — J31.0 CHRONIC RHINITIS: ICD-10-CM

## 2024-06-03 LAB — TOBRAMYCIN TROUGH SERPL-MCNC: 0.8 UG/ML (ref 0–2)

## 2024-06-03 PROCEDURE — 1126F AMNT PAIN NOTED NONE PRSNT: CPT | Performed by: INTERNAL MEDICINE

## 2024-06-03 PROCEDURE — 94010 BREATHING CAPACITY TEST: CPT | Performed by: INTERNAL MEDICINE

## 2024-06-03 PROCEDURE — 3078F DIAST BP <80 MM HG: CPT | Performed by: INTERNAL MEDICINE

## 2024-06-03 PROCEDURE — 1160F RVW MEDS BY RX/DR IN RCRD: CPT | Performed by: INTERNAL MEDICINE

## 2024-06-03 PROCEDURE — 1159F MED LIST DOCD IN RCRD: CPT | Performed by: INTERNAL MEDICINE

## 2024-06-03 PROCEDURE — 99215 OFFICE O/P EST HI 40 MIN: CPT | Performed by: INTERNAL MEDICINE

## 2024-06-03 PROCEDURE — 3074F SYST BP LT 130 MM HG: CPT | Performed by: INTERNAL MEDICINE

## 2024-06-03 PROCEDURE — 71046 X-RAY EXAM CHEST 2 VIEWS: CPT

## 2024-06-03 PROCEDURE — 71046 X-RAY EXAM CHEST 2 VIEWS: CPT | Performed by: RADIOLOGY

## 2024-06-03 PROCEDURE — 94010 BREATHING CAPACITY TEST: CPT

## 2024-06-03 ASSESSMENT — PAIN SCALES - GENERAL: PAINLEVEL: 0-NO PAIN

## 2024-06-03 ASSESSMENT — ENCOUNTER SYMPTOMS
MUSCLE CRAMPS: 1
SPUTUM PRODUCTION: 1
HEADACHES: 0
CARDIOVASCULAR NEGATIVE: 1
BRUISES/BLEEDS EASILY: 1
SHORTNESS OF BREATH: 0
SUSPICIOUS LESIONS: 0
BLURRED VISION: 1
FALLS: 0
WEIGHT GAIN: 0
NUMBNESS: 1
DIARRHEA: 0
PARESTHESIAS: 1
VOMITING: 0
WEIGHT LOSS: 1
TREMORS: 0
WEAKNESS: 1
NAUSEA: 0
CONSTIPATION: 0
COUGH: 0
CHANGE IN BOWEL HABIT: 0
SORE THROAT: 0
HEMATURIA: 0
DIZZINESS: 0

## 2024-06-03 NOTE — PATIENT INSTRUCTIONS
-Please follow up in 3 months with spirometry and chest xray    -Please let me know if you do not get your Claritin    -Let me know if dermatology removes anything or finds anything

## 2024-06-03 NOTE — PROGRESS NOTES
Mr. Patel is a 69-year-old M s/p DLTx for IPF and pulmonary hypertension 3/22/2020 with course complicated by pleural bleed with washout 3/23 and need for trach discharged on 5/8/2020. Post hospital course is also been complicated by septic shock with MSSA 8/2020 with PE on lifelong anticoagulation (remote VTE 1993) he was also COVID-positive 12/2022.    Presenting for a follow-up visit.    Interval Medical History:  6/3/24: Breathing overall stable on RA, trying to increase activity. No nausea or vomiting, moving bowels. No LE edema. Working on weight loss, lost 10 lbs. BP overall improved, 120's systolic. Following with Dermatology, lesion on scalp removed, biopsy pending, will fax us results. Mood overall improved, met with Dr. Vincent, feeling better.    4/3/24: Breathing overall stable on RA, no nausea or vomiting, moving bowels, feeling well. Trying to maintain adequate po fluid intake, no LE edema. Working on weight loss but not having much success, BMI 33. Depressed about weight gain. Trying to increase activity, knows he needs to go back to the gym. BP lower 100's systolic.    1/10/24: Breathing overall stable in RA, no nausea or vomiting, moving bowels. No LE edema. Feeling well. Saw Dermatology lesion on forehead consistent with SCC, removed, no further lesions. Gained 10 lbs since last visit, BMI 30.    11/10/23: Breathing overall stable on RA, no nausea or vomiting, moving bowels. No LE edema. Maintaining adequate po fluid intake. New lesion on forehead, planning to see Dermatology soon.    10/13/23: Bronchoscopy on 9/19/23 with multiple strains of Pseudomonas, completed a 14 day course of Ciprofloxacin and started on inhaled VICENTE, some tremors with albuterol. TBBX with no ACR or OB. Feels breathing and cough is overall improved, no MERINO. No nausea or vomiting, tolerating po intake and moving bowels.    9/7/23: Seen in ER 8/25/23 for chest pain, sharp increased with movement. Workup negative including  nuclear stress test. Started on Crestor. Still present at times but improved, feels musculoskeletal in nature. Breathing overall stable, still has a cough with brownish sputum despite course of Ciprofloxacin after last visit, RVP negative. No nausea or vomiting, moving bowels regularly.    8/2/23: Breathing overall stable, walking daily. Cough with brownish sputum for the past 2 weeks, no fevers, chills. No nausea or vomiting, moving bowels, no LE edema, maintaining adequate po fluid intake.    6/12/23: Breathing stable, working on weight loss. No nausea or vomiting, moving bowels. No LE edema. No longer working due to hours. Trying to be more active.    4/5/23: Feels that breathing is overall stable, no MERINO or cough. Tolerating PO diet, no nausea or vomiting, moving bowels, no LE edema. Notes 15lb weight gain self reported due to inactivity - no orthopnea or PND. Notes skin lesion on scalp present for last few months, not yet evaluated by dermatology. Blood glucose range , 115 on 12.5mg Jardiance bid, BP max 115 at home. Started new job part-time in [employer] 3x weekly for 6hrs/day.    Current Outpatient Medications:     alendronate (Fosamax) 70 mg tablet, TAKE ONE (1) TABLET BY MOUTH ONCE A WEEK, AS DIRECTED, Disp: 12 tablet, Rfl: 3    apixaban (Eliquis) 5 mg tablet, Take 1 tablet (5 mg) by mouth every 12 hours., Disp: 180 tablet, Rfl: 3    azithromycin (Zithromax) 250 mg tablet, Take 1 tablet (250 mg) by mouth once a day on Monday, Wednesday, and Friday., Disp: 12 tablet, Rfl: 11    blood sugar diagnostic (OneTouch Verio test strips) strip, Inject 1 each into the skin 3 times a day before meals., Disp: 300 each, Rfl: 3    budesonide-formoteroL (Symbicort) 160-4.5 mcg/actuation inhaler, Inhale 2 puffs by mouth 2 times a day. Rinse mouth with water after use to reduce aftertaste and incidence of candidiasis. Do not swallow., Disp: 10.2 g, Rfl: 11    cholecalciferol (Vitamin D-3) 25 MCG (1000 UT) capsule,  Take 1 capsule (25 mcg) by mouth once daily., Disp: , Rfl:     dapsone 100 mg tablet, TAKE ONE (1) TABLET BY MOUTH ONCE DAILY., Disp: 90 tablet, Rfl: 3    levalbuterol (Xopenex) 0.63 mg/3 mL nebulizer solution, Inhale one (1) ampule via nebulizer twice daily. (Patient not taking: Reported on 4/18/2024), Disp: 180 mL, Rfl: 11    loratadine (Claritin) 10 mg tablet, TAKE ONE (1) TABLET BY MOUTH ONCE DAILY., Disp: 90 tablet, Rfl: 3    magnesium oxide (Mag-Ox) 400 mg (241.3 mg magnesium) tablet, Take 3 tablets (1,200 mg) by mouth once daily at 1 PM, Disp: 270 tablet, Rfl: 3    montelukast (Singulair) 10 mg tablet, TAKE ONE (1) TABLET BY MOUTH ONCE DAILY AT BEDTIME., Disp: 30 tablet, Rfl: 11    multivitamin with minerals iron-free (Centrum Silver), Take 1 tablet by mouth once daily., Disp: , Rfl:     mycophenolate (Cellcept) 250 mg capsule, TAKE ONE (1) CAPSULE BY MOUTH 2 TIMES DAILY., Disp: 60 capsule, Rfl: 11    pantoprazole (ProtoNix) 40 mg EC tablet, TAKE ONE (1) TABLET BY MOUTH ONCE DAILY., Disp: 30 tablet, Rfl: 11    predniSONE (Deltasone) 5 mg tablet, TAKE ONE (1) TABLET BY MOUTH EVERY DAY, Disp: 30 tablet, Rfl: 11    rosuvastatin (Crestor) 40 mg tablet, Take one (1) tablet by mouth once daily., Disp: 30 tablet, Rfl: 11    tacrolimus (Prograf) 1 mg capsule, Take 2 capsules (2 mg) by mouth 2 times a day., Disp: 120 capsule, Rfl: 11    tobramycin (Cody 300) 300 mg/5 mL nebulizer solution, Inhale 5 mL (300 mg) by nebulization 2 times a day., Disp: 840 mL, Rfl: 3    valGANciclovir (Valcyte) 450 mg tablet, TAKE TWO (2) TABLETS BY MOUTH ONCE DAILY, Disp: 180 tablet, Rfl: 3        ROS:      Constitutional: no chills, no fever, no night sweats, not feeling poorly and not feeling tired.   Eyes: no blurred vision, no eyesight problems, no dryness of the eyes and no eye pain.   ENT: hearing loss, but no nasal congestion, no nasal discharge, no hoarseness, no sore throat, no earache, the ears do not feel full, no tinnitus, no  nosebleeds, no postnasal drip, no rhinorrhea and no sinus pressure.   Neck: no mass(es) and no swelling.   Cardiovascular: no chest pain, no intermittent leg claudication, no lower extremity edema, no palpitations, no syncope, no tachycardia and no bradycardia.   Respiratory: no cough, no shortness of breath during exertion, no shortness of breath at rest, no wheezing, non dry cough, non productive cough and no hemoptysis.   Gastrointestinal: no abdominal pain, no bloating, no nausea and no vomiting.   Musculoskeletal: no arthralgias, no back pain, no myalgias, no history of falls, no localized joint pain, no joint redness, no joint stiffness, no joint swelling, no limb pain, no limb swelling and no neck pain.   Integumentary: no rashes.   Neurological: no convulsions, no difficulty walking, no diplopia, no dizziness, no dysarthria, no facial weakness, no headache, no limb weakness, no memory changes, no numbness, no speech difficulties, no tingling, no confusion and no syncope.   Psychiatric: no anxiety, no depression, no anhedonia, no substance use disorders, no personality change, no sleep disturbances and no suicidal ideations.   Endocrine: no changes in appetite, no feelings of weakness, no hair thinning or loss, no heat/cold intolerance, no polydipsia, no deepening of the voice, no polyuria, no muscle weakness and no proptosis.   Hematologic/Lymphatic: a tendency for easy bruising and a tendency for easy bleeding, but no swollen glands and no recurrent infections.   All other systems have been reviewed and are negative for complaint.          PE: VSS: 103/56, 84,12, 95% on RA, temp 36.3  Constitutional: Alert and in no acute distress. Well developed, well nourished.   Head and Face: Head and face: Normal.  (Moon facies from steroid, right facial droop unchanged).   Ears, Nose, Mouth, and Throat: External inspection of ears and nose: Normal.  Nasal mucosa, septum, and turbinates: Normal.  Lips, teeth, and gums:  Normal.    Neck: No neck mass was observed. Supple.   Pulmonary: Chest: Normal to inspection.  (No wound) Respiratory effort: No increased work of breathing or signs of respiratory distress.  Auscultation of lungs: Clear to auscultation bilaterally.    Cardiovascular: Heart rate and rhythm were normal, normal S1 and S2, no gallops, no murmurs and no pericardial rub. Pedal pulses: Normal. No peripheral edema.   Abdomen: Soft, nontender; no abdominal mass palpated. The abdomen was obese. Normal bowel sounds.   Lymphatic: No lymphadenopathy.   Musculoskeletal: No joint swelling seen, normal movements of all extremities. Digits and nails: Abnormal.  Examination of the extremities revealed fingernail clubbing.   Neurologic: cranial nerves 2-12 were intact.   Psychiatric: Judgment and insight: Intact. Alert and oriented to person, place and time. Mood and affect:    Labwork:  Na 141, potassium 4.3, Cr 0.96, Mag 1.65, LFT's stable  WBC 5.5, Hg 9.2, Hematocrit 31.6, platelets 99, ANC acceptable  Prograf 5.9, CMV PCR negative 5/28/24, EBV PCR negative on 12/21/23.  Vitamin D 47, IgG 713, HLA Ab negative 2/9/24  Lipid panel 2/9/24 cholesterol 106 TG 80, PSA 4.88        CXR: 6/3/24 reviewed, clear lung fields bilaterally, no acute infiltrates, elevated left hemidiaphragm (chronic).    Results:  6/3/24: Spirometry:  FVC: 2.48 64%  FEV1: 1.44 49%  25-75: 0.92 39%    4/3/24: Spirometry:  FVC:  2.41 59%  FEV1: 1.48 48%  25-75: 0.78 32%    1/10/24: Spirometry:  FVC: 2.45 60%  FEV1: 1.55 50%  25-75: 0.97 39%    11/10/23 Spirometry:  FVC: 2.33, 55%  FEV1: 1.47 46%  25-75: 0.95 39%    10/13/23 Spirometry:  FVC: 2.29 52%  FEV1: 1.35 42%  25-75: 0.88 36%    9/7/23 Spirometry:  FVC: 2.25, 53%  FEV1: 1.41, 44%  25-75: 0.82, 34%    8/2/23 Spirometry:  FVC: 2.48, 59%  FEV1: 1.52, 47%  25-75: 1.03, 42%    6/12/23 Spirometry:  FVC 2.65 62%  FEV1: 1.48, 46%  25-75: 0.89, 36%    5/2/23 Spirometry:  FVC: 2.58, 61%  FEV1: 1.51, 47%  25-75: 0.88,  36%    4/5/23 Spirometry:  FVC: 2.47 58%  FEV1: 1.50 46%  FEF 25-75: 31%    Assessment/Plan:  Mr. Patel is a 70 yo M with history of IPF/PAH s/p DLTx 3/22/20, GERD, hypertension, DM, hyperlipidemia who presents for follow-up visit for lung transplant. Overall doing well. Seen in ER 8/25/23 for chest pain with negative workup.    1. Pulmonary- s/p DLTx for IPF/PAH on 3/22/20, CMV mismatch, EBV positive recipient  Allograft function:  -Initial complications- minimal PGD ,pleural bleed with washout 3/23 and need for trach, discharged on 5/8/2020.   -Modest PE at same presentation as septic shock 8/2020 - lifelong anticoagulation given second episode. (Remote VTE 1993). Currently on Eliquis BID.  -TBBX in 4/20 with A2, treated with pulse Solumedrol, TBBX in 5 and 6/20 with minimal ACR, subsequent TBBX negative, last 2/23/21.  -History of MSSA, Klebsiella, Corynebacterium from 4/20 cultures, Enterobacter from 6/20 cultures, sensitive to fluoroquinolones.  -Bronchoscopy on 9/19/23, cultures with multiple strains of Pseudomonas, completed a 14 day course of Ciprofloxacin and started on inhaled VICENTE (will send trough with next labwork). TBBX with no ACR or OB.   -PFT's today stable FEV1 1.44(49%); FEF 25-75 0.92(39%),continue to follow serial exams, continue to work on weight loss and conditioning, lost 10 lbs, BMI improver to 32, trying to increase activity.  -CXR reviewed, clear lung fields bilaterally, continue to follow serial exams. HRCT on 9/18/23 with relatively clear lung fields bilaterally, multifocal expiratory air trapping, suggestive of CLAD.  -SARA- continue with nightly CPAP  -HLA Ab negative 2/9/24, no significant history of DSA post transplant, follow serially, every 3 months, repeat with next labwork  -Continue Azithromycin 250 mg qM,W,F for ROSSY prophylaxis along with Singulair 10 mg QHS. Also added Symbicort BID.    Immunosuppression:  -Prograf goal 6-8, continue 2 mg BID, level 5.9, stable. Continue to  follow closely, repeat monthly, next on 6/24/24.  -Cellcept 250 mg BID, if ongoing evidence of skin cancer, will need to hold. Already has evidence of thrombocytopenia, thus Everolimus likely not a viable option.  -Prednisone 5 mg daily    Prophylaxis:  -Dapsone for PJP prophylaxis (no known prior intolerance to Bactrim, has not taken other agents)  -CMV mismatch- continues on Valcyte 900 mg daily, CMV PCR's negative, last 5/28/24, no history of viremia.  -Previously on voriconazole for anti-fungal prophylaxis; no fungal infections, LFT's stable with elevated alk phos and normal MRI of liver; discontinued voriconazole 4/5/23 with improvement of alkaline phosphatase  -IgG level 713, adequate, continue to follow serial levels, goal greater than 600.    2. CV  -History of hypertension- Lopressor held due to hypotension, BP stable, ranging 120's sytolic. Advised to continue to follow BP closely and call us if greater than 130/80 or less than 100/60 consistently.   -Recently seen in ER 8/25/23 for chest pain, workup negative, including nuclear stress test. Overall improved, appears musculoskeletal in nature. TTE 9/26/23 with EF 65%, normal LV and RV function  -Hyperlipidemia- Lipid panel on 2/9/24 with acceptable Cholesterol and TG, started on Crestor 40 mg daily (for cardioprotective effect), will repeat with next labwork.    3. Renal  -Cr overall stable at 0.96, advised to maintain adequate po hydration.  -Magnesium 1.65, continue 400 mg daily.  -Renally dose all medications and avoid nephrotoxins  -History of nephrolithiasis in the past    4. GI  -Continue Protonix 40 mg daily before meals   -Persistently elevated AP- MRI lever 2/9/23 with normal appearance; improved after discontinuation of voriconazole, will follow.   -Will follow after start of Crestor as above, stable.    5. ID  -History of septic shock with MSSA 8/2020. Received prolonged course of IV Abx with resolution.  -Otitis external starting 9/8/2020 with  ENT involvement , multiple rounds ab and wick insertion, developed mastoiditis  -Sternal wound infection- 2 episodes of Staph s/p debridement, wound vac removed. Repeat episode 7/19-7/23/2021, no debridement, was discharged on Abx and completed a 8 week course.  -COVID 12/22, recovered and overall improved.  -Bronchoscopy on 9/19/23 cultures with multiple strains of Pseudomonas, completed a 14 day course of Ciprofloxacin and started on inhaled VICENTE, tolerating well.  -Afebrile, no signs of new infection currently.    6. Endo  -History of DM- BG levels well-controlled, on Jardiance per endocrinology  -History of osteoporosis- on Fosamax. Also on vitamin D, level acceptable at 47, repeat with next labwork.    7. Heme  -History of PE at same presentation as septic shock 8/2020 - lifelong anticoagulation given second episode. (Remote VTE 1993). On Eliquis, increased to 5 mg BID as off voriconazole. Also has IVF filter in place.   -H/H stable at 9.2/31.6 and no signs of bleeding currently.  -WBC intact, platelets stable at 99, chronic thrombocytopenia    8. ENT  -Otitis extena with facial nerve involvement. 10/17 Right tympanomastoidectomy, mastoid washout while inpatient. 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  -Chronic hearing loss with hearing aids.    8. Prophylaxis  -UTD on vaccines (Influenza, PNA, COVID, RSV). Following with Dermatology, scalp lesion consistent with folliculitis, healed well, new lesion on forehead, SCC, removed, lesion removed again recently, will fax biopsy results to office, may need to hold Cellcept as above.  Last colonoscopy 4/22. DEXA 6/10/22. PSA 4.88, scheduled to see Urology 7/24.    Continue to work on weight loss and increased activity, seen by Nutrition, lost 10 lbs since last visit.   RTC in 3 months with PFT's, CXR. Bronchoscopy only as clinically indicated.   Plan of care discussed at length with patient, all questions  answered, total of 45 minutes spent regarding counseling and coordination of care.

## 2024-06-03 NOTE — PROGRESS NOTES
Review of Systems   Constitutional: Positive for malaise/fatigue and weight loss. Negative for weight gain.   HENT:  Negative for congestion and sore throat.    Eyes:  Positive for blurred vision.   Cardiovascular: Negative.    Respiratory:  Positive for sputum production (Minimal clear/yellow). Negative for cough and shortness of breath.    Hematologic/Lymphatic: Bruises/bleeds easily.   Skin:  Negative for suspicious lesions (Derm appointment 6/4).   Musculoskeletal:  Positive for muscle cramps. Negative for falls and muscle weakness.   Gastrointestinal:  Negative for change in bowel habit, constipation, diarrhea, nausea and vomiting.   Genitourinary:  Negative for hematuria.   Neurological:  Positive for numbness, paresthesias and weakness. Negative for dizziness (occasional), headaches and tremors.       Patient seen in clinic today, patient seems to be doing overall, patient mentioned he has lost 10lbs, nutrition is consulted. Dermatology appointment tomorrow, 6/4/24. Patient has complaints of muscle cramping and states that his R neck will cramp and resolve. Patient to RTC on 9/9 with jelly and chest xray.

## 2024-06-06 LAB
MGC ASCENT PFT - FEV1 - PRE: 1.44
MGC ASCENT PFT - FEV1 - PREDICTED: 2.93
MGC ASCENT PFT - FVC - PRE: 2.48
MGC ASCENT PFT - FVC - PREDICTED: 3.87

## 2024-06-06 PROCEDURE — RXMED WILLOW AMBULATORY MEDICATION CHARGE

## 2024-06-10 ENCOUNTER — TELEPHONE (OUTPATIENT)
Dept: TRANSPLANT | Facility: HOSPITAL | Age: 69
End: 2024-06-10
Payer: MEDICARE

## 2024-06-10 DIAGNOSIS — Z94.2 LUNG TRANSPLANTED (MULTI): ICD-10-CM

## 2024-06-10 RX ORDER — DULOXETIN HYDROCHLORIDE 60 MG/1
60 CAPSULE, DELAYED RELEASE ORAL DAILY
Qty: 90 CAPSULE | Refills: 3 | Status: SHIPPED | OUTPATIENT
Start: 2024-06-10 | End: 2025-06-10

## 2024-06-10 NOTE — TELEPHONE ENCOUNTER
Returned patients phone call, patient stated that since stopping duloxetine, he having a hard time sleeping and having very weird vivid dreams. Per Dr. Boone, patient to restart Duloxetine, order sent to [facility].

## 2024-06-13 ENCOUNTER — PHARMACY VISIT (OUTPATIENT)
Dept: PHARMACY | Facility: CLINIC | Age: 69
End: 2024-06-13
Payer: COMMERCIAL

## 2024-06-19 PROCEDURE — RXMED WILLOW AMBULATORY MEDICATION CHARGE

## 2024-06-20 ENCOUNTER — PHARMACY VISIT (OUTPATIENT)
Dept: PHARMACY | Facility: CLINIC | Age: 69
End: 2024-06-20
Payer: COMMERCIAL

## 2024-06-25 ENCOUNTER — LAB (OUTPATIENT)
Dept: LAB | Facility: LAB | Age: 69
End: 2024-06-25
Payer: MEDICARE

## 2024-06-25 DIAGNOSIS — Z94.2 LUNG TRANSPLANTED (MULTI): ICD-10-CM

## 2024-06-25 DIAGNOSIS — I10 ESSENTIAL (PRIMARY) HYPERTENSION: ICD-10-CM

## 2024-06-25 DIAGNOSIS — M89.9 DISORDER OF BONE, UNSPECIFIED: ICD-10-CM

## 2024-06-25 LAB
25(OH)D3 SERPL-MCNC: 66 NG/ML (ref 30–100)
ALBUMIN SERPL BCP-MCNC: 3.8 G/DL (ref 3.4–5)
ALP SERPL-CCNC: 80 U/L (ref 33–136)
ALT SERPL W P-5'-P-CCNC: 21 U/L (ref 10–52)
ANION GAP SERPL CALC-SCNC: 10 MMOL/L (ref 10–20)
AST SERPL W P-5'-P-CCNC: 27 U/L (ref 9–39)
BASOPHILS # BLD AUTO: 0.05 X10*3/UL (ref 0–0.1)
BASOPHILS NFR BLD AUTO: 1 %
BILIRUB SERPL-MCNC: 0.8 MG/DL (ref 0–1.2)
BUN SERPL-MCNC: 27 MG/DL (ref 6–23)
CALCIUM SERPL-MCNC: 8.7 MG/DL (ref 8.6–10.3)
CHLORIDE SERPL-SCNC: 109 MMOL/L (ref 98–107)
CHOLEST SERPL-MCNC: 98 MG/DL (ref 0–199)
CHOLESTEROL/HDL RATIO: 1.9
CO2 SERPL-SCNC: 27 MMOL/L (ref 21–32)
CREAT SERPL-MCNC: 1.09 MG/DL (ref 0.5–1.3)
EGFRCR SERPLBLD CKD-EPI 2021: 73 ML/MIN/1.73M*2
EOSINOPHIL # BLD AUTO: 0.09 X10*3/UL (ref 0–0.4)
EOSINOPHIL NFR BLD AUTO: 1.7 %
ERYTHROCYTE [DISTWIDTH] IN BLOOD BY AUTOMATED COUNT: 16.9 % (ref 11.5–14.5)
GLUCOSE SERPL-MCNC: 93 MG/DL (ref 74–99)
HCT VFR BLD AUTO: 30.6 % (ref 41–52)
HDLC SERPL-MCNC: 51.8 MG/DL
HGB BLD-MCNC: 8.8 G/DL (ref 13.5–17.5)
IGG SERPL-MCNC: 757 MG/DL (ref 700–1600)
IMM GRANULOCYTES # BLD AUTO: 0.03 X10*3/UL (ref 0–0.5)
IMM GRANULOCYTES NFR BLD AUTO: 0.6 % (ref 0–0.9)
LDLC SERPL CALC-MCNC: 33 MG/DL
LYMPHOCYTES # BLD AUTO: 1.02 X10*3/UL (ref 0.8–3)
LYMPHOCYTES NFR BLD AUTO: 19.8 %
MAGNESIUM SERPL-MCNC: 1.69 MG/DL (ref 1.6–2.4)
MCH RBC QN AUTO: 26.7 PG (ref 26–34)
MCHC RBC AUTO-ENTMCNC: 28.8 G/DL (ref 32–36)
MCV RBC AUTO: 93 FL (ref 80–100)
MONOCYTES # BLD AUTO: 0.46 X10*3/UL (ref 0.05–0.8)
MONOCYTES NFR BLD AUTO: 8.9 %
NEUTROPHILS # BLD AUTO: 3.5 X10*3/UL (ref 1.6–5.5)
NEUTROPHILS NFR BLD AUTO: 68 %
NON HDL CHOLESTEROL: 46 MG/DL (ref 0–149)
NRBC BLD-RTO: 0 /100 WBCS (ref 0–0)
PLATELET # BLD AUTO: 106 X10*3/UL (ref 150–450)
POTASSIUM SERPL-SCNC: 4.4 MMOL/L (ref 3.5–5.3)
PROT SERPL-MCNC: 5.7 G/DL (ref 6.4–8.2)
RBC # BLD AUTO: 3.29 X10*6/UL (ref 4.5–5.9)
SODIUM SERPL-SCNC: 142 MMOL/L (ref 136–145)
TACROLIMUS BLD-MCNC: 8 NG/ML
TRIGL SERPL-MCNC: 66 MG/DL (ref 0–149)
VLDL: 13 MG/DL (ref 0–40)
WBC # BLD AUTO: 5.2 X10*3/UL (ref 4.4–11.3)

## 2024-06-25 PROCEDURE — 85025 COMPLETE CBC W/AUTO DIFF WBC: CPT

## 2024-06-25 PROCEDURE — 82784 ASSAY IGA/IGD/IGG/IGM EACH: CPT

## 2024-06-25 PROCEDURE — 86832 HLA CLASS I HIGH DEFIN QUAL: CPT

## 2024-06-25 PROCEDURE — 82306 VITAMIN D 25 HYDROXY: CPT

## 2024-06-25 PROCEDURE — 80061 LIPID PANEL: CPT

## 2024-06-25 PROCEDURE — 80197 ASSAY OF TACROLIMUS: CPT

## 2024-06-25 PROCEDURE — 36415 COLL VENOUS BLD VENIPUNCTURE: CPT

## 2024-06-25 PROCEDURE — 86833 HLA CLASS II HIGH DEFIN QUAL: CPT

## 2024-06-25 PROCEDURE — 83735 ASSAY OF MAGNESIUM: CPT

## 2024-06-25 PROCEDURE — 80053 COMPREHEN METABOLIC PANEL: CPT

## 2024-06-26 ENCOUNTER — TELEPHONE (OUTPATIENT)
Dept: TRANSPLANT | Facility: HOSPITAL | Age: 69
End: 2024-06-26
Payer: MEDICARE

## 2024-06-26 DIAGNOSIS — Z94.2 LUNG TRANSPLANTED (MULTI): ICD-10-CM

## 2024-06-26 DIAGNOSIS — D53.9 NUTRITIONAL ANEMIA, UNSPECIFIED: ICD-10-CM

## 2024-06-26 LAB
HLA RESULTS: NORMAL
HLA-A+B+C AB NFR SER: NORMAL %
HLA-DP+DQ+DR AB NFR SER: NORMAL %

## 2024-06-26 NOTE — TELEPHONE ENCOUNTER
Labs reviewed on 6/26/24  WBC 5.2 ANC 3.50  Hbg 8.8  Platelets 106  Creatinine 1.09  Magnesium 1.63  Prograf 8.0 Goal 6-8 Dose 2mg BID    -Changes made: None, added on iron studies  -Labs due next 7/23    Called and spoke with patient in regards to labs and when to repeat, patient verbalized understanding. Patient stated that he has been sleeping much better since he restarted his duloxetine. Patient has no concerns at this time.

## 2024-06-27 LAB
CMV DNA SERPL NAA+PROBE-LOG IU: NORMAL {LOG_IU}/ML
LABORATORY COMMENT REPORT: NOT DETECTED

## 2024-07-05 PROCEDURE — RXMED WILLOW AMBULATORY MEDICATION CHARGE

## 2024-07-09 ENCOUNTER — PHARMACY VISIT (OUTPATIENT)
Dept: PHARMACY | Facility: CLINIC | Age: 69
End: 2024-07-09
Payer: COMMERCIAL

## 2024-07-15 PROCEDURE — RXMED WILLOW AMBULATORY MEDICATION CHARGE

## 2024-07-15 NOTE — PROGRESS NOTES
"AUDIOLOGY ADULT AUDIOMETRIC EVALUATION      Name:  Stanford NGUYEN Caro \"Mal\"   :  1955  Age:  69 y.o.  Date of Evaluation:  2024    Time: 1833-9919    IMPRESSIONS     Right Ear: Moderately-severe mixed hearing loss 125-1000 Hz falling to profound at 4000 Hz. No response at the limits of the equipment at 1934-2883 Hz.  Left Ear: Mild sensorineural hearing loss 125-1000 Hz falling to moderately-severe to severe through 8000 Hz.  Word understanding in quiet is excellent bilaterally.  Tympanometry indicates negative middle ear pressure and normal eardrum mobility in the right ear, and middle ear pressure and eardrum compliance within normal limits in the left ear.     Amplification needs: Continue full-time use of devices, expect when activities preclude device safety.    RECOMMENDATIONS     Continue medical follow up with Ears Nose and Throat (ENT) provider as recommended.  Return for audiologic evaluation annually or sooner should concerns arise. The audiology department can be reached at [phone] to schedule an appointment.   Follow up with managing audiologist for hearing aid programming and cleaning. Patient was provided with a copy of today's audiogram.  Avoid exposure to loud sounds by moving away from the noise, turning down the volume, or wearing proper hearing protection correctly.    HISTORY     Reason for visit:  Mr. Patel is seen today for a follow-up audiologic evaluation in conjunction with an evaluation with Lupe Canales MD due to known hearing loss. Previous audio was performed on 22 and revealed moderate sloping to profound sensorineural hearing loss in the right ear, with a conductive component noted at 500 Hz, and an essentially mild sloping to severe sensorineural hearing loss in the left ear. 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 He comes in today wearing ANAYH style hearing aids with custom molds in " "both ears. These are managed at an outside facility. He reports he will occasionally experience a cramping sensation on the right side of his neck. No drainage reported.    History obtained from patient report and chart review.     EVALUATION     See scanned audiogram in \"Media\"     TEST RESULTS     Otoscopic Evaluation:  Right Ear: Dull tympanic membrane noted, clear canal.  Left Ear: Scant amount of non-occluding cerumen.    Tympanometry (226 Hz):  Right Ear: Type C, negative middle ear pressure (-154 daPa) and normal eardrum mobility.   Left Ear: Type A, middle ear pressure and tympanic membrane compliance within normal limits.     Acoustic Reflexes:   Right Ear: Responses absent 500-4000 Hz.  Left Ear: Responses present 500-4000 Hz.    Distortion Product Otoacoustic Emissions:  Right Ear: Did not test.  Left Ear:  Did not test.  Present OAEs suggest normal or near cochlear outer hair cell function for corresponding frequency region(s). Absent OAEs with normal middle ear function can be consistent with some degree of hearing loss. Assessment of cochlear outer hair cell function may be impacted by outer or middle ear function.    Test technique:  Pure Tone Audiometry via insert earphones  Reliability: Good    Pure Tone Audiometry:    Right Ear: Moderately-severe mixed hearing loss 125-1000 Hz falling to profound at 4000 Hz. No response at the limits of the equipment at 5969-6574 Hz.  Left Ear: Mild sensorineural hearing loss 125-1000 Hz falling to moderately-severe to severe through 8000 Hz.    Speech Audiometry:   Right Ear:  Speech Reception Threshold (SRT) was obtained at 55 dB HL. Word Recognition scores were excellent (92%) in quiet when words were presented at 95 dB HL. These results are based on [facility] Auditory Test No.6 (NU-6) (N=25).   Left Ear:  Speech Reception Threshold (SRT) was obtained at 40 dB HL. Word Recognition scores were excellent (92%) in quiet when words were presented at 80 " dB HL. These results are based on [facility] Auditory Test No.6 (NU-6) (N=25).     Comparison of today's results with previous test results: Progressive at 250-500 Hz in the left ear and at 250 Hz, 3951-0729 Hz in the the right ear since last evaluation on 8/23/22.         PATIENT EDUCATION     Discussed results and recommendations with Mr. Patel. Questions were addressed and the patient was encouraged to contact our department at [phone] should concerns arise.       Kaity Sims, CCC-A  Clinical Audiologist    Degree of   Hearing Sensitivity dB Range   Within Normal Limits (WNL) 0 - 20   Slight 25   Mild 26 - 40   Moderate 41 - 55   Moderately-Severe 56 - 70   Severe 71 - 90   Profound 91 +     Key   CHL Conductive Hearing Loss   ECV Ear Canal Volume   HA Hearing Aid   NIHL Noise-Induced Hearing Loss   PTA Pure Tone Average   SNHL Sensorineural Hearing Loss   TM Tympanic Membrane   WNL Within Normal Limits

## 2024-07-16 ENCOUNTER — PHARMACY VISIT (OUTPATIENT)
Dept: PHARMACY | Facility: CLINIC | Age: 69
End: 2024-07-16
Payer: COMMERCIAL

## 2024-07-17 ENCOUNTER — APPOINTMENT (OUTPATIENT)
Dept: OTOLARYNGOLOGY | Facility: CLINIC | Age: 69
End: 2024-07-17
Payer: MEDICARE

## 2024-07-17 ENCOUNTER — CLINICAL SUPPORT (OUTPATIENT)
Dept: AUDIOLOGY | Facility: HOSPITAL | Age: 69
End: 2024-07-17
Payer: MEDICARE

## 2024-07-17 VITALS — BODY MASS INDEX: 31.1 KG/M2 | WEIGHT: 210 LBS | HEIGHT: 69 IN

## 2024-07-17 DIAGNOSIS — H90.A31 MIXED CONDUCTIVE AND SENSORINEURAL HEARING LOSS OF RIGHT EAR WITH RESTRICTED HEARING OF LEFT EAR: Primary | ICD-10-CM

## 2024-07-17 DIAGNOSIS — H90.A22 SENSORINEURAL HEARING LOSS (SNHL) OF LEFT EAR WITH RESTRICTED HEARING OF RIGHT EAR: ICD-10-CM

## 2024-07-17 DIAGNOSIS — H90.A22 SENSORINEURAL HEARING LOSS (SNHL) OF LEFT EAR WITH RESTRICTED HEARING OF RIGHT EAR: Primary | ICD-10-CM

## 2024-07-17 DIAGNOSIS — Z87.39 PERSONAL HISTORY OF OSTEOMYELITIS: ICD-10-CM

## 2024-07-17 DIAGNOSIS — G51.0 UNILATERAL FACIAL PARESIS: ICD-10-CM

## 2024-07-17 PROCEDURE — 1159F MED LIST DOCD IN RCRD: CPT | Performed by: OTOLARYNGOLOGY

## 2024-07-17 PROCEDURE — 92557 COMPREHENSIVE HEARING TEST: CPT | Performed by: AUDIOLOGIST

## 2024-07-17 PROCEDURE — 92550 TYMPANOMETRY & REFLEX THRESH: CPT | Performed by: AUDIOLOGIST

## 2024-07-17 PROCEDURE — 3008F BODY MASS INDEX DOCD: CPT | Performed by: OTOLARYNGOLOGY

## 2024-07-17 PROCEDURE — 99214 OFFICE O/P EST MOD 30 MIN: CPT | Performed by: OTOLARYNGOLOGY

## 2024-07-17 ASSESSMENT — PATIENT HEALTH QUESTIONNAIRE - PHQ9
1. LITTLE INTEREST OR PLEASURE IN DOING THINGS: NOT AT ALL
2. FEELING DOWN, DEPRESSED OR HOPELESS: NOT AT ALL
SUM OF ALL RESPONSES TO PHQ9 QUESTIONS 1 AND 2: 0

## 2024-07-17 ASSESSMENT — ENCOUNTER SYMPTOMS: DEPRESSION: 0

## 2024-07-17 NOTE — PROGRESS NOTES
"History Of Present Illness:  Stanford NGUYEN Caro \"George" is a 69 y.o. 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 Today, he is inquiring about being a possible cochlear implant candidate as he has noticed that his hearing, particularly in the left ear has declined since I saw him last. He is wearing hearing aids but they are about 10 years old.     Recall 8/23/22:  Mr. 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 He still has some facial nerve deficits, but it's improved overall and continues to improve to this day. He had decreased hearing on the right with yellow otorrhea. He had a hearing test about 6 months ago before getting his new hearing aids, and his hearing has gotten noticeably worse since then.        Surgical History:  He has a past surgical history that includes Tonsillectomy (08/09/2018); Vasectomy (08/09/2018); Other surgical history (06/03/2020); MR angio head wo IV contrast (10/15/2020); and MR angio neck wo IV contrast (10/15/2020).     Allergies:  Adhesive    Medications:   Current Outpatient Medications   Medication Instructions    alendronate (Fosamax) 70 mg tablet TAKE ONE (1) TABLET BY MOUTH ONCE A WEEK, AS DIRECTED    apixaban (ELIQUIS) 5 mg, oral, Every 12 hours    azithromycin (ZITHROMAX) 250 mg, oral, Every Mon/Wed/Fri    blood sugar diagnostic (OneTouch Verio test strips) strip 1 each, intradermal, 3 times daily before meals    budesonide-formoteroL (Symbicort) 160-4.5 mcg/actuation inhaler Inhale 2 puffs by mouth 2 times a day. Rinse mouth with water after use to reduce aftertaste and incidence of candidiasis. Do not swallow.    cholecalciferol (Vitamin D-3) 25 MCG (1000 UT) capsule 1 capsule, " oral, Daily    dapsone 100 mg tablet TAKE ONE (1) TABLET BY MOUTH ONCE DAILY.    DULoxetine (CYMBALTA) 60 mg, oral, Daily, Do not crush or chew.    levalbuterol (Xopenex) 0.63 mg/3 mL nebulizer solution Inhale one (1) ampule via nebulizer twice daily.    loratadine (Claritin) 10 mg tablet TAKE ONE (1) TABLET BY MOUTH ONCE DAILY.    magnesium oxide (Mag-Ox) 400 mg (241.3 mg magnesium) tablet Take 3 tablets (1,200 mg) by mouth once daily at 1 PM    montelukast (Singulair) 10 mg tablet TAKE ONE (1) TABLET BY MOUTH ONCE DAILY AT BEDTIME.    multivitamin with minerals iron-free (Centrum Silver) 1 tablet, oral, Daily    mycophenolate (Cellcept) 250 mg capsule TAKE ONE (1) CAPSULE BY MOUTH 2 TIMES DAILY.    pantoprazole (ProtoNix) 40 mg EC tablet TAKE ONE (1) TABLET BY MOUTH ONCE DAILY.    predniSONE (Deltasone) 5 mg tablet TAKE ONE (1) TABLET BY MOUTH EVERY DAY    rosuvastatin (Crestor) 40 mg tablet Take one (1) tablet by mouth once daily.    tacrolimus (PROGRAF) 2 mg, oral, 2 times daily    tobramycin (Cody 300) 300 mg/5 mL nebulizer solution Inhale 5 mL (300 mg) by nebulization 2 times a day.    valGANciclovir (Valcyte) 450 mg tablet TAKE TWO (2) TABLETS BY MOUTH ONCE DAILY      Review of Systems:   A comprehensive 10-point review of systems was obtained including constitutional, neurological, HEENT, pulmonary, cardiovascular, genito-urinary, and other pertinent systems and was negative except as noted in the HPI.      Physical Exam:  Constitutional   General appearance: Healthy-appearing, well-nourished, well groomed, in no acute distress.   Ability to communicate: Normal communication without aids, normal voice quality.       Head and face: Atraumatic with no masses, lesions, or scarring.   Facial strength: Right FN 2/6, Left normal    Ears  Otoscopic examination: right lateralized/blunted medial EAC without normal landmarks.   Left TM normal without effusion or retraction.    Nose: Dorsum symmetric with no visible or  palpable deformities.     Oral Cavity/Mouth  Lips, teeth, and gums: Normal lips, gums, and dentition.     Oropharynx: Mucosa moist, no lesions.     Neck: Symmetrical, trachea midline. No masses visible.     Neurological/Psychiatric  Cranial Nerve Examination: II - XII grossly intact.  Orientation to person, place, and time: Normal.  Mood and affect: Normal.     Skin: Normal without rashes or lesions.     Pulmonary  Respiratory effort: Chest expands symmetrically.     Cardiovascular: Good peripheral pulses  Peripheral vascular system: No varicosities, carotid pulse normal, no edema. No jugular venous distension.     Extremities: Appearance of extremities: Normal. Gait normal.     Last Recorded Vitals:  There were no vitals taken for this visit.    Relevant Results:  I personally reviewed the audiogram from today which demonstrated right side moderate to profound mixed hearing loss, 92% WRS bilaterally, type C tympanogram. Left ear has a mild to severe sensorineural hearing loss.       Assessment/Plan   69 y.o. 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 He inquired about being a possible cochlear implant candidate.  He is not a candidate for CI due to high WRS. He will follow up with audiology to adjust his hearing aids, as these have not been adjusted for over 2 years. I recommended repeat audiogram in 1 year.        Scribe Attestation  By signing my name below, IJace, Scribe   attest that this documentation has been prepared under the direction and in the presence of Lupe Canales MD.    I have reviewed the documentation as scribed by Jace Demarco and agree with the notes.   Lupe Canales MD

## 2024-07-17 NOTE — LETTER
"July 20, 2024     Sampson Tapia MD  [address], [address]  [place] [address]    Patient: Stanford NGUYEN Caro \"Mal\"   YOB: 1955   Date of Visit: 7/17/2024       Dear Dr. Sampson Tapia MD:    I had the pleasure of seeing your patient, Mal Patel today. Below are my notes for this consultation.  If you have questions, please do not hesitate to call me. Thank you for allowing me to participate in the care of your patient.        Sincerely,     Lupe Canales MD      CC: No Recipients  _____________________________________________________________________________    69 y.o. 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 He inquired about being a possible cochlear implant candidate.  He is not a candidate for CI due to high WRS. He will follow up with audiology to adjust his hearing aids, as these have not been adjusted for over 2 years. I recommended repeat audiogram in 1 year.          "

## 2024-07-20 PROBLEM — Z87.39 PERSONAL HISTORY OF OSTEOMYELITIS: Status: ACTIVE | Noted: 2024-07-20

## 2024-07-24 ENCOUNTER — LAB (OUTPATIENT)
Dept: LAB | Facility: LAB | Age: 69
End: 2024-07-24
Payer: MEDICARE

## 2024-07-24 DIAGNOSIS — D53.9 NUTRITIONAL ANEMIA, UNSPECIFIED: ICD-10-CM

## 2024-07-24 DIAGNOSIS — Z94.2 LUNG TRANSPLANTED (MULTI): ICD-10-CM

## 2024-07-24 LAB
ALBUMIN SERPL BCP-MCNC: 3.7 G/DL (ref 3.4–5)
ALP SERPL-CCNC: 71 U/L (ref 33–136)
ALT SERPL W P-5'-P-CCNC: 17 U/L (ref 10–52)
ANION GAP SERPL CALC-SCNC: 10 MMOL/L (ref 10–20)
AST SERPL W P-5'-P-CCNC: 21 U/L (ref 9–39)
BASOPHILS # BLD AUTO: 0.02 X10*3/UL (ref 0–0.1)
BASOPHILS NFR BLD AUTO: 0.4 %
BILIRUB SERPL-MCNC: 0.8 MG/DL (ref 0–1.2)
BUN SERPL-MCNC: 18 MG/DL (ref 6–23)
CALCIUM SERPL-MCNC: 8.2 MG/DL (ref 8.6–10.3)
CHLORIDE SERPL-SCNC: 108 MMOL/L (ref 98–107)
CO2 SERPL-SCNC: 28 MMOL/L (ref 21–32)
CREAT SERPL-MCNC: 0.93 MG/DL (ref 0.5–1.3)
DACRYOCYTES BLD QL SMEAR: NORMAL
EGFRCR SERPLBLD CKD-EPI 2021: 88 ML/MIN/1.73M*2
EOSINOPHIL # BLD AUTO: 0.08 X10*3/UL (ref 0–0.4)
EOSINOPHIL NFR BLD AUTO: 1.5 %
ERYTHROCYTE [DISTWIDTH] IN BLOOD BY AUTOMATED COUNT: 17.7 % (ref 11.5–14.5)
GLUCOSE SERPL-MCNC: 75 MG/DL (ref 74–99)
HCT VFR BLD AUTO: 29.5 % (ref 41–52)
HGB BLD-MCNC: 8.6 G/DL (ref 13.5–17.5)
HYPOCHROMIA BLD QL SMEAR: NORMAL
IGG SERPL-MCNC: 690 MG/DL (ref 700–1600)
IMM GRANULOCYTES # BLD AUTO: 0.02 X10*3/UL (ref 0–0.5)
IMM GRANULOCYTES NFR BLD AUTO: 0.4 % (ref 0–0.9)
IRON SATN MFR SERPL: 11 % (ref 25–45)
IRON SERPL-MCNC: 41 UG/DL (ref 35–150)
LYMPHOCYTES # BLD AUTO: 0.65 X10*3/UL (ref 0.8–3)
LYMPHOCYTES NFR BLD AUTO: 11.8 %
MAGNESIUM SERPL-MCNC: 1.48 MG/DL (ref 1.6–2.4)
MCH RBC QN AUTO: 27.1 PG (ref 26–34)
MCHC RBC AUTO-ENTMCNC: 29.2 G/DL (ref 32–36)
MCV RBC AUTO: 93 FL (ref 80–100)
MONOCYTES # BLD AUTO: 0.38 X10*3/UL (ref 0.05–0.8)
MONOCYTES NFR BLD AUTO: 6.9 %
NEUTROPHILS # BLD AUTO: 4.35 X10*3/UL (ref 1.6–5.5)
NEUTROPHILS NFR BLD AUTO: 79 %
NRBC BLD-RTO: 0 /100 WBCS (ref 0–0)
OVALOCYTES BLD QL SMEAR: NORMAL
PLATELET # BLD AUTO: 84 X10*3/UL (ref 150–450)
POTASSIUM SERPL-SCNC: 4.2 MMOL/L (ref 3.5–5.3)
PROT SERPL-MCNC: 5.7 G/DL (ref 6.4–8.2)
RBC # BLD AUTO: 3.17 X10*6/UL (ref 4.5–5.9)
RBC MORPH BLD: NORMAL
SODIUM SERPL-SCNC: 142 MMOL/L (ref 136–145)
TACROLIMUS BLD-MCNC: 5.5 NG/ML
TIBC SERPL-MCNC: 382 UG/DL (ref 240–445)
UIBC SERPL-MCNC: 341 UG/DL (ref 110–370)
WBC # BLD AUTO: 5.5 X10*3/UL (ref 4.4–11.3)

## 2024-07-24 PROCEDURE — 80053 COMPREHEN METABOLIC PANEL: CPT

## 2024-07-24 PROCEDURE — 80197 ASSAY OF TACROLIMUS: CPT

## 2024-07-24 PROCEDURE — 87799 DETECT AGENT NOS DNA QUANT: CPT

## 2024-07-24 PROCEDURE — 83735 ASSAY OF MAGNESIUM: CPT

## 2024-07-24 PROCEDURE — 85025 COMPLETE CBC W/AUTO DIFF WBC: CPT

## 2024-07-24 PROCEDURE — 83540 ASSAY OF IRON: CPT

## 2024-07-24 PROCEDURE — 82784 ASSAY IGA/IGD/IGG/IGM EACH: CPT

## 2024-07-24 PROCEDURE — 83550 IRON BINDING TEST: CPT

## 2024-07-24 PROCEDURE — 36415 COLL VENOUS BLD VENIPUNCTURE: CPT

## 2024-07-25 ENCOUNTER — TELEPHONE (OUTPATIENT)
Dept: TRANSPLANT | Facility: HOSPITAL | Age: 69
End: 2024-07-25
Payer: MEDICARE

## 2024-07-25 DIAGNOSIS — Z94.2 LUNG TRANSPLANTED (MULTI): ICD-10-CM

## 2024-07-25 NOTE — TELEPHONE ENCOUNTER
----- Message from Pamela Boone sent at 7/24/2024  7:50 PM EDT -----  Regarding: FW:  Last level 8 and no changes… would keep the same and repeat 8/12. Thanks!  ----- Message -----  From: Lab, Background User  Sent: 7/24/2024   2:12 PM EDT  To: Pamela Boone MD

## 2024-07-29 ENCOUNTER — APPOINTMENT (OUTPATIENT)
Dept: UROLOGY | Facility: CLINIC | Age: 69
End: 2024-07-29
Payer: MEDICARE

## 2024-07-29 VITALS — DIASTOLIC BLOOD PRESSURE: 70 MMHG | HEART RATE: 78 BPM | TEMPERATURE: 98.4 F | SYSTOLIC BLOOD PRESSURE: 145 MMHG

## 2024-07-29 DIAGNOSIS — R32 URINARY INCONTINENCE, UNSPECIFIED TYPE: Primary | ICD-10-CM

## 2024-07-29 DIAGNOSIS — N52.03 COMBINED ARTERIAL INSUFFICIENCY AND CORPORO-VENOUS OCCLUSIVE ERECTILE DYSFUNCTION: ICD-10-CM

## 2024-07-29 DIAGNOSIS — R97.20 ELEVATED PSA: ICD-10-CM

## 2024-07-29 DIAGNOSIS — Z94.2 LUNG TRANSPLANTED (MULTI): ICD-10-CM

## 2024-07-29 LAB
POC APPEARANCE, URINE: CLEAR
POC BILIRUBIN, URINE: NEGATIVE
POC BLOOD, URINE: NEGATIVE
POC COLOR, URINE: YELLOW
POC GLUCOSE, URINE: NEGATIVE MG/DL
POC KETONES, URINE: NEGATIVE MG/DL
POC LEUKOCYTES, URINE: ABNORMAL
POC NITRITE,URINE: NEGATIVE
POC PH, URINE: 6 PH
POC PROTEIN, URINE: NEGATIVE MG/DL
POC SPECIFIC GRAVITY, URINE: 1.01
POC UROBILINOGEN, URINE: 0.2 EU/DL

## 2024-07-29 PROCEDURE — G2211 COMPLEX E/M VISIT ADD ON: HCPCS | Performed by: UROLOGY

## 2024-07-29 PROCEDURE — 1160F RVW MEDS BY RX/DR IN RCRD: CPT | Performed by: UROLOGY

## 2024-07-29 PROCEDURE — 99204 OFFICE O/P NEW MOD 45 MIN: CPT | Performed by: UROLOGY

## 2024-07-29 PROCEDURE — 81003 URINALYSIS AUTO W/O SCOPE: CPT | Performed by: UROLOGY

## 2024-07-29 PROCEDURE — 3077F SYST BP >= 140 MM HG: CPT | Performed by: UROLOGY

## 2024-07-29 PROCEDURE — 1159F MED LIST DOCD IN RCRD: CPT | Performed by: UROLOGY

## 2024-07-29 PROCEDURE — 1036F TOBACCO NON-USER: CPT | Performed by: UROLOGY

## 2024-07-29 PROCEDURE — 3078F DIAST BP <80 MM HG: CPT | Performed by: UROLOGY

## 2024-07-29 RX ORDER — TROSPIUM CHLORIDE 20 MG/1
20 TABLET, FILM COATED ORAL 2 TIMES DAILY
Qty: 180 TABLET | Refills: 3 | Status: SHIPPED | OUTPATIENT
Start: 2024-07-29 | End: 2025-07-29

## 2024-07-29 NOTE — PROGRESS NOTES
"History Of Present Illness  69-year-old male seeing me regarding multiple urologic complaints  PMH: Idiopathic pulmonary fibrosis and pulmonary hypertension s/p  donor lung transplant , DVT/PE on anticoagulation, hypertension, hyperlipidemia, type 2 diabetes  On room air  FH: Father, 2-3 brothers (none metastatic or death); No BCa  UA trace leuk esterase  Creatinine 0.93  PSA 4.88    Severe LUTS, most bothersome is frequeny urination. Symptoms for >1 yr. No prior treatment.   Endorses: frequency, urgency, urge incontinence \"10% of the time\",  Denies: hesitancy, weak stream, intermittency, straining, hematuria  NTF 1-2x  IPSS 21  PVR 0cc  No prior treatment.     Also ED, rare. Rare nocturnal erection. Firm enough for penetration rarely, with difficulty maintaining.   No prior treatment.     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), Obstructive sleep apnea (adult) (pediatric) (2018), Other long term (current) drug therapy (2021), Other specified disorders of nose and nasal sinuses (2021), Personal history of other diseases of the musculoskeletal system and connective tissue (2018), Personal history of other diseases of the respiratory system (2018), Personal history of other endocrine, nutritional and metabolic disease (2018), Personal history of other endocrine, nutritional and metabolic disease (2019), Personal history of other venous thrombosis and embolism (2018), Personal history of urinary calculi (2018), Personal history of urinary calculi (2018), and Personal history of urinary calculi (2019).    Surgical History  He has a past surgical history " that includes Tonsillectomy (08/09/2018); Vasectomy (08/09/2018); Other surgical history (06/03/2020); MR angio head wo IV contrast (10/15/2020); and MR angio neck wo IV contrast (10/15/2020).     Social History  He reports that he has never smoked. He has never used smokeless tobacco. He reports that he does not currently use alcohol. He reports that he does not use drugs.    Family History  No family history on file.     Allergies  Adhesive    ROS: 12 system review was completed and is negative with the exception of those signs and symptoms noted in the history of present illness: A 12 system review was completed and is negative with the exception of those signs and symptoms noted in the history of present illness.     Exam:  General: in NAD, appears stated age  Head: normocephalic, atraumatic  Respiratory: normal effort, no use of accessory muscles  Cardiovascular: no edema noted  Skin: normal turgor, no rashes  Neurologic: grossly intact, oriented to person/place/time  Psychiatric: mode and affect appropriate  Digital rectal exam-modest enlargement 30 to 40 g, no nodules or induration     Last Recorded Vitals  Blood pressure 145/70, pulse 78, temperature 36.9 °C (98.4 °F), temperature source Temporal.    Lab Results   Component Value Date    PSASCREEN 1.89 02/28/2022    CREATININE 0.93 07/24/2024    HGB 8.6 (L) 07/24/2024         ASSESSMENT/PLAN:  # Elevated PSA  -Benign exam today  -MRI prostate, discussed the possibility of prostate biopsy which would need to be done at [facility] [place] due to prior lung transplant    # Lower urinary tract symptoms-urge predominant  -PVR low  -UA unremarkable  -Workup suggests overactive bladder  -Start trospium 20 mg twice daily, discussed side effects  -If he does not tolerate side effects, we can switch to Myrbetriq    # Erectile dysfunction  -Discussed management, recommended PDE 5 inhibitors  -I reviewed sildenafil and tadalafil with him as well as side  effects  -Ultimately patient opted to forego, not interested in treatment at this time    Follow-up in 4 months to discuss results    Rosas Soni MD

## 2024-08-01 PROCEDURE — RXMED WILLOW AMBULATORY MEDICATION CHARGE

## 2024-08-03 ENCOUNTER — PHARMACY VISIT (OUTPATIENT)
Dept: PHARMACY | Facility: CLINIC | Age: 69
End: 2024-08-03
Payer: COMMERCIAL

## 2024-08-12 PROCEDURE — RXMED WILLOW AMBULATORY MEDICATION CHARGE

## 2024-08-13 ENCOUNTER — PHARMACY VISIT (OUTPATIENT)
Dept: PHARMACY | Facility: CLINIC | Age: 69
End: 2024-08-13
Payer: COMMERCIAL

## 2024-08-15 ENCOUNTER — HOSPITAL ENCOUNTER (OUTPATIENT)
Dept: RADIOLOGY | Facility: HOSPITAL | Age: 69
End: 2024-08-15
Payer: MEDICARE

## 2024-08-16 ENCOUNTER — LAB (OUTPATIENT)
Dept: LAB | Facility: LAB | Age: 69
End: 2024-08-16
Payer: MEDICARE

## 2024-08-16 DIAGNOSIS — Z94.2 LUNG TRANSPLANTED (MULTI): ICD-10-CM

## 2024-08-16 LAB
ALBUMIN SERPL BCP-MCNC: 3.6 G/DL (ref 3.4–5)
ALP SERPL-CCNC: 79 U/L (ref 33–136)
ALT SERPL W P-5'-P-CCNC: 19 U/L (ref 10–52)
ANION GAP SERPL CALC-SCNC: 8 MMOL/L (ref 10–20)
AST SERPL W P-5'-P-CCNC: 22 U/L (ref 9–39)
BASOPHILS # BLD AUTO: 0.04 X10*3/UL (ref 0–0.1)
BASOPHILS NFR BLD AUTO: 0.7 %
BILIRUB SERPL-MCNC: 0.7 MG/DL (ref 0–1.2)
BUN SERPL-MCNC: 20 MG/DL (ref 6–23)
CALCIUM SERPL-MCNC: 8.6 MG/DL (ref 8.6–10.3)
CHLORIDE SERPL-SCNC: 106 MMOL/L (ref 98–107)
CO2 SERPL-SCNC: 31 MMOL/L (ref 21–32)
CREAT SERPL-MCNC: 1.02 MG/DL (ref 0.5–1.3)
EGFRCR SERPLBLD CKD-EPI 2021: 79 ML/MIN/1.73M*2
EOSINOPHIL # BLD AUTO: 0.09 X10*3/UL (ref 0–0.4)
EOSINOPHIL NFR BLD AUTO: 1.5 %
ERYTHROCYTE [DISTWIDTH] IN BLOOD BY AUTOMATED COUNT: 17.9 % (ref 11.5–14.5)
GLUCOSE SERPL-MCNC: 84 MG/DL (ref 74–99)
HCT VFR BLD AUTO: 29.5 % (ref 41–52)
HGB BLD-MCNC: 8.4 G/DL (ref 13.5–17.5)
IGG SERPL-MCNC: 754 MG/DL (ref 700–1600)
IMM GRANULOCYTES # BLD AUTO: 0.06 X10*3/UL (ref 0–0.5)
IMM GRANULOCYTES NFR BLD AUTO: 1 % (ref 0–0.9)
LYMPHOCYTES # BLD AUTO: 0.86 X10*3/UL (ref 0.8–3)
LYMPHOCYTES NFR BLD AUTO: 14.8 %
MAGNESIUM SERPL-MCNC: 1.67 MG/DL (ref 1.6–2.4)
MCH RBC QN AUTO: 26.4 PG (ref 26–34)
MCHC RBC AUTO-ENTMCNC: 28.5 G/DL (ref 32–36)
MCV RBC AUTO: 93 FL (ref 80–100)
MONOCYTES # BLD AUTO: 0.42 X10*3/UL (ref 0.05–0.8)
MONOCYTES NFR BLD AUTO: 7.2 %
NEUTROPHILS # BLD AUTO: 4.35 X10*3/UL (ref 1.6–5.5)
NEUTROPHILS NFR BLD AUTO: 74.8 %
NRBC BLD-RTO: 0 /100 WBCS (ref 0–0)
PLATELET # BLD AUTO: 114 X10*3/UL (ref 150–450)
POTASSIUM SERPL-SCNC: 4.3 MMOL/L (ref 3.5–5.3)
PROT SERPL-MCNC: 5.8 G/DL (ref 6.4–8.2)
RBC # BLD AUTO: 3.18 X10*6/UL (ref 4.5–5.9)
SODIUM SERPL-SCNC: 141 MMOL/L (ref 136–145)
TACROLIMUS BLD-MCNC: 6.1 NG/ML
WBC # BLD AUTO: 5.8 X10*3/UL (ref 4.4–11.3)

## 2024-08-16 PROCEDURE — 36415 COLL VENOUS BLD VENIPUNCTURE: CPT

## 2024-08-16 PROCEDURE — 82784 ASSAY IGA/IGD/IGG/IGM EACH: CPT

## 2024-08-16 PROCEDURE — 80053 COMPREHEN METABOLIC PANEL: CPT

## 2024-08-16 PROCEDURE — 85025 COMPLETE CBC W/AUTO DIFF WBC: CPT

## 2024-08-16 PROCEDURE — 83735 ASSAY OF MAGNESIUM: CPT

## 2024-08-16 PROCEDURE — 80197 ASSAY OF TACROLIMUS: CPT

## 2024-08-18 LAB
CMV DNA SERPL NAA+PROBE-LOG IU: NORMAL {LOG_IU}/ML
LABORATORY COMMENT REPORT: NOT DETECTED

## 2024-08-19 ENCOUNTER — TELEPHONE (OUTPATIENT)
Dept: TRANSPLANT | Facility: HOSPITAL | Age: 69
End: 2024-08-19
Payer: MEDICARE

## 2024-08-19 DIAGNOSIS — M89.9 DISORDER OF BONE, UNSPECIFIED: ICD-10-CM

## 2024-08-19 DIAGNOSIS — I10 ESSENTIAL (PRIMARY) HYPERTENSION: ICD-10-CM

## 2024-08-19 DIAGNOSIS — Z94.2 LUNG TRANSPLANTED (MULTI): ICD-10-CM

## 2024-08-19 NOTE — TELEPHONE ENCOUNTER
Labs reviewed on 8/19/24  WBC 5.8 ANC 4.35  Hbg 8.4  Platelets 114  Creatinine 1.02  Magnesium 1.67  Prograf 6.1 Goal 6-8 Dose 2mg BID    -Changes made: None  -Labs due next 9/3    Attempted to call patient in regards to labs and starting aranesp, patient did not answer, voicemail left and requested a phone call back.

## 2024-08-22 ENCOUNTER — TELEPHONE (OUTPATIENT)
Dept: TRANSPLANT | Facility: HOSPITAL | Age: 69
End: 2024-08-22
Payer: MEDICARE

## 2024-08-28 ENCOUNTER — SPECIALTY PHARMACY (OUTPATIENT)
Dept: PHARMACY | Facility: CLINIC | Age: 69
End: 2024-08-28

## 2024-08-28 PROCEDURE — RXMED WILLOW AMBULATORY MEDICATION CHARGE

## 2024-08-29 ENCOUNTER — PHARMACY VISIT (OUTPATIENT)
Dept: PHARMACY | Facility: CLINIC | Age: 69
End: 2024-08-29
Payer: COMMERCIAL

## 2024-09-03 ENCOUNTER — TELEPHONE (OUTPATIENT)
Dept: TRANSPLANT | Facility: HOSPITAL | Age: 69
End: 2024-09-03
Payer: MEDICARE

## 2024-09-03 ENCOUNTER — APPOINTMENT (OUTPATIENT)
Dept: TRANSPLANT | Facility: HOSPITAL | Age: 69
End: 2024-09-03
Payer: MEDICARE

## 2024-09-03 NOTE — TELEPHONE ENCOUNTER
Called and spoke to patient. Reminded him to get labs drawn this week. He verbalized understanding. Patient reports no other questions or concerns at this time.

## 2024-09-04 ENCOUNTER — LAB (OUTPATIENT)
Dept: LAB | Facility: LAB | Age: 69
End: 2024-09-04
Payer: MEDICARE

## 2024-09-04 DIAGNOSIS — M89.9 DISORDER OF BONE, UNSPECIFIED: ICD-10-CM

## 2024-09-04 DIAGNOSIS — I10 ESSENTIAL (PRIMARY) HYPERTENSION: ICD-10-CM

## 2024-09-04 DIAGNOSIS — Z94.2 LUNG TRANSPLANTED (MULTI): ICD-10-CM

## 2024-09-04 LAB
25(OH)D3 SERPL-MCNC: 61 NG/ML (ref 30–100)
ALBUMIN SERPL BCP-MCNC: 3.8 G/DL (ref 3.4–5)
ALP SERPL-CCNC: 82 U/L (ref 33–136)
ALT SERPL W P-5'-P-CCNC: 22 U/L (ref 10–52)
ANION GAP SERPL CALC-SCNC: 8 MMOL/L (ref 10–20)
AST SERPL W P-5'-P-CCNC: 26 U/L (ref 9–39)
BASOPHILS # BLD AUTO: 0.03 X10*3/UL (ref 0–0.1)
BASOPHILS NFR BLD AUTO: 0.6 %
BILIRUB SERPL-MCNC: 0.7 MG/DL (ref 0–1.2)
BUN SERPL-MCNC: 17 MG/DL (ref 6–23)
CALCIUM SERPL-MCNC: 7.9 MG/DL (ref 8.6–10.3)
CHLORIDE SERPL-SCNC: 105 MMOL/L (ref 98–107)
CHOLEST SERPL-MCNC: 105 MG/DL (ref 0–199)
CHOLESTEROL/HDL RATIO: 2
CO2 SERPL-SCNC: 32 MMOL/L (ref 21–32)
CREAT SERPL-MCNC: 0.93 MG/DL (ref 0.5–1.3)
EGFRCR SERPLBLD CKD-EPI 2021: 88 ML/MIN/1.73M*2
EOSINOPHIL # BLD AUTO: 0.06 X10*3/UL (ref 0–0.4)
EOSINOPHIL NFR BLD AUTO: 1.1 %
ERYTHROCYTE [DISTWIDTH] IN BLOOD BY AUTOMATED COUNT: 18.1 % (ref 11.5–14.5)
GLUCOSE SERPL-MCNC: 88 MG/DL (ref 74–99)
HCT VFR BLD AUTO: 31.2 % (ref 41–52)
HDLC SERPL-MCNC: 52.5 MG/DL
HGB BLD-MCNC: 8.7 G/DL (ref 13.5–17.5)
IGG SERPL-MCNC: 789 MG/DL (ref 700–1600)
IMM GRANULOCYTES # BLD AUTO: 0.03 X10*3/UL (ref 0–0.5)
IMM GRANULOCYTES NFR BLD AUTO: 0.6 % (ref 0–0.9)
LDLC SERPL CALC-MCNC: 41 MG/DL
LYMPHOCYTES # BLD AUTO: 0.78 X10*3/UL (ref 0.8–3)
LYMPHOCYTES NFR BLD AUTO: 14.5 %
MAGNESIUM SERPL-MCNC: 1.81 MG/DL (ref 1.6–2.4)
MCH RBC QN AUTO: 25.8 PG (ref 26–34)
MCHC RBC AUTO-ENTMCNC: 27.9 G/DL (ref 32–36)
MCV RBC AUTO: 93 FL (ref 80–100)
MONOCYTES # BLD AUTO: 0.43 X10*3/UL (ref 0.05–0.8)
MONOCYTES NFR BLD AUTO: 8 %
NEUTROPHILS # BLD AUTO: 4.04 X10*3/UL (ref 1.6–5.5)
NEUTROPHILS NFR BLD AUTO: 75.2 %
NON HDL CHOLESTEROL: 53 MG/DL (ref 0–149)
NRBC BLD-RTO: 0 /100 WBCS (ref 0–0)
PLATELET # BLD AUTO: 106 X10*3/UL (ref 150–450)
POTASSIUM SERPL-SCNC: 4.3 MMOL/L (ref 3.5–5.3)
PROT SERPL-MCNC: 5.6 G/DL (ref 6.4–8.2)
RBC # BLD AUTO: 3.37 X10*6/UL (ref 4.5–5.9)
SODIUM SERPL-SCNC: 141 MMOL/L (ref 136–145)
TACROLIMUS BLD-MCNC: 6.2 NG/ML
TRIGL SERPL-MCNC: 58 MG/DL (ref 0–149)
VLDL: 12 MG/DL (ref 0–40)
WBC # BLD AUTO: 5.4 X10*3/UL (ref 4.4–11.3)

## 2024-09-04 PROCEDURE — 36415 COLL VENOUS BLD VENIPUNCTURE: CPT

## 2024-09-04 PROCEDURE — 85025 COMPLETE CBC W/AUTO DIFF WBC: CPT

## 2024-09-04 PROCEDURE — 80061 LIPID PANEL: CPT

## 2024-09-04 PROCEDURE — 80197 ASSAY OF TACROLIMUS: CPT

## 2024-09-04 PROCEDURE — 86833 HLA CLASS II HIGH DEFIN QUAL: CPT

## 2024-09-04 PROCEDURE — 83735 ASSAY OF MAGNESIUM: CPT

## 2024-09-04 PROCEDURE — 82306 VITAMIN D 25 HYDROXY: CPT

## 2024-09-04 PROCEDURE — 86832 HLA CLASS I HIGH DEFIN QUAL: CPT

## 2024-09-04 PROCEDURE — 87799 DETECT AGENT NOS DNA QUANT: CPT

## 2024-09-04 PROCEDURE — 80053 COMPREHEN METABOLIC PANEL: CPT

## 2024-09-04 PROCEDURE — 82784 ASSAY IGA/IGD/IGG/IGM EACH: CPT

## 2024-09-04 NOTE — PROGRESS NOTES
Mr. Patel is a 69-year-old M s/p DLTx for IPF and pulmonary hypertension 3/22/2020 with course complicated by pleural bleed with washout 3/23 and need for trach discharged on 5/8/2020. Post hospital course is also been complicated by septic shock with MSSA 8/2020 with PE on lifelong anticoagulation (remote VTE 1993) he was also COVID-positive 12/2022.    Presenting for a follow-up visit.    Interval Medical History:  9/9/24: Breathing overall stable on RA, trying to be more active. No nausea or vomiting, moving bowels. Working on weight loss, BMI 30. BP overall stable, 120's systolic. Lesion on scalp biopsied, negative for malignancy. Elevated PSA, seen by Urology, MRI prostate 9/15/24. Started on Sanctura for BPH. Ongoing issues with neuropathy in feet/hands. Amenable to starting Aranesp injections.    6/3/24: Breathing overall stable on RA, trying to increase activity. No nausea or vomiting, moving bowels. No LE edema. Working on weight loss, lost 10 lbs. BP overall improved, 120's systolic. Following with Dermatology, lesion on scalp removed, biopsy pending, will fax us results. Mood overall improved, met with Dr. Vincent, feeling better.    4/3/24: Breathing overall stable on RA, no nausea or vomiting, moving bowels, feeling well. Trying to maintain adequate po fluid intake, no LE edema. Working on weight loss but not having much success, BMI 33. Depressed about weight gain. Trying to increase activity, knows he needs to go back to the gym. BP lower 100's systolic.    1/10/24: Breathing overall stable in RA, no nausea or vomiting, moving bowels. No LE edema. Feeling well. Saw Dermatology lesion on forehead consistent with SCC, removed, no further lesions. Gained 10 lbs since last visit, BMI 30.    11/10/23: Breathing overall stable on RA, no nausea or vomiting, moving bowels. No LE edema. Maintaining adequate po fluid intake. New lesion on forehead, planning to see Dermatology soon.    10/13/23: Bronchoscopy on  9/19/23 with multiple strains of Pseudomonas, completed a 14 day course of Ciprofloxacin and started on inhaled VICENTE, some tremors with albuterol. TBBX with no ACR or OB. Feels breathing and cough is overall improved, no MERINO. No nausea or vomiting, tolerating po intake and moving bowels.    9/7/23: Seen in ER 8/25/23 for chest pain, sharp increased with movement. Workup negative including nuclear stress test. Started on Crestor. Still present at times but improved, feels musculoskeletal in nature. Breathing overall stable, still has a cough with brownish sputum despite course of Ciprofloxacin after last visit, RVP negative. No nausea or vomiting, moving bowels regularly.    8/2/23: Breathing overall stable, walking daily. Cough with brownish sputum for the past 2 weeks, no fevers, chills. No nausea or vomiting, moving bowels, no LE edema, maintaining adequate po fluid intake.    6/12/23: Breathing stable, working on weight loss. No nausea or vomiting, moving bowels. No LE edema. No longer working due to hours. Trying to be more active.    4/5/23: Feels that breathing is overall stable, no MERINO or cough. Tolerating PO diet, no nausea or vomiting, moving bowels, no LE edema. Notes 15lb weight gain self reported due to inactivity - no orthopnea or PND. Notes skin lesion on scalp present for last few months, not yet evaluated by dermatology. Blood glucose range , 115 on 12.5mg Jardiance bid, BP max 115 at home. 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    Current Outpatient Medications:     alendronate (Fosamax) 70 mg tablet, TAKE ONE (1) TABLET BY MOUTH ONCE A WEEK, AS DIRECTED, Disp: 12 tablet, Rfl: 3    apixaban (Eliquis) 5 mg tablet, Take 1 tablet (5 mg) by mouth every 12 hours., Disp: 180 tablet, Rfl: 3    azithromycin (Zithromax) 250 mg tablet, Take 1 tablet (250 mg) by mouth once a day on Monday, Wednesday, and Friday., Disp: 12 tablet, Rfl: 11    blood sugar diagnostic (OneTouch  Verio test strips) strip, Inject 1 each into the skin 3 times a day before meals., Disp: 300 each, Rfl: 3    budesonide-formoteroL (Symbicort) 160-4.5 mcg/actuation inhaler, Inhale 2 puffs by mouth 2 times a day. Rinse mouth with water after use to reduce aftertaste and incidence of candidiasis. Do not swallow., Disp: 10.2 g, Rfl: 11    cholecalciferol (Vitamin D-3) 25 MCG (1000 UT) capsule, Take 1 capsule (25 mcg) by mouth once daily., Disp: , Rfl:     dapsone 100 mg tablet, TAKE ONE (1) TABLET BY MOUTH ONCE DAILY., Disp: 90 tablet, Rfl: 3    DULoxetine (Cymbalta) 60 mg DR capsule, Take 1 capsule (60 mg) by mouth once daily. Do not crush or chew., Disp: 90 capsule, Rfl: 3    levalbuterol (Xopenex) 0.63 mg/3 mL nebulizer solution, Inhale one (1) ampule via nebulizer twice daily., Disp: 180 mL, Rfl: 11    loratadine (Claritin) 10 mg tablet, TAKE ONE (1) TABLET BY MOUTH ONCE DAILY., Disp: 90 tablet, Rfl: 3    magnesium oxide (Mag-Ox) 400 mg (241.3 mg magnesium) tablet, Take 3 tablets (1,200 mg) by mouth once daily at 1 PM, Disp: 270 tablet, Rfl: 3    montelukast (Singulair) 10 mg tablet, TAKE ONE (1) TABLET BY MOUTH ONCE DAILY AT BEDTIME., Disp: 30 tablet, Rfl: 11    multivitamin with minerals iron-free (Centrum Silver), Take 1 tablet by mouth once daily., Disp: , Rfl:     mycophenolate (Cellcept) 250 mg capsule, TAKE ONE (1) CAPSULE BY MOUTH 2 TIMES DAILY., Disp: 60 capsule, Rfl: 11    pantoprazole (ProtoNix) 40 mg EC tablet, TAKE ONE (1) TABLET BY MOUTH ONCE DAILY., Disp: 30 tablet, Rfl: 11    predniSONE (Deltasone) 5 mg tablet, TAKE ONE (1) TABLET BY MOUTH EVERY DAY, Disp: 30 tablet, Rfl: 11    rosuvastatin (Crestor) 40 mg tablet, Take one (1) tablet by mouth once daily., Disp: 30 tablet, Rfl: 11    tacrolimus (Prograf) 1 mg capsule, Take 2 capsules (2 mg) by mouth 2 times a day., Disp: 120 capsule, Rfl: 11    tobramycin (Cody 300) 300 mg/5 mL nebulizer solution, Inhale 5 mL (300 mg) by nebulization 2 times a day.,  Disp: 840 mL, Rfl: 3    trospium (Sanctura) 20 mg tablet, Take 1 tablet (20 mg) by mouth 2 times a day., Disp: 180 tablet, Rfl: 3    valGANciclovir (Valcyte) 450 mg tablet, TAKE TWO (2) TABLETS BY MOUTH ONCE DAILY, Disp: 180 tablet, Rfl: 3        ROS:      Constitutional: no chills, no fever, no night sweats, not feeling poorly and not feeling tired.   Eyes: no blurred vision, no eyesight problems, no dryness of the eyes and no eye pain.   ENT: hearing loss, but no nasal congestion, no nasal discharge, no hoarseness, no sore throat, no earache, the ears do not feel full, no tinnitus, no nosebleeds, no postnasal drip, no rhinorrhea and no sinus pressure.   Neck: no mass(es) and no swelling.   Cardiovascular: no chest pain, no intermittent leg claudication, no lower extremity edema, no palpitations, no syncope, no tachycardia and no bradycardia.   Respiratory: no cough, no shortness of breath during exertion, no shortness of breath at rest, no wheezing, non dry cough, non productive cough and no hemoptysis.   Gastrointestinal: no abdominal pain, no bloating, no nausea and no vomiting.   Musculoskeletal: no arthralgias, no back pain, no myalgias, no history of falls, no localized joint pain, no joint redness, no joint stiffness, no joint swelling, no limb pain, no limb swelling and no neck pain.   Integumentary: no rashes.   Neurological: no convulsions, no difficulty walking, no diplopia, no dizziness, no dysarthria, no facial weakness, no headache, no limb weakness, no memory changes, no numbness, no speech difficulties, no tingling, no confusion and no syncope.   Psychiatric: no anxiety, no depression, no anhedonia, no substance use disorders, no personality change, no sleep disturbances and no suicidal ideations.   Endocrine: no changes in appetite, no feelings of weakness, no hair thinning or loss, no heat/cold intolerance, no polydipsia, no deepening of the voice, no polyuria, no muscle weakness and no proptosis.    Hematologic/Lymphatic: a tendency for easy bruising and a tendency for easy bleeding, but no swollen glands and no recurrent infections.   All other systems have been reviewed and are negative for complaint.          PE: VSS: 112/71, 85,12, 94% on RA, temp 36.4  Constitutional: Alert and in no acute distress. Well developed, well nourished.   Head and Face: Head and face: Normal.  (Moon facies from steroid, right facial droop unchanged).   Ears, Nose, Mouth, and Throat: External inspection of ears and nose: Normal.  Nasal mucosa, septum, and turbinates: Normal.  Lips, teeth, and gums: Normal.    Neck: No neck mass was observed. Supple.   Pulmonary: Chest: Normal to inspection.  (No wound) Respiratory effort: No increased work of breathing or signs of respiratory distress.  Auscultation of lungs: Clear to auscultation bilaterally.    Cardiovascular: Heart rate and rhythm were normal, normal S1 and S2, no gallops, no murmurs and no pericardial rub. Pedal pulses: Normal. No peripheral edema.   Abdomen: Soft, nontender; no abdominal mass palpated. The abdomen was obese. Normal bowel sounds.   Lymphatic: No lymphadenopathy.   Musculoskeletal: No joint swelling seen, normal movements of all extremities. Digits and nails: Abnormal.  Examination of the extremities revealed fingernail clubbing.   Neurologic: cranial nerves 2-12 were intact.   Psychiatric: Judgment and insight: Intact. Alert and oriented to person, place and time. Mood and affect:    Labwork:  Na 141, potassium 4.3, Cr 0.93, Mag 1.81, LFT's stable  WBC 5.4, Hg 8.7, Hematocrit 31.2, platelets 106, ANC acceptable 4040  Prograf 6.2, CMV PCR negative 9/4/24, EBV PCR negative on 9/4/24.  Vitamin D 61, IgG 789, HLA Ab negative 9/4/24.  Lipid panel 9/4/24 105  TG 58, PSA 4.88, iron 41, TIBC 382.        CXR: 9/9/24 reviewed, clear lung fields bilaterally, no acute infiltrates, elevated left hemidiaphragm (chronic).    Results:  9/9/24: Spirometry:  FVC: 2.40  62%  FEV1: 1.36 46%  25-75: 0. 64 27%    6/3/24: Spirometry:  FVC: 2.48 64%  FEV1: 1.44 49%  25-75: 0.92 39%    4/3/24: Spirometry:  FVC:  2.41 59%  FEV1: 1.48 48%  25-75: 0.78 32%    1/10/24: Spirometry:  FVC: 2.45 60%  FEV1: 1.55 50%  25-75: 0.97 39%    11/10/23 Spirometry:  FVC: 2.33, 55%  FEV1: 1.47 46%  25-75: 0.95 39%    10/13/23 Spirometry:  FVC: 2.29 52%  FEV1: 1.35 42%  25-75: 0.88 36%    9/7/23 Spirometry:  FVC: 2.25, 53%  FEV1: 1.41, 44%  25-75: 0.82, 34%    8/2/23 Spirometry:  FVC: 2.48, 59%  FEV1: 1.52, 47%  25-75: 1.03, 42%    6/12/23 Spirometry:  FVC 2.65 62%  FEV1: 1.48, 46%  25-75: 0.89, 36%    5/2/23 Spirometry:  FVC: 2.58, 61%  FEV1: 1.51, 47%  25-75: 0.88, 36%    4/5/23 Spirometry:  FVC: 2.47 58%  FEV1: 1.50 46%  FEF 25-75: 31%    Assessment/Plan:  Mr. Patel is a 70 yo M with history of IPF/PAH s/p DLTx 3/22/20, GERD, hypertension, DM, hyperlipidemia who presents for follow-up visit for lung transplant. Overall doing well. Seen in ER 8/25/23 for chest pain with negative workup.    1. Pulmonary- s/p DLTx for IPF/PAH on 3/22/20, CMV mismatch, EBV positive recipient  Allograft function:  -Initial complications- minimal PGD ,pleural bleed with washout 3/23 and need for trach, discharged on 5/8/2020.   -Modest PE at same presentation as septic shock 8/2020 - lifelong anticoagulation given second episode. (Remote VTE 1993). Currently on Eliquis BID.  -TBBX in 4/20 with A2, treated with pulse Solumedrol, TBBX in 5 and 6/20 with minimal ACR, subsequent TBBX negative, last 2/23/21.  -History of MSSA, Klebsiella, Corynebacterium from 4/20 cultures, Enterobacter from 6/20 cultures, sensitive to fluoroquinolones.  -Bronchoscopy on 9/19/23, cultures with multiple strains of Pseudomonas, completed a 14 day course of Ciprofloxacin and started on inhaled VICENTE (will send trough with next labwork). TBBX with no ACR or OB.   -PFT's today overall stable compared to prior FEV1 1.36(46%); FEF 25-75 0.64(27%),continue  to follow serial exams, continue to work on weight loss and conditioning, lost  lbs, BMI improved at 31, trying to increase activity.  -CXR reviewed, clear lung fields bilaterally, continue to follow serial exams. HRCT on 9/18/23 with relatively clear lung fields bilaterally, multifocal expiratory air trapping, suggestive of CLAD.  -SARA- continue with nightly CPAP  -HLA Ab negative 9/4/24, no significant history of DSA post transplant, follow serially, every 3 months  -Continue Azithromycin 250 mg qM,W,F for ROSSY prophylaxis along with Singulair 10 mg QHS. Also added Symbicort BID.    Immunosuppression:  -Prograf goal 6-8, continue 2 mg BID, level 6.2, stable. Continue to follow closely, repeat monthly, next on 10/14/24.  -Cellcept 250 mg BID, if ongoing evidence of skin cancer, will need to hold. Already has evidence of thrombocytopenia, thus Everolimus likely not a viable option.  -Prednisone 5 mg daily    Prophylaxis:  -Dapsone for PJP prophylaxis (no known prior intolerance to Bactrim, has not taken other agents)  -CMV mismatch- continues on Valcyte 900 mg daily, CMV PCR's negative, last 9/4/24, no history of viremia.  -Previously on voriconazole for anti-fungal prophylaxis; no fungal infections, LFT's stable with elevated alk phos and normal MRI of liver; discontinued voriconazole 4/5/23 with improvement of alkaline phosphatase  -IgG level 789, adequate, continue to follow serial levels, goal greater than 600.    2. CV  -History of hypertension- Lopressor held due to hypotension, BP stable, ranging 120's sytolic. Advised to continue to follow BP closely and call us if greater than 130/80 or less than 100/60 consistently.   -Recently seen in ER 8/25/23 for chest pain, workup negative, including nuclear stress test. Overall improved, appears musculoskeletal in nature. TTE 9/26/23 with EF 65%, normal LV and RV function  -Hyperlipidemia- Lipid panel on 9/4/24 with acceptable Cholesterol and TG, continue Crestor 40  mg daily (for cardioprotective effect), repeat in 3 months.    3. Renal  -Cr overall stable at 0.93, advised to maintain adequate po hydration.  -Magnesium 1.8, continue 400 mg daily.  -Renally dose all medications and avoid nephrotoxins  -History of nephrolithiasis in the past    4. GI  -Continue Protonix 40 mg daily before meals   -Persistently elevated AP- MRI lever 2/9/23 with normal appearance; improved after discontinuation of voriconazole, will follow.   -Will follow after start of Crestor as above, stable.    5. ID  -History of septic shock with MSSA 8/2020. Received prolonged course of IV Abx with resolution.  -Otitis external starting 9/8/2020 with ENT involvement , multiple rounds ab and wick insertion, developed mastoiditis  -Sternal wound infection- 2 episodes of Staph s/p debridement, wound vac removed. Repeat episode 7/19-7/23/2021, no debridement, was discharged on Abx and completed a 8 week course.  -COVID 12/22, recovered and overall improved.  -Bronchoscopy on 9/19/23 cultures with multiple strains of Pseudomonas, completed a 14 day course of Ciprofloxacin and started on inhaled VICENTE, tolerating well.  -Afebrile, no signs of new infection currently.    6. Endo  -History of DM- BG levels well-controlled, on Jardiance per endocrinology  -History of osteoporosis- on Fosamax, would like to discuss alternative options, will repeat DEXA scan, will plan to refer to Endocrinology for treatment with Prolia or Reclast. Also on vitamin D, level acceptable at 61.    7. Heme  -History of PE at same presentation as septic shock 8/2020 - lifelong anticoagulation given second episode. (Remote VTE 1993). On Eliquis, increased to 5 mg BID as off voriconazole. Also has IVF filter in place.   -H/H stable at 8.7/31.2 and no signs of bleeding currently. Iron stores intact. Will start Aranesp 60 mcg subcutaneous weekly, prescription sent to insurance, patient agreeable.  -WBC intact, platelets stable at 106, chronic  thrombocytopenia    8. ENT  -Otitis extena with facial nerve involvement. 10/17 Right tympanomastoidectomy, mastoid washout while inpatient. 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  -Chronic hearing loss with hearing aids, following with ENT, plan for new hearing aids soon, not a candidate for cochlear implant.    9. Neuro  - Neuropathy in feet/hands, will start Neurontin 300 mg at bedtime and follow response.    9. Prophylaxis  -UTD on vaccines (Influenza, PNA, COVID), needs RSV, advised to receive ASAP. Following with Dermatology, scalp lesion consistent with folliculitis, healed well, new lesion on forehead, SCC, removed, lesion removed again, no evidence of malignancy,.  Last colonoscopy 4/22. DEXA 2020, repeat pending. PSA 4.88, seen by Urology 7/24, MRI prostate pending 9/15/24, started on Sanctura for BPH.    Continue to work on weight loss and increased activity, seen by Nutrition, weight stable since last visit, BMI 31.  RTC in 2 months with PFT's, CXR. Bronchoscopy only as clinically indicated.   Plan of care discussed at length with patient, all questions answered, total of 45 minutes spent regarding counseling and coordination of care.

## 2024-09-05 ENCOUNTER — TELEPHONE (OUTPATIENT)
Dept: TRANSPLANT | Facility: HOSPITAL | Age: 69
End: 2024-09-05
Payer: MEDICARE

## 2024-09-05 ENCOUNTER — APPOINTMENT (OUTPATIENT)
Dept: RADIOLOGY | Facility: HOSPITAL | Age: 69
End: 2024-09-05
Payer: MEDICARE

## 2024-09-05 DIAGNOSIS — T86.810 LUNG TRANSPLANT REJECTION (MULTI): ICD-10-CM

## 2024-09-05 DIAGNOSIS — Z94.2 LUNG TRANSPLANTED (MULTI): ICD-10-CM

## 2024-09-05 LAB
CMV DNA SERPL NAA+PROBE-LOG IU: NORMAL {LOG_IU}/ML
EBV DNA SPEC NAA+PROBE-LOG#: NORMAL {LOG_COPIES}/ML
HLA RESULTS: NORMAL
HLA-A+B+C AB NFR SER: NORMAL %
HLA-DP+DQ+DR AB NFR SER: NORMAL %
LABORATORY COMMENT REPORT: NOT DETECTED
LABORATORY COMMENT REPORT: NOT DETECTED

## 2024-09-05 NOTE — TELEPHONE ENCOUNTER
Labs reviewed on 9/5/2024  WBC 5.4 ANC 4.04  Hbg 8.7  Platelets 106  Creatinine 0.93  Magnesium 1.81  Prograf 6.2 Goal 6-8 Dose 2mg BID     -Changes made: None  -Labs due next 10/7      Called patient to review lab results. Emphasized that there are no medication changes needed and to repeat labs on 10/7. Patient states that he will be going to [place] to visit family the week of 10/7, but will be able to get labs after. Will plan on 10/7. Will also discuss potentially starting aranesp for HGB. Patient to be seen in clinic next week. No further questions or needs at this time.

## 2024-09-09 ENCOUNTER — HOSPITAL ENCOUNTER (OUTPATIENT)
Dept: RADIOLOGY | Facility: HOSPITAL | Age: 69
Discharge: HOME | End: 2024-09-09
Payer: MEDICARE

## 2024-09-09 ENCOUNTER — HOSPITAL ENCOUNTER (OUTPATIENT)
Dept: RESPIRATORY THERAPY | Facility: HOSPITAL | Age: 69
Discharge: HOME | End: 2024-09-09
Payer: MEDICARE

## 2024-09-09 ENCOUNTER — OFFICE VISIT (OUTPATIENT)
Dept: TRANSPLANT | Facility: HOSPITAL | Age: 69
End: 2024-09-09
Payer: MEDICARE

## 2024-09-09 ENCOUNTER — APPOINTMENT (OUTPATIENT)
Dept: TRANSPLANT | Facility: HOSPITAL | Age: 69
End: 2024-09-09
Payer: MEDICARE

## 2024-09-09 VITALS
TEMPERATURE: 97.5 F | DIASTOLIC BLOOD PRESSURE: 71 MMHG | OXYGEN SATURATION: 94 % | BODY MASS INDEX: 30.91 KG/M2 | SYSTOLIC BLOOD PRESSURE: 112 MMHG | WEIGHT: 209.3 LBS | HEART RATE: 85 BPM

## 2024-09-09 DIAGNOSIS — Z94.2 S/P LUNG TRANSPLANT (MULTI): ICD-10-CM

## 2024-09-09 DIAGNOSIS — E78.49 OTHER HYPERLIPIDEMIA: ICD-10-CM

## 2024-09-09 DIAGNOSIS — R73.9 STEROID-INDUCED HYPERGLYCEMIA: ICD-10-CM

## 2024-09-09 DIAGNOSIS — Z94.2 S/P LUNG TRANSPLANT (MULTI): Primary | ICD-10-CM

## 2024-09-09 DIAGNOSIS — M81.8 OTHER OSTEOPOROSIS WITHOUT CURRENT PATHOLOGICAL FRACTURE: ICD-10-CM

## 2024-09-09 DIAGNOSIS — G51.0 UNILATERAL FACIAL PARESIS: ICD-10-CM

## 2024-09-09 DIAGNOSIS — I26.99 PULMONARY EMBOLISM, UNSPECIFIED CHRONICITY, UNSPECIFIED PULMONARY EMBOLISM TYPE, UNSPECIFIED WHETHER ACUTE COR PULMONALE PRESENT (MULTI): ICD-10-CM

## 2024-09-09 DIAGNOSIS — T38.0X5A STEROID-INDUCED HYPERGLYCEMIA: ICD-10-CM

## 2024-09-09 DIAGNOSIS — M81.0 OSTEOPOROSIS WITHOUT CURRENT PATHOLOGICAL FRACTURE, UNSPECIFIED OSTEOPOROSIS TYPE: ICD-10-CM

## 2024-09-09 DIAGNOSIS — J84.10 PULMONARY FIBROSIS (MULTI): ICD-10-CM

## 2024-09-09 DIAGNOSIS — D61.89 ANEMIA DUE TO OTHER BONE MARROW FAILURE (MULTI): ICD-10-CM

## 2024-09-09 DIAGNOSIS — D84.9 IMMUNOSUPPRESSION (MULTI): ICD-10-CM

## 2024-09-09 DIAGNOSIS — I10 PRIMARY HYPERTENSION: ICD-10-CM

## 2024-09-09 LAB
MGC ASCENT PFT - FEV1 - PRE: 1.36
MGC ASCENT PFT - FEV1 - PREDICTED: 2.92
MGC ASCENT PFT - FVC - PRE: 2.4
MGC ASCENT PFT - FVC - PREDICTED: 3.87

## 2024-09-09 PROCEDURE — 71046 X-RAY EXAM CHEST 2 VIEWS: CPT

## 2024-09-09 PROCEDURE — 99215 OFFICE O/P EST HI 40 MIN: CPT | Performed by: INTERNAL MEDICINE

## 2024-09-09 PROCEDURE — 94010 BREATHING CAPACITY TEST: CPT

## 2024-09-09 PROCEDURE — 99215 OFFICE O/P EST HI 40 MIN: CPT | Mod: 25 | Performed by: INTERNAL MEDICINE

## 2024-09-09 PROCEDURE — 1159F MED LIST DOCD IN RCRD: CPT | Performed by: INTERNAL MEDICINE

## 2024-09-09 PROCEDURE — 71046 X-RAY EXAM CHEST 2 VIEWS: CPT | Performed by: RADIOLOGY

## 2024-09-09 PROCEDURE — 3078F DIAST BP <80 MM HG: CPT | Performed by: INTERNAL MEDICINE

## 2024-09-09 PROCEDURE — 3074F SYST BP LT 130 MM HG: CPT | Performed by: INTERNAL MEDICINE

## 2024-09-09 PROCEDURE — 1126F AMNT PAIN NOTED NONE PRSNT: CPT | Performed by: INTERNAL MEDICINE

## 2024-09-09 PROCEDURE — 1160F RVW MEDS BY RX/DR IN RCRD: CPT | Performed by: INTERNAL MEDICINE

## 2024-09-09 RX ORDER — GABAPENTIN 300 MG/1
300 CAPSULE ORAL NIGHTLY
Qty: 30 CAPSULE | Refills: 11 | Status: SHIPPED | OUTPATIENT
Start: 2024-09-09 | End: 2025-09-09

## 2024-09-09 RX ORDER — DARBEPOETIN ALFA 60 UG/ML
60 SOLUTION INTRAVENOUS; SUBCUTANEOUS WEEKLY
Qty: 51 ML | Refills: 0 | Status: SHIPPED | OUTPATIENT
Start: 2024-09-09 | End: 2025-09-09

## 2024-09-09 ASSESSMENT — ENCOUNTER SYMPTOMS
WEIGHT GAIN: 0
DYSPNEA ON EXERTION: 1
COUGH: 0
SPUTUM PRODUCTION: 0
CHANGE IN BOWEL HABIT: 0
PARESTHESIAS: 1
SUSPICIOUS LESIONS: 0
DIARRHEA: 0
WEIGHT LOSS: 0
CONSTIPATION: 0
MUSCLE CRAMPS: 1
WEAKNESS: 1
COLOR CHANGE: 0
EYE PAIN: 0
DOUBLE VISION: 0
FALLS: 0
BLURRED VISION: 0
BRUISES/BLEEDS EASILY: 1
NAUSEA: 0
DIZZINESS: 0
NUMBNESS: 1
SORE THROAT: 0
CHILLS: 1
TREMORS: 0
HEADACHES: 0
FEVER: 0
SHORTNESS OF BREATH: 0
HEMATURIA: 0
VOMITING: 0

## 2024-09-09 ASSESSMENT — PAIN SCALES - GENERAL: PAINLEVEL: 0-NO PAIN

## 2024-09-09 NOTE — PATIENT INSTRUCTIONS
Thank you for coming in to clinic today, it was nice to see you.     Today we discussed the following:  - Repeat labs on 10/14  -Will schedule DEXA scan    Medication Changes:  - Begin taking aranesp injections weekly, script sent to [facility]  -Begin taking gabapenin 300mg at night to help with numbness and tingling, script sent to [facility]    Follow-up  - Return to clinic in 2 months with jelly and chest xray    Get your Influenza and RSV vaccines this month.    As a friendly reminder:  - Call the office if you develop any symptoms including but not limited to fevers, chills, nausea, vomiting, aches, shortness of breath, or coughing  - Do not let your medications run out, try to maintain at least a 1 week supply at all times. Call the office early if refills are needed as these can be difficult to obtain on weekends  - Please keep all your appointments    As always, call the office at [phone] or [phone] for any issues.     Out of hours, please call the Cardi[facility] On-Call [phone] for emergencies

## 2024-09-09 NOTE — PROGRESS NOTES
Review of Systems   Constitutional: Positive for chills. Negative for fever, malaise/fatigue (occasional), weight gain and weight loss.   HENT:  Negative for congestion and sore throat.    Eyes:  Negative for blurred vision, double vision and pain.   Cardiovascular:  Positive for dyspnea on exertion. Negative for chest pain and leg swelling.   Respiratory:  Negative for cough, shortness of breath and sputum production.    Hematologic/Lymphatic: Bruises/bleeds easily.   Skin:  Negative for color change and suspicious lesions.   Musculoskeletal:  Positive for muscle cramps and muscle weakness. Negative for falls.   Gastrointestinal:  Negative for change in bowel habit, constipation, diarrhea, nausea and vomiting.   Genitourinary:  Negative for hematuria.   Neurological:  Positive for numbness, paresthesias and weakness. Negative for dizziness, headaches and tremors.     Patient seen in clinic today, patient seems to be doing overall, patient will be out of town October 1st-10th. Patient has questions regarding fosamax, will discuss with physician. Patient to start taking aranesp injections weekly, script sent to [facility]. Patient has complaints of numbness and tingling, gabapentin 300mg started nightly. Patient to RTC in 2 months with jelly and chest xray.

## 2024-09-11 ENCOUNTER — TELEPHONE (OUTPATIENT)
Dept: TRANSPLANT | Facility: HOSPITAL | Age: 69
End: 2024-09-11
Payer: MEDICARE

## 2024-09-11 NOTE — TELEPHONE ENCOUNTER
Called patient and left voicemail in regards to scheduling a nurse visit for education on weekly aranesp injections. Called [facility] and verified that the mediation would be delivered in the next two days. Awaiting callback from patient.

## 2024-09-11 NOTE — TELEPHONE ENCOUNTER
Patient called writer back in regards to aranesp injections. He reports that he feels comfortable giving himself subcutaneous injections as he has done so in the past. I let him know to call back the office with any questions or concerns. Writer will follow up with patient next week to ensure injections are going well at home.

## 2024-09-15 ENCOUNTER — HOSPITAL ENCOUNTER (OUTPATIENT)
Dept: RADIOLOGY | Facility: HOSPITAL | Age: 69
Discharge: HOME | End: 2024-09-15
Payer: MEDICARE

## 2024-09-15 DIAGNOSIS — R97.20 ELEVATED PSA: ICD-10-CM

## 2024-09-15 PROCEDURE — A9575 INJ GADOTERATE MEGLUMI 0.1ML: HCPCS | Performed by: UROLOGY

## 2024-09-15 PROCEDURE — 2550000001 HC RX 255 CONTRASTS: Performed by: UROLOGY

## 2024-09-15 PROCEDURE — 72197 MRI PELVIS W/O & W/DYE: CPT | Performed by: RADIOLOGY

## 2024-09-15 PROCEDURE — 72197 MRI PELVIS W/O & W/DYE: CPT

## 2024-09-15 RX ORDER — GADOTERATE MEGLUMINE 376.9 MG/ML
18 INJECTION INTRAVENOUS
Status: COMPLETED | OUTPATIENT
Start: 2024-09-15 | End: 2024-09-15

## 2024-09-16 ENCOUNTER — TELEPHONE (OUTPATIENT)
Dept: TRANSPLANT | Facility: HOSPITAL | Age: 69
End: 2024-09-16
Payer: MEDICARE

## 2024-09-16 ENCOUNTER — SPECIALTY PHARMACY (OUTPATIENT)
Dept: PHARMACY | Facility: CLINIC | Age: 69
End: 2024-09-16

## 2024-09-16 PROCEDURE — RXMED WILLOW AMBULATORY MEDICATION CHARGE

## 2024-09-16 NOTE — TELEPHONE ENCOUNTER
Patient called back stating that Aranesp injections were never delivered. Writer called [facility] to check on status of medication. Was told that the pharmacy was waiting for patient to call to set up delivery date. Per pharmacy, no further actions were needed by writer. Called patient back with phone number to pharmacy to set up delivery [phone]. Patient has no further questions or concerns at this time.

## 2024-09-16 NOTE — TELEPHONE ENCOUNTER
Called patient and left voicemail in regards to aranesp injections. Wanted to make sure that patient was doing okay giving himself the injections. Instructed him to call the office back with any questions or concerns.

## 2024-09-18 ENCOUNTER — TELEPHONE (OUTPATIENT)
Dept: TRANSPLANT | Facility: HOSPITAL | Age: 69
End: 2024-09-18
Payer: MEDICARE

## 2024-09-18 PROCEDURE — RXMED WILLOW AMBULATORY MEDICATION CHARGE

## 2024-09-18 NOTE — TELEPHONE ENCOUNTER
Called patient regarding Aranesp injections. Per pt, he is scheduled to receive the injections tomorrow 9/19. I instructed him to call the office if he does not receive the injections or if he has any questions. Verbalized understanding.

## 2024-09-19 ENCOUNTER — PHARMACY VISIT (OUTPATIENT)
Dept: PHARMACY | Facility: CLINIC | Age: 69
End: 2024-09-19
Payer: COMMERCIAL

## 2024-09-20 ENCOUNTER — TELEPHONE (OUTPATIENT)
Dept: TRANSPLANT | Facility: HOSPITAL | Age: 69
End: 2024-09-20
Payer: MEDICARE

## 2024-09-20 NOTE — TELEPHONE ENCOUNTER
Received voicemail from patient, patient received aranesp medication on 9/19/24 and had no issues administering the medication, patient has no concerns at this time.

## 2024-10-14 PROCEDURE — RXMED WILLOW AMBULATORY MEDICATION CHARGE

## 2024-10-15 ENCOUNTER — PHARMACY VISIT (OUTPATIENT)
Dept: PHARMACY | Facility: CLINIC | Age: 69
End: 2024-10-15
Payer: COMMERCIAL

## 2024-10-15 ENCOUNTER — TELEPHONE (OUTPATIENT)
Dept: TRANSPLANT | Facility: HOSPITAL | Age: 69
End: 2024-10-15
Payer: MEDICARE

## 2024-10-15 NOTE — TELEPHONE ENCOUNTER
Called patient to remind him to get labwork completed this week. He reports that he may need some refills, will look through his med list. He also reports that he received his flu and covid vaccine this season. He plans to bring proof of vaccination to next clinic visit on 11/11. He already received his RSV vaccine last year. No further questions or concerns at this time.

## 2024-10-17 ENCOUNTER — SPECIALTY PHARMACY (OUTPATIENT)
Dept: PHARMACY | Facility: CLINIC | Age: 69
End: 2024-10-17

## 2024-10-21 PROCEDURE — RXMED WILLOW AMBULATORY MEDICATION CHARGE

## 2024-10-21 NOTE — PROGRESS NOTES
"Almaz NGUYEN Caro is a 69 y.o. male who presents for follow-up of Type 2 diabetes mellitus.     He underwent a double lung transplant in 2000    Known complications due to diabetes included obesity    Cardiovascular risk factors include advanced age (older than 55 for men, 65 for women), diabetes mellitus, male gender, and obesity (BMI >= 30 kg/m2). The patient is not on an ACE inhibitor or angiotensin II receptor blocker.  The patient has not been previously hospitalized due to diabetic ketoacidosis.     Current symptoms/problems include none. His clinical course has been stable.     The patient is currently checking the blood glucose.    Patient is using: glucometer    Hypoglycemia frequency: Denies   Hypoglycemia awareness: Yes     Current Medication Regimen  None      MEALS:  not as regular due to working outside   Beverages - water, regular coke, 2% milk     Exercise regimen -denies     Review of Systems   Genitourinary:         Nocturia x 1   All other systems reviewed and are negative.      Objective   /60 (BP Location: Left arm, Patient Position: Sitting, BP Cuff Size: Adult)   Pulse 90   Ht 1.753 m (5' 9\")   Wt 96.2 kg (212 lb)   BMI 31.31 kg/m²   Physical Exam  Vitals and nursing note reviewed.   Constitutional:       General: He is not in acute distress.     Appearance: Normal appearance. He is obese.   HENT:      Head: Normocephalic and atraumatic.      Nose: Nose normal.      Mouth/Throat:      Mouth: Mucous membranes are moist.   Eyes:      Extraocular Movements: Extraocular movements intact.   Cardiovascular:      Rate and Rhythm: Normal rate and regular rhythm.   Pulmonary:      Effort: Pulmonary effort is normal.      Breath sounds: Normal breath sounds.   Musculoskeletal:         General: Normal range of motion.   Skin:     General: Skin is warm.   Neurological:      Mental Status: He is alert and oriented to person, place, and time.   Psychiatric:         Mood and Affect: Mood " normal.       Lab Review  Glucose (mg/dL)   Date Value   09/04/2024 88   08/16/2024 84   07/24/2024 75     POC HEMOGLOBIN A1c (%)   Date Value   10/22/2024 5.4   04/18/2024 5.3     Hemoglobin A1C (%)   Date Value   03/01/2023 5.6   06/30/2022 5.4   02/28/2022 4.8   09/17/2021 6.1 (A)     Bicarbonate (mmol/L)   Date Value   09/04/2024 32   08/16/2024 31   07/24/2024 28     Urea Nitrogen (mg/dL)   Date Value   09/04/2024 17   08/16/2024 20   07/24/2024 18     Creatinine (mg/dL)   Date Value   09/04/2024 0.93   08/16/2024 1.02   07/24/2024 0.93     Lab Results   Component Value Date    CHOL 105 09/04/2024    CHOL 98 06/25/2024    CHOL 106 02/09/2024     Lab Results   Component Value Date    HDL 52.5 09/04/2024    HDL 51.8 06/25/2024    HDL 54.4 02/09/2024     Lab Results   Component Value Date    LDLCALC 41 09/04/2024    LDLCALC 33 06/25/2024    LDLCALC 36 02/09/2024     Lab Results   Component Value Date    TRIG 58 09/04/2024    TRIG 66 06/25/2024    TRIG 80 02/09/2024     === 06/10/22 ===    BONE DENSITY    Health Maintenance:   Foot Exam:   Eye Dorothy Density:     AP Spine (L1-L4)   BMD: 1.257 T-score: 1.5 Z-score: 2.4 Classification: Normal  Femoral Neck (Left)   BMD: 0.677 T-score: -1.9 Z-score: -0.7 Classification: Osteopenic  Total Hip (Left)   BMD: 0.865 T-score: -1.1 Z-score: -0.5 Classification: Osteopenic  -----------------------------------------------------------------     World Health Organization (WHO) criteria define normal bone density  as that  which is less than 1 standard deviation (S.D) below the mean of a   young adult  population. Osteopenia is defined as a measured bone density that is between 1 and 2.5 S.D. below the mean of a young adult population. Osteoporosis is defined as a measured bone density that is greater than or equal to 2.5 S.D. below the mean of a young adult population. The WHO reference diagnosis is based on postmenopausal women and men age 50 and over.      10-year Fracture  Risk(1):  -----------------------------------------------------------------  Major Osteoporotic Fracture 7.5%  Hip Fracture 1.7%  -----------------------------------------------------------------  Reported Risk Factors:  [place] (), Neck BMD=0.677, BMI=25.5  (1) FRAX? Version 3.01. Fracture probability calculated for an   untreated patient. Fracture probability may be lower if the   patient has received treatment.        Interpretation:     The patient has osteopenia as determined by WHO criteria.  Based on the results of the patient's bone density assessment,  the risk of fracture increases approximately two fold for each  1.0 SD decrease in T-score. xam: 2022 Urine Albumin: March 1, 2023    Assessment/Plan   69-year-old male presents for follow up. 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 He is currently on 5mg of prednisone daily. His blood pressure is at goal.     Steroid-induced hyperglycemia  The A1C is at goal   No medications warranted for glycemic control      Osteoporosis  For bone density as ordered  To continue Vitamin D 1000 units daily   The recommended calcium intake is 1200mg per day   Please take Alendronate 70mg po once weekly  Please take the above with a full glass of water and remain upright for 30 minutes     For follow up in 12 months

## 2024-10-21 NOTE — PATIENT INSTRUCTIONS
Thank you for choosing [facility]  for your health care needs.  If you have any questions, concerns or medical needs, please feel free to contact our office at [phone].    Please ensure you complete your blood work one week before the next scheduled appointment.    The A1C is at goal   For bone density as ordered  To continue Vitamin D 1000 units daily   The recommended calcium intake is 1200mg per day   Please take Alendronate 70mg po once weekly  Please take the above with a full glass of water and remain upright for 30 minutes   For follow up in 12 months      Expiration Date (Optional): 06/18/2022 Detail Level: None Lot # (Optional): [id number] Performed By: Pennie Bucio MA Urine Pregnancy Test Result: negative

## 2024-10-22 ENCOUNTER — APPOINTMENT (OUTPATIENT)
Dept: ENDOCRINOLOGY | Facility: CLINIC | Age: 69
End: 2024-10-22
Payer: MEDICARE

## 2024-10-22 ENCOUNTER — LAB (OUTPATIENT)
Dept: LAB | Facility: LAB | Age: 69
End: 2024-10-22
Payer: MEDICARE

## 2024-10-22 ENCOUNTER — PHARMACY VISIT (OUTPATIENT)
Dept: PHARMACY | Facility: CLINIC | Age: 69
End: 2024-10-22
Payer: COMMERCIAL

## 2024-10-22 VITALS
WEIGHT: 212 LBS | SYSTOLIC BLOOD PRESSURE: 138 MMHG | HEIGHT: 69 IN | DIASTOLIC BLOOD PRESSURE: 60 MMHG | HEART RATE: 90 BPM | BODY MASS INDEX: 31.4 KG/M2

## 2024-10-22 DIAGNOSIS — T38.0X5A STEROID-INDUCED HYPERGLYCEMIA: Primary | ICD-10-CM

## 2024-10-22 DIAGNOSIS — Z94.2 LUNG TRANSPLANTED (MULTI): ICD-10-CM

## 2024-10-22 DIAGNOSIS — R73.9 STEROID-INDUCED HYPERGLYCEMIA: Primary | ICD-10-CM

## 2024-10-22 DIAGNOSIS — M81.0 OSTEOPOROSIS WITHOUT CURRENT PATHOLOGICAL FRACTURE, UNSPECIFIED OSTEOPOROSIS TYPE: ICD-10-CM

## 2024-10-22 LAB
ALBUMIN SERPL BCP-MCNC: 3.7 G/DL (ref 3.4–5)
ALP SERPL-CCNC: 82 U/L (ref 33–136)
ALT SERPL W P-5'-P-CCNC: 18 U/L (ref 10–52)
ANION GAP SERPL CALC-SCNC: 9 MMOL/L (ref 10–20)
AST SERPL W P-5'-P-CCNC: 24 U/L (ref 9–39)
BASOPHILS # BLD AUTO: 0.03 X10*3/UL (ref 0–0.1)
BASOPHILS NFR BLD AUTO: 0.6 %
BILIRUB SERPL-MCNC: 0.8 MG/DL (ref 0–1.2)
BUN SERPL-MCNC: 17 MG/DL (ref 6–23)
CALCIUM SERPL-MCNC: 8.4 MG/DL (ref 8.6–10.3)
CHLORIDE SERPL-SCNC: 108 MMOL/L (ref 98–107)
CO2 SERPL-SCNC: 28 MMOL/L (ref 21–32)
CREAT SERPL-MCNC: 0.99 MG/DL (ref 0.5–1.3)
EGFRCR SERPLBLD CKD-EPI 2021: 81 ML/MIN/1.73M*2
EOSINOPHIL # BLD AUTO: 0.06 X10*3/UL (ref 0–0.4)
EOSINOPHIL NFR BLD AUTO: 1.1 %
ERYTHROCYTE [DISTWIDTH] IN BLOOD BY AUTOMATED COUNT: 18 % (ref 11.5–14.5)
GLUCOSE SERPL-MCNC: 93 MG/DL (ref 74–99)
HCT VFR BLD AUTO: 28.5 % (ref 41–52)
HGB BLD-MCNC: 8 G/DL (ref 13.5–17.5)
IGG SERPL-MCNC: 738 MG/DL (ref 700–1600)
IMM GRANULOCYTES # BLD AUTO: 0.04 X10*3/UL (ref 0–0.5)
IMM GRANULOCYTES NFR BLD AUTO: 0.8 % (ref 0–0.9)
LYMPHOCYTES # BLD AUTO: 0.73 X10*3/UL (ref 0.8–3)
LYMPHOCYTES NFR BLD AUTO: 13.9 %
MAGNESIUM SERPL-MCNC: 1.62 MG/DL (ref 1.6–2.4)
MCH RBC QN AUTO: 25.2 PG (ref 26–34)
MCHC RBC AUTO-ENTMCNC: 28.1 G/DL (ref 32–36)
MCV RBC AUTO: 90 FL (ref 80–100)
MONOCYTES # BLD AUTO: 0.39 X10*3/UL (ref 0.05–0.8)
MONOCYTES NFR BLD AUTO: 7.4 %
NEUTROPHILS # BLD AUTO: 4.02 X10*3/UL (ref 1.6–5.5)
NEUTROPHILS NFR BLD AUTO: 76.2 %
NRBC BLD-RTO: 0 /100 WBCS (ref 0–0)
PLATELET # BLD AUTO: 113 X10*3/UL (ref 150–450)
POC FINGERSTICK BLOOD GLUCOSE: 108 MG/DL (ref 70–100)
POC HEMOGLOBIN A1C: 5.4 % (ref 4.2–6.5)
POTASSIUM SERPL-SCNC: 4 MMOL/L (ref 3.5–5.3)
PROT SERPL-MCNC: 6.1 G/DL (ref 6.4–8.2)
RBC # BLD AUTO: 3.18 X10*6/UL (ref 4.5–5.9)
SODIUM SERPL-SCNC: 141 MMOL/L (ref 136–145)
TACROLIMUS BLD-MCNC: 6.8 NG/ML
WBC # BLD AUTO: 5.3 X10*3/UL (ref 4.4–11.3)

## 2024-10-22 PROCEDURE — 87799 DETECT AGENT NOS DNA QUANT: CPT

## 2024-10-22 PROCEDURE — 80053 COMPREHEN METABOLIC PANEL: CPT

## 2024-10-22 PROCEDURE — 3075F SYST BP GE 130 - 139MM HG: CPT | Performed by: INTERNAL MEDICINE

## 2024-10-22 PROCEDURE — 36415 COLL VENOUS BLD VENIPUNCTURE: CPT

## 2024-10-22 PROCEDURE — 82962 GLUCOSE BLOOD TEST: CPT | Performed by: INTERNAL MEDICINE

## 2024-10-22 PROCEDURE — 80197 ASSAY OF TACROLIMUS: CPT

## 2024-10-22 PROCEDURE — 3008F BODY MASS INDEX DOCD: CPT | Performed by: INTERNAL MEDICINE

## 2024-10-22 PROCEDURE — 1159F MED LIST DOCD IN RCRD: CPT | Performed by: INTERNAL MEDICINE

## 2024-10-22 PROCEDURE — 82784 ASSAY IGA/IGD/IGG/IGM EACH: CPT

## 2024-10-22 PROCEDURE — G2211 COMPLEX E/M VISIT ADD ON: HCPCS | Performed by: INTERNAL MEDICINE

## 2024-10-22 PROCEDURE — 83036 HEMOGLOBIN GLYCOSYLATED A1C: CPT | Performed by: INTERNAL MEDICINE

## 2024-10-22 PROCEDURE — 3078F DIAST BP <80 MM HG: CPT | Performed by: INTERNAL MEDICINE

## 2024-10-22 PROCEDURE — 85025 COMPLETE CBC W/AUTO DIFF WBC: CPT

## 2024-10-22 PROCEDURE — 99214 OFFICE O/P EST MOD 30 MIN: CPT | Performed by: INTERNAL MEDICINE

## 2024-10-22 PROCEDURE — 1160F RVW MEDS BY RX/DR IN RCRD: CPT | Performed by: INTERNAL MEDICINE

## 2024-10-22 PROCEDURE — 83735 ASSAY OF MAGNESIUM: CPT

## 2024-10-23 ENCOUNTER — TELEPHONE (OUTPATIENT)
Dept: TRANSPLANT | Facility: HOSPITAL | Age: 69
End: 2024-10-23
Payer: MEDICARE

## 2024-10-23 DIAGNOSIS — Z94.2 LUNG TRANSPLANTED (MULTI): Primary | ICD-10-CM

## 2024-10-23 LAB
EBV DNA SPEC NAA+PROBE-LOG#: NORMAL {LOG_COPIES}/ML
LABORATORY COMMENT REPORT: NOT DETECTED

## 2024-10-23 RX ORDER — CALCIUM CARBONATE 500(1250)
1 TABLET ORAL
Qty: 60 TABLET | Refills: 11 | Status: SHIPPED | OUTPATIENT
Start: 2024-10-23 | End: 2025-10-23

## 2024-10-23 NOTE — TELEPHONE ENCOUNTER
Called patient back to let him know that our team ordered calcium supplements for him as requested. Made sure that he was aware to separate calcium from tacro by two hours. Pt verbalized understanding. No further questions or needs at this time.

## 2024-10-23 NOTE — TELEPHONE ENCOUNTER
Labs reviewed on 10/23/2024  WBC 5.3 ANC 4.02  Hbg 8  Platelets 113  Creatinine 0.99  Magnesium 1.62  IgG 738  Prograf 6.8 Goal 6-8 Dose 2mg BID    -Changes made: None  -Labs due next 11/18     Called patient with above results and plan. He states that his endocrinologist advised that he take 1000mg of calcium daily. Will discuss with team and call him back with plan.

## 2024-10-24 LAB
CMV DNA SERPL NAA+PROBE-LOG IU: NORMAL {LOG_IU}/ML
LABORATORY COMMENT REPORT: NOT DETECTED

## 2024-10-25 NOTE — PROGRESS NOTES
"Almaz NGUYEN Caro is a 69 y.o. male who presents for follow-up of Type 2 diabetes mellitus.     He underwent a dual lung transplant in 2000.    He is no longer on medications for steroid-induced hyperglycemia    Known complications due to diabetes included obesity    Cardiovascular risk factors include advanced age (older than 55 for men, 65 for women), diabetes mellitus, male gender, and obesity (BMI >= 30 kg/m2). The patient is not on an ACE inhibitor or angiotensin II receptor blocker.  The patient has not been previously hospitalized due to diabetic ketoacidosis.     Current symptoms/problems include none. His clinical course has been stable.     The patient is currently checking the blood glucose.    Patient is using: glucometer    Hypoglycemia frequency: Denies   Hypoglycemia awareness: Yes     Current Medication Regimen  None      MEALS:  not as regular due to working outside   Beverages - water, regular coke, 2% milk     Exercise regimen -denies     Review of Systems   Genitourinary:         Nocturia x 1   All other systems reviewed and are negative.      Objective   /60 (BP Location: Left arm, Patient Position: Sitting, BP Cuff Size: Adult)   Pulse 90   Ht 1.753 m (5' 9\")   Wt 96.2 kg (212 lb)   BMI 31.31 kg/m²   Physical Exam  Vitals and nursing note reviewed.   Constitutional:       General: He is not in acute distress.     Appearance: Normal appearance. He is obese.   HENT:      Head: Normocephalic and atraumatic.      Nose: Nose normal.      Mouth/Throat:      Mouth: Mucous membranes are moist.   Eyes:      Extraocular Movements: Extraocular movements intact.   Cardiovascular:      Rate and Rhythm: Normal rate and regular rhythm.   Pulmonary:      Effort: Pulmonary effort is normal.      Breath sounds: Normal breath sounds.   Musculoskeletal:         General: Normal range of motion.   Skin:     General: Skin is warm.   Neurological:      Mental Status: He is alert and oriented to " person, place, and time.   Psychiatric:         Mood and Affect: Mood normal.       Lab Review  Glucose (mg/dL)   Date Value   10/22/2024 93   09/04/2024 88   08/16/2024 84     POC HEMOGLOBIN A1c (%)   Date Value   10/22/2024 5.4   04/18/2024 5.3     Hemoglobin A1C (%)   Date Value   03/01/2023 5.6   06/30/2022 5.4   02/28/2022 4.8   09/17/2021 6.1 (A)     Bicarbonate (mmol/L)   Date Value   10/22/2024 28   09/04/2024 32   08/16/2024 31     Urea Nitrogen (mg/dL)   Date Value   10/22/2024 17   09/04/2024 17   08/16/2024 20     Creatinine (mg/dL)   Date Value   10/22/2024 0.99   09/04/2024 0.93   08/16/2024 1.02     Lab Results   Component Value Date    CHOL 105 09/04/2024    CHOL 98 06/25/2024    CHOL 106 02/09/2024     Lab Results   Component Value Date    HDL 52.5 09/04/2024    HDL 51.8 06/25/2024    HDL 54.4 02/09/2024     Lab Results   Component Value Date    LDLCALC 41 09/04/2024    LDLCALC 33 06/25/2024    LDLCALC 36 02/09/2024     Lab Results   Component Value Date    TRIG 58 09/04/2024    TRIG 66 06/25/2024    TRIG 80 02/09/2024     === 06/10/22 ===    BONE DENSITY    Health Maintenance:   Foot Exam:   Eye Exam: 2022  Urine Albumin: March 1, 2023    Assessment/Plan   68-year-old male presents for follow up. 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 He is currently on 5mg of prednisone daily. His blood pressure is at goal. His hemoglobin A1c was found to be 5.3% as of today.  All insulin and oral agents have since been stopped    Steroid-induced hyperglycemia  The A1C is at goal   No medications warranted for glycemic control      Osteoporosis  For bone density as ordered  To continue Vitamin D 1000 units daily   The recommended calcium intake is 1200mg per day   Please take Alendronate 70mg po once weekly  Please take the above with a full glass of water and remain upright for 30 minutes     For follow up in 12 months

## 2024-10-25 NOTE — ASSESSMENT & PLAN NOTE
For bone density as ordered  To continue Vitamin D 1000 units daily   The recommended calcium intake is 1200mg per day   Please take Alendronate 70mg po once weekly  Please take the above with a full glass of water and remain upright for 30 minutes     For follow up in 12 months

## 2024-11-02 DIAGNOSIS — Z94.2 S/P LUNG TRANSPLANT (MULTI): ICD-10-CM

## 2024-11-02 DIAGNOSIS — M81.8 OTHER OSTEOPOROSIS, UNSPECIFIED PATHOLOGICAL FRACTURE PRESENCE: ICD-10-CM

## 2024-11-02 DIAGNOSIS — Z94.2 LUNG TRANSPLANTED (MULTI): ICD-10-CM

## 2024-11-03 RX ORDER — PREDNISONE 5 MG/1
5 TABLET ORAL DAILY
Qty: 90 TABLET | Refills: 3 | Status: SHIPPED | OUTPATIENT
Start: 2024-11-03

## 2024-11-03 RX ORDER — ROSUVASTATIN CALCIUM 40 MG/1
40 TABLET, COATED ORAL DAILY
Qty: 90 TABLET | Refills: 3 | Status: SHIPPED | OUTPATIENT
Start: 2024-11-03

## 2024-11-03 RX ORDER — ALENDRONATE SODIUM 70 MG/1
TABLET ORAL
Qty: 12 TABLET | Refills: 3 | Status: SHIPPED | OUTPATIENT
Start: 2024-11-03

## 2024-11-03 RX ORDER — TACROLIMUS 1 MG/1
2 CAPSULE ORAL 2 TIMES DAILY
Qty: 360 CAPSULE | Refills: 3 | Status: SHIPPED | OUTPATIENT
Start: 2024-11-03

## 2024-11-03 RX ORDER — PANTOPRAZOLE SODIUM 40 MG/1
40 TABLET, DELAYED RELEASE ORAL DAILY
Qty: 90 TABLET | Refills: 3 | Status: SHIPPED | OUTPATIENT
Start: 2024-11-03

## 2024-11-03 RX ORDER — MYCOPHENOLATE MOFETIL 250 MG/1
250 CAPSULE ORAL 2 TIMES DAILY
Qty: 180 CAPSULE | Refills: 3 | Status: SHIPPED | OUTPATIENT
Start: 2024-11-03

## 2024-11-03 RX ORDER — DAPSONE 100 MG/1
1 TABLET ORAL DAILY
Qty: 90 TABLET | Refills: 3 | Status: SHIPPED | OUTPATIENT
Start: 2024-11-03

## 2024-11-03 RX ORDER — MONTELUKAST SODIUM 10 MG/1
10 TABLET ORAL NIGHTLY
Qty: 90 TABLET | Refills: 3 | Status: SHIPPED | OUTPATIENT
Start: 2024-11-03

## 2024-11-03 RX ORDER — VALGANCICLOVIR 450 MG/1
900 TABLET, FILM COATED ORAL DAILY
Qty: 180 TABLET | Refills: 3 | Status: SHIPPED | OUTPATIENT
Start: 2024-11-03

## 2024-11-04 ENCOUNTER — HOSPITAL ENCOUNTER (OUTPATIENT)
Dept: RADIOLOGY | Facility: HOSPITAL | Age: 69
Discharge: HOME | End: 2024-11-04
Payer: MEDICARE

## 2024-11-04 DIAGNOSIS — M81.8 OTHER OSTEOPOROSIS WITHOUT CURRENT PATHOLOGICAL FRACTURE: ICD-10-CM

## 2024-11-04 DIAGNOSIS — Z94.2 S/P LUNG TRANSPLANT (MULTI): ICD-10-CM

## 2024-11-04 PROCEDURE — 77080 DXA BONE DENSITY AXIAL: CPT

## 2024-11-04 PROCEDURE — 77080 DXA BONE DENSITY AXIAL: CPT | Performed by: STUDENT IN AN ORGANIZED HEALTH CARE EDUCATION/TRAINING PROGRAM

## 2024-11-05 ENCOUNTER — TELEPHONE (OUTPATIENT)
Dept: TRANSPLANT | Facility: HOSPITAL | Age: 69
End: 2024-11-05
Payer: MEDICARE

## 2024-11-05 NOTE — TELEPHONE ENCOUNTER
Reviewed DEXA scan results with Dr. Boone, patient already follows with Endocrine. Next appt 10/25/2025. Will email Dr. Ashok Pascual to see if he can be seen sooner.

## 2024-11-05 NOTE — TELEPHONE ENCOUNTER
Called patient and left voicemail in regards to plan for treatment of osteopenia. Instructed him to call back with any questions.

## 2024-11-11 ENCOUNTER — HOSPITAL ENCOUNTER (OUTPATIENT)
Dept: RESPIRATORY THERAPY | Facility: HOSPITAL | Age: 69
Discharge: HOME | End: 2024-11-11
Payer: MEDICARE

## 2024-11-11 ENCOUNTER — OFFICE VISIT (OUTPATIENT)
Dept: TRANSPLANT | Facility: HOSPITAL | Age: 69
End: 2024-11-11
Payer: MEDICARE

## 2024-11-11 ENCOUNTER — HOSPITAL ENCOUNTER (OUTPATIENT)
Dept: RADIOLOGY | Facility: HOSPITAL | Age: 69
Discharge: HOME | End: 2024-11-11
Payer: MEDICARE

## 2024-11-11 VITALS
SYSTOLIC BLOOD PRESSURE: 139 MMHG | HEART RATE: 85 BPM | BODY MASS INDEX: 31.16 KG/M2 | TEMPERATURE: 97 F | WEIGHT: 211 LBS | OXYGEN SATURATION: 95 % | DIASTOLIC BLOOD PRESSURE: 69 MMHG

## 2024-11-11 DIAGNOSIS — R41.3 MEMORY LOSS: Primary | ICD-10-CM

## 2024-11-11 DIAGNOSIS — Z94.2 LUNG TRANSPLANTED (MULTI): ICD-10-CM

## 2024-11-11 DIAGNOSIS — Z94.2 S/P LUNG TRANSPLANT (MULTI): ICD-10-CM

## 2024-11-11 PROCEDURE — 71046 X-RAY EXAM CHEST 2 VIEWS: CPT

## 2024-11-11 PROCEDURE — 99215 OFFICE O/P EST HI 40 MIN: CPT | Mod: 25

## 2024-11-11 PROCEDURE — 94010 BREATHING CAPACITY TEST: CPT | Performed by: INTERNAL MEDICINE

## 2024-11-11 PROCEDURE — 94010 BREATHING CAPACITY TEST: CPT

## 2024-11-11 PROCEDURE — 71046 X-RAY EXAM CHEST 2 VIEWS: CPT | Performed by: RADIOLOGY

## 2024-11-11 RX ORDER — AZITHROMYCIN 250 MG/1
250 TABLET, FILM COATED ORAL
Qty: 12 TABLET | Refills: 11 | Status: SHIPPED | OUTPATIENT
Start: 2024-11-11 | End: 2024-11-14 | Stop reason: SDUPTHER

## 2024-11-11 RX ORDER — LANOLIN ALCOHOL/MO/W.PET/CERES
800 CREAM (GRAM) TOPICAL 2 TIMES DAILY
Qty: 270 TABLET | Refills: 3 | Status: SHIPPED | OUTPATIENT
Start: 2024-11-11 | End: 2024-11-14 | Stop reason: SDUPTHER

## 2024-11-11 RX ORDER — TOBRAMYCIN INHALATION SOLUTION 300 MG/5ML
300 INHALANT RESPIRATORY (INHALATION)
Qty: 300 ML | Refills: 11 | Status: SHIPPED | OUTPATIENT
Start: 2024-11-11 | End: 2024-11-14 | Stop reason: ALTCHOICE

## 2024-11-11 RX ORDER — DARBEPOETIN ALFA 60 UG/ML
60 SOLUTION INTRAVENOUS; SUBCUTANEOUS WEEKLY
Qty: 4 ML | Refills: 11 | Status: SHIPPED | OUTPATIENT
Start: 2024-11-11 | End: 2024-11-14 | Stop reason: SDUPTHER

## 2024-11-11 ASSESSMENT — ENCOUNTER SYMPTOMS
CARDIOVASCULAR NEGATIVE: 1
BLURRED VISION: 0
COUGH: 1
FEVER: 0
CONSTITUTIONAL NEGATIVE: 1
SHORTNESS OF BREATH: 0
IRREGULAR HEARTBEAT: 0
EYES NEGATIVE: 1
SPUTUM PRODUCTION: 1
PARESTHESIAS: 1
HEMATURIA: 0
COLOR CHANGE: 0
ENDOCRINE NEGATIVE: 1
VOMITING: 0
PALPITATIONS: 0
CHILLS: 0
NEUROLOGICAL NEGATIVE: 1
NAUSEA: 0
RESPIRATORY NEGATIVE: 1
BRUISES/BLEEDS EASILY: 1
GASTROINTESTINAL NEGATIVE: 1
WEAKNESS: 1
FALLS: 0
EYE PAIN: 0
HEADACHES: 0
WEIGHT LOSS: 1
TREMORS: 0
CONSTIPATION: 0
HEMATOLOGIC/LYMPHATIC NEGATIVE: 1
LIGHT-HEADEDNESS: 0
CHANGE IN BOWEL HABIT: 0
DOUBLE VISION: 0
SUSPICIOUS LESIONS: 0
MUSCLE CRAMPS: 1
PSYCHIATRIC NEGATIVE: 1
WEIGHT GAIN: 0
NUMBNESS: 1
DIARRHEA: 0
SORE THROAT: 0
DYSPNEA ON EXERTION: 1
DIZZINESS: 0

## 2024-11-11 ASSESSMENT — PAIN SCALES - GENERAL: PAINLEVEL_OUTOF10: 0-NO PAIN

## 2024-11-11 NOTE — PATIENT INSTRUCTIONS
Thank you for coming in to clinic today, it was nice to see you.     Today we discussed the following:  - Monthly spirometry at a  facility close to home.   - We are waiting to hear back from your Endocrinology team in regards to treatment for osteopenia and taking you off of fosamax    Medication Changes:  - Increase Magnesium to 2 pills twice day, take 2 hours apart from Tacrolimus. Please call if diarrhea becomes a problem and will resume previous dose. This may help your muscle cramping      Follow-up  - Labs 11/18/24  - Chest X-ray and Spirometry in 1 month, may do at [facility]  - Return to clinic in 8 weeks with CXR and jelly  - Follow-up with Neurology, may schedule locally at [facility] or [facility]    As a friendly reminder:  - Call the office if you develop any symptoms including but not limited to fevers, chills, nausea, vomiting, aches, shortness of breath, or coughing  - Do not let your medications run out, try to maintain at least a 1 week supply at all times. Call the office early if refills are needed as these can be difficult to obtain on weekends  - Please keep all your appointments    As always, call the office at [phone] or [phone] for any issues during normal business hours    Out of hours, please call the [facility] On[facility] [phone] for emergencies    negative...

## 2024-11-11 NOTE — PROGRESS NOTES
Mr. 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 His transplant course was complicated by pleural bleed with return to OR for washout on 3/23/2020, prolonged ventilatory support requiring tracheostomy (since decannulated) and discharged on 5/8/2020. Post-hospital course was complicated by septic shock with MSSA in August 20202 with PE requiring lifelong anticoagulation.     Presenting today for follow-up visit    Interval Medical History:  11/11/24: Breathing feels okay, no coughing/wheezing/SOB. Reporting muscle cramping, mostly in the evening, will space Magnesium to BID advised to call if diarrhea becomes an issue. Staying active at home, working outdoors. Reporting memory concerns, first noticed by wife, frequently forgets tasks, prior engagements, wife concerned. Will refer to Neurology.     9/9/24: Breathing overall stable on RA, trying to be more active. No nausea or vomiting, moving bowels. Working on weight loss, BMI 30. BP overall stable, 120's systolic. Lesion on scalp biopsied, negative for malignancy. Elevated PSA, seen by Urology, MRI prostate 9/15/24. Started on Sanctura for BPH. Ongoing issues with neuropathy in feet/hands. Amenable to starting Aranesp injections.     6/3/24: Breathing overall stable on RA, trying to increase activity. No nausea or vomiting, moving bowels. No LE edema. Working on weight loss, lost 10 lbs. BP overall improved, 120's systolic. Following with Dermatology, lesion on scalp removed, biopsy pending, will fax us results. Mood overall improved, met with Dr. Vincent, feeling better.     4/3/24: Breathing overall stable on RA, no nausea or vomiting, moving bowels, feeling well. Trying to maintain adequate po fluid intake, no LE edema. Working on weight loss but not having much success, BMI 33. Depressed about weight gain. Trying to increase activity, knows he needs to go back to the gym. BP lower 100's systolic.     1/10/24:  Breathing overall stable in RA, no nausea or vomiting, moving bowels. No LE edema. Feeling well. Saw Dermatology lesion on forehead consistent with SCC, removed, no further lesions. Gained 10 lbs since last visit, BMI 30.     11/10/23: Breathing overall stable on RA, no nausea or vomiting, moving bowels. No LE edema. Maintaining adequate po fluid intake. New lesion on forehead, planning to see Dermatology soon.     10/13/23: Bronchoscopy on 9/19/23 with multiple strains of Pseudomonas, completed a 14 day course of Ciprofloxacin and started on inhaled VICENTE, some tremors with albuterol. TBBX with no ACR or OB. Feels breathing and cough is overall improved, no MERINO. No nausea or vomiting, tolerating po intake and moving bowels.     9/7/23: Seen in ER 8/25/23 for chest pain, sharp increased with movement. Workup negative including nuclear stress test. Started on Crestor. Still present at times but improved, feels musculoskeletal in nature. Breathing overall stable, still has a cough with brownish sputum despite course of Ciprofloxacin after last visit, RVP negative. No nausea or vomiting, moving bowels regularly.     8/2/23: Breathing overall stable, walking daily. Cough with brownish sputum for the past 2 weeks, no fevers, chills. No nausea or vomiting, moving bowels, no LE edema, maintaining adequate po fluid intake.     6/12/23: Breathing stable, working on weight loss. No nausea or vomiting, moving bowels. No LE edema. No longer working due to hours. Trying to be more active.     4/5/23: Feels that breathing is overall stable, no MERINO or cough. Tolerating PO diet, no nausea or vomiting, moving bowels, no LE edema. Notes 15lb weight gain self reported due to inactivity - no orthopnea or PND. Notes skin lesion on scalp present for last few months, not yet evaluated by dermatology. Blood glucose range , 115 on 12.5mg Jardiance bid, BP max 115 at home. Started new job part-time in [employer] at [employer] 3x weekly for  6hrs/day    Current Outpatient Medications   Medication Instructions    alendronate (Fosamax) 70 mg tablet TAKE 1 TABLET BY MOUTH ONCE  WEEKLY AS DIRECTED    apixaban (ELIQUIS) 5 mg, oral, Every 12 hours    Aranesp (in polysorbate) 60 mcg, subcutaneous, Weekly    azithromycin (ZITHROMAX) 250 mg, oral, Every Mon/Wed/Fri    blood sugar diagnostic (OneTouch Verio test strips) strip 1 each, intradermal, 3 times daily before meals    budesonide-formoteroL (Symbicort) 160-4.5 mcg/actuation inhaler Inhale 2 puffs by mouth 2 times a day. Rinse mouth with water after use to reduce aftertaste and incidence of candidiasis. Do not swallow.    calcium carbonate (OSCAL) 1,250 mg, oral, 2 times daily (morning and late afternoon)    cholecalciferol (Vitamin D-3) 25 MCG (1000 UT) capsule 1 capsule, Daily    dapsone 100 mg, oral, Daily    DULoxetine (CYMBALTA) 60 mg, oral, Daily, Do not crush or chew.    gabapentin (NEURONTIN) 300 mg, oral, Nightly    levalbuterol (Xopenex) 0.63 mg/3 mL nebulizer solution Inhale one (1) ampule via nebulizer twice daily.    loratadine (Claritin) 10 mg tablet TAKE ONE (1) TABLET BY MOUTH ONCE DAILY.    magnesium oxide (Mag-Ox) 400 mg (241.3 mg magnesium) tablet Take 3 tablets (1,200 mg) by mouth once daily at 1 PM    montelukast (SINGULAIR) 10 mg, oral, Nightly    multivitamin with minerals iron-free (Centrum Silver) 1 tablet, Daily    mycophenolate (CELLCEPT) 250 mg, oral, 2 times daily    pantoprazole (PROTONIX) 40 mg, oral, Daily    predniSONE (DELTASONE) 5 mg, oral, Daily    rosuvastatin (CRESTOR) 40 mg, oral, Daily    tacrolimus (PROGRAF) 2 mg, oral, 2 times daily    tobramycin (Cody 300) 300 mg/5 mL nebulizer solution Inhale 5 mL (300 mg) by nebulization 2 times a day.    trospium (SANCTURA) 20 mg, oral, 2 times daily    valGANciclovir (VALCYTE) 900 mg, oral, Daily     Review of Systems   Constitutional: Negative.    HENT: Negative.     Eyes: Negative.    Respiratory: Negative.      Cardiovascular: Negative.    Gastrointestinal: Negative.    Endocrine: Negative.    Genitourinary: Negative.    Musculoskeletal:         Cramping   Skin: Negative.    Allergic/Immunologic: Positive for immunocompromised state.   Neurological: Negative.    Hematological: Negative.    Psychiatric/Behavioral: Negative.         Physical Exam  Constitutional:       General: He is not in acute distress.     Appearance: Normal appearance. He is not ill-appearing.   HENT:      Head: Normocephalic and atraumatic.      Nose: Nose normal.      Mouth/Throat:      Mouth: Mucous membranes are moist.      Pharynx: Oropharynx is clear.   Eyes:      Extraocular Movements: Extraocular movements intact.      Conjunctiva/sclera: Conjunctivae normal.      Pupils: Pupils are equal, round, and reactive to light.   Cardiovascular:      Rate and Rhythm: Normal rate and regular rhythm.      Pulses: Normal pulses.      Heart sounds: No murmur heard.     No friction rub. No gallop.   Pulmonary:      Effort: Pulmonary effort is normal. No respiratory distress.      Breath sounds: Normal breath sounds. No stridor. No wheezing, rhonchi or rales.   Chest:      Chest wall: No tenderness.   Abdominal:      General: Abdomen is flat. Bowel sounds are normal.      Palpations: Abdomen is soft.      Tenderness: There is no abdominal tenderness.   Musculoskeletal:         General: Normal range of motion.      Cervical back: Normal range of motion and neck supple.      Right lower leg: No edema.      Left lower leg: No edema.   Lymphadenopathy:      Cervical: No cervical adenopathy.   Skin:     General: Skin is warm and dry.      Capillary Refill: Capillary refill takes less than 2 seconds.   Neurological:      General: No focal deficit present.      Mental Status: He is alert and oriented to person, place, and time. Mental status is at baseline.   Psychiatric:         Mood and Affect: Mood normal.         Behavior: Behavior normal.         Thought  Content: Thought content normal.         Judgment: Judgment normal.         EBV DNA Result   Date Value Ref Range Status   10/22/2024 Not Detected Not Detected Final   09/04/2024 Not Detected Not Detected Final   07/24/2024 Not Detected Not Detected Final     CMV DNA Result   Date Value Ref Range Status   10/22/2024 Not Detected Not Detected Final   09/04/2024 Not Detected Not Detected Final   08/16/2024 Not Detected Not Detected Final     IgG   Date Value Ref Range Status   10/22/2024 738 700 - 1,600 mg/dL Final     Comment:     MONOCLONAL PROTEINS MAY CAUSE FALSELY LOW  RESULTS IN THIS ASSAY. SERUM PROTEIN  ELECTROPHORESIS SHOULD BE DONE AS THE  FIRST TEST TO EVALUATE MONOCLONAL GAMMOPATHY.     09/04/2024 789 700 - 1,600 mg/dL Final     Comment:     MONOCLONAL PROTEINS MAY CAUSE FALSELY LOW  RESULTS IN THIS ASSAY. SERUM PROTEIN  ELECTROPHORESIS SHOULD BE DONE AS THE  FIRST TEST TO EVALUATE MONOCLONAL GAMMOPATHY.     08/16/2024 754 700 - 1,600 mg/dL Final     Comment:     MONOCLONAL PROTEINS MAY CAUSE FALSELY LOW  RESULTS IN THIS ASSAY. SERUM PROTEIN  ELECTROPHORESIS SHOULD BE DONE AS THE  FIRST TEST TO EVALUATE MONOCLONAL GAMMOPATHY.       Tacrolimus    Date Value Ref Range Status   10/22/2024 6.8 <=15.0 ng/mL Final   09/04/2024 6.2 <=15.0 ng/mL Final   08/16/2024 6.1 <=15.0 ng/mL Final     WBC   Date Value Ref Range Status   10/22/2024 5.3 4.4 - 11.3 x10*3/uL Final   09/04/2024 5.4 4.4 - 11.3 x10*3/uL Final   08/16/2024 5.8 4.4 - 11.3 x10*3/uL Final     RBC   Date Value Ref Range Status   10/22/2024 3.18 (L) 4.50 - 5.90 x10*6/uL Final   09/04/2024 3.37 (L) 4.50 - 5.90 x10*6/uL Final   08/16/2024 3.18 (L) 4.50 - 5.90 x10*6/uL Final     Hemoglobin   Date Value Ref Range Status   10/22/2024 8.0 (L) 13.5 - 17.5 g/dL Final   09/04/2024 8.7 (L) 13.5 - 17.5 g/dL Final   08/16/2024 8.4 (L) 13.5 - 17.5 g/dL Final     Hematocrit   Date Value Ref Range Status   10/22/2024 28.5 (L) 41.0 - 52.0 % Final   09/04/2024 31.2 (L)  41.0 - 52.0 % Final   08/16/2024 29.5 (L) 41.0 - 52.0 % Final     Platelets   Date Value Ref Range Status   10/22/2024 113 (L) 150 - 450 x10*3/uL Final   09/04/2024 106 (L) 150 - 450 x10*3/uL Final   08/16/2024 114 (L) 150 - 450 x10*3/uL Final     Neutrophils Absolute   Date Value Ref Range Status   10/22/2024 4.02 1.60 - 5.50 x10*3/uL Final     Comment:     Percent differential counts (%) should be interpreted in the context of the absolute cell counts (cells/uL).   09/04/2024 4.04 1.60 - 5.50 x10*3/uL Final     Comment:     Percent differential counts (%) should be interpreted in the context of the absolute cell counts (cells/uL).   08/16/2024 4.35 1.60 - 5.50 x10*3/uL Final     Comment:     Percent differential counts (%) should be interpreted in the context of the absolute cell counts (cells/uL).     Glucose   Date Value Ref Range Status   10/22/2024 93 74 - 99 mg/dL Final   09/04/2024 88 74 - 99 mg/dL Final   08/16/2024 84 74 - 99 mg/dL Final     Sodium   Date Value Ref Range Status   10/22/2024 141 136 - 145 mmol/L Final   09/04/2024 141 136 - 145 mmol/L Final   08/16/2024 141 136 - 145 mmol/L Final     Potassium   Date Value Ref Range Status   10/22/2024 4.0 3.5 - 5.3 mmol/L Final   09/04/2024 4.3 3.5 - 5.3 mmol/L Final   08/16/2024 4.3 3.5 - 5.3 mmol/L Final     Chloride   Date Value Ref Range Status   10/22/2024 108 (H) 98 - 107 mmol/L Final   09/04/2024 105 98 - 107 mmol/L Final   08/16/2024 106 98 - 107 mmol/L Final     Anion Gap   Date Value Ref Range Status   10/22/2024 9 (L) 10 - 20 mmol/L Final   09/04/2024 8 (L) 10 - 20 mmol/L Final   08/16/2024 8 (L) 10 - 20 mmol/L Final     Urea Nitrogen   Date Value Ref Range Status   10/22/2024 17 6 - 23 mg/dL Final   09/04/2024 17 6 - 23 mg/dL Final   08/16/2024 20 6 - 23 mg/dL Final     Creatinine   Date Value Ref Range Status   10/22/2024 0.99 0.50 - 1.30 mg/dL Final   09/04/2024 0.93 0.50 - 1.30 mg/dL Final   08/16/2024 1.02 0.50 - 1.30 mg/dL Final     eGFR    Date Value Ref Range Status   10/22/2024 81 >60 mL/min/1.73m*2 Final     Comment:     Calculations of estimated GFR are performed using the 2021 CKD-EPI Study Refit equation without the race variable for the IDMS-Traceable creatinine methods.  https://jasn.asnjournals.org/content/early/2021/09/22/ASN.[email or web address]   09/04/2024 88 >60 mL/min/1.73m*2 Final     Comment:     Calculations of estimated GFR are performed using the 2021 CKD-EPI Study Refit equation without the race variable for the IDMS-Traceable creatinine methods.  https://jasn.asnjournals.org/content/early/2021/09/22/ASN.[email or web address]   08/16/2024 79 >60 mL/min/1.73m*2 Final     Comment:     Calculations of estimated GFR are performed using the 2021 CKD-EPI Study Refit equation without the race variable for the IDMS-Traceable creatinine methods.  https://jasn.asnjournals.org/content/early/2021/09/22/ASN.[email or web address]     Calcium   Date Value Ref Range Status   10/22/2024 8.4 (L) 8.6 - 10.3 mg/dL Final   09/04/2024 7.9 (L) 8.6 - 10.3 mg/dL Final   08/16/2024 8.6 8.6 - 10.3 mg/dL Final     Albumin   Date Value Ref Range Status   10/22/2024 3.7 3.4 - 5.0 g/dL Final   09/04/2024 3.8 3.4 - 5.0 g/dL Final   08/16/2024 3.6 3.4 - 5.0 g/dL Final     Alkaline Phosphatase   Date Value Ref Range Status   10/22/2024 82 33 - 136 U/L Final   09/04/2024 82 33 - 136 U/L Final   08/16/2024 79 33 - 136 U/L Final     ALT   Date Value Ref Range Status   10/22/2024 18 10 - 52 U/L Final     Comment:     Patients treated with Sulfasalazine may generate falsely decreased results for ALT.   09/04/2024 22 10 - 52 U/L Final     Comment:     Patients treated with Sulfasalazine may generate falsely decreased results for ALT.   08/16/2024 19 10 - 52 U/L Final     Comment:     Patients treated with Sulfasalazine may generate falsely decreased results for ALT.     AST   Date Value Ref Range Status   10/22/2024 24 9 - 39 U/L Final   09/04/2024 26 9 - 39 U/L Final   08/16/2024 22 9 - 39 U/L  Final     Total Protein   Date Value Ref Range Status   10/22/2024 6.1 (L) 6.4 - 8.2 g/dL Final   09/04/2024 5.6 (L) 6.4 - 8.2 g/dL Final   08/16/2024 5.8 (L) 6.4 - 8.2 g/dL Final     Bilirubin, Direct   Date Value Ref Range Status   04/29/2023 0.1 0.0 - 0.3 mg/dL Final   01/05/2023 0.1 0.0 - 0.3 mg/dL Final   09/21/2020 0.1 0.0 - 0.3 mg/dL Final     Magnesium   Date Value Ref Range Status   10/22/2024 1.62 1.60 - 2.40 mg/dL Final   09/04/2024 1.81 1.60 - 2.40 mg/dL Final   08/16/2024 1.67 1.60 - 2.40 mg/dL Final     Pulmonary Function Tests:  11/11/24:  FEV: 1.32 45%  FVC: 2.35 60%  FEV1/FVC: 0.56 73%  HDX18-05: 0.56 24%    9/9/24: Spirometry:  FVC: 2.40 62%  FEV1: 1.36 46%  25-75: 0. 64 27%     6/3/24: Spirometry:  FVC: 2.48 64%  FEV1: 1.44 49%  25-75: 0.92 39%     4/3/24: Spirometry:  FVC:  2.41 59%  FEV1: 1.48 48%  25-75: 0.78 32%     1/10/24: Spirometry:  FVC: 2.45 60%  FEV1: 1.55 50%  25-75: 0.97 39%     11/10/23 Spirometry:  FVC: 2.33, 55%  FEV1: 1.47 46%  25-75: 0.95 39%     10/13/23 Spirometry:  FVC: 2.29 52%  FEV1: 1.35 42%  25-75: 0.88 36%     9/7/23 Spirometry:  FVC: 2.25, 53%  FEV1: 1.41, 44%  25-75: 0.82, 34%     8/2/23 Spirometry:  FVC: 2.48, 59%  FEV1: 1.52, 47%  25-75: 1.03, 42%     6/12/23 Spirometry:  FVC 2.65 62%  FEV1: 1.48, 46%  25-75: 0.89, 36%     5/2/23 Spirometry:  FVC: 2.58, 61%  FEV1: 1.51, 47%  25-75: 0.88, 36%     4/5/23 Spirometry:  FVC: 2.47 58%  FEV1: 1.50 46%  FEF 25-75: 31%    Assessment & Plan:  Mr. 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 Modest PE at same time of septic shock in August 2020, continue Eliquis 5mg BID lifelong   - Hx MSSA, Klebsiella, Corynebacterium from April 2020  bronchoscopy  - Hx Enterobacter from June 2020 bronchoscopy, sensitive to Fluoroquinolones  - Hx Pseudomonas from September 2023 bronchoscopy, completed 14 day course of Ciprofloxacin and commenced on inhaled Tobramycin  - PFTs as above  - CXR 11/11/2024 reviewed with patient, stable  - Hx SARA: No longer requiring CPAP   - Hx CLAD: Multifocal air trapping identified with September 2023 high resolution chest CT  - HLA Class I & II Negative September 2024, repeat December 2024  - ROSSY Prophylaxis: Azithromycin 250mg M W F, Singular 10mg nightly, Symbicort 160/4.5 2 puffs BID  - Nebulized Medications: Levalbuterol BID followed by Tobramycin 300mg BID and Symbicort as above     Immunosuppression:  - Prograf goal 6-8, level 6.8, continue 2mg BID  - Cellcept 250mg BID  - Prednisone 5mg daily     Prophylaxis:  - PJP: Dapsone 100mg daily   - CMV +/- mismatch: Valcyte 900mg daily. Follow with routine CMV PCR 10/22/24 not detected  - EBV: Follow with routine EBV PCR 10/22/24 not detected  - Fungal: Completed course of Voriconazole  - IgG, goal > 600: Level 738 on 10/22/24     Cardiac:  - Denies chest pain, palpitations, swelling in feet/ankles  - BP Management: No current antihypertensives, historically required Lopressor but then discontinued secondary to hypotension   Advised to call Lung Transplant Office if BP consistently > 130/80 or < 100/60  - Lifelong anticoagulation with Eliquis 5mg BID  - Hx Chest Pain: August 2023, evaluated in ED, work-up unremarkable. Likely MSK etiology   TTE September 2023: EF 65% and normal biventricular function  - Hx HLD: Crestor 40mg daily   Lipid Panel September 2024: , HDL 52.5, Cholesterol/HDL 2.0, LDL 41, VLDL 12, TG 58, non HDL 53   Repeat December 2024     Renal  - Denies dysuria/hematuria  - Creatinine 0.99, eGFR 81  - Potassium 4.0  - Magnesium 1.62. Increase to 800mg BID  - Hx BPH: Sanctura 20mg BID per Urology  - Elevated PSA: 4.88 in April 2024, MRI prostate consistent with  "BPH. Follows with Dr. Soni of Urology  - Renally dose all medications and avoid nephrotoxins. May not have NSAIDs or IV Contrast    GI  - Denies n/v/d/c  - Protonix BID   - Elevated Alkaline Phosphatase: Improved with discontinuation of Voriconazole, MRI Liver February 2023 normal. Not presently an issue     ID  - Denies fevers/chills/night sweats. Afebrile, no leukocytosis  - Hx Septic Shock with MSSA: August 2020, prolonged course of IV antibiotics with resolution  - Hx Otitis Externa: Noted September 2020, follows by ENT. Several courses of antibiotics with wick insertion, developed mastoiditis. Since resolved  - Hx Sternal Wound Infection: 2 episodes of staph infection requiring debridement and wound vac   2nd episode in July 2021, no debridement at this time but completed an 8 week course of antibiotics  - Hx Covid: December 2022, recovered  - Hx positive bronchoscopy cultures as above     Endo  - Hx DM: Follows with Endocrine, last visit 10/22/24 HgBA1c 5.4 at this time   No glucose lowering agents presently, historically prescribed Jardiance  - Hx Osteoporosis: DEXA 11/4/24 indicative of low bone mass   Follows with Endocrinology. Alendronate 70mg once weekly  - Hx Vitamin D Deficiency: Level 61 on 9/4/24, continue Vitamin D3 1000u daily      Heme  - Denies bleeding/bruising  - Hx PE: Noted in August 2020 with admission for septic shock. Eliquis 5mg BID, continue indefinitely   IVC filter, will explore removal  - HgB 8.0, Hematocrit 28.5  - Hx ANDREW: Aranesp 60mcg weekly  - WBC 5.3, ANC sufficient, platelets 113     ENT  - Hx Otitis Externa with facial nerve involvement. 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 Will refer to Neurology for further work-up     Preventative Health:  -Vaccines:   Influenza: 10/11/24   Covid: 2/4/21, 3/12/21 8/16/21, 2/25/22, 10/13/22, 10/9/23, 10/11/24   Pneumonia: [id number] 9/22/16   RSV: 11/123   Shingrix: 6/21/19, 3/6/19  - Colonoscopy: Last done April 2018, tubular adenoma. Repeat interval not documented, should continue colon cancer screening until age 73  - Dermatology: Hx SCC. Follows with Dermatology, last visit November 2024,. Melanoma removed from left ear. Dr. Liza Okeefe  - Urology: Elevated PSA of 4.88 April 2024. MRI prostate consistent with BPH, follows with Dr. Soni  - Dental: Up to day    FOLLOW-UP:  - Return to clinic in 8 weeks with CXR/Veguita  - Repeat CXR and Geovanny in 1 month, may do at  [facility]  - Labs 11/18  - Follow-up with Neurology

## 2024-11-11 NOTE — PROGRESS NOTES
Review of Systems   Constitutional: Positive for weight loss (More active, eating healthier). Negative for chills, fever, malaise/fatigue (occasional) and weight gain.   HENT:  Negative for congestion and sore throat.    Eyes:  Negative for blurred vision, double vision and pain.   Cardiovascular:  Positive for dyspnea on exertion. Negative for chest pain, irregular heartbeat, leg swelling and palpitations.   Respiratory:  Positive for cough and sputum production (Brown/yellow. In the morning.). Negative for shortness of breath.    Hematologic/Lymphatic: Bruises/bleeds easily.   Skin:  Negative for color change and suspicious lesions.   Musculoskeletal:  Positive for muscle cramps (Toes and feet.) and muscle weakness. Negative for falls.   Gastrointestinal:  Negative for change in bowel habit, constipation, diarrhea, nausea and vomiting.   Genitourinary: Negative.  Negative for hematuria.   Neurological:  Positive for numbness (Feet), paresthesias (Feet) and weakness. Negative for dizziness, headaches, light-headedness and tremors.     Patient seen in clinic today. Overall, doing well at home. He has lost a small amount of weight from increasing activity and eating healthier. Blood sugars have been stable, recent A1C 5.4. C/o numbness and tingling in feet, gabapentin helping some. Discussed monthly dell for maintenance. Refills sent for Aranesp, Cody and delmis. Discussed with Kita CHARLES. Plan to RTC 1/13/2025 with CXR and jelly. Monthly jelly. Next bronchoscopy PRN. Labs due 11/18.     Routine Care Addressed:   -Vaccines:  -->2024 COVID 10/11/2024  -->2024 Flu 10/11/2024  -->RSV Done  -Colonoscopy: 4/2022--> Next due 4/2027  -DEXA Scan: 11/4/2024--> Osteopenia   -->Treatments:   - Endocrine: 10/22/2025  -PSA: Elevated at 4.88 in 4/2024  - Urology: 2/3/2025  -Dentist: Following   -Dermatology: Following with Dr. Ty to home. Recent SCC removed from left forehead.

## 2024-11-12 ENCOUNTER — DOCUMENTATION (OUTPATIENT)
Dept: TRANSPLANT | Facility: HOSPITAL | Age: 69
End: 2024-11-12
Payer: MEDICARE

## 2024-11-12 DIAGNOSIS — Z94.2 S/P LUNG TRANSPLANT (MULTI): Primary | ICD-10-CM

## 2024-11-12 DIAGNOSIS — Z94.2 LUNG TRANSPLANTED (MULTI): Primary | ICD-10-CM

## 2024-11-12 DIAGNOSIS — Z87.01: ICD-10-CM

## 2024-11-12 LAB
MGC ASCENT PFT - FEV1 - PRE: 1.32
MGC ASCENT PFT - FEV1 - PREDICTED: 2.92
MGC ASCENT PFT - FVC - PRE: 2.35
MGC ASCENT PFT - FVC - PREDICTED: 3.86

## 2024-11-12 PROCEDURE — 99214 OFFICE O/P EST MOD 30 MIN: CPT

## 2024-11-12 PROCEDURE — 99213 OFFICE O/P EST LOW 20 MIN: CPT

## 2024-11-12 RX ORDER — SODIUM CHLORIDE 0.9 % (FLUSH) 0.9 %
5 SYRINGE (ML) INJECTION 2 TIMES DAILY
Qty: 300 ML | Refills: 11 | Status: SHIPPED | OUTPATIENT
Start: 2024-11-12 | End: 2024-11-13 | Stop reason: WASHOUT

## 2024-11-12 RX ORDER — COLISTIMETHATE SODIUM 150 MG/1
150 INJECTION, POWDER, LYOPHILIZED, FOR SOLUTION INTRAMUSCULAR; INTRAVENOUS 2 TIMES DAILY
Qty: 60 EACH | Refills: 11 | Status: SHIPPED | OUTPATIENT
Start: 2024-11-12 | End: 2025-11-12

## 2024-11-12 RX ORDER — SYRINGE, DISPOSABLE, 1 ML
1 SYRINGE, EMPTY DISPOSABLE MISCELLANEOUS 2 TIMES DAILY
Qty: 60 EACH | Refills: 11 | Status: SHIPPED | OUTPATIENT
Start: 2024-11-12 | End: 2025-11-12

## 2024-11-12 NOTE — PROGRESS NOTES
Called Mr. Patel to discuss discontinuation of inhaled tobramycin due to ototoxic side effects, verbalized his understanding and was agreeable to stopping this medication.    Will transition to inhaled Colistin 150mg BID, prescriptions and supplies [facility]. Advised Mr. Patel to call the office when he receives the medication for further instruction on administration.     During this call we reviewed that this is an inhaled medication that requires reconstitution. All questions answered to his satisfaction

## 2024-11-12 NOTE — PROGRESS NOTES
Spoke with  Stanford Patel's Dermatologist office, Dr. Annie Arenas of [facility] in [facility].     Phone: [phone]    Awaiting fax of pathology from recent SCC excisions. Mr. Patel reported a diagnosis of Melanoma during his office visit on 11/11/24 with me. However, on discussion with Dr. Arenas's team, he was NOT diagnosed with melanoma and had a SCC lesion removed from his left ear.     Will adjust immunosuppression if indicated based on pathology results.     ADDENDUM  Records reviewed from Dermatology office, complete excision of SCC. No melanoma

## 2024-11-13 DIAGNOSIS — Z94.2 LUNG TRANSPLANTED (MULTI): Primary | ICD-10-CM

## 2024-11-13 PROCEDURE — RXMED WILLOW AMBULATORY MEDICATION CHARGE

## 2024-11-13 RX ORDER — SODIUM CHLORIDE 9 MG/ML
5 INJECTION, SOLUTION INTRAMUSCULAR; INTRAVENOUS; SUBCUTANEOUS 2 TIMES DAILY
Qty: 600 ML | Refills: 11 | Status: SHIPPED | OUTPATIENT
Start: 2024-11-13 | End: 2025-11-13

## 2024-11-14 ENCOUNTER — TELEPHONE (OUTPATIENT)
Dept: TRANSPLANT | Facility: HOSPITAL | Age: 69
End: 2024-11-14

## 2024-11-14 ENCOUNTER — TELEPHONE (OUTPATIENT)
Dept: RESPIRATORY THERAPY | Facility: HOSPITAL | Age: 69
End: 2024-11-14
Payer: MEDICARE

## 2024-11-14 DIAGNOSIS — E11.9 TYPE 2 DIABETES MELLITUS WITHOUT COMPLICATION, UNSPECIFIED WHETHER LONG TERM INSULIN USE (MULTI): ICD-10-CM

## 2024-11-14 DIAGNOSIS — Z94.2 LUNG TRANSPLANTED (MULTI): ICD-10-CM

## 2024-11-14 DIAGNOSIS — M81.8 OTHER OSTEOPOROSIS, UNSPECIFIED PATHOLOGICAL FRACTURE PRESENCE: ICD-10-CM

## 2024-11-14 DIAGNOSIS — Z94.2 S/P LUNG TRANSPLANT (MULTI): ICD-10-CM

## 2024-11-14 DIAGNOSIS — R32 URINARY INCONTINENCE, UNSPECIFIED TYPE: ICD-10-CM

## 2024-11-14 PROCEDURE — RXMED WILLOW AMBULATORY MEDICATION CHARGE

## 2024-11-14 RX ORDER — GABAPENTIN 300 MG/1
300 CAPSULE ORAL NIGHTLY
Qty: 30 CAPSULE | Refills: 11 | Status: SHIPPED | OUTPATIENT
Start: 2024-11-14 | End: 2025-11-14

## 2024-11-14 RX ORDER — DULOXETIN HYDROCHLORIDE 60 MG/1
60 CAPSULE, DELAYED RELEASE ORAL DAILY
Qty: 90 CAPSULE | Refills: 3 | Status: SHIPPED | OUTPATIENT
Start: 2024-11-14 | End: 2025-11-14

## 2024-11-14 RX ORDER — VALGANCICLOVIR 450 MG/1
900 TABLET, FILM COATED ORAL DAILY
Qty: 180 TABLET | Refills: 3 | Status: SHIPPED | OUTPATIENT
Start: 2024-11-14

## 2024-11-14 RX ORDER — LANOLIN ALCOHOL/MO/W.PET/CERES
800 CREAM (GRAM) TOPICAL 2 TIMES DAILY
Qty: 270 TABLET | Refills: 3 | Status: SHIPPED | OUTPATIENT
Start: 2024-11-14 | End: 2025-11-14

## 2024-11-14 RX ORDER — VIT C/E/ZN/COPPR/LUTEIN/ZEAXAN 250MG-90MG
25 CAPSULE ORAL DAILY
Qty: 90 CAPSULE | Refills: 3 | Status: SHIPPED | OUTPATIENT
Start: 2024-11-14 | End: 2025-11-14

## 2024-11-14 RX ORDER — ROSUVASTATIN CALCIUM 40 MG/1
40 TABLET, COATED ORAL DAILY
Qty: 90 TABLET | Refills: 3 | Status: SHIPPED | OUTPATIENT
Start: 2024-11-14

## 2024-11-14 RX ORDER — LORATADINE 10 MG/1
10 TABLET ORAL DAILY
Qty: 90 TABLET | Refills: 3 | Status: SHIPPED | OUTPATIENT
Start: 2024-11-14 | End: 2025-11-14

## 2024-11-14 RX ORDER — PREDNISONE 5 MG/1
5 TABLET ORAL DAILY
Qty: 90 TABLET | Refills: 3 | Status: SHIPPED | OUTPATIENT
Start: 2024-11-14

## 2024-11-14 RX ORDER — ISOPROPYL ALCOHOL 70 ML/100ML
1 SWAB TOPICAL WEEKLY
Qty: 140 EACH | Refills: 3 | Status: SHIPPED | OUTPATIENT
Start: 2024-11-14 | End: 2025-11-14

## 2024-11-14 RX ORDER — TACROLIMUS 1 MG/1
2 CAPSULE ORAL 2 TIMES DAILY
Qty: 360 CAPSULE | Refills: 3 | Status: SHIPPED | OUTPATIENT
Start: 2024-11-14

## 2024-11-14 RX ORDER — MONTELUKAST SODIUM 10 MG/1
10 TABLET ORAL NIGHTLY
Qty: 90 TABLET | Refills: 3 | Status: SHIPPED | OUTPATIENT
Start: 2024-11-14

## 2024-11-14 RX ORDER — LEVALBUTEROL INHALATION SOLUTION 0.63 MG/3ML
1 SOLUTION RESPIRATORY (INHALATION) 2 TIMES DAILY
Qty: 180 ML | Refills: 11 | Status: SHIPPED | OUTPATIENT
Start: 2024-11-14 | End: 2025-11-14

## 2024-11-14 RX ORDER — MULTIVIT-MIN/FA/LYCOPEN/LUTEIN .4-300-25
1 TABLET ORAL DAILY
Qty: 90 TABLET | Refills: 3 | Status: SHIPPED | OUTPATIENT
Start: 2024-11-14 | End: 2025-11-14

## 2024-11-14 RX ORDER — DAPSONE 100 MG/1
1 TABLET ORAL DAILY
Qty: 90 TABLET | Refills: 3 | Status: SHIPPED | OUTPATIENT
Start: 2024-11-14

## 2024-11-14 RX ORDER — SYRINGE W-NEEDLE,DISPOSAB,3 ML 25GX5/8"
1 SYRINGE, EMPTY DISPOSABLE MISCELLANEOUS WEEKLY
Qty: 100 EACH | Refills: 4 | Status: SHIPPED | OUTPATIENT
Start: 2024-11-14 | End: 2025-11-14

## 2024-11-14 RX ORDER — ALENDRONATE SODIUM 70 MG/1
70 TABLET ORAL
Qty: 12 TABLET | Refills: 3 | Status: SHIPPED | OUTPATIENT
Start: 2024-11-14 | End: 2025-11-14

## 2024-11-14 RX ORDER — MYCOPHENOLATE MOFETIL 250 MG/1
250 CAPSULE ORAL 2 TIMES DAILY
Qty: 180 CAPSULE | Refills: 3 | Status: SHIPPED | OUTPATIENT
Start: 2024-11-14

## 2024-11-14 RX ORDER — CALCIUM CARBONATE 500(1250)
1 TABLET ORAL
Qty: 180 TABLET | Refills: 3 | Status: SHIPPED | OUTPATIENT
Start: 2024-11-14 | End: 2025-11-14

## 2024-11-14 RX ORDER — TROSPIUM CHLORIDE 20 MG/1
20 TABLET, FILM COATED ORAL 2 TIMES DAILY
Qty: 180 TABLET | Refills: 3 | Status: SHIPPED | OUTPATIENT
Start: 2024-11-14 | End: 2025-11-14

## 2024-11-14 RX ORDER — BLOOD-GLUCOSE METER
1 EACH MISCELLANEOUS
Qty: 300 EACH | Refills: 3 | Status: SHIPPED | OUTPATIENT
Start: 2024-11-14 | End: 2025-11-14

## 2024-11-14 RX ORDER — AZITHROMYCIN 250 MG/1
250 TABLET, FILM COATED ORAL
Qty: 36 TABLET | Refills: 3 | Status: SHIPPED | OUTPATIENT
Start: 2024-11-15 | End: 2025-11-15

## 2024-11-14 RX ORDER — PANTOPRAZOLE SODIUM 40 MG/1
40 TABLET, DELAYED RELEASE ORAL DAILY
Qty: 90 TABLET | Refills: 3 | Status: SHIPPED | OUTPATIENT
Start: 2024-11-14

## 2024-11-14 RX ORDER — DARBEPOETIN ALFA 60 UG/ML
60 SOLUTION INTRAVENOUS; SUBCUTANEOUS WEEKLY
Qty: 4 ML | Refills: 11 | Status: SHIPPED | OUTPATIENT
Start: 2024-11-14 | End: 2025-11-14

## 2024-11-14 RX ORDER — BUDESONIDE AND FORMOTEROL FUMARATE DIHYDRATE 160; 4.5 UG/1; UG/1
2 AEROSOL RESPIRATORY (INHALATION)
Qty: 10.2 G | Refills: 11 | Status: SHIPPED | OUTPATIENT
Start: 2024-11-14 | End: 2025-11-14

## 2024-11-14 NOTE — TELEPHONE ENCOUNTER
Called [facility] back, confirmed that patient is being transitioned from tobramycin to inhaled Colistin. Called to update patient. [facility]s [facility] per patient request. Scheduled him an appointment with neurology, first opening in April 2025. Email sent to  [facility] in regards to new orders.

## 2024-11-14 NOTE — TELEPHONE ENCOUNTER
PT called and advised that  [facility] need the diagnosis code for the new Rx   And instructions on how to take the medication.

## 2024-11-14 NOTE — TELEPHONE ENCOUNTER
[facility] called, looking to speak to a coord to verify information regarding patient's script. Requesting call back from coord. Number: [phone]

## 2024-11-15 PROCEDURE — RXMED WILLOW AMBULATORY MEDICATION CHARGE

## 2024-11-18 ENCOUNTER — SPECIALTY PHARMACY (OUTPATIENT)
Dept: PHARMACY | Facility: CLINIC | Age: 69
End: 2024-11-18

## 2024-11-18 ENCOUNTER — TELEPHONE (OUTPATIENT)
Dept: TRANSPLANT | Facility: HOSPITAL | Age: 69
End: 2024-11-18
Payer: MEDICARE

## 2024-11-18 ENCOUNTER — PHARMACY VISIT (OUTPATIENT)
Dept: PHARMACY | Facility: CLINIC | Age: 69
End: 2024-11-18
Payer: COMMERCIAL

## 2024-11-18 ENCOUNTER — DOCUMENTATION (OUTPATIENT)
Dept: TRANSPLANT | Facility: HOSPITAL | Age: 69
End: 2024-11-18
Payer: MEDICARE

## 2024-11-18 PROCEDURE — RXMED WILLOW AMBULATORY MEDICATION CHARGE

## 2024-11-18 NOTE — PROGRESS NOTES
Spoke with  Specialty, patient has delivery set up for meds including inhaled Colistin scheduled for delivery today. Rep to call patient to set up delivery of Aranesp later this week. Will call back back shortly to make sure he was able to get Aranesp delivery set up.

## 2024-11-18 NOTE — TELEPHONE ENCOUNTER
Called patient and left voicemail in regards to getting labs this week and checking to see if he received his new inhaled Colistin. Will await callback.       11:00: Called patient back after talking with [facility], left a voicemail. Let him know that he needs to speak with the pharmacy to set up delivery of Aranesp. Also asked him to get labs this week.

## 2024-11-19 ENCOUNTER — TELEPHONE (OUTPATIENT)
Dept: TRANSPLANT | Facility: HOSPITAL | Age: 69
End: 2024-11-19
Payer: MEDICARE

## 2024-11-19 ENCOUNTER — PHARMACY VISIT (OUTPATIENT)
Dept: PHARMACY | Facility: CLINIC | Age: 69
End: 2024-11-19
Payer: COMMERCIAL

## 2024-11-19 ENCOUNTER — SPECIALTY PHARMACY (OUTPATIENT)
Dept: PHARMACY | Facility: CLINIC | Age: 69
End: 2024-11-19

## 2024-11-19 PROCEDURE — RXMED WILLOW AMBULATORY MEDICATION CHARGE

## 2024-11-19 NOTE — TELEPHONE ENCOUNTER
Called patient back, he reports that he received his Colistin but never received the aranesp. Also did not receive any instructions for colistin. Explained how to reconstitute colistin with NS, patient verbalized understanding. Will call  Pharmacy in the morning to have them send new instructions for colistin and check on status of aranesp.

## 2024-11-20 ENCOUNTER — DOCUMENTATION (OUTPATIENT)
Dept: TRANSPLANT | Facility: HOSPITAL | Age: 69
End: 2024-11-20
Payer: MEDICARE

## 2024-11-20 NOTE — PROGRESS NOTES
Called  Specialty and spoke with [place]. Asked for new instructions to be sent to patient for Colymycin. Also confirmed that Aranesp injections are out for delivery for the patient.

## 2024-11-21 PROCEDURE — RXMED WILLOW AMBULATORY MEDICATION CHARGE

## 2024-11-22 ENCOUNTER — LAB (OUTPATIENT)
Dept: LAB | Facility: LAB | Age: 69
End: 2024-11-22
Payer: MEDICARE

## 2024-11-22 DIAGNOSIS — Z94.2 LUNG TRANSPLANTED (MULTI): ICD-10-CM

## 2024-11-22 LAB
ALBUMIN SERPL BCP-MCNC: 3.8 G/DL (ref 3.4–5)
ALP SERPL-CCNC: 77 U/L (ref 33–136)
ALT SERPL W P-5'-P-CCNC: 17 U/L (ref 10–52)
ANION GAP SERPL CALC-SCNC: 9 MMOL/L (ref 10–20)
AST SERPL W P-5'-P-CCNC: 22 U/L (ref 9–39)
BASOPHILS # BLD AUTO: 0.03 X10*3/UL (ref 0–0.1)
BASOPHILS NFR BLD AUTO: 0.5 %
BILIRUB SERPL-MCNC: 0.7 MG/DL (ref 0–1.2)
BUN SERPL-MCNC: 21 MG/DL (ref 6–23)
CALCIUM SERPL-MCNC: 8.4 MG/DL (ref 8.6–10.3)
CHLORIDE SERPL-SCNC: 106 MMOL/L (ref 98–107)
CO2 SERPL-SCNC: 29 MMOL/L (ref 21–32)
CREAT SERPL-MCNC: 1 MG/DL (ref 0.5–1.3)
EGFRCR SERPLBLD CKD-EPI 2021: 80 ML/MIN/1.73M*2
EOSINOPHIL # BLD AUTO: 0.09 X10*3/UL (ref 0–0.4)
EOSINOPHIL NFR BLD AUTO: 1.6 %
ERYTHROCYTE [DISTWIDTH] IN BLOOD BY AUTOMATED COUNT: 18.4 % (ref 11.5–14.5)
GLUCOSE SERPL-MCNC: 90 MG/DL (ref 74–99)
HCT VFR BLD AUTO: 29.5 % (ref 41–52)
HGB BLD-MCNC: 8.1 G/DL (ref 13.5–17.5)
IGG SERPL-MCNC: 846 MG/DL (ref 700–1600)
IMM GRANULOCYTES # BLD AUTO: 0.04 X10*3/UL (ref 0–0.5)
IMM GRANULOCYTES NFR BLD AUTO: 0.7 % (ref 0–0.9)
LYMPHOCYTES # BLD AUTO: 0.94 X10*3/UL (ref 0.8–3)
LYMPHOCYTES NFR BLD AUTO: 16.5 %
MAGNESIUM SERPL-MCNC: 1.76 MG/DL (ref 1.6–2.4)
MCH RBC QN AUTO: 24.6 PG (ref 26–34)
MCHC RBC AUTO-ENTMCNC: 27.5 G/DL (ref 32–36)
MCV RBC AUTO: 90 FL (ref 80–100)
MONOCYTES # BLD AUTO: 0.45 X10*3/UL (ref 0.05–0.8)
MONOCYTES NFR BLD AUTO: 7.9 %
NEUTROPHILS # BLD AUTO: 4.13 X10*3/UL (ref 1.6–5.5)
NEUTROPHILS NFR BLD AUTO: 72.8 %
NRBC BLD-RTO: 0 /100 WBCS (ref 0–0)
PLATELET # BLD AUTO: 114 X10*3/UL (ref 150–450)
POTASSIUM SERPL-SCNC: 4 MMOL/L (ref 3.5–5.3)
PROT SERPL-MCNC: 6.2 G/DL (ref 6.4–8.2)
RBC # BLD AUTO: 3.29 X10*6/UL (ref 4.5–5.9)
SODIUM SERPL-SCNC: 140 MMOL/L (ref 136–145)
TACROLIMUS BLD-MCNC: 5.2 NG/ML
WBC # BLD AUTO: 5.7 X10*3/UL (ref 4.4–11.3)

## 2024-11-22 PROCEDURE — 82784 ASSAY IGA/IGD/IGG/IGM EACH: CPT

## 2024-11-22 PROCEDURE — 80197 ASSAY OF TACROLIMUS: CPT

## 2024-11-22 PROCEDURE — 36415 COLL VENOUS BLD VENIPUNCTURE: CPT

## 2024-11-22 PROCEDURE — 83735 ASSAY OF MAGNESIUM: CPT

## 2024-11-22 PROCEDURE — 87799 DETECT AGENT NOS DNA QUANT: CPT

## 2024-11-22 PROCEDURE — 85025 COMPLETE CBC W/AUTO DIFF WBC: CPT

## 2024-11-22 PROCEDURE — 80053 COMPREHEN METABOLIC PANEL: CPT

## 2024-11-26 ENCOUNTER — TELEPHONE (OUTPATIENT)
Dept: TRANSPLANT | Facility: HOSPITAL | Age: 69
End: 2024-11-26
Payer: MEDICARE

## 2024-11-26 NOTE — TELEPHONE ENCOUNTER
Labs reviewed on 11/26/2024  WBC 5.7 ANC 4.13  Hbg 8.1  Platelets 114  Creatinine 1.00  Magnesium 1.76  IgG 846  Prograf 5.2 Goal 6-8 Dose 2mg BID    -Changes made: None.   -Labs due next 12/16. Will hold off on ordering labs due to [facility].     Called patient with above results and plan. Patient also confirms that he received Aranesp injections from  Specialty. He reports that he only takes the inhaled Colistin once a day due to his schedule, will update team. He states that he only was able to take inhaled Cody once a day as well. No further questions or needs.

## 2024-11-28 PROCEDURE — RXMED WILLOW AMBULATORY MEDICATION CHARGE

## 2024-12-02 ENCOUNTER — APPOINTMENT (OUTPATIENT)
Dept: UROLOGY | Facility: CLINIC | Age: 69
End: 2024-12-02
Payer: MEDICARE

## 2024-12-04 ENCOUNTER — SPECIALTY PHARMACY (OUTPATIENT)
Dept: PHARMACY | Facility: CLINIC | Age: 69
End: 2024-12-04

## 2024-12-04 DIAGNOSIS — M89.9 DISORDER OF BONE, UNSPECIFIED: ICD-10-CM

## 2024-12-04 DIAGNOSIS — E66.3 OVERWEIGHT: ICD-10-CM

## 2024-12-04 DIAGNOSIS — Z94.2 LUNG TRANSPLANTED (MULTI): ICD-10-CM

## 2024-12-05 ENCOUNTER — PHARMACY VISIT (OUTPATIENT)
Dept: PHARMACY | Facility: CLINIC | Age: 69
End: 2024-12-05
Payer: COMMERCIAL

## 2024-12-06 ENCOUNTER — TELEPHONE (OUTPATIENT)
Dept: ENDOCRINOLOGY | Facility: CLINIC | Age: 69
End: 2024-12-06
Payer: MEDICARE

## 2024-12-06 PROCEDURE — RXMED WILLOW AMBULATORY MEDICATION CHARGE

## 2024-12-06 NOTE — TELEPHONE ENCOUNTER
Riky from transplant dept left message with answering service requesting patient be seen sooner. Call back number provider by answering service was incomplete. A voicemail was left for patient to call back for additional information and schedule follow up visit.

## 2024-12-09 PROCEDURE — RXMED WILLOW AMBULATORY MEDICATION CHARGE

## 2024-12-11 ENCOUNTER — PHARMACY VISIT (OUTPATIENT)
Dept: PHARMACY | Facility: CLINIC | Age: 69
End: 2024-12-11
Payer: COMMERCIAL

## 2024-12-12 PROCEDURE — RXMED WILLOW AMBULATORY MEDICATION CHARGE

## 2024-12-13 ENCOUNTER — PHARMACY VISIT (OUTPATIENT)
Dept: PHARMACY | Facility: CLINIC | Age: 69
End: 2024-12-13
Payer: COMMERCIAL

## 2024-12-16 ENCOUNTER — SPECIALTY PHARMACY (OUTPATIENT)
Dept: PHARMACY | Facility: CLINIC | Age: 69
End: 2024-12-16

## 2024-12-16 ENCOUNTER — HOSPITAL ENCOUNTER (OUTPATIENT)
Dept: RESPIRATORY THERAPY | Facility: HOSPITAL | Age: 69
Discharge: HOME | End: 2024-12-16
Payer: MEDICARE

## 2024-12-16 ENCOUNTER — LAB (OUTPATIENT)
Dept: LAB | Facility: LAB | Age: 69
End: 2024-12-16
Payer: MEDICARE

## 2024-12-16 DIAGNOSIS — M89.9 DISORDER OF BONE, UNSPECIFIED: ICD-10-CM

## 2024-12-16 DIAGNOSIS — Z94.2 S/P LUNG TRANSPLANT (MULTI): ICD-10-CM

## 2024-12-16 DIAGNOSIS — E66.3 OVERWEIGHT: ICD-10-CM

## 2024-12-16 DIAGNOSIS — Z94.2 LUNG TRANSPLANTED (MULTI): ICD-10-CM

## 2024-12-16 LAB
25(OH)D3 SERPL-MCNC: 41 NG/ML (ref 30–100)
ALBUMIN SERPL BCP-MCNC: 3.6 G/DL (ref 3.4–5)
ALP SERPL-CCNC: 80 U/L (ref 33–136)
ALT SERPL W P-5'-P-CCNC: 19 U/L (ref 10–52)
ANION GAP SERPL CALC-SCNC: 14 MMOL/L (ref 10–20)
AST SERPL W P-5'-P-CCNC: 23 U/L (ref 9–39)
BASOPHILS # BLD AUTO: 0.04 X10*3/UL (ref 0–0.1)
BASOPHILS NFR BLD AUTO: 0.7 %
BILIRUB SERPL-MCNC: 0.7 MG/DL (ref 0–1.2)
BUN SERPL-MCNC: 16 MG/DL (ref 6–23)
CALCIUM SERPL-MCNC: 8.5 MG/DL (ref 8.6–10.3)
CHLORIDE SERPL-SCNC: 102 MMOL/L (ref 98–107)
CHOLEST SERPL-MCNC: 98 MG/DL (ref 0–199)
CHOLESTEROL/HDL RATIO: 2.1
CO2 SERPL-SCNC: 30 MMOL/L (ref 21–32)
CREAT SERPL-MCNC: 1.01 MG/DL (ref 0.5–1.3)
EGFRCR SERPLBLD CKD-EPI 2021: 80 ML/MIN/1.73M*2
EOSINOPHIL # BLD AUTO: 0.1 X10*3/UL (ref 0–0.4)
EOSINOPHIL NFR BLD AUTO: 1.6 %
ERYTHROCYTE [DISTWIDTH] IN BLOOD BY AUTOMATED COUNT: 17.8 % (ref 11.5–14.5)
GLUCOSE SERPL-MCNC: 80 MG/DL (ref 74–99)
HCT VFR BLD AUTO: 29 % (ref 41–52)
HDLC SERPL-MCNC: 46.8 MG/DL
HGB BLD-MCNC: 7.8 G/DL (ref 13.5–17.5)
IGG SERPL-MCNC: 834 MG/DL (ref 700–1600)
IMM GRANULOCYTES # BLD AUTO: 0.03 X10*3/UL (ref 0–0.5)
IMM GRANULOCYTES NFR BLD AUTO: 0.5 % (ref 0–0.9)
LDLC SERPL CALC-MCNC: 33 MG/DL
LYMPHOCYTES # BLD AUTO: 1 X10*3/UL (ref 0.8–3)
LYMPHOCYTES NFR BLD AUTO: 16.4 %
MAGNESIUM SERPL-MCNC: 1.43 MG/DL (ref 1.6–2.4)
MCH RBC QN AUTO: 23.9 PG (ref 26–34)
MCHC RBC AUTO-ENTMCNC: 26.9 G/DL (ref 32–36)
MCV RBC AUTO: 89 FL (ref 80–100)
MGC ASCENT PFT - FEV1 - PRE: 1.29
MGC ASCENT PFT - FEV1 - PREDICTED: 2.91
MGC ASCENT PFT - FVC - PRE: 2.27
MGC ASCENT PFT - FVC - PREDICTED: 3.86
MONOCYTES # BLD AUTO: 0.49 X10*3/UL (ref 0.05–0.8)
MONOCYTES NFR BLD AUTO: 8 %
NEUTROPHILS # BLD AUTO: 4.44 X10*3/UL (ref 1.6–5.5)
NEUTROPHILS NFR BLD AUTO: 72.8 %
NON HDL CHOLESTEROL: 51 MG/DL (ref 0–149)
NRBC BLD-RTO: 0.3 /100 WBCS (ref 0–0)
PLATELET # BLD AUTO: 115 X10*3/UL (ref 150–450)
POTASSIUM SERPL-SCNC: 3.9 MMOL/L (ref 3.5–5.3)
PROT SERPL-MCNC: 5.9 G/DL (ref 6.4–8.2)
RBC # BLD AUTO: 3.26 X10*6/UL (ref 4.5–5.9)
SODIUM SERPL-SCNC: 142 MMOL/L (ref 136–145)
TACROLIMUS BLD-MCNC: 6 NG/ML
TRIGL SERPL-MCNC: 93 MG/DL (ref 0–149)
VLDL: 19 MG/DL (ref 0–40)
WBC # BLD AUTO: 6.1 X10*3/UL (ref 4.4–11.3)

## 2024-12-16 PROCEDURE — 94010 BREATHING CAPACITY TEST: CPT | Performed by: INTERNAL MEDICINE

## 2024-12-16 PROCEDURE — 94010 BREATHING CAPACITY TEST: CPT

## 2024-12-16 PROCEDURE — 87799 DETECT AGENT NOS DNA QUANT: CPT

## 2024-12-16 PROCEDURE — 82784 ASSAY IGA/IGD/IGG/IGM EACH: CPT

## 2024-12-16 PROCEDURE — 36415 COLL VENOUS BLD VENIPUNCTURE: CPT

## 2024-12-16 PROCEDURE — 94760 N-INVAS EAR/PLS OXIMETRY 1: CPT

## 2024-12-16 PROCEDURE — 85025 COMPLETE CBC W/AUTO DIFF WBC: CPT

## 2024-12-16 PROCEDURE — 80053 COMPREHEN METABOLIC PANEL: CPT

## 2024-12-16 PROCEDURE — 86833 HLA CLASS II HIGH DEFIN QUAL: CPT

## 2024-12-16 PROCEDURE — 80197 ASSAY OF TACROLIMUS: CPT

## 2024-12-16 PROCEDURE — 86832 HLA CLASS I HIGH DEFIN QUAL: CPT

## 2024-12-16 PROCEDURE — 83735 ASSAY OF MAGNESIUM: CPT

## 2024-12-16 PROCEDURE — 82306 VITAMIN D 25 HYDROXY: CPT

## 2024-12-16 PROCEDURE — 80061 LIPID PANEL: CPT

## 2024-12-17 ENCOUNTER — TELEPHONE (OUTPATIENT)
Dept: TRANSPLANT | Facility: HOSPITAL | Age: 69
End: 2024-12-17
Payer: MEDICARE

## 2024-12-17 ENCOUNTER — PHARMACY VISIT (OUTPATIENT)
Dept: PHARMACY | Facility: CLINIC | Age: 69
End: 2024-12-17
Payer: COMMERCIAL

## 2024-12-17 DIAGNOSIS — Z94.2 LUNG TRANSPLANTED (MULTI): ICD-10-CM

## 2024-12-17 PROCEDURE — RXMED WILLOW AMBULATORY MEDICATION CHARGE

## 2024-12-17 NOTE — TELEPHONE ENCOUNTER
Pt called requesting a call back from [facility] due to a recall on DULoxetine (Cymbalta) 60 mg DR capsule

## 2024-12-18 LAB
HLA RESULTS: NORMAL
HLA-A+B+C AB NFR SER: NORMAL %
HLA-DP+DQ+DR AB NFR SER: NORMAL %

## 2024-12-18 RX ORDER — MAGNESIUM CHLORIDE 64 MG
192 TABLET, DELAYED RELEASE (ENTERIC COATED) ORAL 2 TIMES DAILY
Qty: 540 TABLET | Refills: 3 | Status: SHIPPED | OUTPATIENT
Start: 2024-12-18 | End: 2025-12-18

## 2024-12-18 NOTE — TELEPHONE ENCOUNTER
Labs reviewed on 12/18/2024  WBC 6.1 ANC 4.44  Hbg 7.8 (Aranesp weekly)  Platelets 115  Creatinine 1.01  Magnesium 1.43 (800mg BID)  IgG 834  Prograf 6 Goal 6-8 Dose 2mg BID    -Changes made: Ensure he is taking Aranesp weekly and magnesium correctly. Plan to increase mag to [place] 192 mg BID.   -Labs due next 1/6/2025     Called patient back in regards to question about Cymbalta. Patient reports that he called his pharmacy and got the answers he needed. He also reports taking Aranesp and magnesium as prescribed. Discussed other above results and plan. Magnesium script sent to  Specialty. Also let patient know that his PFTs from 12/16/ were stable. Verbalized understanding.

## 2024-12-20 ENCOUNTER — PHARMACY VISIT (OUTPATIENT)
Dept: PHARMACY | Facility: CLINIC | Age: 69
End: 2024-12-20
Payer: COMMERCIAL

## 2024-12-31 ENCOUNTER — TELEPHONE (OUTPATIENT)
Dept: TRANSPLANT | Facility: HOSPITAL | Age: 69
End: 2024-12-31
Payer: MEDICARE

## 2025-01-02 NOTE — TELEPHONE ENCOUNTER
Called patient back in regards to medication question, left voicemail asking that patient return call if he still has questions.

## 2025-01-05 ENCOUNTER — SPECIALTY PHARMACY (OUTPATIENT)
Dept: PHARMACY | Facility: CLINIC | Age: 70
End: 2025-01-05

## 2025-01-05 PROCEDURE — RXMED WILLOW AMBULATORY MEDICATION CHARGE

## 2025-01-06 ENCOUNTER — LAB (OUTPATIENT)
Dept: LAB | Facility: LAB | Age: 70
End: 2025-01-06
Payer: MEDICARE

## 2025-01-06 ENCOUNTER — TELEPHONE (OUTPATIENT)
Dept: TRANSPLANT | Facility: HOSPITAL | Age: 70
End: 2025-01-06

## 2025-01-06 DIAGNOSIS — Z94.2 LUNG TRANSPLANTED (MULTI): ICD-10-CM

## 2025-01-06 DIAGNOSIS — D61.89 ANEMIA DUE TO OTHER BONE MARROW FAILURE: ICD-10-CM

## 2025-01-06 DIAGNOSIS — D64.9 ANEMIA, UNSPECIFIED TYPE: ICD-10-CM

## 2025-01-06 LAB
ALBUMIN SERPL BCP-MCNC: 3.7 G/DL (ref 3.4–5)
ALP SERPL-CCNC: 67 U/L (ref 33–136)
ALT SERPL W P-5'-P-CCNC: 16 U/L (ref 10–52)
ANION GAP SERPL CALC-SCNC: 9 MMOL/L (ref 10–20)
AST SERPL W P-5'-P-CCNC: 21 U/L (ref 9–39)
BASOPHILS # BLD AUTO: 0.04 X10*3/UL (ref 0–0.1)
BASOPHILS NFR BLD AUTO: 0.7 %
BILIRUB SERPL-MCNC: 0.6 MG/DL (ref 0–1.2)
BUN SERPL-MCNC: 25 MG/DL (ref 6–23)
CALCIUM SERPL-MCNC: 8.5 MG/DL (ref 8.6–10.3)
CHLORIDE SERPL-SCNC: 107 MMOL/L (ref 98–107)
CO2 SERPL-SCNC: 28 MMOL/L (ref 21–32)
CREAT SERPL-MCNC: 1.02 MG/DL (ref 0.5–1.3)
EGFRCR SERPLBLD CKD-EPI 2021: 79 ML/MIN/1.73M*2
EOSINOPHIL # BLD AUTO: 0.1 X10*3/UL (ref 0–0.4)
EOSINOPHIL NFR BLD AUTO: 1.7 %
ERYTHROCYTE [DISTWIDTH] IN BLOOD BY AUTOMATED COUNT: 18.6 % (ref 11.5–14.5)
GLUCOSE SERPL-MCNC: 121 MG/DL (ref 74–99)
HCT VFR BLD AUTO: 26.3 % (ref 41–52)
HGB BLD-MCNC: 7 G/DL (ref 13.5–17.5)
IGG SERPL-MCNC: 792 MG/DL (ref 700–1600)
IMM GRANULOCYTES # BLD AUTO: 0.06 X10*3/UL (ref 0–0.5)
IMM GRANULOCYTES NFR BLD AUTO: 1 % (ref 0–0.9)
LYMPHOCYTES # BLD AUTO: 0.91 X10*3/UL (ref 0.8–3)
LYMPHOCYTES NFR BLD AUTO: 15.8 %
MAGNESIUM SERPL-MCNC: 1.59 MG/DL (ref 1.6–2.4)
MCH RBC QN AUTO: 23.5 PG (ref 26–34)
MCHC RBC AUTO-ENTMCNC: 26.6 G/DL (ref 32–36)
MCV RBC AUTO: 88 FL (ref 80–100)
MONOCYTES # BLD AUTO: 0.56 X10*3/UL (ref 0.05–0.8)
MONOCYTES NFR BLD AUTO: 9.7 %
NEUTROPHILS # BLD AUTO: 4.08 X10*3/UL (ref 1.6–5.5)
NEUTROPHILS NFR BLD AUTO: 71.1 %
NRBC BLD-RTO: 0.3 /100 WBCS (ref 0–0)
PLATELET # BLD AUTO: 112 X10*3/UL (ref 150–450)
POTASSIUM SERPL-SCNC: 4.4 MMOL/L (ref 3.5–5.3)
PROT SERPL-MCNC: 5.9 G/DL (ref 6.4–8.2)
RBC # BLD AUTO: 2.98 X10*6/UL (ref 4.5–5.9)
SODIUM SERPL-SCNC: 140 MMOL/L (ref 136–145)
TACROLIMUS BLD-MCNC: 6 NG/ML
WBC # BLD AUTO: 5.8 X10*3/UL (ref 4.4–11.3)

## 2025-01-06 PROCEDURE — 83550 IRON BINDING TEST: CPT

## 2025-01-06 PROCEDURE — 87799 DETECT AGENT NOS DNA QUANT: CPT

## 2025-01-06 PROCEDURE — 83540 ASSAY OF IRON: CPT

## 2025-01-06 PROCEDURE — 82784 ASSAY IGA/IGD/IGG/IGM EACH: CPT

## 2025-01-06 PROCEDURE — 83010 ASSAY OF HAPTOGLOBIN QUANT: CPT

## 2025-01-06 PROCEDURE — 83921 ORGANIC ACID SINGLE QUANT: CPT

## 2025-01-06 PROCEDURE — 80053 COMPREHEN METABOLIC PANEL: CPT

## 2025-01-06 PROCEDURE — 83735 ASSAY OF MAGNESIUM: CPT

## 2025-01-06 PROCEDURE — 80197 ASSAY OF TACROLIMUS: CPT

## 2025-01-06 PROCEDURE — 82746 ASSAY OF FOLIC ACID SERUM: CPT

## 2025-01-06 PROCEDURE — 85025 COMPLETE CBC W/AUTO DIFF WBC: CPT

## 2025-01-06 NOTE — TELEPHONE ENCOUNTER
Called patient in regards to Hgb of 7.0 from lab draw today. Per Dr. Boone and Lupe CHARLES, patient will need to be admitted for blood transfusion. He reports that he can come tomorrow. Advised that he answer all phone calls tomorrow in case the bed coordinator calls.

## 2025-01-07 ENCOUNTER — TELEPHONE (OUTPATIENT)
Dept: TRANSPLANT | Facility: HOSPITAL | Age: 70
End: 2025-01-07
Payer: MEDICARE

## 2025-01-07 ENCOUNTER — HOSPITAL ENCOUNTER (INPATIENT)
Facility: HOSPITAL | Age: 70
LOS: 4 days | Discharge: HOME | End: 2025-01-11
Attending: STUDENT IN AN ORGANIZED HEALTH CARE EDUCATION/TRAINING PROGRAM | Admitting: INTERNAL MEDICINE
Payer: MEDICARE

## 2025-01-07 ENCOUNTER — HOSPITAL ENCOUNTER (INPATIENT)
Facility: HOSPITAL | Age: 70
End: 2025-01-07
Attending: INTERNAL MEDICINE | Admitting: INTERNAL MEDICINE
Payer: MEDICARE

## 2025-01-07 ENCOUNTER — CLINICAL SUPPORT (OUTPATIENT)
Dept: EMERGENCY MEDICINE | Facility: HOSPITAL | Age: 70
End: 2025-01-07
Payer: MEDICARE

## 2025-01-07 ENCOUNTER — PHARMACY VISIT (OUTPATIENT)
Dept: PHARMACY | Facility: CLINIC | Age: 70
End: 2025-01-07
Payer: COMMERCIAL

## 2025-01-07 ENCOUNTER — APPOINTMENT (OUTPATIENT)
Dept: RADIOLOGY | Facility: HOSPITAL | Age: 70
End: 2025-01-07
Payer: MEDICARE

## 2025-01-07 DIAGNOSIS — D64.9 ANEMIA, UNSPECIFIED TYPE: ICD-10-CM

## 2025-01-07 DIAGNOSIS — E87.29 CO2 RETENTION: ICD-10-CM

## 2025-01-07 DIAGNOSIS — I10 PRIMARY HYPERTENSION: ICD-10-CM

## 2025-01-07 DIAGNOSIS — D64.9 ANEMIA: Primary | ICD-10-CM

## 2025-01-07 DIAGNOSIS — Z94.2 S/P LUNG TRANSPLANT (MULTI): ICD-10-CM

## 2025-01-07 DIAGNOSIS — Z94.2 LUNG TRANSPLANTED (MULTI): ICD-10-CM

## 2025-01-07 DIAGNOSIS — D61.89 ANEMIA DUE TO OTHER BONE MARROW FAILURE: Primary | ICD-10-CM

## 2025-01-07 DIAGNOSIS — Z94.2 LUNG TRANSPLANT RECIPIENT (MULTI): ICD-10-CM

## 2025-01-07 DIAGNOSIS — D64.9 SYMPTOMATIC ANEMIA: ICD-10-CM

## 2025-01-07 DIAGNOSIS — Z94.2 HISTORY OF LUNG TRANSPLANT (MULTI): ICD-10-CM

## 2025-01-07 DIAGNOSIS — R06.02 SHORTNESS OF BREATH: ICD-10-CM

## 2025-01-07 DIAGNOSIS — D50.8 IRON DEFICIENCY ANEMIA SECONDARY TO INADEQUATE DIETARY IRON INTAKE: ICD-10-CM

## 2025-01-07 DIAGNOSIS — E83.42 HYPOMAGNESEMIA: ICD-10-CM

## 2025-01-07 LAB
ABO GROUP (TYPE) IN BLOOD: NORMAL
ALBUMIN SERPL BCP-MCNC: 3.9 G/DL (ref 3.4–5)
ALBUMIN SERPL BCP-MCNC: 4 G/DL (ref 3.4–5)
ALP SERPL-CCNC: 73 U/L (ref 33–136)
ALP SERPL-CCNC: 73 U/L (ref 33–136)
ALT SERPL W P-5'-P-CCNC: 16 U/L (ref 10–52)
ALT SERPL W P-5'-P-CCNC: 16 U/L (ref 10–52)
ANION GAP SERPL CALC-SCNC: 12 MMOL/L (ref 10–20)
ANTIBODY SCREEN: NORMAL
AST SERPL W P-5'-P-CCNC: 19 U/L (ref 9–39)
AST SERPL W P-5'-P-CCNC: 21 U/L (ref 9–39)
ATRIAL RATE: 94 BPM
BASOPHILS # BLD AUTO: 0.03 X10*3/UL (ref 0–0.1)
BASOPHILS NFR BLD AUTO: 0.5 %
BILIRUB DIRECT SERPL-MCNC: 0.2 MG/DL (ref 0–0.3)
BILIRUB SERPL-MCNC: 0.6 MG/DL (ref 0–1.2)
BILIRUB SERPL-MCNC: 0.6 MG/DL (ref 0–1.2)
BUN SERPL-MCNC: 24 MG/DL (ref 6–23)
CALCIUM SERPL-MCNC: 8.9 MG/DL (ref 8.6–10.6)
CARDIAC TROPONIN I PNL SERPL HS: 15 NG/L (ref 0–53)
CHLORIDE SERPL-SCNC: 109 MMOL/L (ref 98–107)
CMV DNA SERPL NAA+PROBE-LOG IU: NORMAL {LOG_IU}/ML
CO2 SERPL-SCNC: 26 MMOL/L (ref 21–32)
CREAT SERPL-MCNC: 1.13 MG/DL (ref 0.5–1.3)
EBV DNA SPEC NAA+PROBE-LOG#: NORMAL {LOG_COPIES}/ML
EGFRCR SERPLBLD CKD-EPI 2021: 69 ML/MIN/1.73M*2
EOSINOPHIL # BLD AUTO: 0.01 X10*3/UL (ref 0–0.4)
EOSINOPHIL NFR BLD AUTO: 0.2 %
ERYTHROCYTE [DISTWIDTH] IN BLOOD BY AUTOMATED COUNT: 18.2 % (ref 11.5–14.5)
FERRITIN SERPL-MCNC: 8 NG/ML (ref 20–300)
FLUAV RNA RESP QL NAA+PROBE: NOT DETECTED
FLUBV RNA RESP QL NAA+PROBE: NOT DETECTED
FOLATE SERPL-MCNC: 16.9 NG/ML
GLUCOSE SERPL-MCNC: 118 MG/DL (ref 74–99)
HAPTOGLOB SERPL NEPH-MCNC: 49 MG/DL (ref 30–200)
HCT VFR BLD AUTO: 25.1 % (ref 41–52)
HGB BLD-MCNC: 7.2 G/DL (ref 13.5–17.5)
HGB RETIC QN: 19 PG (ref 28–38)
IMM GRANULOCYTES # BLD AUTO: 0.04 X10*3/UL (ref 0–0.5)
IMM GRANULOCYTES NFR BLD AUTO: 0.6 % (ref 0–0.9)
IMMATURE RETIC FRACTION: 18.8 %
IRON SATN MFR SERPL: 6 % (ref 25–45)
IRON SERPL-MCNC: 24 UG/DL (ref 35–150)
LABORATORY COMMENT REPORT: NOT DETECTED
LABORATORY COMMENT REPORT: NOT DETECTED
LDH SERPL L TO P-CCNC: 148 U/L (ref 84–246)
LYMPHOCYTES # BLD AUTO: 0.4 X10*3/UL (ref 0.8–3)
LYMPHOCYTES NFR BLD AUTO: 6.4 %
MAGNESIUM SERPL-MCNC: 1.7 MG/DL (ref 1.6–2.4)
MCH RBC QN AUTO: 23.7 PG (ref 26–34)
MCHC RBC AUTO-ENTMCNC: 28.7 G/DL (ref 32–36)
MCV RBC AUTO: 83 FL (ref 80–100)
MONOCYTES # BLD AUTO: 0.26 X10*3/UL (ref 0.05–0.8)
MONOCYTES NFR BLD AUTO: 4.2 %
NEUTROPHILS # BLD AUTO: 5.5 X10*3/UL (ref 1.6–5.5)
NEUTROPHILS NFR BLD AUTO: 88.1 %
NRBC BLD-RTO: 0 /100 WBCS (ref 0–0)
P AXIS: 62 DEGREES
P OFFSET: 208 MS
P ONSET: 160 MS
PLATELET # BLD AUTO: 105 X10*3/UL (ref 150–450)
POTASSIUM SERPL-SCNC: 4.6 MMOL/L (ref 3.5–5.3)
PR INTERVAL: 132 MS
PROT SERPL-MCNC: 6.4 G/DL (ref 6.4–8.2)
PROT SERPL-MCNC: 6.4 G/DL (ref 6.4–8.2)
Q ONSET: 226 MS
QRS COUNT: 16 BEATS
QRS DURATION: 82 MS
QT INTERVAL: 336 MS
QTC CALCULATION(BAZETT): 420 MS
QTC FREDERICIA: 390 MS
R AXIS: -10 DEGREES
RBC # BLD AUTO: 3.04 X10*6/UL (ref 4.5–5.9)
RETICS #: 0.07 X10*6/UL (ref 0.02–0.11)
RETICS/RBC NFR AUTO: 2.3 % (ref 0.5–2)
RH FACTOR (ANTIGEN D): NORMAL
SARS-COV-2 RNA RESP QL NAA+PROBE: NOT DETECTED
SODIUM SERPL-SCNC: 142 MMOL/L (ref 136–145)
T AXIS: 18 DEGREES
T OFFSET: 394 MS
TACROLIMUS BLD-MCNC: 7.4 NG/ML
TIBC SERPL-MCNC: 401 UG/DL (ref 240–445)
UIBC SERPL-MCNC: 377 UG/DL (ref 110–370)
VENTRICULAR RATE: 94 BPM
WBC # BLD AUTO: 6.2 X10*3/UL (ref 4.4–11.3)

## 2025-01-07 PROCEDURE — 83735 ASSAY OF MAGNESIUM: CPT

## 2025-01-07 PROCEDURE — 71046 X-RAY EXAM CHEST 2 VIEWS: CPT | Performed by: RADIOLOGY

## 2025-01-07 PROCEDURE — 99285 EMERGENCY DEPT VISIT HI MDM: CPT | Performed by: NURSE PRACTITIONER

## 2025-01-07 PROCEDURE — 93005 ELECTROCARDIOGRAM TRACING: CPT

## 2025-01-07 PROCEDURE — 85025 COMPLETE CBC W/AUTO DIFF WBC: CPT | Performed by: NURSE PRACTITIONER

## 2025-01-07 PROCEDURE — 84075 ASSAY ALKALINE PHOSPHATASE: CPT | Performed by: NURSE PRACTITIONER

## 2025-01-07 PROCEDURE — 36430 TRANSFUSION BLD/BLD COMPNT: CPT

## 2025-01-07 PROCEDURE — 99285 EMERGENCY DEPT VISIT HI MDM: CPT | Mod: 25 | Performed by: STUDENT IN AN ORGANIZED HEALTH CARE EDUCATION/TRAINING PROGRAM

## 2025-01-07 PROCEDURE — 2500000004 HC RX 250 GENERAL PHARMACY W/ HCPCS (ALT 636 FOR OP/ED)

## 2025-01-07 PROCEDURE — 80076 HEPATIC FUNCTION PANEL: CPT | Mod: CCI

## 2025-01-07 PROCEDURE — 82728 ASSAY OF FERRITIN: CPT

## 2025-01-07 PROCEDURE — 84484 ASSAY OF TROPONIN QUANT: CPT | Performed by: NURSE PRACTITIONER

## 2025-01-07 PROCEDURE — 80197 ASSAY OF TACROLIMUS: CPT | Performed by: NURSE PRACTITIONER

## 2025-01-07 PROCEDURE — 86901 BLOOD TYPING SEROLOGIC RH(D): CPT | Performed by: NURSE PRACTITIONER

## 2025-01-07 PROCEDURE — P9016 RBC LEUKOCYTES REDUCED: HCPCS

## 2025-01-07 PROCEDURE — 86923 COMPATIBILITY TEST ELECTRIC: CPT

## 2025-01-07 PROCEDURE — 83615 LACTATE (LD) (LDH) ENZYME: CPT

## 2025-01-07 PROCEDURE — 71046 X-RAY EXAM CHEST 2 VIEWS: CPT

## 2025-01-07 PROCEDURE — 2500000001 HC RX 250 WO HCPCS SELF ADMINISTERED DRUGS (ALT 637 FOR MEDICARE OP)

## 2025-01-07 PROCEDURE — 36415 COLL VENOUS BLD VENIPUNCTURE: CPT | Performed by: NURSE PRACTITIONER

## 2025-01-07 PROCEDURE — 1100000001 HC PRIVATE ROOM DAILY

## 2025-01-07 PROCEDURE — 87636 SARSCOV2 & INF A&B AMP PRB: CPT | Performed by: NURSE PRACTITIONER

## 2025-01-07 PROCEDURE — 2500000002 HC RX 250 W HCPCS SELF ADMINISTERED DRUGS (ALT 637 FOR MEDICARE OP, ALT 636 FOR OP/ED)

## 2025-01-07 PROCEDURE — 85045 AUTOMATED RETICULOCYTE COUNT: CPT

## 2025-01-07 RX ORDER — DEXTROSE 50 % IN WATER (D50W) INTRAVENOUS SYRINGE
25
Status: DISCONTINUED | OUTPATIENT
Start: 2025-01-07 | End: 2025-01-11 | Stop reason: HOSPADM

## 2025-01-07 RX ORDER — MYCOPHENOLATE MOFETIL 250 MG/1
250 CAPSULE ORAL 2 TIMES DAILY
Status: DISCONTINUED | OUTPATIENT
Start: 2025-01-07 | End: 2025-01-08

## 2025-01-07 RX ORDER — AZITHROMYCIN 250 MG/1
250 TABLET, FILM COATED ORAL 3 TIMES WEEKLY
Status: DISCONTINUED | OUTPATIENT
Start: 2025-01-08 | End: 2025-01-11 | Stop reason: HOSPADM

## 2025-01-07 RX ORDER — INSULIN LISPRO 100 [IU]/ML
0-5 INJECTION, SOLUTION INTRAVENOUS; SUBCUTANEOUS
Status: DISCONTINUED | OUTPATIENT
Start: 2025-01-08 | End: 2025-01-07

## 2025-01-07 RX ORDER — MAGNESIUM CHLORIDE 64 MG
192 TABLET, DELAYED RELEASE (ENTERIC COATED) ORAL 2 TIMES DAILY
Status: DISCONTINUED | OUTPATIENT
Start: 2025-01-07 | End: 2025-01-11 | Stop reason: HOSPADM

## 2025-01-07 RX ORDER — OXYBUTYNIN CHLORIDE 5 MG/1
5 TABLET ORAL 2 TIMES DAILY
Status: DISCONTINUED | OUTPATIENT
Start: 2025-01-07 | End: 2025-01-08

## 2025-01-07 RX ORDER — CALCIUM CARBONATE 500(1250)
1250 TABLET ORAL
Status: DISCONTINUED | OUTPATIENT
Start: 2025-01-07 | End: 2025-01-11 | Stop reason: HOSPADM

## 2025-01-07 RX ORDER — TACROLIMUS 1 MG/1
2 CAPSULE ORAL 2 TIMES DAILY
Status: DISCONTINUED | OUTPATIENT
Start: 2025-01-07 | End: 2025-01-08

## 2025-01-07 RX ORDER — LEVALBUTEROL INHALATION SOLUTION 0.63 MG/3ML
0.63 SOLUTION RESPIRATORY (INHALATION) 2 TIMES DAILY
Status: DISCONTINUED | OUTPATIENT
Start: 2025-01-07 | End: 2025-01-11 | Stop reason: HOSPADM

## 2025-01-07 RX ORDER — POLYETHYLENE GLYCOL 3350 17 G/17G
17 POWDER, FOR SOLUTION ORAL DAILY
Status: DISCONTINUED | OUTPATIENT
Start: 2025-01-07 | End: 2025-01-07

## 2025-01-07 RX ORDER — MAGNESIUM CHLORIDE 64 MG
64 TABLET, DELAYED RELEASE (ENTERIC COATED) ORAL DAILY
Status: DISCONTINUED | OUTPATIENT
Start: 2025-01-07 | End: 2025-01-07

## 2025-01-07 RX ORDER — CETIRIZINE HYDROCHLORIDE 10 MG/1
10 TABLET ORAL DAILY
Status: DISCONTINUED | OUTPATIENT
Start: 2025-01-07 | End: 2025-01-11 | Stop reason: HOSPADM

## 2025-01-07 RX ORDER — DULOXETIN HYDROCHLORIDE 30 MG/1
60 CAPSULE, DELAYED RELEASE ORAL DAILY
Status: DISCONTINUED | OUTPATIENT
Start: 2025-01-07 | End: 2025-01-11 | Stop reason: HOSPADM

## 2025-01-07 RX ORDER — PENTAMIDINE ISETHIONATE 300 MG/300MG
300 INHALANT RESPIRATORY (INHALATION) ONCE
Status: DISCONTINUED | OUTPATIENT
Start: 2025-01-07 | End: 2025-01-08

## 2025-01-07 RX ORDER — AZITHROMYCIN 250 MG/1
250 TABLET, FILM COATED ORAL
Status: DISCONTINUED | OUTPATIENT
Start: 2025-01-08 | End: 2025-01-07

## 2025-01-07 RX ORDER — SODIUM CHLORIDE 9 MG/ML
5 INJECTION, SOLUTION INTRAMUSCULAR; INTRAVENOUS; SUBCUTANEOUS 2 TIMES DAILY
Status: DISCONTINUED | OUTPATIENT
Start: 2025-01-07 | End: 2025-01-07

## 2025-01-07 RX ORDER — GABAPENTIN 300 MG/1
300 CAPSULE ORAL NIGHTLY
Status: DISCONTINUED | OUTPATIENT
Start: 2025-01-07 | End: 2025-01-10

## 2025-01-07 RX ORDER — PREDNISONE 5 MG/1
5 TABLET ORAL DAILY
Status: DISCONTINUED | OUTPATIENT
Start: 2025-01-07 | End: 2025-01-11 | Stop reason: HOSPADM

## 2025-01-07 RX ORDER — PANTOPRAZOLE SODIUM 40 MG/1
40 TABLET, DELAYED RELEASE ORAL DAILY
Status: DISCONTINUED | OUTPATIENT
Start: 2025-01-07 | End: 2025-01-11 | Stop reason: HOSPADM

## 2025-01-07 RX ORDER — CHOLECALCIFEROL (VITAMIN D3) 25 MCG
1000 TABLET ORAL DAILY
Status: DISCONTINUED | OUTPATIENT
Start: 2025-01-07 | End: 2025-01-11 | Stop reason: HOSPADM

## 2025-01-07 RX ORDER — ALBUTEROL SULFATE 0.83 MG/ML
1.25 SOLUTION RESPIRATORY (INHALATION) 3 TIMES DAILY
Status: DISCONTINUED | OUTPATIENT
Start: 2025-01-07 | End: 2025-01-07

## 2025-01-07 RX ORDER — DEXTROSE 50 % IN WATER (D50W) INTRAVENOUS SYRINGE
12.5
Status: DISCONTINUED | OUTPATIENT
Start: 2025-01-07 | End: 2025-01-11 | Stop reason: HOSPADM

## 2025-01-07 RX ORDER — FLUTICASONE FUROATE AND VILANTEROL 200; 25 UG/1; UG/1
1 POWDER RESPIRATORY (INHALATION)
Status: DISCONTINUED | OUTPATIENT
Start: 2025-01-07 | End: 2025-01-11 | Stop reason: HOSPADM

## 2025-01-07 RX ORDER — MONTELUKAST SODIUM 10 MG/1
10 TABLET ORAL NIGHTLY
Status: DISCONTINUED | OUTPATIENT
Start: 2025-01-07 | End: 2025-01-11 | Stop reason: HOSPADM

## 2025-01-07 RX ORDER — COLISTIMETHATE SODIUM 150 MG/1
150 INJECTION, POWDER, LYOPHILIZED, FOR SOLUTION INTRAMUSCULAR; INTRAVENOUS 2 TIMES DAILY
Status: DISCONTINUED | OUTPATIENT
Start: 2025-01-07 | End: 2025-01-07

## 2025-01-07 RX ORDER — ROSUVASTATIN CALCIUM 40 MG/1
40 TABLET, COATED ORAL DAILY
Status: DISCONTINUED | OUTPATIENT
Start: 2025-01-07 | End: 2025-01-11 | Stop reason: HOSPADM

## 2025-01-07 RX ADMIN — TACROLIMUS 2 MG: 1 CAPSULE ORAL at 23:12

## 2025-01-07 RX ADMIN — ROSUVASTATIN CALCIUM 40 MG: 40 TABLET, FILM COATED ORAL at 23:12

## 2025-01-07 RX ADMIN — GABAPENTIN 300 MG: 300 CAPSULE ORAL at 23:12

## 2025-01-07 RX ADMIN — MONTELUKAST 10 MG: 10 TABLET, FILM COATED ORAL at 23:12

## 2025-01-07 RX ADMIN — LETERMOVIR 480 MG: 480 TABLET, FILM COATED ORAL at 20:54

## 2025-01-07 RX ADMIN — Medication 1000 UNITS: at 23:12

## 2025-01-07 RX ADMIN — CALCIUM 1250 MG: 500 TABLET ORAL at 20:54

## 2025-01-07 ASSESSMENT — COGNITIVE AND FUNCTIONAL STATUS - GENERAL
MOBILITY SCORE: 24
DAILY ACTIVITIY SCORE: 24

## 2025-01-07 ASSESSMENT — PAIN SCALES - GENERAL
PAINLEVEL_OUTOF10: 0 - NO PAIN
PAINLEVEL_OUTOF10: 0 - NO PAIN

## 2025-01-07 ASSESSMENT — COLUMBIA-SUICIDE SEVERITY RATING SCALE - C-SSRS
1. IN THE PAST MONTH, HAVE YOU WISHED YOU WERE DEAD OR WISHED YOU COULD GO TO SLEEP AND NOT WAKE UP?: NO
2. HAVE YOU ACTUALLY HAD ANY THOUGHTS OF KILLING YOURSELF?: NO
6. HAVE YOU EVER DONE ANYTHING, STARTED TO DO ANYTHING, OR PREPARED TO DO ANYTHING TO END YOUR LIFE?: NO

## 2025-01-07 ASSESSMENT — PAIN - FUNCTIONAL ASSESSMENT: PAIN_FUNCTIONAL_ASSESSMENT: 0-10

## 2025-01-07 NOTE — ED TRIAGE NOTES
Double lung transplant 2020, presents for low hemoglobin. Endorses shortness of breath and lethargy

## 2025-01-07 NOTE — H&P
"    History of Present Illness Note    History Of Present Illness  Stanford NGUYEN Caro \"Mal\" is a 69 y.o. 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     Patient has been experiencing SOB, fatigue, dizziness/LH and a dry cough for the past few weeks. He has also noted that over this time period his pulse ox readings at home have been lower than normal (low 90's compared to normally high 90's). His SOB is worse with exertion but he notices it even at rest now. Denies associated CP. His inhalers do not improve his SOB. He gets monthly labs and his Hgb has steadily dropped from 8.7 in September to 8.0 > 8.1 > 7.8. > 7.0 on 01/06/25. Patient denies fevers/chills, CP, extremity swelling, orthopnea, PND, blood in stool or urine, hemoptysis, mucosal bleeding, sick contacts, new medications, recent trauma or falls. Has had some minor LUQ pain that doesn't bother him much, nonradiating and non pleuritic, doesn't correlate with PO intake. Patient visited [place] for a family wedding in October but no other recent travel. Notes he has bruised easily since transplant in 2020 but has not noticed any worsening bruising or skin lesions. Last took Apixaban this morning and reports compliance with all other meds.     Patient follows with transplant pulmonology. Of note, his transplant course was complicated by pleural bleed with return to OR for washout on 3/23/2020, prolonged ventilatory support requiring tracheostomy (since decannulated) and discharged on 5/8/2020. Post-hospital course was complicated by septic shock with MSSA in August 2022 with PE requiring lifelong anticoagulation.     Patient arrived to [facility] in no acute distress, afebrile and HDS with /58 and SPO2 95% RA. CXR with no acute cardiopulmonary process. EKG NSR and troponin negative. Labs notable for Hgb 7.2, MCV 83, Plt 105, WBC " 6.2, Ferritin 8, Iron 24. TIBC 401, % sat 6 Cr 1.13, BUN 24. CMV and EBV negative. He was seen by lung transplant in the ED who recommended 2u PRBC transfusion    ED Course:  BP: 108/58  Temperature: 36.8 °C (98.2 °F)  Heart Rate: 96  Respirations: 16  Pulse Ox: 95 %    ED Interventions  Medications - No data to display    EKG  No results found. However, due to the size of the patient record, not all encounters were searched. Please check Results Review for a complete set of results.     Review of Systems   All other systems reviewed and are negative.      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    Lung transplant rejection    Lung transplant status 12/21/2020    H/O lung transplant    Nonfamilial hypogammaglobulinemia (Multi) 10/04/2021    Hypogammaglobulinemia    Obstructive sleep apnea (adult) (pediatric) 08/09/2018    SARA on CPAP    Other long term (current) drug therapy 12/07/2021    Encounter for long-term current use of high risk medication    Other specified disorders of nose and nasal sinuses 05/09/2021    Nasal crusting    Personal history of other diseases of the musculoskeletal system and connective tissue 08/09/2018    History of gout    Personal history of other diseases of the respiratory system 08/09/2018    History of interstitial lung disease    Personal history of other endocrine, nutritional and metabolic disease 08/09/2018    History of obesity    Personal history of other endocrine, nutritional and metabolic disease 02/21/2019    History of hyperlipidemia    Personal history of other venous thrombosis and embolism 08/09/2018    History of deep venous thrombosis    Personal history of urinary calculi 08/09/2018    History of kidney stones     Personal history of urinary calculi 09/06/2018    History of kidney stones    Personal history of urinary calculi 02/25/2019    History of kidney stones       Home Medications  (Not in a hospital admission)      Surgical History    Past Surgical History:   Procedure Laterality Date    MR HEAD ANGIO WO IV CONTRAST  10/15/2020    MR HEAD ANGIO WO IV CONTRAST 10/15/2020 [facility]GAC[facility]    MR NECK ANGIO WO IV CONTRAST  10/15/2020    MR NECK ANGIO WO IV CONTRAST 10/15/2020 [facility] CLINICAL LEGAC[facility]    OTHER SURGICAL HISTORY  06/03/2020    Lung transplantation    TONSILLECTOMY  08/09/2018    Tonsillectomy    VASECTOMY  08/09/2018    Surgery Vas Deferens Vasectomy       Allergies    Allergies   Allergen Reactions    Gadolinium-Containing Contrast Media Other     Acute renal insufficiency s/p double lung transplant    Iodinated Contrast Media Other     Acute renal insufficiency s/p double lung transplant    Nsaids (Non-Steroidal Anti-Inflammatory Drug) Other     Acute renal insufficiency s/p double lung transplant    Adhesive Unknown     Patient stated dont have allergy to adhesive       Social History    Social History     Socioeconomic History    Marital status:      Spouse name: Not on file    Number of children: Not on file    Years of education: Not on file    Highest education level: Not on file   Occupational History    Not on file   Tobacco Use    Smoking status: Never    Smokeless tobacco: Never   Substance and Sexual Activity    Alcohol use: Not Currently    Drug use: Never    Sexual activity: Not on file   Other Topics Concern    Not on file   Social History Narrative    Not on file     Social Drivers of Health     Financial Resource Strain: Not on file   Food Insecurity: Not on file   Transportation Needs: Not on file   Physical Activity: Unknown (8/8/2022)    [facility]    Exercise Vital Sign     Days of Exercise per Week: 0 days      Minutes of Exercise per Session: Not on file   Stress: Not on file   Social Connections: Not on file   Intimate Partner Violence: Not on file   Housing Stability: Not on file       Family History  No family history on file.    Medications  Scheduled medications    Continuous medications         Cardiac Hx:  Last echo: No results found for this or any previous visit.   Last EKG: No results found. However, due to the size of the patient record, not all encounters were searched. Please check Results Review for a complete set of results.     GI Hx:  Last EGD: === 02/23/21 ===    Last Colonoscopy: === 02/23/21 ===    BRONCHOSCOPY    - Narrative -  Patient Name: Stanford Patel  Procedure Date: 2/23/2021 2:01 PM  MRN: [id number]  Account Number: [id number]  YOB: 1955  Room: [facility] Room 8  Attending MD: Gregorio Cedillo MD, [id number]  Procedure:             Bronchoscopy  Indications:           Surveillance lung transplant  Providers:             Gregorio Cedillo MD (Doctor), Latisha Hdz RN  (Nurse), Lewis Jordan Technician  (Technician), Beatriz More MD (Fellow)  Referring MD:          Thai Mcelroy DO (Referring MD)  Medicines:             General Anesthesia, See the Anesthesia note for  documentation of the administered medications  Complications:         No immediate complications  Procedure:             Pre-Anesthesia Assessment:  - A History and Physical has been performed. Patient  meds and allergies have been reviewed. The risks and  benefits of the procedure and the sedation options and  risks were discussed with the patient. All questions  were answered and informed consent was obtained.  Patient identification and proposed procedure were  verified prior to the procedure by the physician and  the nurse in the procedure room at 14:01 PM. Mental  Status Examination: alert and oriented. Airway  Examination: normal oropharyngeal airway. Respiratory  Examination: clear to  auscultation. CV Examination:  normal. ASA Grade Assessment: III - A patient with  severe systemic disease. After reviewing the risks and  benefits, the patient was deemed in satisfactory  condition to undergo the procedure. The anesthesia  plan was to use general anesthesia. Immediately prior  to administration of medications, the patient was  re-assessed for adequacy to receive sedatives. The  heart rate, respiratory rate, oxygen saturations,  blood pressure, adequacy of pulmonary ventilation, and  response to care were monitored throughout the  procedure. The physical status of the patient was  re-assessed after the procedure.  After obtaining informed consent, the therapeutic  bronchoscope was introduced through the mouth, via the  endotracheal tube (the patient was intubated for the  procedure) and advanced to the tracheobronchial tree.  The procedure was accomplished without difficulty. The  patient tolerated the procedure well. Total  fluoroscopy time was 2 minutes, 34 seconds. The total  duration of the procedure was 20 minutes.  Findings:  The endotracheal tube is in good position. The trachea is of normal  caliber. The genesis is sharp. The tracheobronchial tree was examined to  at least the first subsegmental level. Bronchial mucosa and anatomy are  normal; there are no endobronchial lesions, and no secretions.  Anastomoses Examination: The surgical anastomosis in the right mainstem  bronchus is normal. The right mainstem bronchus surgical anastomosis is  stenotic, with minor airway obstruction.  Bronchoalveolar lavage was performed in the right middle lobe of the  lung and sent for routine cytology, bacterial, AFB, fungal and viral  analysis, Aspergillus antigen, Pneumocystis (PCP/PJP) analysis and viral  antigen analysis. 120 mL of fluid were instilled. 70 mL were returned.  The return was cellular.  Transbronchial biopsies of a lesion were performed in the anterior basal  segment of the right lower lobe  using forceps and sent for  histopathology examination. The procedure was guided by fluoroscopy.  Transbronchial biopsy technique was selected because the sampling site  was not visible endoscopically. The sampling device penetrated the full  thickness of the bronchial wall to obtain the biopsy of lung tissue.  Four biopsy passes were performed. Four biopsy samples were obtained.  Transbronchial biopsies of a lesion were performed in the lateral  segment of the right middle lobe using forceps and sent for  histopathology examination. The procedure was guided by fluoroscopy.  Transbronchial biopsy technique was selected because the sampling site  was not visible endoscopically. The sampling device penetrated the full  thickness of the bronchial wall to obtain the biopsy of lung tissue.  Four biopsy passes were performed. Four biopsy samples were obtained.  ISHLT Airway Complications after Lung Transplant Grading - Left Lung  Ischemia and Necrosis (I): None  Dehiscence (D): None  Stenosis (S): None  Malacia (M): None  ISHLT Airway Complications after Lung Transplant Grading - Right Lung  Ischemia and Necrosis (I): None  Dehiscence (D): None  Stenosis (S):  Location: (a) Anastomotic  Extent: (b) >25% to 50% reduction in cross-sectional area  Malacia (M): None  Estimated Blood Loss:  Estimated blood loss was minimal.  Impression:            - Surveillance lung transplant  - Stenotic surgical anastomosis, with minor airway  obstruction.  - Bronchoalveolar lavage was performed.  - Transbronchial lung biopsies were performed.  - ISHLT Airway Complications Grade - Time: Month 11  Left lung:  Ischemia and Necrosis (I): None  Dehiscence (D): None  Stenosis (S): None  Malacia (M): None  Right lung:  Ischemia and Necrosis (I): None  Dehiscence (D): None  Stenosis - Location a, Extent b,  Malacia (M): None  Recommendation:        - The patient will be observed post-procedure, until  all discharge criteria are met.  - Await BAL  "and biopsy results.  - Follow up with referring physician as previously  scheduled.  Procedure Code(s):     --- Professional ---  77907, Bronchoscopy, rigid or flexible, including  fluoroscopic guidance, when performed; with  transbronchial lung biopsy(s), single lobe  32668, Bronchoscopy, rigid or flexible, including  fluoroscopic guidance, when performed; with bronchial  alveolar lavage  72757, Bronchoscopy, rigid or flexible, including  fluoroscopic guidance, when performed; with  transbronchial lung biopsy(s), each additional lobe  (List separately in addition to code for primary  procedure)  Diagnosis Code(s):     --- Professional ---  T86.818, Other complications of lung transplant  Z94.2, Lung transplant status  CPT copyright 2021 American Medical Association. All rights reserved.  The codes documented in this report are preliminary and upon  review may  be revised to meet current compliance requirements.  Attending Participation:  I was present and participated during the entire procedure, including  non-key portions.  Gregorio Cedillo MD  2/24/2021 6:14:51 AM  This report has been signed electronically.  Number of Addenda: 0  Note Initiated On: 2/23/2021 2:01 PM  Patient Profile:  This is a 65 year old male. Refer to note in patient chart for  documentation of history and physical. The attending physician  independently verified the history and physical at 1401 on 2/23/2021.  The patient's ECOG performance status grade is 2 - ambulatory and  capable of all self care but unable to carry out any work activities; up  and about more than 50% of waking hours. No results found for this or any previous visit.    Objective   Current Vitals  /58   Pulse 96   Temp 36.8 °C (98.2 °F)   Resp 16   Ht 1.753 m (5' 9\")   Wt 94.3 kg (208 lb)   SpO2 95%   BMI 30.72 kg/m²    No intake or output data in the 24 hours ending 01/07/25 1718    Physical Exam  Constitutional:       General: He is not in acute " distress.     Appearance: Normal appearance.   HENT:      Head: Normocephalic and atraumatic.      Mouth/Throat:      Mouth: Mucous membranes are moist.      Comments: No mucosal pallor, no lesions  Eyes:      General: No scleral icterus.     Extraocular Movements: Extraocular movements intact.      Pupils: Pupils are equal, round, and reactive to light.   Cardiovascular:      Rate and Rhythm: Normal rate and regular rhythm.      Pulses: Normal pulses.      Heart sounds: Normal heart sounds.   Pulmonary:      Effort: Pulmonary effort is normal.      Breath sounds: Normal breath sounds. No wheezing or rhonchi.   Abdominal:      General: Bowel sounds are normal. There is no distension.      Palpations: Abdomen is soft.      Tenderness: There is no abdominal tenderness. There is no guarding or rebound.   Musculoskeletal:      Right lower leg: No edema.      Left lower leg: No edema.   Skin:     General: Skin is warm and dry.   Neurological:      General: No focal deficit present.      Mental Status: He is alert and oriented to person, place, and time.   Psychiatric:         Mood and Affect: Mood normal.         Behavior: Behavior normal.         Labs  CBC: WBC 5.8 , HGB 7.0,   BMP: , K 4.4, Cl 107, HCO3 28, BUN 25, CR 1.02, Glu 121  LFTS: AST 21 , ALT 16, ALKPHOS 67 , TBILI 0.6 , DBILI No results found for requested labs within last 365 days.  TROP: No results found for requested labs within last 365 days.  BNP: No results found for requested labs within last 365 days.  COAGS: PT No results found for requested labs within last 365 days. , PTT No results found for requested labs within last 365 days.  , INR No results found for requested labs within last 365 days.  UA:     ABG:    CALCIUM 8.5 MAG No results found for requested labs within last 365 days. ALB 3.7 LACTATE No results found for requested labs within last 365 days. PHOS No results found for requested labs within last 365 days. COVIDNo results  "found for requested labs within last 365 days.    Imaging  === 11/11/24 ===    XR CHEST 2 VIEWS    - Impression -  1. Prior bilateral lung transplantation with healed sternotomy.  Persistent asymmetric left hemidiaphragm elevation with adjacent  atelectasis. No acute consolidation, edema, significant pleural fluid  or pneumothorax.        MACRO:  None    Signed by: Pawel Brown 11/11/2024 9:46 PM  Dictation workstation:   [id number]   === 09/18/23 ===    CT CHEST HIGH RESOLUTION    - Impression -  1.  Multifocal expiratory air trapping, consistent with small airways  disease. This may be due to sequela of prior infectious etiology,  however given history of lung transplant, findings of bronchiolitis  obliterans and chronic refection are not excluded. Correlate with  PFTs.  2. Interval slight worsening of left basilar atelectasis/scarring and  asymmetric elevation of left hemidiaphragm. No definite focal  consolidation.  3. Atherosclerosis to include severe coronary artery calcifications.  Please note that, the present study is not tailored for evaluation of  coronary arteries.  4. Mildly dilated main pulmonary artery which can be seen with  pulmonary hypertension.  5. Additional stable chronic findings as above.    MACRO:  None       Assessment/Plan     Stanford NGUYEN Caro \"Mal\" is a 69 y.o. 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 Hemoglobin has steadily dropped from baseline 10 to recent baseline of 8, now 7.2 on admission. Giving 2U PRBC per lung transplant team. Unclear etiology of acute on chronic anemia, will send basic anemia workup with plan to consult heme in AM. DDX SOB includes anemia vs less likely ACS (negative EKG/trop) vs less likely PE (compliant on Eliquis, not tachycardic, saturating well on RA) vs less likely ADHF (lack of HF symptoms, no crackles or edema on exam). "     #Shortness of breath  #Acute on chronic normocytic anemia   :: Recent baseline Hgb ~8.0, 7.2 on admission 01/07  :: 1/6/25 anemia labs c/w ANDREW: Fe 24L, %sat 6L  :: EKG NSR with trop negative, CXR nonacute   :: On Aranesp 60mcg weekly  :: last TTE 09/2023: EF 65%, mild increased thickness of LV septal wall, mild AR  :: T+S, consented for blood  :: Transfusing 2u leukocyte reduced PRBC per transplant recs  Plan  -Consult hematology in the morning   -Follow up additional anemia labs --> B12, folate, reticulocytes, hapto, direct/indirect bilirubin, LDH, peripheral smear  -Can consider repeat TTE for SOB      #s/p DLTx for IPF/PAH   :: CMV +/-, EBV positive recipient (PCR's negative on 01/06/25)  :: PFTs 11/11/24 - FEV: 1.32 45%, FVC: 2.35 60%, FEV1/FVC: 0.56 73%, - CXR  11/11/24   - c/w home prednisone 5mg daily and tacrolimus 2mg q12h  - AM tacro level daily (goal 6-8)  - Holding cellcept 250mg BID due to anemia  - For PJP prophylaxis, switch from dapsone to pentamidine inhalation 300 mg monthly  - For CMV mismatch, switching from valcyte to letermovir 480 mg daily  - C/w azithromycin 250mg MWF ROSSY ppx  - C/w home Levalbuterol BID followed by Colisitin 150mg BID and Symbicort 160/4.5 2 puffs BID (ordered as Breo-Ellipta inpatient)  - C/w home montelukast 10mg nightly    #PE (08/2020)  #Remote VTE (1993)  ::Hx PE: Noted in August 2020 with admission for septic shock. Eliquis 5mg BID, continue indefinitely  - c/w Eliquis 5 mg BID  - consider bridging with heparin gtt if plan for inpatient procedure      #Neuropathy  - C/w home gabapentin 300mg at night    #Steroid induced hyperglycemia  :: A1c 5.4% 10/2024  :: not on any medications  -POC qACHS  -Lispro SSI  -hypoglycemia protocol    #Osteoporosis  ::DEXA 11/4/24 indicative of low bone mass  -Holding home alendronate  -C/w vitamin D3 daily  -C/w calcium carbonate 1250 mg BID    #HLD  - c/w home rosuvastatin 40mg    #Nephrolithiasis  #LUTS  ::Follows with  urology  -c/w home oxybutynin 5mg BID    #Depression/CATHY  -C/w home duloxetine 60mg daily    #Miscellaneous  ::Home loratadine 10 mg  ::Home pantoprazole 40mg daily  -Cetirizine 10 mg inpatient  -C/w home PPI    Fluids: Replete PRN  Electrolytes: Keep mg >2, phos >3  and K >4  Nutrition:  Adult diet Regular   Antimicrobials:   DVT PPX: Apixaban  GI PPX: Pantoprazole  Bowel Care: Miralax  Catheter: None  Lines: PIV  Oxygen: Room Air  Drips:     Code Status: DNR and No Intubation and No ICU (confirmed on admission)   NOK:  Primary Emergency Contact: Lori Patel, [place] Phone: [phone]      Patient and plan to be staffed with attending physician in the morning.      Leland Donaldson MD  Department of Internal Medicine, PGY-1     ======  I saw and evaluated the patient. I personally obtained the key and critical portions of the history and physical exam or was physically present for key and critical portions performed by the resident/fellow. I reviewed the resident/fellow's documentation and discussed the patient with the resident/fellow. I agree with the resident/fellow's medical decision making as documented in the note.    Gregorio Cedillo MD

## 2025-01-07 NOTE — ED PROVIDER NOTES
Emergency Department Encounter  [facility] EMERGENCY MEDICINE    Patient: Stanford NGUYEN Caro  MRN: [id number]  : 1955  Date of Evaluation: 2025  ED Provider: KENZIE Gomez    ED care was supervised by Dr. Santos who independently examined and evaluated the patient. Please see their attestation note for further details.    Limitations to history: none  Independent Historian: self  Records reviewed: Care everywhere, paper chart, Inpatient and outpatient notes    Chief Complaint       Chief Complaint   Patient presents with   • Shortness of Breath   • low hemoglobin     [place]    (Location/Symptom, Timing/Onset, Context/Setting, Quality, Duration, Modifying Factors, Severity) Note limiting factors.   Stanford NGUYEN Caro is a 69 y.o. male who presents to the emergency department complaining of sent in for admission for low hemoglobin, patient has been complaining of shortness of breath, weakness, fatigue over the last 3 weeks, had lab work done yesterday was found to have a hemoglobin of 7.0.  Denies any fevers, chills, does endorse some cough.  Denies any lightheadedness, dizziness, syncope, states that he has noticed that his blood count has been steadily decreasing over the last few months.  Was supposed to be directly admitted however there were no beds so patient was sent to the emergency department.  Patient does consent to having a blood transfusion.  Denies any abdominal pain, denies any hemoptysis, hematic emesis or hematochezia.  Does report softer yellow stools but does not have any darkened stools.  Is on anticoagulants.    ROS:     Review of Systems  14 systems reviewed and otherwise acutely negative except as in the [place].          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    Lung transplant rejection   • Lung transplant status 12/21/2020    H/O lung transplant   • Nonfamilial hypogammaglobulinemia (Multi) 10/04/2021    Hypogammaglobulinemia   • Obstructive sleep apnea (adult) (pediatric) 08/09/2018    SARA on CPAP   • Other long term (current) drug therapy 12/07/2021    Encounter for long-term current use of high risk medication   • Other specified disorders of nose and nasal sinuses 05/09/2021    Nasal crusting   • Personal history of other diseases of the musculoskeletal system and connective tissue 08/09/2018    History of gout   • Personal history of other diseases of the respiratory system 08/09/2018    History of interstitial lung disease   • Personal history of other endocrine, nutritional and metabolic disease 08/09/2018    History of obesity   • Personal history of other endocrine, nutritional and metabolic disease 02/21/2019    History of hyperlipidemia   • Personal history of other venous thrombosis and embolism 08/09/2018    History of deep venous thrombosis   • Personal history of urinary calculi 08/09/2018    History of kidney stones   • Personal history of urinary calculi 09/06/2018    History of kidney stones   • Personal history of urinary calculi 02/25/2019    History of kidney stones     Past Surgical History:   Procedure Laterality Date   • MR HEAD ANGIO WO IV CONTRAST  10/15/2020    MR HEAD ANGIO WO IV CONTRAST 10/15/2020 [facility]   • MR NECK ANGIO WO IV CONTRAST  10/15/2020    MR NECK ANGIO WO IV CONTRAST 10/15/2020 [facility] CLINICAL LEGAC[facility]   • OTHER SURGICAL HISTORY  06/03/2020    Lung transplantation   • TONSILLECTOMY  08/09/2018    Tonsillectomy   • VASECTOMY  08/09/2018    Surgery Vas Deferens Vasectomy     Social History     Socioeconomic History   • Marital status:    Tobacco Use   • Smoking status: Never   • Smokeless tobacco: Never   Substance and Sexual Activity   • Alcohol use: Not Currently    • Drug use: Never     Social Drivers of Health     Physical Activity: Unknown (8/8/2022)    Received from [facility]    Exercise Vital Sign    • Days of Exercise per Week: 0 days       Medications/Allergies     Previous Medications    0.9 % SODIUM CHLORIDE (SODIUM CHLORIDE, PF, 0.9%) INJECTION    Mix with Colistin as directed: withdraw 5 mL of sodium chloride and add to Colistin vial twice daily.  Discard remainder of vial after each use.    ALCOHOL SWABS PADS, MEDICATED    Apply 1 Swab topically 1 (one) time per week. Use before aranesp injection.    ALENDRONATE (FOSAMAX) 70 MG TABLET    Take 1 tablet (70 mg) by mouth every 7 days. Take in the morning with a full glass of water on an empty stomach and do not take anything else by mouth or lie down for the next 30 minutes    APIXABAN (ELIQUIS) 5 MG TABLET    Take 1 tablet (5 mg) by mouth every 12 hours.    AZITHROMYCIN (ZITHROMAX) 250 MG TABLET    Take 1 tablet (250 mg) by mouth once a day on Monday, Wednesday, and Friday.    BLOOD SUGAR DIAGNOSTIC (ONETOUCH VERIO TEST STRIPS) STRIP    Use as directed 3 times a day before meals.    BUDESONIDE-FORMOTEROL (SYMBICORT) 160-4.5 MCG/ACTUATION INHALER    Inhale 2 puffs by mouth 2 times a day. Rinse mouth with water after use to reduce aftertaste and incidence of candidiasis. Do not swallow.    CALCIUM CARBONATE (OSCAL) 500 MG CALCIUM (1,250 MG) TABLET    Take 1 tablet (1,250 mg) by mouth 2 times daily (morning and late afternoon).    CHOLECALCIFEROL (VITAMIN D-3) 25 MCG (1000 UT) CAPSULE    Take 1 capsule (25 mcg) by mouth once daily.    COLISTIMETHATE (COLYMYCIN) 150 MG VIAL    Take 150 mg (1 vial) by nebulization 2 times a day. Mix 150 mg vial with 5 mL normal saline, insert into nebulizer and inhale medication 2 times a day    DAPSONE 100 MG TABLET    Take 1 tablet (100 mg) by mouth once daily.    DARBEPOETIN MALATHI IN POLYSORBAT (ARANESP, IN POLYSORBATE,) 60  "MCG/ML INJECTION    Inject 1 mL (60 mcg) under the skin 1 (one) time per week.    DULOXETINE (CYMBALTA) 60 MG DR CAPSULE    Take 1 capsule (60 mg) by mouth once daily. Do not crush or chew.    GABAPENTIN (NEURONTIN) 300 MG CAPSULE    Take 1 capsule (300 mg) by mouth once daily at bedtime.    LEVALBUTEROL (XOPENEX) 0.63 MG/3 ML NEBULIZER SOLUTION    Inhale one (1) ampule via nebulizer twice daily.    LORATADINE (CLARITIN) 10 MG TABLET    TAKE ONE (1) TABLET BY MOUTH ONCE DAILY.    MAGNESIUM CHLORIDE (MAGDELAY) 64 MG EC TABLET    Take 3 tablets (192 mg) by mouth 2 times a day. Separate from your tacrolimus by at least 2 hours.    MONTELUKAST (SINGULAIR) 10 MG TABLET    Take 1 tablet (10 mg) by mouth once daily at bedtime.    MULTIVITAMIN WITH MINERALS IRON-FREE (CENTRUM SILVER)    Take 1 tablet by mouth once daily.    MYCOPHENOLATE (CELLCEPT) 250 MG CAPSULE    Take 1 capsule (250 mg) by mouth 2 times a day.    PANTOPRAZOLE (PROTONIX) 40 MG EC TABLET    Take 1 tablet (40 mg) by mouth once daily.    PREDNISONE (DELTASONE) 5 MG TABLET    Take 1 tablet (5 mg) by mouth once daily.    ROSUVASTATIN (CRESTOR) 40 MG TABLET    Take 1 tablet (40 mg) by mouth once daily.    SHARPS CONTAINER    1 each if needed (For aranesp needles).    SYRINGE WITH NEEDLE (BD LUER-RASHAD SYRINGE) 10 ML 21 X 1 1/2\" SYRINGE    Use as directed to mix normal saline and Colistin twice daily.    SYRINGE WITH NEEDLE (SYRINGE 3CC/25GX1\") 3 ML 25 GAUGE X 1\" SYRINGE    Use 1 Syringe 1 (one) time per week as directed.    TACROLIMUS (PROGRAF) 1 MG CAPSULE    Take 2 capsules (2 mg) by mouth 2 times a day.    TROSPIUM (SANCTURA) 20 MG TABLET    Take 1 tablet (20 mg) by mouth 2 times a day.    VALGANCICLOVIR (VALCYTE) 450 MG TABLET    Take 2 tablets (900 mg) by mouth once daily.     Allergies   Allergen Reactions   • Gadolinium-Containing Contrast Media Other     Acute renal insufficiency s/p double lung transplant   • Iodinated Contrast Media Other     Acute " renal insufficiency s/p double lung transplant   • Nsaids (Non-Steroidal Anti-Inflammatory Drug) Other     Acute renal insufficiency s/p double lung transplant   • Adhesive Unknown     Patient stated dont have allergy to adhesive        Physical Exam       ED Triage Vitals [01/07/25 1630]   Temperature Heart Rate Respirations BP   36.8 °C (98.2 °F) 96 16 108/58      Pulse Ox Temp src Heart Rate Source Patient Position   95 % -- -- --      BP Location FiO2 (%)     -- --         Physical Exam    GENERAL:  The patient appears nourished and normally developed. Vital signs as documented.     HEENT:  Head normocephalic, atraumatic, EOMs intact, PERRLA, Mucous membranes moist. Nares patent without copious rhinorrhea.  No lymphadenopathy.    PULMONARY:  Lungs are clear to auscultation, without any respiratory distress. Able to speak full sentences, no accessory muscle use    CARDIAC:   Normal rate. No murmurs, rubs or gallops    ABDOMEN:  Soft, non-distended, non-tender, BS positive x 4 quadrants, No rebound or guarding, no peritoneal signs, no CVA tenderness, no masses or organomegaly    MUSCULOSKELETAL:   Able to ambulate, Non edematous, with no obvious deformities. Pulses intact distal    SKIN:   Pale, warm, dry, no significant rashes    NEURO:  No obvious neurological deficits, normal sensation and strength bilaterally.  Able to follow commands, NIH 0, CN 2-12 intact.        Diagnostics   Labs:  Results for orders placed or performed in visit on 01/06/25   CBC and Auto Differential    Collection Time: 01/06/25 11:52 AM   Result Value Ref Range    WBC 5.8 4.4 - 11.3 x10*3/uL    nRBC 0.3 (H) 0.0 - 0.0 /100 WBCs    RBC 2.98 (L) 4.50 - 5.90 x10*6/uL    Hemoglobin 7.0 (L) 13.5 - 17.5 g/dL    Hematocrit 26.3 (L) 41.0 - 52.0 %    MCV 88 80 - 100 fL    MCH 23.5 (L) 26.0 - 34.0 pg    MCHC 26.6 (L) 32.0 - 36.0 g/dL    RDW 18.6 (H) 11.5 - 14.5 %    Platelets 112 (L) 150 - 450 x10*3/uL    Neutrophils % 71.1 40.0 - 80.0 %     Immature Granulocytes %, Automated 1.0 (H) 0.0 - 0.9 %    Lymphocytes % 15.8 13.0 - 44.0 %    Monocytes % 9.7 2.0 - 10.0 %    Eosinophils % 1.7 0.0 - 6.0 %    Basophils % 0.7 0.0 - 2.0 %    Neutrophils Absolute 4.08 1.60 - 5.50 x10*3/uL    Immature Granulocytes Absolute, Automated 0.06 0.00 - 0.50 x10*3/uL    Lymphocytes Absolute 0.91 0.80 - 3.00 x10*3/uL    Monocytes Absolute 0.56 0.05 - 0.80 x10*3/uL    Eosinophils Absolute 0.10 0.00 - 0.40 x10*3/uL    Basophils Absolute 0.04 0.00 - 0.10 x10*3/uL   Comprehensive Metabolic Panel    Collection Time: 01/06/25 11:52 AM   Result Value Ref Range    Glucose 121 (H) 74 - 99 mg/dL    Sodium 140 136 - 145 mmol/L    Potassium 4.4 3.5 - 5.3 mmol/L    Chloride 107 98 - 107 mmol/L    Bicarbonate 28 21 - 32 mmol/L    Anion Gap 9 (L) 10 - 20 mmol/L    Urea Nitrogen 25 (H) 6 - 23 mg/dL    Creatinine 1.02 0.50 - 1.30 mg/dL    eGFR 79 >60 mL/min/1.73m*2    Calcium 8.5 (L) 8.6 - 10.3 mg/dL    Albumin 3.7 3.4 - 5.0 g/dL    Alkaline Phosphatase 67 33 - 136 U/L    Total Protein 5.9 (L) 6.4 - 8.2 g/dL    AST 21 9 - 39 U/L    Bilirubin, Total 0.6 0.0 - 1.2 mg/dL    ALT 16 10 - 52 U/L   IgG    Collection Time: 01/06/25 11:52 AM   Result Value Ref Range    IgG 792 700 - 1,600 mg/dL   Cyndi-Rodriguez PCR, Quant,Plasma    Collection Time: 01/06/25 11:52 AM   Result Value Ref Range    EBV DNA Result Not Detected Not Detected    EBV PCR Plasma Log     Magnesium    Collection Time: 01/06/25 11:52 AM   Result Value Ref Range    Magnesium 1.59 (L) 1.60 - 2.40 mg/dL   CMV DNA, Quantitative, PCR    Collection Time: 01/06/25 11:52 AM   Result Value Ref Range    Cytomegalovirus DNA, PCR Log IU/ML      CMV DNA Result Not Detected Not Detected   Tacrolimus    Collection Time: 01/06/25 11:52 AM   Result Value Ref Range    Tacrolimus  6.0 <=15.0 ng/mL     *Note: Due to a large number of results and/or encounters for the requested time period, some results have not been displayed. A complete set of results  "can be found in Results Review.     Radiographs:  XR chest 2 views    (Results Pending)       Procedures:   Procedures           Assessment   In brief, Stanford NGUYEN Caro is a 69 y.o. male who presented to the emergency department for symptomatic anemia in the setting of history of double lung transplant sent in by pulmonology    Plan   IV, lab work, chest x-ray, blood transfusion    Differentials   Symptomatic anemia  GI bleed  Hepatorenal syndrome    ED Course     Diagnoses as of 01/13/25 2132   Symptomatic anemia   History of lung transplant (Multi)   Anemia       Visit Vitals  /58   Pulse 96   Temp 36.8 °C (98.2 °F)   Resp 16   Ht 1.753 m (5' 9\")   Wt 94.3 kg (208 lb)   SpO2 95%   BMI 30.72 kg/m²   Smoking Status Never   BSA 2.14 m²       Medications - No data to display    Plan of care discussed, patient is stable appearing, in no distress, is not hypoxic.  Hemoglobin from yesterday was 7.0.  Patient is agreeable to obtaining a blood transfusion, states that he saw his pulmonologist walk-through.  Patient will be consented, plan is for patient to be admitted, case discussed with and seen by ED attending      Final Impression      1. Symptomatic anemia    2. History of lung transplant (Multi)          DISPOSITION  Disposition: Admit to med/surg floor  Patient condition is stable    Comment: Please note this report has been produced using speech recognition software and may contain errors related to that system including errors in grammar, punctuation, and spelling, as well as words and phrases that may be inappropriate.  If there are any questions or concerns please feel free to contact the dictating provider for clarification.    KENZIE Gomez APRN-CNP  01/07/25 1717       KENZIE Gmoez  01/13/25 2132    "

## 2025-01-07 NOTE — SIGNIFICANT EVENT
"Senior Staffing Note:    Please see excellent intern H&P for full details.    Stanford NGUYEN Caro \"George" is a 69 y.o. male with PMH s/p DLTx for IPF and pulmonary hypertension 3/22/2020 with course complicated by pleural bleed with washout 3/23 and need for trach discharged on 5/8/2020. 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 Pt reports shortness of breath, generalized fatigue, and dizziness/LH ongoing for the last few weeks. His dyspnea has not improved on his home inhaler/nebulizer regimen. He monitors his O2 saturation at home, normally runs 96-98% but has been running low 90s recently. Also with dry cough ongoing over the same timeframe. Denies f/c, recent travel, sick contacts, new medications including OTC. Denies CP, orthopnea, PND, or LE edema. Endorses easy bruising but this has been ongoing his whole life. Also endorses intermittent LUQ pain, nonradiating, not pleuritic, and not better/worse with PO intake. Denies mucosal bleeding, melena/hematochezia, hematuria, recent falls. Reports compliance with all of his medications including Eliquis (last dose 1/7 AM).    Vitals upon presentation unremarkable (SpO2 95% RA). ED labs still pending, however outpatient labs from yesterday 1/6/25 notable for acute on chronic normocytic anemia with Hb 7.0 (recent bl. Hb ~8.0, has declined from 10.0 over the past year), chronic thrombocytopenia with  (bl. ~100), hypomagenesemia to 1.6, and elevation in BUN to 25 without concurrent elevation in creatinine (Cr 1.02). No leukocytosis. CXR nonacute. EKG with NSR and LAD. Trop negative. Pt was seen by lung transplant in the ED, recommended 2u PRBC transfusion and heme consult for anemia. For immunosuppression, will continue home tacro 2 mg BID and prednisone 5 mg daily, hold cellcept 250 mg BID due to anemia. For " "prophylaxis, switching dapsone to pentamidine and valcyte to letermovir.     PHYSICAL EXAM  GEN: conversant, NAD  HEENT: PERRL, EOMI, MMM  CV: S1, S2, RRR, no MRG  PULM: CTAB, normal WOB  ABD: soft, NT, ND  NEURO: AO x4, CN 2-12 grossly intact, no FND  EXT: no sig LE edema  PSYCH: appropriate affect    Assessment/Plan  Stanford NGUYEN Caro \"Mal\" is a 69 y.o. male with PPMH s/p DLTx for IPF and pulmonary hypertension 3/22/2020 with course complicated by pleural bleed with washout 3/23 and need for trach discharged on 5/8/2020. Post hospital course is also been complicated by septic shock with MSSA 8/2020 with PE on lifelong anticoagulation (remote VTE 1993) he was also COVID-positive 12/2022, presenting for symptomatic acute on chronic anemia. Hemoglobin has steadily dropped from baseline 10 to recent baseline of 8, now 7.0. Unclear etiology of acute on chronic anemia, will send basic anemia workup with plan to consult heme in AM. DDX SOB includes anemia vs less likely ACS (negative EKG/trop) vs less likely PE (compliant on Eliquis, not tachycardic, saturating well on RA) vs less likely ADHF (lack of HF symptoms, no crackles or edema on exam).     #Shortness of breath  #Acute on chronic normocytic anemia   :: bl. Hb ~8.0, was 7.0 on admission  :: 1/6/25 anemia labs c/w ANDREW: Fe 24L, %sat 6L  :: EKG NSR with trop negative, CXR nonacute   :: last TTE 09/2023: EF 65%, mild increased thickness of LV septal wall, mild AR  -T+S, consented for blood  -2u leukocyte reduced PRBC per transplant recs  [ ] follow additional anemia workup: B12, folate, ferritin, reticulocytes, hapto, direct/indirect bilirubin, LDH, peripheral smear  [ ] heme c/s in AM  [ ] consider repeat TTE for SOB as last TTE over 1 year ago    #s/p DLTx for IPF and pulmonary hypertension  :: CMV +/-, EBV positive recipient   :: 1/6/25 CMV PCR not detected, EBV PCR not detected  -lung transplant c/s in ED, appreciate recs  -c/w prednisone 5 mg daily, tacro 2 mg " q12h  -HOLD cellcept 250 mg BID due to anemia  -for PJP prophylaxis, switch from dapsone to pentamidine inhalation 300 mg monthly  -for CMV mismatch, switch from valcyte to letermovir 480 mg daily    #PE (08/2020)  #Remote VTE (1993)  :: on lifelong AC  -c/w Eliquis 5 mg BID  -consider bridging with heparin gtt if plan for inpatient procedure like BMBx     #Steroid induced hyperglycemia  :: A1c 5.4% 10/2024  :: not on any medications  -POC qACHS  -Lispro SSI  -hypoglycemia protocol    Remainder of plan as per H&P.    F: PRN  E: PRN  N: Regular    Lines: PIV  O2: RA  Abx: N/I    DVT PPX: Eliquis  GI PPX: Home PPI    Code Status (confirmed on admission): DNR/DNI/no ICU  Surrogate Decision Maker: Lori Patel (wife) [phone]    [facility]    This patient was admitted by [facility], a medicine admission-only team. Overnight, the patient will be covered by NACR on Medicine Flex Team until signed out to Primary Team in AM. Please page Flex Team [phone] with any overnight patient questions or concerns.     Kelsey Ponce MD  Internal Medicine PGY2

## 2025-01-07 NOTE — ED TRIAGE NOTES
Emergency Department Encounter  [facility] EMERGENCY MEDICINE    Patient: Stanford NGUYEN Caro  MRN: [id number]  : 1955  Date of Evaluation: 2025  Triage Provider: KENZIE Gomez      Chief Complaint       Chief Complaint   Patient presents with    Shortness of Breath    low hemoglobin     [place]    (Location/Symptom, Timing/Onset, Context/Setting, Quality, Duration, Modifying Factors, Severity) Note limiting factors.       Stanford NGUYEN Caro is a 69 y.o. 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 Moore).  Denies fever, +cough.  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    Lung transplant rejection    Lung transplant status 2020    H/O lung transplant    Nonfamilial hypogammaglobulinemia (Multi) 10/04/2021    Hypogammaglobulinemia    Obstructive sleep apnea (adult) (pediatric) 2018    SARA on CPAP    Other long term (current) drug therapy 2021    Encounter for long-term current use of high risk medication    Other specified disorders of nose and nasal sinuses 2021    Nasal crusting    Personal history of other diseases of the musculoskeletal system and connective tissue 2018    History of gout    Personal history of other diseases of the respiratory system 2018    History of interstitial lung disease    Personal history of other endocrine, nutritional and metabolic disease 2018    History of obesity    Personal history of other endocrine, nutritional and metabolic disease 2019    History of  hyperlipidemia    Personal history of other venous thrombosis and embolism 08/09/2018    History of deep venous thrombosis    Personal history of urinary calculi 08/09/2018    History of kidney stones    Personal history of urinary calculi 09/06/2018    History of kidney stones    Personal history of urinary calculi 02/25/2019    History of kidney stones     Past Surgical History:   Procedure Laterality Date    MR HEAD ANGIO WO IV CONTRAST  10/15/2020    MR HEAD ANGIO WO IV CONTRAST 10/15/2020 [facility]    MR NECK ANGIO WO IV CONTRAST  10/15/2020    MR NECK ANGIO WO IV CONTRAST 10/15/2020 [facility]    OTHER SURGICAL HISTORY  06/03/2020    Lung transplantation    TONSILLECTOMY  08/09/2018    Tonsillectomy    VASECTOMY  08/09/2018    Surgery Vas Deferens Vasectomy     Social History     Socioeconomic History    Marital status:    Tobacco Use    Smoking status: Never    Smokeless tobacco: Never   Substance and Sexual Activity    Alcohol use: Not Currently    Drug use: Never     Social Drivers of Health     Physical Activity: Unknown (8/8/2022)    Received from [facility]    Exercise Vital Sign     Days of Exercise per Week: 0 days       Medications/Allergies     Previous Medications    0.9 % SODIUM CHLORIDE (SODIUM CHLORIDE, PF, 0.9%) INJECTION    Mix with Colistin as directed: withdraw 5 mL of sodium chloride and add to Colistin vial twice daily.  Discard remainder of vial after each use.    ALCOHOL SWABS PADS, MEDICATED    Apply 1 Swab topically 1 (one) time per week. Use before aranesp injection.    ALENDRONATE (FOSAMAX) 70 MG TABLET    Take 1 tablet (70 mg) by mouth every 7 days. Take in the morning with a full glass of water on an empty stomach and do not take anything else by mouth or lie down for the next 30 minutes    APIXABAN (ELIQUIS) 5 MG TABLET    Take 1 tablet (5 mg) by mouth every 12 hours.    AZITHROMYCIN (ZITHROMAX)  250 MG TABLET    Take 1 tablet (250 mg) by mouth once a day on Monday, Wednesday, and Friday.    BLOOD SUGAR DIAGNOSTIC (ONETOUCH VERIO TEST STRIPS) STRIP    Use as directed 3 times a day before meals.    BUDESONIDE-FORMOTEROL (SYMBICORT) 160-4.5 MCG/ACTUATION INHALER    Inhale 2 puffs by mouth 2 times a day. Rinse mouth with water after use to reduce aftertaste and incidence of candidiasis. Do not swallow.    CALCIUM CARBONATE (OSCAL) 500 MG CALCIUM (1,250 MG) TABLET    Take 1 tablet (1,250 mg) by mouth 2 times daily (morning and late afternoon).    CHOLECALCIFEROL (VITAMIN D-3) 25 MCG (1000 UT) CAPSULE    Take 1 capsule (25 mcg) by mouth once daily.    COLISTIMETHATE (COLYMYCIN) 150 MG VIAL    Take 150 mg (1 vial) by nebulization 2 times a day. Mix 150 mg vial with 5 mL normal saline, insert into nebulizer and inhale medication 2 times a day    DAPSONE 100 MG TABLET    Take 1 tablet (100 mg) by mouth once daily.    DARBEPOETIN MALATHI IN POLYSORBAT (ARANESP, IN POLYSORBATE,) 60 MCG/ML INJECTION    Inject 1 mL (60 mcg) under the skin 1 (one) time per week.    DULOXETINE (CYMBALTA) 60 MG DR CAPSULE    Take 1 capsule (60 mg) by mouth once daily. Do not crush or chew.    GABAPENTIN (NEURONTIN) 300 MG CAPSULE    Take 1 capsule (300 mg) by mouth once daily at bedtime.    LEVALBUTEROL (XOPENEX) 0.63 MG/3 ML NEBULIZER SOLUTION    Inhale one (1) ampule via nebulizer twice daily.    LORATADINE (CLARITIN) 10 MG TABLET    TAKE ONE (1) TABLET BY MOUTH ONCE DAILY.    MAGNESIUM CHLORIDE (MAGDELAY) 64 MG EC TABLET    Take 3 tablets (192 mg) by mouth 2 times a day. Separate from your tacrolimus by at least 2 hours.    MONTELUKAST (SINGULAIR) 10 MG TABLET    Take 1 tablet (10 mg) by mouth once daily at bedtime.    MULTIVITAMIN WITH MINERALS IRON-FREE (CENTRUM SILVER)    Take 1 tablet by mouth once daily.    MYCOPHENOLATE (CELLCEPT) 250 MG CAPSULE    Take 1 capsule (250 mg) by mouth 2 times a day.    PANTOPRAZOLE (PROTONIX) 40 MG EC  "TABLET    Take 1 tablet (40 mg) by mouth once daily.    PREDNISONE (DELTASONE) 5 MG TABLET    Take 1 tablet (5 mg) by mouth once daily.    ROSUVASTATIN (CRESTOR) 40 MG TABLET    Take 1 tablet (40 mg) by mouth once daily.    SHARPS CONTAINER    1 each if needed (For aranesp needles).    SYRINGE WITH NEEDLE (BD LUER-RASHAD SYRINGE) 10 ML 21 X 1 1/2\" SYRINGE    Use as directed to mix normal saline and Colistin twice daily.    SYRINGE WITH NEEDLE (SYRINGE 3CC/25GX1\") 3 ML 25 GAUGE X 1\" SYRINGE    Use 1 Syringe 1 (one) time per week as directed.    TACROLIMUS (PROGRAF) 1 MG CAPSULE    Take 2 capsules (2 mg) by mouth 2 times a day.    TROSPIUM (SANCTURA) 20 MG TABLET    Take 1 tablet (20 mg) by mouth 2 times a day.    VALGANCICLOVIR (VALCYTE) 450 MG TABLET    Take 2 tablets (900 mg) by mouth once daily.     Allergies   Allergen Reactions    Gadolinium-Containing Contrast Media Other     Acute renal insufficiency s/p double lung transplant    Iodinated Contrast Media Other     Acute renal insufficiency s/p double lung transplant    Nsaids (Non-Steroidal Anti-Inflammatory Drug) Other     Acute renal insufficiency s/p double lung transplant    Adhesive Unknown     Patient stated dont have allergy to adhesive        Physical Exam       ED Triage Vitals [01/07/25 1630]   Temperature Heart Rate Respirations BP   36.8 °C (98.2 °F) 96 16 108/58      Pulse Ox Temp src Heart Rate Source Patient Position   95 % -- -- --      BP Location FiO2 (%)     -- --         Physical Exam    Focused PE    GENERAL:  The patient appears nourished and normally developed. Vital signs as documented.     PULMONARY:  Lungs are clear to auscultation, without any respiratory distress. Able to speak full sentences, no accessory muscle use    CARDIAC:   Normal rate. No murmurs, rubs or gallops    ABDOMEN:  Soft, non distended, non tender, BS positive x 4 quadrants, No rebound or guarding, no peritoneal signs, may be limited by patient positioning in " triage    MUSCULOSKELETAL:   Able to ambulate, Non edematous, with no obvious deformities. Pulses intact distal    SKIN:   Good color, with no significant rashes.  No pallor.    NEURO:  Alert and oriented, speech clear and coherent    Plan     IV, lab work, EKG, imaging      For the remainder of the patient's workup and ED course, please see the main ED provider note.  We discussed need for diagnostic testing including lab studies and imaging.  We also discussed that they may be asked to wait in the waiting room while these test are pending.  They understand that if they choose to leave without having the testing completed or resulted that we cannot rule out acute life-threatening illnesses and the risks involved to lead to worsening condition, permanent disability or even death.        Comment: Please note this report has been produced using speech recognition software and may contain errors related to that system including errors in grammar, punctuation, and spelling, as well as words and phrases that may be inappropriate.  If there are any questions or concerns please feel free to contact the dictating provider for clarification.    MARGOT Gomez-CNP

## 2025-01-07 NOTE — PROGRESS NOTES
"Stanford NGUYEN Caro is a 69 y.o. 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 His transplant course was complicated by pleural bleed with return to OR for washout on 3/23/2020, prolonged ventilatory support requiring tracheostomy (since decannulated) and discharged on 5/8/2020. Post-hospital course was complicated by septic shock with MSSA in August 20202 with PE requiring lifelong anticoagulation.      Presenting today for anemia, SOB     Subjective Data:  Presented to ED due to SOB with anemia     Overnight Events:    N/a     Objective Data:  Last Recorded Vitals:  Vitals:    01/07/25 1630   BP: 108/58   Pulse: 96   Resp: 16   Temp: 36.8 °C (98.2 °F)   SpO2: 95%   Weight: 94.3 kg (208 lb)   Height: 1.753 m (5' 9\")       Last Labs:  CBC - 1/6/2025: 11:52 AM  5.8 7.0 112    26.3      CMP - 1/6/2025: 11:52 AM  8.5 5.9 21 --- 0.6   _ 3.7 16 67      PTT - No results in last year.  _   _ _     BNP   Date/Time Value Ref Range Status   11/05/2020 01:30  0 - 99 pg/mL Final     Comment:     .  <100 pg/mL - Heart failure unlikely  100-299 pg/mL - Intermediate probability of acute heart  .               failure exacerbation. Correlate with clinical  .               context and patient history.    >=300 pg/mL - Heart Failure likely. Correlate with clinical  .               context and patient history.   Biotin interference may cause falsely decreased results.   Patients taking a Biotin dose of up to 5 mg/day should   refrain from taking Biotin for 24 hours before sample   collection. Providers may contact their local laboratory   for further information.     09/20/2020 06:53  0 - 99 pg/mL Final     Comment:     .  <100 pg/mL - Heart failure unlikely  100-299 pg/mL - Intermediate probability of acute heart  .               failure exacerbation. Correlate with clinical  .               context and patient history.    >=300 pg/mL - Heart Failure likely. Correlate with clinical  .   "             context and patient history.   Biotin interference may cause falsely decreased results.   Patients taking a Biotin dose of up to 5 mg/day should   refrain from taking Biotin for 24 hours before sample   collection. Providers may contact their local laboratory   for further information.       HGBA1C   Date/Time Value Ref Range Status   10/22/2024 11:45 AM 5.4 4.2 - 6.5 % Final   04/18/2024 12:19 PM 5.3 4.2 - 6.5 % Final     LDLCALC   Date/Time Value Ref Range Status   12/16/2024 11:29 AM 33 <=99 mg/dL Final     Comment:                                 Near   Borderline      AGE      Desirable  Optimal    High     High     Very High     0-19 Y     0 - 109     ---    110-129   >/= 130     ----    20-24 Y     0 - 119     ---    120-159   >/= 160     ----      >24 Y     0 -  99   100-129  130-159   160-189     >/=190     09/04/2024 11:42 AM 41 <=99 mg/dL Final     Comment:                                 Near   Borderline      AGE      Desirable  Optimal    High     High     Very High     0-19 Y     0 - 109     ---    110-129   >/= 130     ----    20-24 Y     0 - 119     ---    120-159   >/= 160     ----      >24 Y     0 -  99   100-129  130-159   160-189     >/=190     06/25/2024 10:06 AM 33 <=99 mg/dL Final     Comment:                                 Near   Borderline      AGE      Desirable  Optimal    High     High     Very High     0-19 Y     0 - 109     ---    110-129   >/= 130     ----    20-24 Y     0 - 119     ---    120-159   >/= 160     ----      >24 Y     0 -  99   100-129  130-159   160-189     >/=190       VLDL   Date/Time Value Ref Range Status   12/16/2024 11:29 AM 19 0 - 40 mg/dL Final   09/04/2024 11:42 AM 12 0 - 40 mg/dL Final   06/25/2024 10:06 AM 13 0 - 40 mg/dL Final      Last I/O:  No intake/output data recorded.    Inpatient Medications:        No current facility-administered medications for this encounter.    Current Outpatient Medications:     0.9 % sodium chloride (sodium  "chloride, PF, 0.9%) injection, Mix with Colistin as directed: withdraw 5 mL of sodium chloride and add to Colistin vial twice daily.  Discard remainder of vial after each use., Disp: 600 mL, Rfl: 11    alcohol swabs pads, medicated, Apply 1 Swab topically 1 (one) time per week. Use before aranesp injection., Disp: 140 each, Rfl: 3    alendronate (Fosamax) 70 mg tablet, Take 1 tablet (70 mg) by mouth every 7 days. Take in the morning with a full glass of water on an empty stomach and do not take anything else by mouth or lie down for the next 30 minutes, Disp: 12 tablet, Rfl: 3    apixaban (Eliquis) 5 mg tablet, Take 1 tablet (5 mg) by mouth every 12 hours., Disp: 180 tablet, Rfl: 3    azithromycin (Zithromax) 250 mg tablet, Take 1 tablet (250 mg) by mouth once a day on Monday, Wednesday, and Friday., Disp: 36 tablet, Rfl: 3    blood sugar diagnostic (OneTouch Verio test strips) strip, Use as directed 3 times a day before meals., Disp: 300 each, Rfl: 3    syringe with needle (BD Luer-Silvia Syringe) 10 mL 21 x 1 1/2\" syringe, Use as directed to mix normal saline and Colistin twice daily., Disp: 60 each, Rfl: 11    budesonide-formoteroL (Symbicort) 160-4.5 mcg/actuation inhaler, Inhale 2 puffs by mouth 2 times a day. Rinse mouth with water after use to reduce aftertaste and incidence of candidiasis. Do not swallow., Disp: 10.2 g, Rfl: 11    calcium carbonate (Oscal) 500 mg calcium (1,250 mg) tablet, Take 1 tablet (1,250 mg) by mouth 2 times daily (morning and late afternoon)., Disp: 180 tablet, Rfl: 3    cholecalciferol (Vitamin D-3) 25 MCG (1000 UT) capsule, Take 1 capsule (25 mcg) by mouth once daily., Disp: 90 capsule, Rfl: 3    colistimethate (Colymycin) 150 mg vial, Take 150 mg (1 vial) by nebulization 2 times a day. Mix 150 mg vial with 5 mL normal saline, insert into nebulizer and inhale medication 2 times a day, Disp: 60 each, Rfl: 11    dapsone 100 mg tablet, Take 1 tablet (100 mg) by mouth once daily., Disp: 90 " "tablet, Rfl: 3    darbepoetin alex in polysorbat (Aranesp, in polysorbate,) 60 mcg/mL injection, Inject 1 mL (60 mcg) under the skin 1 (one) time per week., Disp: 4 mL, Rfl: 11    DULoxetine (Cymbalta) 60 mg DR capsule, Take 1 capsule (60 mg) by mouth once daily. Do not crush or chew., Disp: 90 capsule, Rfl: 3    gabapentin (Neurontin) 300 mg capsule, Take 1 capsule (300 mg) by mouth once daily at bedtime., Disp: 30 capsule, Rfl: 11    levalbuterol (Xopenex) 0.63 mg/3 mL nebulizer solution, Inhale one (1) ampule via nebulizer twice daily., Disp: 180 mL, Rfl: 11    loratadine (Claritin) 10 mg tablet, TAKE ONE (1) TABLET BY MOUTH ONCE DAILY., Disp: 90 tablet, Rfl: 3    magnesium chloride (MagDelay) 64 mg EC tablet, Take 3 tablets (192 mg) by mouth 2 times a day. Separate from your tacrolimus by at least 2 hours., Disp: 540 tablet, Rfl: 3    montelukast (Singulair) 10 mg tablet, Take 1 tablet (10 mg) by mouth once daily at bedtime., Disp: 90 tablet, Rfl: 3    multivitamin with minerals iron-free (Centrum Silver), Take 1 tablet by mouth once daily., Disp: 90 tablet, Rfl: 3    mycophenolate (Cellcept) 250 mg capsule, Take 1 capsule (250 mg) by mouth 2 times a day., Disp: 180 capsule, Rfl: 3    pantoprazole (ProtoNix) 40 mg EC tablet, Take 1 tablet (40 mg) by mouth once daily., Disp: 90 tablet, Rfl: 3    predniSONE (Deltasone) 5 mg tablet, Take 1 tablet (5 mg) by mouth once daily., Disp: 90 tablet, Rfl: 3    rosuvastatin (Crestor) 40 mg tablet, Take 1 tablet (40 mg) by mouth once daily., Disp: 90 tablet, Rfl: 3    sharps container, 1 each if needed (For aranesp needles)., Disp: 1 each, Rfl: 11    syringe with needle (Syringe 3cc/25Gx1\") 3 mL 25 gauge x 1\" syringe, Use 1 Syringe 1 (one) time per week as directed., Disp: 100 each, Rfl: 4    tacrolimus (Prograf) 1 mg capsule, Take 2 capsules (2 mg) by mouth 2 times a day., Disp: 360 capsule, Rfl: 3    trospium (Sanctura) 20 mg tablet, Take 1 tablet (20 mg) by mouth 2 times a " day., Disp: 180 tablet, Rfl: 3    valGANciclovir (Valcyte) 450 mg tablet, Take 2 tablets (900 mg) by mouth once daily., Disp: 180 tablet, Rfl: 3    Physical Exam:  Physical Exam  Constitutional:       Appearance: He is normal weight.   HENT:      Head: Normocephalic.      Nose: Nose normal.      Mouth/Throat:      Mouth: Mucous membranes are moist.      Pharynx: Oropharynx is clear.   Cardiovascular:      Rate and Rhythm: Normal rate.      Heart sounds: Normal heart sounds.   Pulmonary:      Effort: Pulmonary effort is normal.   Musculoskeletal:         General: Normal range of motion.   Skin:     General: Skin is warm and dry.   Neurological:      General: No focal deficit present.      Mental Status: He is alert.   Psychiatric:         Attention and Perception: Attention normal.         Mood and Affect: Mood normal.         Behavior: Behavior is cooperative.       Assessment/Plan   Stanford NGUYEN Caro is a 69 y.o. 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 His transplant course was complicated by pleural bleed with return to OR for washout on 3/23/2020, prolonged ventilatory support requiring tracheostomy (since decannulated) and discharged on 5/8/2020. Post-hospital course was complicated by septic shock with MSSA in August 20202 with PE requiring lifelong anticoagulation.      He underwent a double lung transplant on 3/22/2020.    Presenting to ED due to anemia (Hgb 7.0), fatigue and SOB.      Pulmonary:  s/p DLTx for IPF/PAH on 3/22/20, CMV +/-, EBV positive recipient  Allograft function:  - describes janelle SOB; SPO2 ~90%   - Hx PE: Remove history of VTE in 1993. Modest PE at same time of septic shock in August 2020, continue Eliquis 5mg BID lifelong   - Hx MSSA, Klebsiella, Corynebacterium from April 2020 bronchoscopy  - Hx Enterobacter from June 2020 bronchoscopy, sensitive to Fluoroquinolones  - Hx Pseudomonas from September 2023 bronchoscopy, completed 14 day course of  Ciprofloxacin and commenced on inhaled Tobramycin  - PFTs as above  - CXR  11/11/24 reviewed, stable; repeat pending   - Hx SARA: No longer requiring CPAP   - Hx CLAD: Multifocal air trapping identified with September 2023 high resolution chest CT  - HLA Class I & II Negative September 2024, repeat December 2024  - ROSSY Prophylaxis: Azithromycin 250mg M W F, Singular 10mg nightly, Symbicort 160/4.5 2 puffs BID  - Nebulized Medications: Levalbuterol BID followed by Colisitin 150mg BID and Symbicort as above     Immunosuppression:  - Prograf goal 6-8, level 6.0, continue 2mg BID  - Hold Cellcept 250mg BID due to anemia   - Prednisone 5mg daily     Prophylaxis:  - PJP: Previously on Dapsone 100mg daily; will hold due to anemia, will switch to Pentamidine   - CMV +/- mismatch: Will start Letermovir 480mg daily, previously on Valcyte 900mg daily but will hold due to anemia. Follow with routine CMV PCR 1/6/25 not detected  - EBV: Follow with routine EBV PCR 1/6/25 not detected  - Fungal: Completed course of Voriconazole  - IgG, goal > 600: Level 792 on 1/6/25     Cardiac:  - Denies chest pain, palpitations, swelling in feet/ankles  - BP Management: No current antihypertensives, historically required Lopressor but then discontinued secondary to hypotension              Advised to call Lung Transplant Office if BP consistently > 130/80 or < 100/60  - Lifelong anticoagulation with Eliquis 5mg BID  - Hx Chest Pain: August 2023, evaluated in ED, work-up unremarkable. Likely MSK etiology              TTE September 2023: EF 65% and normal biventricular function  - Hx HLD: Crestor 40mg daily              Lipid Panel September 2024: , HDL 52.5, Cholesterol/HDL 2.0, LDL 41, VLDL 12, TG 58, non HDL 53              Repeat December 2024     Renal  - Denies dysuria/hematuria  - Creatinine 1.02, eGFR 79  - Potassium 4.4  - Magnesium 1.59. On MagDelay 192mg BID   - Hx BPH: Sanctura 20mg BID per Urology  - Elevated PSA: 4.88 in April  2024, MRI prostate consistent with BPH. Follows with Dr. Soni of Urology  - Renally dose all medications and avoid nephrotoxins. May not have NSAIDs or IV Contrast     GI  - Denies n/v/d/c  - Protonix BID   - Elevated Alkaline Phosphatase: Improved with discontinuation of Voriconazole, MRI Liver February 2023 normal. Not presently an issue     ID  - Denies fevers/chills/night sweats. Afebrile, no leukocytosis  - Hx Septic Shock with MSSA: August 2020, prolonged course of IV antibiotics with resolution  - Hx Otitis Externa: Noted September 2020, follows by ENT. Several courses of antibiotics with wick insertion, developed mastoiditis. Since resolved  - Hx Sternal Wound Infection: 2 episodes of staph infection requiring debridement and wound vac              2nd episode in July 2021, no debridement at this time but completed an 8 week course of antibiotics  - Hx Covid: December 2022, recovered  - Hx positive bronchoscopy cultures as above     Endo  - Hx DM: Follows with Endocrine, last visit 10/22/24 HgBA1c 5.4 at this time              No glucose lowering agents presently, historically prescribed Jardiance  - Hx Osteoporosis: DEXA 11/4/24 indicative of low bone mass              Follows with Endocrinology. Alendronate 70mg once weekly  - Hx Vitamin D Deficiency: Level 41 on 12/16/24 continue Vitamin D3 1000u daily      Heme  - Iron studies pending  - Consult heme for chronic anemia   - Denies bleeding/bruising  - Hx PE: Noted in August 2020 with admission for septic shock. Eliquis 5mg BID, continue indefinitely              IVC filter, will explore removal  - HgB 7.0, Hematocrit 26.3; Transfuse with 2uPRBC -Leukocytes Reduced (CMV reduced risk)   - WBC 5.8, ANC sufficient 4080, platelets 112     ENT  - Hx Otitis Externa with facial nerve involvement. Follows with ENT              S/p R tympanomasteoidectomy, mastoid washout while inpatient               March 2021 Osteomyelitis skull base, completed 12 week course  "of Meropenem  - Hx Chronic Hearing Loss: Requires hearing aids, follows with ENT     Neuro  - Neuropathy: Gabapentin 300mg at night  - Memory Loss: Reporting onset \"several months\" forgets to pay bills, forgets to complete tasks, wife reporting this is an issue. Will refer to Neurology for further work-up    Code Status:  No Order    I spent 60 minutes in the professional and overall care of this patient.    Lupe Keith, APRN-CNS  "

## 2025-01-07 NOTE — TELEPHONE ENCOUNTER
Called patient to update him that there will not be a bed available today for direct admission. He reports some SOB with activity, pulse ox around 90%. Team updated. Advised that patient call our office back if he gets more short of breath or O2 drops below 90. Patient verbalized understanding.

## 2025-01-08 LAB
ALBUMIN SERPL BCP-MCNC: 3.7 G/DL (ref 3.4–5)
ALP SERPL-CCNC: 71 U/L (ref 33–136)
ALT SERPL W P-5'-P-CCNC: 15 U/L (ref 10–52)
ANION GAP BLDV CALCULATED.4IONS-SCNC: 12 MMOL/L (ref 10–25)
ANION GAP BLDV CALCULATED.4IONS-SCNC: 8 MMOL/L (ref 10–25)
ANION GAP SERPL CALC-SCNC: 13 MMOL/L (ref 10–20)
AST SERPL W P-5'-P-CCNC: 19 U/L (ref 9–39)
BASE EXCESS BLDV CALC-SCNC: -0.9 MMOL/L (ref -2–3)
BASE EXCESS BLDV CALC-SCNC: -2 MMOL/L (ref -2–3)
BASOPHILS # BLD AUTO: 0.04 X10*3/UL (ref 0–0.1)
BASOPHILS NFR BLD AUTO: 0.7 %
BILIRUB SERPL-MCNC: 1.8 MG/DL (ref 0–1.2)
BLOOD EXPIRATION DATE: NORMAL
BLOOD EXPIRATION DATE: NORMAL
BODY TEMPERATURE: 37 DEGREES CELSIUS
BODY TEMPERATURE: 37 DEGREES CELSIUS
BUN SERPL-MCNC: 21 MG/DL (ref 6–23)
CA-I BLDV-SCNC: 1.08 MMOL/L (ref 1.1–1.33)
CA-I BLDV-SCNC: 1.23 MMOL/L (ref 1.1–1.33)
CALCIUM SERPL-MCNC: 8.5 MG/DL (ref 8.6–10.6)
CHLORIDE BLDV-SCNC: 108 MMOL/L (ref 98–107)
CHLORIDE BLDV-SCNC: 108 MMOL/L (ref 98–107)
CHLORIDE SERPL-SCNC: 110 MMOL/L (ref 98–107)
CO2 SERPL-SCNC: 23 MMOL/L (ref 21–32)
CREAT SERPL-MCNC: 0.94 MG/DL (ref 0.5–1.3)
DISPENSE STATUS: NORMAL
DISPENSE STATUS: NORMAL
EGFRCR SERPLBLD CKD-EPI 2021: 87 ML/MIN/1.73M*2
EOSINOPHIL # BLD AUTO: 0.07 X10*3/UL (ref 0–0.4)
EOSINOPHIL NFR BLD AUTO: 1.2 %
ERYTHROCYTE [DISTWIDTH] IN BLOOD BY AUTOMATED COUNT: 17 % (ref 11.5–14.5)
GLUCOSE BLDV-MCNC: 119 MG/DL (ref 74–99)
GLUCOSE BLDV-MCNC: 90 MG/DL (ref 74–99)
GLUCOSE SERPL-MCNC: 82 MG/DL (ref 74–99)
HCO3 BLDV-SCNC: 25.7 MMOL/L (ref 22–26)
HCO3 BLDV-SCNC: 26.1 MMOL/L (ref 22–26)
HCT VFR BLD AUTO: 29.4 % (ref 41–52)
HCT VFR BLD EST: 28 % (ref 41–52)
HCT VFR BLD EST: 32 % (ref 41–52)
HGB BLD-MCNC: 8.6 G/DL (ref 13.5–17.5)
HGB BLDV-MCNC: 10.6 G/DL (ref 13.5–17.5)
HGB BLDV-MCNC: 9.2 G/DL (ref 13.5–17.5)
IMM GRANULOCYTES # BLD AUTO: 0.06 X10*3/UL (ref 0–0.5)
IMM GRANULOCYTES NFR BLD AUTO: 1 % (ref 0–0.9)
INHALED O2 CONCENTRATION: 24 %
INHALED O2 CONCENTRATION: 28 %
LACTATE BLDV-SCNC: 1 MMOL/L (ref 0.4–2)
LACTATE BLDV-SCNC: 1.1 MMOL/L (ref 0.4–2)
LACTATE BLDV-SCNC: 2.8 MMOL/L (ref 0.4–2)
LYMPHOCYTES # BLD AUTO: 0.81 X10*3/UL (ref 0.8–3)
LYMPHOCYTES NFR BLD AUTO: 13.4 %
MAGNESIUM SERPL-MCNC: 1.73 MG/DL (ref 1.6–2.4)
MCH RBC QN AUTO: 24.8 PG (ref 26–34)
MCHC RBC AUTO-ENTMCNC: 29.3 G/DL (ref 32–36)
MCV RBC AUTO: 85 FL (ref 80–100)
MONOCYTES # BLD AUTO: 0.43 X10*3/UL (ref 0.05–0.8)
MONOCYTES NFR BLD AUTO: 7.1 %
NEUTROPHILS # BLD AUTO: 4.63 X10*3/UL (ref 1.6–5.5)
NEUTROPHILS NFR BLD AUTO: 76.6 %
NRBC BLD-RTO: 0 /100 WBCS (ref 0–0)
OXYHGB MFR BLDV: 61 % (ref 45–75)
OXYHGB MFR BLDV: 92.4 % (ref 45–75)
PCO2 BLDV: 51 MM HG (ref 41–51)
PCO2 BLDV: 61 MM HG (ref 41–51)
PH BLDV: 7.24 PH (ref 7.33–7.43)
PH BLDV: 7.31 PH (ref 7.33–7.43)
PLATELET # BLD AUTO: 88 X10*3/UL (ref 150–450)
PO2 BLDV: 42 MM HG (ref 35–45)
PO2 BLDV: 75 MM HG (ref 35–45)
POTASSIUM BLDV-SCNC: 4 MMOL/L (ref 3.5–5.3)
POTASSIUM BLDV-SCNC: 4.6 MMOL/L (ref 3.5–5.3)
POTASSIUM SERPL-SCNC: 4.4 MMOL/L (ref 3.5–5.3)
PRODUCT BLOOD TYPE: 5100
PRODUCT BLOOD TYPE: 5100
PRODUCT CODE: NORMAL
PRODUCT CODE: NORMAL
PROT SERPL-MCNC: 5.8 G/DL (ref 6.4–8.2)
RBC # BLD AUTO: 3.47 X10*6/UL (ref 4.5–5.9)
SAO2 % BLDV: 62 % (ref 45–75)
SAO2 % BLDV: 94 % (ref 45–75)
SODIUM BLDV-SCNC: 137 MMOL/L (ref 136–145)
SODIUM BLDV-SCNC: 142 MMOL/L (ref 136–145)
SODIUM SERPL-SCNC: 142 MMOL/L (ref 136–145)
TACROLIMUS BLD-MCNC: 9.4 NG/ML
UNIT ABO: NORMAL
UNIT ABO: NORMAL
UNIT NUMBER: NORMAL
UNIT NUMBER: NORMAL
UNIT RH: NORMAL
UNIT RH: NORMAL
UNIT VOLUME: 350
UNIT VOLUME: 350
WBC # BLD AUTO: 6 X10*3/UL (ref 4.4–11.3)
XM INTEP: NORMAL
XM INTEP: NORMAL

## 2025-01-08 PROCEDURE — 36430 TRANSFUSION BLD/BLD COMPNT: CPT

## 2025-01-08 PROCEDURE — 1100000001 HC PRIVATE ROOM DAILY

## 2025-01-08 PROCEDURE — 2500000001 HC RX 250 WO HCPCS SELF ADMINISTERED DRUGS (ALT 637 FOR MEDICARE OP)

## 2025-01-08 PROCEDURE — 2500000004 HC RX 250 GENERAL PHARMACY W/ HCPCS (ALT 636 FOR OP/ED): Performed by: CLINICAL NURSE SPECIALIST

## 2025-01-08 PROCEDURE — 99223 1ST HOSP IP/OBS HIGH 75: CPT | Performed by: INTERNAL MEDICINE

## 2025-01-08 PROCEDURE — P9016 RBC LEUKOCYTES REDUCED: HCPCS

## 2025-01-08 PROCEDURE — 85025 COMPLETE CBC W/AUTO DIFF WBC: CPT | Performed by: STUDENT IN AN ORGANIZED HEALTH CARE EDUCATION/TRAINING PROGRAM

## 2025-01-08 PROCEDURE — 84132 ASSAY OF SERUM POTASSIUM: CPT | Performed by: CLINICAL NURSE SPECIALIST

## 2025-01-08 PROCEDURE — 80053 COMPREHEN METABOLIC PANEL: CPT | Performed by: STUDENT IN AN ORGANIZED HEALTH CARE EDUCATION/TRAINING PROGRAM

## 2025-01-08 PROCEDURE — 83735 ASSAY OF MAGNESIUM: CPT | Performed by: STUDENT IN AN ORGANIZED HEALTH CARE EDUCATION/TRAINING PROGRAM

## 2025-01-08 PROCEDURE — RXMED WILLOW AMBULATORY MEDICATION CHARGE

## 2025-01-08 PROCEDURE — 82435 ASSAY OF BLOOD CHLORIDE: CPT | Performed by: CLINICAL NURSE SPECIALIST

## 2025-01-08 PROCEDURE — 2500000005 HC RX 250 GENERAL PHARMACY W/O HCPCS: Performed by: CLINICAL NURSE SPECIALIST

## 2025-01-08 PROCEDURE — 2500000001 HC RX 250 WO HCPCS SELF ADMINISTERED DRUGS (ALT 637 FOR MEDICARE OP): Performed by: INTERNAL MEDICINE

## 2025-01-08 PROCEDURE — 99221 1ST HOSP IP/OBS SF/LOW 40: CPT | Performed by: STUDENT IN AN ORGANIZED HEALTH CARE EDUCATION/TRAINING PROGRAM

## 2025-01-08 PROCEDURE — 80197 ASSAY OF TACROLIMUS: CPT | Performed by: INTERNAL MEDICINE

## 2025-01-08 PROCEDURE — 83605 ASSAY OF LACTIC ACID: CPT | Performed by: CLINICAL NURSE SPECIALIST

## 2025-01-08 PROCEDURE — 36415 COLL VENOUS BLD VENIPUNCTURE: CPT | Performed by: STUDENT IN AN ORGANIZED HEALTH CARE EDUCATION/TRAINING PROGRAM

## 2025-01-08 PROCEDURE — 5A09357 ASSISTANCE WITH RESPIRATORY VENTILATION, LESS THAN 24 CONSECUTIVE HOURS, CONTINUOUS POSITIVE AIRWAY PRESSURE: ICD-10-PCS | Performed by: STUDENT IN AN ORGANIZED HEALTH CARE EDUCATION/TRAINING PROGRAM

## 2025-01-08 PROCEDURE — 36415 COLL VENOUS BLD VENIPUNCTURE: CPT | Performed by: INTERNAL MEDICINE

## 2025-01-08 PROCEDURE — 2500000002 HC RX 250 W HCPCS SELF ADMINISTERED DRUGS (ALT 637 FOR MEDICARE OP, ALT 636 FOR OP/ED): Performed by: INTERNAL MEDICINE

## 2025-01-08 PROCEDURE — 94660 CPAP INITIATION&MGMT: CPT

## 2025-01-08 PROCEDURE — 99233 SBSQ HOSP IP/OBS HIGH 50: CPT | Performed by: INTERNAL MEDICINE

## 2025-01-08 PROCEDURE — 94640 AIRWAY INHALATION TREATMENT: CPT

## 2025-01-08 PROCEDURE — 2500000004 HC RX 250 GENERAL PHARMACY W/ HCPCS (ALT 636 FOR OP/ED)

## 2025-01-08 PROCEDURE — 2500000002 HC RX 250 W HCPCS SELF ADMINISTERED DRUGS (ALT 637 FOR MEDICARE OP, ALT 636 FOR OP/ED)

## 2025-01-08 PROCEDURE — 2500000001 HC RX 250 WO HCPCS SELF ADMINISTERED DRUGS (ALT 637 FOR MEDICARE OP): Performed by: CLINICAL NURSE SPECIALIST

## 2025-01-08 RX ORDER — ACYCLOVIR 400 MG/1
400 TABLET ORAL 2 TIMES DAILY
Status: DISCONTINUED | OUTPATIENT
Start: 2025-01-08 | End: 2025-01-11 | Stop reason: HOSPADM

## 2025-01-08 RX ORDER — TACROLIMUS 1 MG/1
1 CAPSULE ORAL 2 TIMES DAILY
Status: DISCONTINUED | OUTPATIENT
Start: 2025-01-08 | End: 2025-01-10

## 2025-01-08 RX ORDER — CALCIUM CARBONATE 300MG(750)
2 TABLET,CHEWABLE ORAL 2 TIMES DAILY
COMMUNITY
End: 2025-01-11 | Stop reason: HOSPADM

## 2025-01-08 RX ORDER — TAMSULOSIN HYDROCHLORIDE 0.4 MG/1
0.4 CAPSULE ORAL DAILY
Status: DISCONTINUED | OUTPATIENT
Start: 2025-01-09 | End: 2025-01-11 | Stop reason: HOSPADM

## 2025-01-08 RX ORDER — PENTAMIDINE ISETHIONATE 300 MG/300MG
300 INHALANT RESPIRATORY (INHALATION) ONCE
Status: COMPLETED | OUTPATIENT
Start: 2025-01-09 | End: 2025-01-09

## 2025-01-08 RX ADMIN — Medication 21 PERCENT: at 17:31

## 2025-01-08 RX ADMIN — GABAPENTIN 300 MG: 300 CAPSULE ORAL at 21:18

## 2025-01-08 RX ADMIN — DULOXETINE HYDROCHLORIDE 60 MG: 60 CAPSULE, DELAYED RELEASE ORAL at 09:04

## 2025-01-08 RX ADMIN — APIXABAN 5 MG: 5 TABLET, FILM COATED ORAL at 18:09

## 2025-01-08 RX ADMIN — LEVALBUTEROL HYDROCHLORIDE 0.63 MG: 0.63 SOLUTION RESPIRATORY (INHALATION) at 20:45

## 2025-01-08 RX ADMIN — LETERMOVIR 480 MG: 480 TABLET, FILM COATED ORAL at 21:19

## 2025-01-08 RX ADMIN — SODIUM CHLORIDE 200 MG: 900 INJECTION, SOLUTION INTRAVENOUS at 13:40

## 2025-01-08 RX ADMIN — PANTOPRAZOLE SODIUM 40 MG: 40 TABLET, DELAYED RELEASE ORAL at 09:02

## 2025-01-08 RX ADMIN — TACROLIMUS 1 MG: 1 CAPSULE ORAL at 18:10

## 2025-01-08 RX ADMIN — ACYCLOVIR 400 MG: 400 TABLET ORAL at 14:30

## 2025-01-08 RX ADMIN — CALCIUM 1250 MG: 500 TABLET ORAL at 21:18

## 2025-01-08 RX ADMIN — Medication 192 MG: at 09:03

## 2025-01-08 RX ADMIN — PREDNISONE 5 MG: 5 TABLET ORAL at 09:02

## 2025-01-08 RX ADMIN — CALCIUM 1250 MG: 500 TABLET ORAL at 09:04

## 2025-01-08 RX ADMIN — MONTELUKAST 10 MG: 10 TABLET, FILM COATED ORAL at 21:18

## 2025-01-08 RX ADMIN — OXYBUTYNIN CHLORIDE 5 MG: 5 TABLET ORAL at 09:03

## 2025-01-08 RX ADMIN — COLISTIMETHATE SODIUM 150 MG: 150 INJECTION, POWDER, LYOPHILIZED, FOR SOLUTION INTRAMUSCULAR; INTRAVENOUS at 20:45

## 2025-01-08 RX ADMIN — COLISTIMETHATE SODIUM 150 MG: 150 INJECTION, POWDER, LYOPHILIZED, FOR SOLUTION INTRAMUSCULAR; INTRAVENOUS at 08:21

## 2025-01-08 RX ADMIN — TACROLIMUS 2 MG: 1 CAPSULE ORAL at 06:36

## 2025-01-08 RX ADMIN — Medication 192 MG: at 21:18

## 2025-01-08 RX ADMIN — ROSUVASTATIN CALCIUM 40 MG: 40 TABLET, FILM COATED ORAL at 09:02

## 2025-01-08 RX ADMIN — CETIRIZINE HYDROCHLORIDE 10 MG: 10 TABLET, FILM COATED ORAL at 09:04

## 2025-01-08 RX ADMIN — Medication 1000 UNITS: at 09:03

## 2025-01-08 RX ADMIN — LEVALBUTEROL HYDROCHLORIDE 0.63 MG: 0.63 SOLUTION RESPIRATORY (INHALATION) at 08:21

## 2025-01-08 RX ADMIN — AZITHROMYCIN DIHYDRATE 250 MG: 250 TABLET ORAL at 09:04

## 2025-01-08 RX ADMIN — APIXABAN 5 MG: 5 TABLET, FILM COATED ORAL at 06:36

## 2025-01-08 RX ADMIN — ACYCLOVIR 400 MG: 400 TABLET ORAL at 21:19

## 2025-01-08 ASSESSMENT — COGNITIVE AND FUNCTIONAL STATUS - GENERAL
DAILY ACTIVITIY SCORE: 24
MOBILITY SCORE: 24

## 2025-01-08 ASSESSMENT — PAIN SCALES - GENERAL: PAINLEVEL_OUTOF10: 0 - NO PAIN

## 2025-01-08 NOTE — PROGRESS NOTES
"Stanford NGUYEN Caro \"Mal\" is a 69 y.o. male on day 1 of admission presenting with Symptomatic anemia.      Subjective   Vitally stable overnight, with elevated BP 130s-160s. He was desatting to 85% this morning on RA and was put on 4L NC. Pt was drowsy but readily roused during pre-rounds, AAOX4. No acute complaint and no noticeable.  Breathing improved after transfusion.       Objective     Last Recorded Vitals  /74 (BP Location: Right arm, Patient Position: Lying)   Pulse 93   Temp 37 °C (98.6 °F) (Temporal)   Resp 19   Wt 94.3 kg (208 lb)   SpO2 97%   Intake/Output last 3 Shifts:    Intake/Output Summary (Last 24 hours) at 1/8/2025 1253  Last data filed at 1/8/2025 0415  Gross per 24 hour   Intake 843.75 ml   Output 0 ml   Net 843.75 ml       Admission Weight  Weight: 94.3 kg (208 lb) (01/07/25 1630)    Daily Weight  01/07/25 : 94.3 kg (208 lb)    Image Results  ECG 12 lead  Normal sinus rhythm  Minimal voltage criteria for LVH, may be normal variant ( R in aVL )  Nonspecific ST abnormality  Abnormal ECG  When compared with ECG of 05-NOV-2020 13:10,  Previous ECG has undetermined rhythm, needs review  QRS duration has decreased  Non-specific change in ST segment in Lateral leads    See ED provider note for full interpretation and clinical correlation  Confirmed by Varsha Perez ([id number]) on 1/7/2025 8:04:13 PM  XR chest 2 views  Narrative: Interpreted By:  Tru Cedillo,   STUDY:  XR CHEST 2 VIEWS      INDICATION:  Signs/Symptoms:shortness of breath.      COMPARISON:  November 11, 2024      ACCESSION NUMBER(S):  [id number]      ORDERING CLINICIAN:  AMILCAR BO      FINDINGS:  Remote granulomatous changes similar to prior studies with bulky  calcified lymph nodes in the mediastinum. No consolidation, effusion,  edema, or pneumothorax. Chronic elevation left hemidiaphragm  unchanged. Thoracic spinal DISH unchanged. Surgical clips left lung  unchanged.      Impression: No evidence of acute intrathoracic " abnormality.      Left lung postsurgical changes and remote granulomatous disease  similar to the previous exam.      Signed by: Tru Cedillo 1/7/2025 6:34 PM  Dictation workstation:   [id number]      Physical Exam    PHYSICAL EXAM:  General: drowsy, easily roused  HEENT: pupils equal and round, no scleral icterus or conjunctivitis  Skin: no suspect lesions or rashes noted on visible skin  Chest: expiratory wheezing throughout all lobes, no crackles, normal respiratory effort, on 4L NC  Cardiac: regular rate and rhythm, normal s1, s2, no M/R/G, no JVD  Abdomen: soft, ND, NT, no involuntary guarding  : no flank pain or indwelling urinary catheter  EXT: no peripheral edema, no asymmetry noted  MSK: no focal joint swelling noted  Neuro: AOx4, moving all limbs spontaneously          Relevant Results    Results for orders placed or performed during the hospital encounter of 01/07/25 (from the past 24 hours)   ECG 12 lead   Result Value Ref Range    Ventricular Rate 94 BPM    Atrial Rate 94 BPM    MT Interval 132 ms    QRS Duration 82 ms    QT Interval 336 ms    QTC Calculation(Bazett) 420 ms    P Axis 62 degrees    R Axis -10 degrees    T Axis 18 degrees    QRS Count 16 beats    Q Onset 226 ms    P Onset 160 ms    P Offset 208 ms    T Offset 394 ms    QTC Fredericia 390 ms   Sars-CoV-2 and Influenza A/B PCR   Result Value Ref Range    Flu A Result Not Detected Not Detected    Flu B Result Not Detected Not Detected    Coronavirus 2019, PCR Not Detected Not Detected   CBC and Auto Differential   Result Value Ref Range    WBC 6.2 4.4 - 11.3 x10*3/uL    nRBC 0.0 0.0 - 0.0 /100 WBCs    RBC 3.04 (L) 4.50 - 5.90 x10*6/uL    Hemoglobin 7.2 (L) 13.5 - 17.5 g/dL    Hematocrit 25.1 (L) 41.0 - 52.0 %    MCV 83 80 - 100 fL    MCH 23.7 (L) 26.0 - 34.0 pg    MCHC 28.7 (L) 32.0 - 36.0 g/dL    RDW 18.2 (H) 11.5 - 14.5 %    Platelets 105 (L) 150 - 450 x10*3/uL    Neutrophils % 88.1 40.0 - 80.0 %    Immature Granulocytes %, Automated  0.6 0.0 - 0.9 %    Lymphocytes % 6.4 13.0 - 44.0 %    Monocytes % 4.2 2.0 - 10.0 %    Eosinophils % 0.2 0.0 - 6.0 %    Basophils % 0.5 0.0 - 2.0 %    Neutrophils Absolute 5.50 1.60 - 5.50 x10*3/uL    Immature Granulocytes Absolute, Automated 0.04 0.00 - 0.50 x10*3/uL    Lymphocytes Absolute 0.40 (L) 0.80 - 3.00 x10*3/uL    Monocytes Absolute 0.26 0.05 - 0.80 x10*3/uL    Eosinophils Absolute 0.01 0.00 - 0.40 x10*3/uL    Basophils Absolute 0.03 0.00 - 0.10 x10*3/uL   Comprehensive metabolic panel   Result Value Ref Range    Glucose 118 (H) 74 - 99 mg/dL    Sodium 142 136 - 145 mmol/L    Potassium 4.6 3.5 - 5.3 mmol/L    Chloride 109 (H) 98 - 107 mmol/L    Bicarbonate 26 21 - 32 mmol/L    Anion Gap 12 10 - 20 mmol/L    Urea Nitrogen 24 (H) 6 - 23 mg/dL    Creatinine 1.13 0.50 - 1.30 mg/dL    eGFR 69 >60 mL/min/1.73m*2    Calcium 8.9 8.6 - 10.6 mg/dL    Albumin 4.0 3.4 - 5.0 g/dL    Alkaline Phosphatase 73 33 - 136 U/L    Total Protein 6.4 6.4 - 8.2 g/dL    AST 21 9 - 39 U/L    Bilirubin, Total 0.6 0.0 - 1.2 mg/dL    ALT 16 10 - 52 U/L   Type And Screen   Result Value Ref Range    ABO TYPE O     Rh TYPE POS     ANTIBODY SCREEN NEG    Troponin I, High Sensitivity   Result Value Ref Range    Troponin I, High Sensitivity (CMC) 15 0 - 53 ng/L   Tacrolimus level   Result Value Ref Range    Tacrolimus  7.4 <=15.0 ng/mL   Reticulocytes   Result Value Ref Range    Retic % 2.3 (H) 0.5 - 2.0 %    Retic Absolute 0.065 0.017 - 0.110 x10*6/uL    Reticulocyte Hemoglobin 19 (L) 28 - 38 pg    Immature Retic fraction 18.8 (H) <=16.0 %   Lactate dehydrogenase   Result Value Ref Range     84 - 246 U/L   Ferritin   Result Value Ref Range    Ferritin 8 (L) 20 - 300 ng/mL   Hepatic function panel   Result Value Ref Range    Albumin 3.9 3.4 - 5.0 g/dL    Bilirubin, Total 0.6 0.0 - 1.2 mg/dL    Bilirubin, Direct 0.2 0.0 - 0.3 mg/dL    Alkaline Phosphatase 73 33 - 136 U/L    ALT 16 10 - 52 U/L    AST 19 9 - 39 U/L    Total Protein 6.4  6.4 - 8.2 g/dL   Magnesium   Result Value Ref Range    Magnesium 1.70 1.60 - 2.40 mg/dL   Prepare RBC: 2 Units, Leukocytes Reduced (CMV reduced risk)   Result Value Ref Range    PRODUCT CODE F1688Q61     Unit Number [id number]     Unit ABO O     Unit RH POS     XM INTEP COMP     Dispense Status TR     Blood Expiration Date 1/20/2025 11:59:00 PM EST     PRODUCT BLOOD TYPE 5100     UNIT VOLUME 350     PRODUCT CODE T2858Q28     Unit Number [id number]     Unit ABO O     Unit RH POS     XM INTEP COMP     Dispense Status TR     Blood Expiration Date 1/21/2025 11:59:00 PM EST     PRODUCT BLOOD TYPE 5100     UNIT VOLUME 350    Tacrolimus level   Result Value Ref Range    Tacrolimus  9.4 <=15.0 ng/mL   CBC and Auto Differential   Result Value Ref Range    WBC 6.0 4.4 - 11.3 x10*3/uL    nRBC 0.0 0.0 - 0.0 /100 WBCs    RBC 3.47 (L) 4.50 - 5.90 x10*6/uL    Hemoglobin 8.6 (L) 13.5 - 17.5 g/dL    Hematocrit 29.4 (L) 41.0 - 52.0 %    MCV 85 80 - 100 fL    MCH 24.8 (L) 26.0 - 34.0 pg    MCHC 29.3 (L) 32.0 - 36.0 g/dL    RDW 17.0 (H) 11.5 - 14.5 %    Platelets 88 (L) 150 - 450 x10*3/uL    Neutrophils % 76.6 40.0 - 80.0 %    Immature Granulocytes %, Automated 1.0 (H) 0.0 - 0.9 %    Lymphocytes % 13.4 13.0 - 44.0 %    Monocytes % 7.1 2.0 - 10.0 %    Eosinophils % 1.2 0.0 - 6.0 %    Basophils % 0.7 0.0 - 2.0 %    Neutrophils Absolute 4.63 1.60 - 5.50 x10*3/uL    Immature Granulocytes Absolute, Automated 0.06 0.00 - 0.50 x10*3/uL    Lymphocytes Absolute 0.81 0.80 - 3.00 x10*3/uL    Monocytes Absolute 0.43 0.05 - 0.80 x10*3/uL    Eosinophils Absolute 0.07 0.00 - 0.40 x10*3/uL    Basophils Absolute 0.04 0.00 - 0.10 x10*3/uL   Comprehensive Metabolic Panel   Result Value Ref Range    Glucose 82 74 - 99 mg/dL    Sodium 142 136 - 145 mmol/L    Potassium 4.4 3.5 - 5.3 mmol/L    Chloride 110 (H) 98 - 107 mmol/L    Bicarbonate 23 21 - 32 mmol/L    Anion Gap 13 10 - 20 mmol/L    Urea Nitrogen 21 6 - 23 mg/dL    Creatinine 0.94 0.50 -  1.30 mg/dL    eGFR 87 >60 mL/min/1.73m*2    Calcium 8.5 (L) 8.6 - 10.6 mg/dL    Albumin 3.7 3.4 - 5.0 g/dL    Alkaline Phosphatase 71 33 - 136 U/L    Total Protein 5.8 (L) 6.4 - 8.2 g/dL    AST 19 9 - 39 U/L    Bilirubin, Total 1.8 (H) 0.0 - 1.2 mg/dL    ALT 15 10 - 52 U/L   Magnesium   Result Value Ref Range    Magnesium 1.73 1.60 - 2.40 mg/dL   Blood Gas Venous Full Panel   Result Value Ref Range    POCT pH, Venous 7.24 (LL) 7.33 - 7.43 pH    POCT pCO2, Venous 61 (H) 41 - 51 mm Hg    POCT pO2, Venous 42 35 - 45 mm Hg    POCT SO2, Venous 62 45 - 75 %    POCT Oxy Hemoglobin, Venous 61.0 45.0 - 75.0 %    POCT Hematocrit Calculated, Venous 32.0 (L) 41.0 - 52.0 %    POCT Sodium, Venous 142 136 - 145 mmol/L    POCT Potassium, Venous 4.0 3.5 - 5.3 mmol/L    POCT Chloride, Venous 108 (H) 98 - 107 mmol/L    POCT Ionized Calicum, Venous 1.08 (L) 1.10 - 1.33 mmol/L    POCT Glucose, Venous 90 74 - 99 mg/dL    POCT Lactate, Venous 2.8 (H) 0.4 - 2.0 mmol/L    POCT Base Excess, Venous -2.0 -2.0 - 3.0 mmol/L    POCT HCO3 Calculated, Venous 26.1 (H) 22.0 - 26.0 mmol/L    POCT Hemoglobin, Venous 10.6 (L) 13.5 - 17.5 g/dL    POCT Anion Gap, Venous 12.0 10.0 - 25.0 mmol/L    Patient Temperature 37.0 degrees Celsius    FiO2 24 %     *Note: Due to a large number of results and/or encounters for the requested time period, some results have not been displayed. A complete set of results can be found in Results Review.         ECG 12 lead    Result Date: 1/7/2025  Normal sinus rhythm Minimal voltage criteria for LVH, may be normal variant ( R in aVL ) Nonspecific ST abnormality Abnormal ECG When compared with ECG of 05-NOV-2020 13:10, Previous ECG has undetermined rhythm, needs review QRS duration has decreased Non-specific change in ST segment in Lateral leads See ED provider note for full interpretation and clinical correlation Confirmed by Varsha Perez ([id number]) on 1/7/2025 8:04:13 PM    XR chest 2 views    Result Date:  "1/7/2025  Interpreted By:  Tru Cedillo, STUDY: XR CHEST 2 VIEWS   INDICATION: Signs/Symptoms:shortness of breath.   COMPARISON: November 11, 2024   ACCESSION NUMBER(S): [id number]   ORDERING CLINICIAN: AMILCAR BO   FINDINGS: Remote granulomatous changes similar to prior studies with bulky calcified lymph nodes in the mediastinum. No consolidation, effusion, edema, or pneumothorax. Chronic elevation left hemidiaphragm unchanged. Thoracic spinal DISH unchanged. Surgical clips left lung unchanged.       No evidence of acute intrathoracic abnormality.   Left lung postsurgical changes and remote granulomatous disease similar to the previous exam.   Signed by: Tru Cedillo 1/7/2025 6:34 PM Dictation workstation:   [id number]         Assessment/Plan        Stanford NGUYEN Caro \"Mal\" is a 69 y.o. 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 Hemoglobin has steadily dropped from baseline 10 to recent baseline of 8, now 7.2 on admission. 8.6 today s/p 2U PRBC per lung transplant team. Unclear etiology of acute on chronic anemia, hem has been consulted.       -01/08/25 updates  - VBG today 7.24/61/2.8, will repeat VBG tomorrow  - echo ordered for today  - hem consulted, appreciate recs       #Shortness of breath  #Acute on chronic normocytic anemia   :: Recent baseline Hgb ~8.0, 7.2 on admission 01/07  :: 1/6/25 anemia labs c/w ANDREW: Fe 24L, %sat 6L  :: EKG NSR with trop negative, CXR nonacute   :: On Aranesp 60mcg weekly  :: last TTE 09/2023: EF 65%, mild increased thickness of LV septal wall, mild AR  :: T+S, consented for blood  :: s/p 2u leukocyte reduced PRBC transfusion per transplant recs  Plan  - hem consulted in the morning, appreciate recs   - anemia workup: folate wnl, hapto 49, , ferritin 8, corrected retic 1.35%, direct bili 0.2, pending b12 and peripheral smear   - ordered " echo today   - BiPAP tonight, will repeat VBG tomorrow 1/9, pulse ox study on 1/9 night         #s/p DLTx for IPF/PAH   :: CMV +/-, EBV positive recipient (PCR's negative on 01/06/25)  :: PFTs 11/11/24 - FEV: 1.32 45%, FVC: 2.35 60%, FEV1/FVC: 0.56 73%, - CXR  11/11/24   - c/w home prednisone 5mg daily and tacrolimus 1mg q12h  - AM tacro level daily (goal 6-8)  - Holding cellcept 250mg BID due to anemia  - For PJP prophylaxis, switch from dapsone to pentamidine inhalation 300 mg monthly  - For CMV mismatch, switching from valcyte to letermovir 480 mg daily  - C/w azithromycin 250mg MWF ROSSY ppx  - C/w home Levalbuterol BID followed by Colisitin 150mg BID and Symbicort 160/4.5 2 puffs BID (ordered as Breo-Ellipta inpatient)  - C/w home montelukast 10mg nightly     #PE (08/2020)  #Remote VTE (1993)  ::Hx PE: Noted in August 2020 with admission for septic shock. Eliquis 5mg BID, continue indefinitely  - c/w Eliquis 5 mg BID  - consider bridging with heparin gtt if plan for inpatient procedure       #Neuropathy  - C/w home gabapentin 300mg at night     #Steroid induced hyperglycemia  :: A1c 5.4% 10/2024  :: not on any medications  -POC qACHS  -Lispro SSI  -hypoglycemia protocol     #Osteoporosis  ::DEXA 11/4/24 indicative of low bone mass  -Holding home alendronate  -C/w vitamin D3 daily  -C/w calcium carbonate 1250 mg BID     #HLD  - c/w home rosuvastatin 40mg     #Nephrolithiasis  #LUTS  ::Follows with urology  -home oxybutynin 5mg BID, on hold     #Depression/CATHY  -C/w home duloxetine 60mg daily     #Miscellaneous  ::Home loratadine 10 mg  ::Home pantoprazole 40mg daily  -Cetirizine 10 mg inpatient  -C/w home PPI     Fluids: Replete PRN  Electrolytes: Keep mg >2, phos >3  and K >4  Nutrition:  Adult diet Regular   DVT PPX: Apixaban  GI PPX: Pantoprazole  Bowel Care: Miralax  Catheter: None  Lines: PIV       Code Status: DNR and No Intubation and No ICU (confirmed on admission)   NOK:  Primary Emergency Contact:  Lori Patel, Home Phone: [phone]    ======  I saw and evaluated the patient. I personally obtained the key and critical portions of the history and physical exam or was physically present for key and critical portions performed by the resident/fellow. I reviewed the resident/fellow's documentation and discussed the patient with the resident/fellow. I agree with the resident/fellow's medical decision making as documented in the note.    Gregorio Cedillo MD

## 2025-01-08 NOTE — PROGRESS NOTES
"Pharmacy Admission Order Reconciliation Review    Stanford NGUYEN Caro \"Mal\" is a 69 y.o. male admitted for Symptomatic anemia. Pharmacy reviewed the patient's unreconciled admission medications.    Prior to admission medications that were reviewed and acted on by the pharmacist include:  magnesium oxide  These medications have been reconciled.     Any other unreconcilied medications have been addressed and will be ordered or held by the patient's medical team. Medications addressed by the pharmacist may be added or changed by the patient's medical team at any time.    Gray Briceño, PharmD  Transitions of Care Pharmacist  [facility]  Please reach out via Secure Chat for questions    "

## 2025-01-08 NOTE — CONSULTS
Name: Stanford NGUYEN Caro  MRN: [id number]  Encounter Date: 1/8/2025  PCP: Sampson Tapia MD  Heme-Onc: Shauna Carter MD (last seen 2022)    Reason for consult: anemia  Attending Provider: Dr. Cedillo    Hematology/ Oncology Consult Note      History of Present Illness   Stanford NGUYEN Caro is a 69 y.o. male with PMH of double lung transplant in setting of IPF/PAH (03/2020; on tacro, MMF), COPD, PE (in 2020; on apixaban), SARA, HTN & gout who is admitted for symptomatic anemia. Symptoms including dyspnea, fatigue & weakness. Reports this has worsened over the past week. On evaluation patient confirms that he has experienced anemia for several years. He was on PO iron supplementation 2-3 years ago but does not currently take this. Denies any signs of GI,  or respiratory blood loss. Reports eating full unrestricted diet.       Heme/Onc History    Last heme note (10/5/2022)    Routine blood work on 9/17/2021 revealed low HGB 8.7 and HCT 31.3 with normal WBC, MCV, and plt count. Repeat labs on 10/4/2021 showed HGB dropped to 8.3 and HCT to 29.5. CBC data was reviewed in the EMR and patient was found to have elevated hgb 19.5 in 9/2018. HGB intermittently elevated until 3/2020 when he had lung transplant, dropped to 12.6. HGB has been in range 8-10 since then. He had symptomatic fatigue. Denied bleeding including epistaxis, hemoptysis, hematuria, melena, hematochezia, and BRBPR. On tacrolimus in setting of transplant. He was placed on PO iron supplementation.    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    Lung transplant rejection    Lung transplant status 12/21/2020    H/O lung transplant    Nonfamilial hypogammaglobulinemia (Multi) 10/04/2021    Hypogammaglobulinemia     Obstructive sleep apnea (adult) (pediatric) 08/09/2018    SARA on CPAP    Other long term (current) drug therapy 12/07/2021    Encounter for long-term current use of high risk medication    Other specified disorders of nose and nasal sinuses 05/09/2021    Nasal crusting    Personal history of other diseases of the musculoskeletal system and connective tissue 08/09/2018    History of gout    Personal history of other diseases of the respiratory system 08/09/2018    History of interstitial lung disease    Personal history of other endocrine, nutritional and metabolic disease 08/09/2018    History of obesity    Personal history of other endocrine, nutritional and metabolic disease 02/21/2019    History of hyperlipidemia    Personal history of other venous thrombosis and embolism 08/09/2018    History of deep venous thrombosis    Personal history of urinary calculi 08/09/2018    History of kidney stones    Personal history of urinary calculi 09/06/2018    History of kidney stones    Personal history of urinary calculi 02/25/2019    History of kidney stones         Past Surgical History     Past Surgical History:   Procedure Laterality Date    MR HEAD ANGIO WO IV CONTRAST  10/15/2020    MR HEAD ANGIO WO IV CONTRAST 10/15/2020 [facility] CLINICAL LEGAC[facility]    MR NECK ANGIO WO IV CONTRAST  10/15/2020    MR NECK ANGIO WO IV CONTRAST 10/15/2020 [facility] CLINICAL LEGACY    OTHER SURGICAL HISTORY  06/03/2020    Lung transplantation    TONSILLECTOMY  08/09/2018    Tonsillectomy    VASECTOMY  08/09/2018    Surgery Vas Deferens Vasectomy         Family History    No family history on file.      Social History     Social History     Socioeconomic History    Marital status:    Tobacco Use    Smoking status: Never    Smokeless tobacco: Never   Substance and Sexual Activity    Alcohol use: Not Currently    Drug use: Never     Social Drivers of Health     Physical Activity: Unknown (8/8/2022)    Received from [facility]    Exercise Vital Sign     Days of Exercise per Week: 0 days         Allergies     Allergies   Allergen Reactions    Gadolinium-Containing Contrast Media Other     Acute renal insufficiency s/p double lung transplant    Iodinated Contrast Media Other     Acute renal insufficiency s/p double lung transplant    Nsaids (Non-Steroidal Anti-Inflammatory Drug) Other     Acute renal insufficiency s/p double lung transplant    Adhesive Unknown     Patient stated dont have allergy to adhesive       Medications   apixaban, 5 mg, q12h  azithromycin, 250 mg, Once per day on Monday Wednesday Friday  calcium carbonate, 1,250 mg, BID  cetirizine, 10 mg, Daily  cholecalciferol, 1,000 Units, Daily  colistimethate, 150 mg, q12h ARMANDO  DULoxetine, 60 mg, Daily  fluticasone furoate-vilanteroL, 1 puff, Daily  gabapentin, 300 mg, Nightly  iron sucrose, 200 mg, Daily  letermovir, 480 mg, Daily  levalbuterol, 0.63 mg, BID  magnesium chloride, 192 mg, BID  montelukast, 10 mg, Nightly  [Held by provider] mycophenolate, 250 mg, BID  pantoprazole, 40 mg, Daily  pentamidine, 300 mg, Once  predniSONE, 5 mg, Daily  rosuvastatin, 40 mg, Daily  tacrolimus, 1 mg, BID         dextrose, 12.5 g, q15 min PRN  dextrose, 25 g, q15 min PRN  glucagon, 1 mg, q15 min PRN  glucagon, 1 mg, q15 min PRN        Review of Systems   Review of Systems   10 pt ROS reviewed and negative aside from above    Physical Exam   Blood pressure 153/74, pulse 93, temperature 37 °C (98.6 °F), temperature source Temporal, resp. rate 19, height 1.753 m (5' 9\"), weight 94.3 kg (208 lb), SpO2 97%.    Gen: awake, alert, in no acute distress  HEENT: AT/NC, PEERL, EOMI, no LAD  CV: RRR, no m/r/g  Pulm: coarse breath sounds, intermittent wheezes  Abd: soft, NT/ND, no organomegaly  Ext: no LE edema  Skin: warm and dry  Neuro: A&Ox4, moves all 4 extremities spontaneously     Labs     Lab Results   Component Value Date    GLUCOSE 82 01/08/2025    " CALCIUM 8.5 (L) 01/08/2025     01/08/2025    K 4.4 01/08/2025    CO2 23 01/08/2025     (H) 01/08/2025    BUN 21 01/08/2025    CREATININE 0.94 01/08/2025       Lab Results   Component Value Date    WBC 6.0 01/08/2025    HGB 8.6 (L) 01/08/2025    HCT 29.4 (L) 01/08/2025    MCV 85 01/08/2025    PLT 88 (L) 01/08/2025       Lab Results   Component Value Date    ALT 15 01/08/2025    AST 19 01/08/2025     (H) 04/06/2021    ALKPHOS 71 01/08/2025    BILITOT 1.8 (H) 01/08/2025       Imaging   XR CHEST 2 VIEWS   IMPRESSION:  No evidence of acute intrathoracic abnormality.    Assessment/Plan     Stanford NGUYEN Caro is a 69 y.o. male w/ a past medical history of double lung transplant in setting of IPF/PAH (03/2020), COPD, PE (in 2020; on apixaban), SARA, HTN & gout who is admitted in the setting of symptomatic anemia. Workup is consistent with iron deficiency. Additionally, patient taking MMF for transplant immunosuppression which can lead to macrocytic anemia. This likely accounts for normocytic MCV.      Pertinent workup  -Hgb (bl 8-10): 7.0 -> 7.2 -> 2U pRBC -> 8.6  -MCV 85  -Plts (bl , low since 2022): 112 -> 88  -iron panel: iron (L), TIBC (ULN), % sat (L), ferritin (L)  -folate nml  -hapto 49 nml,  nml, T bili 0.6 -> 1.8  -CMV/EBV neg  -retic index 1.1 (hypoprolif)  -tacro level nml    Recommendations   - calculated iron deficit is 1270 mg - please give IV venofer 300 mg daily for 4 days  - at time of discharge can transition to ferrous sulfate 325 mg every other day  - would recommend GI evaluation given ANDREW    Thank you for this consult. We will sign off. Patient seen and discussed with attending physician, Dr. Kwok, who agrees with the above.     Ana Love MD  Hematology-Oncology Fellow, PGY5  Hematology Consult Pager: [phone]  Oncology Consult Pager: [phone]

## 2025-01-08 NOTE — PROGRESS NOTES
Stanford NGUYEN Caro is a 69 y.o. 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 His transplant course was complicated by pleural bleed with return to OR for washout on 3/23/2020, prolonged ventilatory support requiring tracheostomy (since decannulated) and discharged on 5/8/2020. Post-hospital course was complicated by septic shock with MSSA in August 20202 with PE requiring lifelong anticoagulation.      Presenting today for anemia, SOB     Subjective Data:  VBG with retention (7.24/61/42)      Overnight Events:    Pulse ox dropped; drowsy this morning     Continue daily at 0600:  - CMP  - CBC with diff  - Mg   - Tac      Objective Data:  Last Recorded Vitals:  Vitals:    01/08/25 0335 01/08/25 0340 01/08/25 0415 01/08/25 0553   BP: 164/79 166/75 137/78 138/71   BP Location:    Left arm   Patient Position:    Lying   Pulse:   87 84   Resp:   18 18   Temp:   36.8 °C (98.2 °F) 37.1 °C (98.8 °F)   TempSrc:   Temporal Temporal   SpO2:   94% 96%   Weight:       Height:           Last Labs:  CBC - 1/8/2025:  6:34 AM  6.0 8.6 88    29.4      CMP - 1/8/2025:  6:42 AM  8.5 5.8 19 --- 1.8   _ 3.7 15 71      PTT - No results in last year.  _   _ _     TROPHS   Date/Time Value Ref Range Status   01/07/2025 05:19 PM 15 0 - 53 ng/L Final     BNP   Date/Time Value Ref Range Status   11/05/2020 01:30  0 - 99 pg/mL Final     Comment:     .  <100 pg/mL - Heart failure unlikely  100-299 pg/mL - Intermediate probability of acute heart  .               failure exacerbation. Correlate with clinical  .               context and patient history.    >=300 pg/mL - Heart Failure likely. Correlate with clinical  .               context and patient history.   Biotin interference may cause falsely decreased results.   Patients taking a Biotin dose of up to 5 mg/day should   refrain from taking Biotin for 24 hours before sample   collection. Providers may contact their local laboratory   for further  information.     09/20/2020 06:53  0 - 99 pg/mL Final     Comment:     .  <100 pg/mL - Heart failure unlikely  100-299 pg/mL - Intermediate probability of acute heart  .               failure exacerbation. Correlate with clinical  .               context and patient history.    >=300 pg/mL - Heart Failure likely. Correlate with clinical  .               context and patient history.   Biotin interference may cause falsely decreased results.   Patients taking a Biotin dose of up to 5 mg/day should   refrain from taking Biotin for 24 hours before sample   collection. Providers may contact their local laboratory   for further information.       HGBA1C   Date/Time Value Ref Range Status   10/22/2024 11:45 AM 5.4 4.2 - 6.5 % Final   04/18/2024 12:19 PM 5.3 4.2 - 6.5 % Final     LDLCALC   Date/Time Value Ref Range Status   12/16/2024 11:29 AM 33 <=99 mg/dL Final     Comment:                                 Near   Borderline      AGE      Desirable  Optimal    High     High     Very High     0-19 Y     0 - 109     ---    110-129   >/= 130     ----    20-24 Y     0 - 119     ---    120-159   >/= 160     ----      >24 Y     0 -  99   100-129  130-159   160-189     >/=190     09/04/2024 11:42 AM 41 <=99 mg/dL Final     Comment:                                 Near   Borderline      AGE      Desirable  Optimal    High     High     Very High     0-19 Y     0 - 109     ---    110-129   >/= 130     ----    20-24 Y     0 - 119     ---    120-159   >/= 160     ----      >24 Y     0 -  99   100-129  130-159   160-189     >/=190     06/25/2024 10:06 AM 33 <=99 mg/dL Final     Comment:                                 Near   Borderline      AGE      Desirable  Optimal    High     High     Very High     0-19 Y     0 - 109     ---    110-129   >/= 130     ----    20-24 Y     0 - 119     ---    120-159   >/= 160     ----      >24 Y     0 -  99   100-129  130-159   160-189     >/=190       VLDL   Date/Time Value Ref Range Status    12/16/2024 11:29 AM 19 0 - 40 mg/dL Final   09/04/2024 11:42 AM 12 0 - 40 mg/dL Final   06/25/2024 10:06 AM 13 0 - 40 mg/dL Final      Last I/O:  I/O last 3 completed shifts:  In: 843.8 (8.9 mL/kg) [Blood:843.8]  Out: 0 (0 mL/kg)   Weight: 94.3 kg     Inpatient Medications:  Scheduled medications   Medication Dose Route Frequency    apixaban  5 mg oral q12h    azithromycin  250 mg oral Once per day on Monday Wednesday Friday    calcium carbonate  1,250 mg oral BID    cetirizine  10 mg oral Daily    cholecalciferol  1,000 Units oral Daily    colistimethate  150 mg nebulization q12h ARMANDO    DULoxetine  60 mg oral Daily    fluticasone furoate-vilanteroL  1 puff inhalation Daily    gabapentin  300 mg oral Nightly    letermovir  480 mg oral Daily    levalbuterol  0.63 mg nebulization BID    magnesium chloride  192 mg oral BID    montelukast  10 mg oral Nightly    [Held by provider] mycophenolate  250 mg oral BID    oxybutynin  5 mg oral BID    pantoprazole  40 mg oral Daily    pentamidine  300 mg nebulization Once    predniSONE  5 mg oral Daily    rosuvastatin  40 mg oral Daily    tacrolimus  2 mg oral BID     PRN medications   Medication    dextrose    dextrose    glucagon    glucagon     Continuous Medications   Medication Dose Last Rate       Current Facility-Administered Medications:     apixaban (Eliquis) tablet 5 mg, 5 mg, oral, q12h, Leland Donaldson MD, 5 mg at 01/08/25 0636    azithromycin (Zithromax) tablet 250 mg, 250 mg, oral, Once per day on Monday Wednesday Friday, Pamela Boone MD    calcium carbonate (Oscal) tablet 1,250 mg, 1,250 mg, oral, BID, Leland Donaldson MD, 1,250 mg at 01/07/25 2054    cetirizine (ZyrTEC) tablet 10 mg, 10 mg, oral, Daily, Leland Donaldson MD    cholecalciferol (Vitamin D-3) tablet 1,000 Units, 1,000 Units, oral, Daily, Leland Donaldson MD, 1,000 Units at 01/07/25 2312    colistimethate (Colymycin) 150 mg in sodium chloride 0.9% 3 mL inhalation, 150 mg, nebulization, q12h Count includes the [facility],  Lleand Donaldson MD    dextrose 50 % injection 12.5 g, 12.5 g, intravenous, q15 min PRN, Leland Donaldson MD    dextrose 50 % injection 25 g, 25 g, intravenous, q15 min PRN, Leland Donaldson MD    DULoxetine (Cymbalta) DR capsule 60 mg, 60 mg, oral, Daily, Leland Donaldson MD    fluticasone furoate-vilanteroL (Breo Ellipta) 200-25 mcg/dose inhaler 1 puff, 1 puff, inhalation, Daily, Leland Donaldson MD    gabapentin (Neurontin) capsule 300 mg, 300 mg, oral, Nightly, Leland Donaldson MD, 300 mg at 01/07/25 2312    glucagon (Glucagen) injection 1 mg, 1 mg, intramuscular, q15 min PRN, Leland Donaldson MD    glucagon (Glucagen) injection 1 mg, 1 mg, intramuscular, q15 min PRN, Leland Donaldson MD    letermovir (Prevymis) tablet 480 mg, 480 mg, oral, Daily, Leland Donaldson MD, 480 mg at 01/07/25 2054    levalbuterol (Xopenex) 0.63 mg/3 mL nebulizer solution 0.63 mg, 0.63 mg, nebulization, BID, Pamela Boone MD    magnesium chloride (MagDelay) EC tablet 192 mg, 192 mg, oral, BID, Pamela Boone MD    montelukast (Singulair) tablet 10 mg, 10 mg, oral, Nightly, Leland Donaldson MD, 10 mg at 01/07/25 2312    [Held by provider] mycophenolate (Cellcept) capsule 250 mg, 250 mg, oral, BID, Leland Donaldson MD    oxybutynin (Ditropan) tablet 5 mg, 5 mg, oral, BID, Leland Donaldson MD    pantoprazole (ProtoNix) EC tablet 40 mg, 40 mg, oral, Daily, Leland Donaldson MD    Pentamidine (Pentam) inhalation 300 mg, 300 mg, nebulization, Once, Leland Donaldson MD    predniSONE (Deltasone) tablet 5 mg, 5 mg, oral, Daily, Leland Donaldson MD    rosuvastatin (Crestor) tablet 40 mg, 40 mg, oral, Daily, Leland Donaldson MD, 40 mg at 01/07/25 2312    tacrolimus (Prograf) capsule 2 mg, 2 mg, oral, BID, Leland Donaldson MD, 2 mg at 01/08/25 0688    Physical Exam:  Physical Exam  Constitutional:       Appearance: He is normal weight.   HENT:      Head: Normocephalic.      Nose: Nose normal.      Mouth/Throat:      Mouth: Mucous membranes are moist.       Pharynx: Oropharynx is clear.   Cardiovascular:      Rate and Rhythm: Normal rate.      Heart sounds: Normal heart sounds.   Pulmonary:      Effort: Pulmonary effort is normal.   Musculoskeletal:         General: Normal range of motion.   Skin:     General: Skin is warm and dry.   Neurological:      General: No focal deficit present.      Mental Status: He is alert.   Psychiatric:         Attention and Perception: Attention normal.         Mood and Affect: Mood normal.         Behavior: Behavior is cooperative.       Assessment/Plan   Stanford NGUYEN Caro is a 69 y.o. 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 His transplant course was complicated by pleural bleed with return to OR for washout on 3/23/2020, prolonged ventilatory support requiring tracheostomy (since decannulated) and discharged on 5/8/2020. Post-hospital course was complicated by septic shock with MSSA in August 20202 with PE requiring lifelong anticoagulation.      He underwent a double lung transplant on 3/22/2020.    Presenting to ED due to anemia (Hgb 7.0), fatigue and SOB.      Pulmonary:  s/p DLTx for IPF/PAH on 3/22/20, CMV +/-, EBV positive recipient  Allograft function:  - describes janelle SOB; SPO2 ~90%   - Hx PE: Remove history of VTE in 1993. Modest PE at same time of septic shock in August 2020, continue Eliquis 5mg BID lifelong   - Hx MSSA, Klebsiella, Corynebacterium from April 2020 bronchoscopy  - Hx Enterobacter from June 2020 bronchoscopy, sensitive to Fluoroquinolones  - Hx Pseudomonas from September 2023 bronchoscopy, completed 14 day course of Ciprofloxacin and commenced on inhaled Tobramycin  - PFTs as above  - CXR  11/11/24 reviewed, stable; repeat pending   - Hx SARA: No longer using CPAP at home but with retention here, BiPAP ordered overnight   - Hx CLAD: Multifocal air trapping identified with September 2023 high resolution chest CT  - HLA Class I & II Negative September 2024, repeat  December 2024  - ROSSY Prophylaxis: Azithromycin 250mg M W F, Singular 10mg nightly, Symbicort 160/4.5 2 puffs BID  - Nebulized Medications: Levalbuterol BID followed by Colisitin 150mg BID and Symbicort as above  - pulse ox dropped overnight and pt drowsy; VBG shows hypercarbic respiratory acidosis. BiPAP ordered for HS, will repeat VBG in a.m. and check nocturnal pulse ox study on Thursday     Immunosuppression:  - Prograf goal 6-8, level 9.4 (evening dose given late), decrease to 1 mg BID since started on Letermovir; daily tacro trough levels   - Hold Cellcept 250mg BID due to anemia   - Prednisone 5mg daily     Prophylaxis:  - PJP: Previously on Dapsone 100mg daily; will hold due to anemia, will switch to Pentamidine   - CMV +/- mismatch: Will start Letermovir 480mg daily, previously on Valcyte 900mg daily but will hold due to anemia. Follow with routine CMV PCR 1/6/25 not detected  - HSV: Acyclovir 400mg BID   - EBV: Follow with routine EBV PCR 1/6/25 not detected  - Fungal: Completed course of Voriconazole  - IgG, goal > 600: Level 792 on 1/6/25     Cardiac:  - Denies chest pain, palpitations, swelling in feet/ankles  - BP Management: No current antihypertensives, historically required Lopressor but then discontinued secondary to hypotension              Advised to call Lung Transplant Office if BP consistently > 130/80 or < 100/60  - Lifelong anticoagulation with Eliquis 5mg BID  - Hx Chest Pain: August 2023, evaluated in ED, work-up unremarkable. Likely MSK etiology              TTE September 2023: EF 65% and normal biventricular function  - Hx HLD: Crestor 40mg daily              Lipid Panel September 2024: , HDL 52.5, Cholesterol/HDL 2.0, LDL 41, VLDL 12, TG 58, non HDL 53              Repeat December 2024  - Primary team checking ECHO      Renal  - Denies dysuria/hematuria  - Creatinine 0.94, eGFR 87  - Potassium 4.4  - Magnesium 1.73. On MagDelay 192mg BID   - Hx BPH: Sanctura 20mg BID per Urology;  holding since Sanctura not on formulary, Flomax 0.4mg daily while inpatient   - Elevated PSA: 4.88 in April 2024, MRI prostate consistent with BPH. Follows with Dr. Soni of Urology  - Renally dose all medications and avoid nephrotoxins. May not have NSAIDs or IV Contrast     GI  - Denies n/v/d/c  - Protonix BID   - Elevated Alkaline Phosphatase: Improved with discontinuation of Voriconazole, MRI Liver February 2023 normal.   - Bilirubin Total elevated to 1.8     ID  - Denies fevers/chills/night sweats. Afebrile, no leukocytosis  - Hx Septic Shock with MSSA: August 2020, prolonged course of IV antibiotics with resolution  - Hx Otitis Externa: Noted September 2020, follows by ENT. Several courses of antibiotics with wick insertion, developed mastoiditis. Since resolved  - Hx Sternal Wound Infection: 2 episodes of staph infection requiring debridement and wound vac              2nd episode in July 2021, no debridement at this time but completed an 8 week course of antibiotics  - Hx Covid: December 2022, recovered  - Hx positive bronchoscopy cultures as above     Endo  - Hx DM: Follows with Endocrine, last visit 10/22/24 HgBA1c 5.4 at this time              No glucose lowering agents presently, historically prescribed Jardiance  - Hx Osteoporosis: DEXA 11/4/24 indicative of low bone mass              Follows with Endocrinology (Dr. Ashok Pascual); Previously on Alendronate 70mg once weekly, discontinued due to anemia, switched to Reclast  - Hx Vitamin D Deficiency: Level 41 on 12/16/24 continue Vitamin D3 1000u daily      Heme  - Iron studies: Iron 24, UIBC 377, TIBC 401, 6% saturation - Iron sucrose 200mg IV x 3 days   - heme consult pending for chronic anemia   - Denies bleeding/bruising  - Hx PE: Noted in August 2020 with admission for septic shock. Eliquis 5mg BID, continue indefinitely              IVC filter, will explore removal  - HgB 8.6, Hematocrit 29.4; s/p 2uPRBC -Leukocytes Reduced (CMV reduced risk)  "  - WBC 6.0, ANC sufficient 4630, platelets 88     ENT  - Hx Otitis Externa with facial nerve involvement. Follows with ENT              S/p R tympanomasteoidectomy, mastoid washout while inpatient               March 2021 Osteomyelitis skull base, completed 12 week course of Meropenem  - Hx Chronic Hearing Loss: Requires hearing aids, follows with ENT     Neuro  - Neuropathy: Gabapentin 300mg at night  - Memory Loss: Reporting onset \"several months\" forgets to pay bills, forgets to complete tasks, wife reporting this is an issue. Will refer to Neurology for further work-up    Code Status:  DNR and No Intubation and No ICU    I spent 60 minutes in the professional and overall care of this patient.    Lupe Keith, APRN-CNS  "

## 2025-01-08 NOTE — PROGRESS NOTES
"Pharmacy Medication History Review    Stanford NGUYEN Caro \"Mal\" is a 69 y.o. male admitted for Symptomatic anemia. Pharmacy reviewed the patient's [id number] medications and allergies for accuracy.    Medications ADDED:  None  Medications CHANGED:  Magnesium  Medications REMOVED:   Loratadine  Trospium     The list below reflects the updated PTA list.   Prior to Admission Medications   Prescriptions Informant   0.9 % sodium chloride (sodium chloride, PF, 0.9%) injection Self   Sig: Mix with Colistin as directed: withdraw 5 mL of sodium chloride and add to Colistin vial twice daily.  Discard remainder of vial after each use.   DULoxetine (Cymbalta) 60 mg DR capsule Self   Sig: Take 1 capsule (60 mg) by mouth once daily. Do not crush or chew.   alcohol swabs pads, medicated Self   Sig: Apply 1 Swab topically 1 (one) time per week. Use before aranesp injection.   alendronate (Fosamax) 70 mg tablet Self   Sig: Take 1 tablet (70 mg) by mouth every 7 days. Take in the morning with a full glass of water on an empty stomach and do not take anything else by mouth or lie down for the next 30 minutes  Patient reported he takes on Sunday    apixaban (Eliquis) 5 mg tablet Self   Sig: Take 1 tablet (5 mg) by mouth every 12 hours.   azithromycin (Zithromax) 250 mg tablet Self   Sig: Take 1 tablet (250 mg) by mouth once a day on Monday, Wednesday, and Friday.   blood sugar diagnostic (OneTouch Verio test strips) strip Self   Sig: Use as directed 3 times a day before meals.   budesonide-formoteroL (Symbicort) 160-4.5 mcg/actuation inhaler Self   Sig: Inhale 2 puffs by mouth 2 times a day. Rinse mouth with water after use to reduce aftertaste and incidence of candidiasis. Do not swallow.   calcium carbonate (Oscal) 500 mg calcium (1,250 mg) tablet Self   Sig: Take 1 tablet (1,250 mg) by mouth 2 times daily (morning and late afternoon).   cholecalciferol (Vitamin D-3) 25 MCG (1000 UT) capsule Self   Sig: Take 1 capsule (25 mcg) " "by mouth once daily.   colistimethate (Colymycin) 150 mg vial Self   Sig: Take 150 mg (1 vial) by nebulization 2 times a day. Mix 150 mg vial with 5 mL normal saline, insert into nebulizer and inhale medication 2 times a day   dapsone 100 mg tablet Self   Sig: Take 1 tablet (100 mg) by mouth once daily.   darbepoetin alex in polysorbat (Aranesp, in polysorbate,) 60 mcg/mL injection Self   Sig: Inject 1 mL (60 mcg) under the skin 1 (one) time per week.  Patient reported he takes on     gabapentin (Neurontin) 300 mg capsule Self   Sig: Take 1 capsule (300 mg) by mouth once daily at bedtime.   levalbuterol (Xopenex) 0.63 mg/3 mL nebulizer solution Self   Sig: Inhale one (1) ampule via nebulizer twice daily.   magnesium oxide (Mag-Ox) 400 mg tablet Self   Sig: Take 2 tablets (800 mg) by mouth 2 times a day. Separate from Tacrolimus by 2 hours. Take at 1pm and 7pm   montelukast (Singulair) 10 mg tablet Self   Sig: Take 1 tablet (10 mg) by mouth once daily at bedtime.   multivitamin with minerals iron-free (Centrum Silver) Self   Sig: Take 1 tablet by mouth once daily.   mycophenolate (Cellcept) 250 mg capsule Self   Sig: Take 1 capsule (250 mg) by mouth 2 times a day.   pantoprazole (ProtoNix) 40 mg EC tablet Self   Sig: Take 1 tablet (40 mg) by mouth once daily.   predniSONE (Deltasone) 5 mg tablet Self   Sig: Take 1 tablet (5 mg) by mouth once daily.   rosuvastatin (Crestor) 40 mg tablet Self   Sig: Take 1 tablet (40 mg) by mouth once daily.   sharps container Self   Si each if needed (For aranesp needles).   syringe with needle (BD Luer-Silvia Syringe) 10 mL 21 x 1 1/2\" syringe Self   Sig: Use as directed to mix normal saline and Colistin twice daily.   syringe with needle (Syringe 3cc/25Gx1\") 3 mL 25 gauge x 1\" syringe Self   Sig: Use 1 Syringe 1 (one) time per week as directed.   tacrolimus (Prograf) 1 mg capsule Self   Sig: Take 2 capsules (2 mg) by mouth 2 times a day.   valGANciclovir (Valcyte) 450 mg " "tablet Self   Sig: Take 2 tablets (900 mg) by mouth once daily.      Facility-Administered Medications: None        The list below reflects the updated allergy list. Please review each documented allergy for additional clarification and justification.  Allergies  Reviewed by Gray Briceño, Lauri on 1/8/2025        Severity Reactions Comments    Gadolinium-containing Contrast Media Medium Other Acute renal insufficiency s/p double lung transplant    Iodinated Contrast Media Medium Other Acute renal insufficiency s/p double lung transplant    Nsaids (non-steroidal Anti-inflammatory Drug) Medium Other Acute renal insufficiency s/p double lung transplant            Patient accepts M2B at discharge.     Sources:   [facility]  Pharmacy dispense history  Patient interview Moderate historian  Chart Review  11/11/24  transplant progress note    Additional Comments:  Patient knew most of his medications and was able to verify PTA med list with prompting      Gray Briceño PharmD  Transitions of Care Pharmacist  01/08/25     Secure Chat preferred   If no response call [phone] or [employer] \"Med Rec\"    "

## 2025-01-08 NOTE — PROGRESS NOTES
01/08/25 1330   Discharge Planning   Living Arrangements Spouse/significant other   Support Systems Spouse/significant other   Assistance Needed none   Type of Residence Private residence   Number of Stairs to Enter Residence 0   Number of Stairs Within Residence 0   Do you have animals or pets at home? Yes   Type of Animals or Pets Dog   Who is requesting discharge planning? Provider   Home or Post Acute Services None   Expected Discharge Disposition Home   Does the patient need discharge transport arranged? No     Met with patient and introduced myself as Care Coordinator and member of the discharge planning team.  Pt with history of double lung transplant was admitted with anemia. He plans to return home at time of discharge with his wife. He is independent in ADL's. He is followed by the lung transplant team for his medical care. He uses  Pharmacy. No home care needs were identified. Care coordinator will continue to follow for home going needs.

## 2025-01-09 ENCOUNTER — APPOINTMENT (OUTPATIENT)
Dept: CARDIOLOGY | Facility: HOSPITAL | Age: 70
End: 2025-01-09
Payer: MEDICARE

## 2025-01-09 ENCOUNTER — APPOINTMENT (OUTPATIENT)
Dept: RADIOLOGY | Facility: HOSPITAL | Age: 70
End: 2025-01-09
Payer: MEDICARE

## 2025-01-09 LAB
ABO GROUP (TYPE) IN BLOOD: NORMAL
ALBUMIN SERPL BCP-MCNC: 3.5 G/DL (ref 3.4–5)
ALP SERPL-CCNC: 65 U/L (ref 33–136)
ALT SERPL W P-5'-P-CCNC: 15 U/L (ref 10–52)
ANION GAP BLDV CALCULATED.4IONS-SCNC: 8 MMOL/L (ref 10–25)
ANION GAP SERPL CALC-SCNC: 10 MMOL/L (ref 10–20)
ANTIBODY SCREEN: NORMAL
AORTIC VALVE MEAN GRADIENT: 8 MMHG
AORTIC VALVE PEAK VELOCITY: 1.85 M/S
AST SERPL W P-5'-P-CCNC: 19 U/L (ref 9–39)
AV PEAK GRADIENT: 14 MMHG
AVA (PEAK VEL): 2.22 CM2
AVA (VTI): 2.36 CM2
BASE EXCESS BLDV CALC-SCNC: 0.3 MMOL/L (ref -2–3)
BASOPHILS # BLD AUTO: 0.03 X10*3/UL (ref 0–0.1)
BASOPHILS NFR BLD AUTO: 0.6 %
BILIRUB SERPL-MCNC: 1.1 MG/DL (ref 0–1.2)
BLOOD EXPIRATION DATE: NORMAL
BODY TEMPERATURE: 37 DEGREES CELSIUS
BUN SERPL-MCNC: 16 MG/DL (ref 6–23)
CA-I BLDV-SCNC: 1.25 MMOL/L (ref 1.1–1.33)
CALCIUM SERPL-MCNC: 8.3 MG/DL (ref 8.6–10.6)
CHLORIDE BLDV-SCNC: 107 MMOL/L (ref 98–107)
CHLORIDE SERPL-SCNC: 107 MMOL/L (ref 98–107)
CO2 SERPL-SCNC: 28 MMOL/L (ref 21–32)
CREAT SERPL-MCNC: 0.78 MG/DL (ref 0.5–1.3)
DISPENSE STATUS: NORMAL
EGFRCR SERPLBLD CKD-EPI 2021: >90 ML/MIN/1.73M*2
EJECTION FRACTION APICAL 4 CHAMBER: 30.9
EJECTION FRACTION: 58 %
EOSINOPHIL # BLD AUTO: 0.08 X10*3/UL (ref 0–0.4)
EOSINOPHIL NFR BLD AUTO: 1.6 %
ERYTHROCYTE [DISTWIDTH] IN BLOOD BY AUTOMATED COUNT: 17.1 % (ref 11.5–14.5)
GLUCOSE BLDV-MCNC: 80 MG/DL (ref 74–99)
GLUCOSE SERPL-MCNC: 79 MG/DL (ref 74–99)
HCO3 BLDV-SCNC: 27.1 MMOL/L (ref 22–26)
HCT VFR BLD AUTO: 28.2 % (ref 41–52)
HCT VFR BLD EST: 26 % (ref 41–52)
HGB BLD-MCNC: 8.2 G/DL (ref 13.5–17.5)
HGB BLDV-MCNC: 8.6 G/DL (ref 13.5–17.5)
IMM GRANULOCYTES # BLD AUTO: 0.05 X10*3/UL (ref 0–0.5)
IMM GRANULOCYTES NFR BLD AUTO: 1 % (ref 0–0.9)
INHALED O2 CONCENTRATION: 30 %
LACTATE BLDV-SCNC: 0.4 MMOL/L (ref 0.4–2)
LEFT ATRIUM VOLUME AREA LENGTH INDEX BSA: 37.5 ML/M2
LEFT VENTRICULAR OUTFLOW TRACT DIAMETER: 2.2 CM
LYMPHOCYTES # BLD AUTO: 0.71 X10*3/UL (ref 0.8–3)
LYMPHOCYTES NFR BLD AUTO: 14.3 %
MAGNESIUM SERPL-MCNC: 1.69 MG/DL (ref 1.6–2.4)
MCH RBC QN AUTO: 24.8 PG (ref 26–34)
MCHC RBC AUTO-ENTMCNC: 29.1 G/DL (ref 32–36)
MCV RBC AUTO: 85 FL (ref 80–100)
MONOCYTES # BLD AUTO: 0.44 X10*3/UL (ref 0.05–0.8)
MONOCYTES NFR BLD AUTO: 8.9 %
NEUTROPHILS # BLD AUTO: 3.64 X10*3/UL (ref 1.6–5.5)
NEUTROPHILS NFR BLD AUTO: 73.6 %
NRBC BLD-RTO: 0.6 /100 WBCS (ref 0–0)
OXYHGB MFR BLDV: 94.5 % (ref 45–75)
PCO2 BLDV: 55 MM HG (ref 41–51)
PH BLDV: 7.3 PH (ref 7.33–7.43)
PLATELET # BLD AUTO: 78 X10*3/UL (ref 150–450)
PO2 BLDV: 86 MM HG (ref 35–45)
POTASSIUM BLDV-SCNC: 4 MMOL/L (ref 3.5–5.3)
POTASSIUM SERPL-SCNC: 4 MMOL/L (ref 3.5–5.3)
PRODUCT BLOOD TYPE: 5100
PRODUCT CODE: NORMAL
PROT SERPL-MCNC: 5.5 G/DL (ref 6.4–8.2)
RBC # BLD AUTO: 3.31 X10*6/UL (ref 4.5–5.9)
RH FACTOR (ANTIGEN D): NORMAL
RIGHT VENTRICLE FREE WALL PEAK S': 13.5 CM/S
SAO2 % BLDV: 97 % (ref 45–75)
SODIUM BLDV-SCNC: 138 MMOL/L (ref 136–145)
SODIUM SERPL-SCNC: 141 MMOL/L (ref 136–145)
TACROLIMUS BLD-MCNC: 8.3 NG/ML
UNIT ABO: NORMAL
UNIT NUMBER: NORMAL
UNIT RH: NORMAL
UNIT VOLUME: 290
VIT B12 SERPL-MCNC: 690 PG/ML (ref 211–911)
WBC # BLD AUTO: 5 X10*3/UL (ref 4.4–11.3)
XM INTEP: NORMAL

## 2025-01-09 PROCEDURE — 2500000001 HC RX 250 WO HCPCS SELF ADMINISTERED DRUGS (ALT 637 FOR MEDICARE OP)

## 2025-01-09 PROCEDURE — C8929 TTE W OR WO FOL WCON,DOPPLER: HCPCS

## 2025-01-09 PROCEDURE — 71046 X-RAY EXAM CHEST 2 VIEWS: CPT | Performed by: RADIOLOGY

## 2025-01-09 PROCEDURE — 99232 SBSQ HOSP IP/OBS MODERATE 35: CPT | Performed by: INTERNAL MEDICINE

## 2025-01-09 PROCEDURE — 94660 CPAP INITIATION&MGMT: CPT

## 2025-01-09 PROCEDURE — 36415 COLL VENOUS BLD VENIPUNCTURE: CPT | Performed by: CLINICAL NURSE SPECIALIST

## 2025-01-09 PROCEDURE — 2500000005 HC RX 250 GENERAL PHARMACY W/O HCPCS: Performed by: CLINICAL NURSE SPECIALIST

## 2025-01-09 PROCEDURE — 2500000001 HC RX 250 WO HCPCS SELF ADMINISTERED DRUGS (ALT 637 FOR MEDICARE OP): Performed by: INTERNAL MEDICINE

## 2025-01-09 PROCEDURE — 71046 X-RAY EXAM CHEST 2 VIEWS: CPT

## 2025-01-09 PROCEDURE — 2500000001 HC RX 250 WO HCPCS SELF ADMINISTERED DRUGS (ALT 637 FOR MEDICARE OP): Performed by: CLINICAL NURSE SPECIALIST

## 2025-01-09 PROCEDURE — 82607 VITAMIN B-12: CPT

## 2025-01-09 PROCEDURE — 36415 COLL VENOUS BLD VENIPUNCTURE: CPT

## 2025-01-09 PROCEDURE — 83735 ASSAY OF MAGNESIUM: CPT | Performed by: INTERNAL MEDICINE

## 2025-01-09 PROCEDURE — 99233 SBSQ HOSP IP/OBS HIGH 50: CPT

## 2025-01-09 PROCEDURE — 82435 ASSAY OF BLOOD CHLORIDE: CPT | Performed by: INTERNAL MEDICINE

## 2025-01-09 PROCEDURE — 85025 COMPLETE CBC W/AUTO DIFF WBC: CPT | Performed by: INTERNAL MEDICINE

## 2025-01-09 PROCEDURE — 36430 TRANSFUSION BLD/BLD COMPNT: CPT

## 2025-01-09 PROCEDURE — 2500000004 HC RX 250 GENERAL PHARMACY W/ HCPCS (ALT 636 FOR OP/ED)

## 2025-01-09 PROCEDURE — RXMED WILLOW AMBULATORY MEDICATION CHARGE

## 2025-01-09 PROCEDURE — 2500000004 HC RX 250 GENERAL PHARMACY W/ HCPCS (ALT 636 FOR OP/ED): Performed by: CLINICAL NURSE SPECIALIST

## 2025-01-09 PROCEDURE — 2500000002 HC RX 250 W HCPCS SELF ADMINISTERED DRUGS (ALT 637 FOR MEDICARE OP, ALT 636 FOR OP/ED)

## 2025-01-09 PROCEDURE — 2500000002 HC RX 250 W HCPCS SELF ADMINISTERED DRUGS (ALT 637 FOR MEDICARE OP, ALT 636 FOR OP/ED): Performed by: CLINICAL NURSE SPECIALIST

## 2025-01-09 PROCEDURE — 80197 ASSAY OF TACROLIMUS: CPT | Performed by: INTERNAL MEDICINE

## 2025-01-09 PROCEDURE — 93306 TTE W/DOPPLER COMPLETE: CPT | Performed by: INTERNAL MEDICINE

## 2025-01-09 PROCEDURE — 99233 SBSQ HOSP IP/OBS HIGH 50: CPT | Performed by: INTERNAL MEDICINE

## 2025-01-09 PROCEDURE — 1200000002 HC GENERAL ROOM WITH TELEMETRY DAILY

## 2025-01-09 PROCEDURE — 94640 AIRWAY INHALATION TREATMENT: CPT

## 2025-01-09 PROCEDURE — 84132 ASSAY OF SERUM POTASSIUM: CPT | Performed by: CLINICAL NURSE SPECIALIST

## 2025-01-09 PROCEDURE — 2500000002 HC RX 250 W HCPCS SELF ADMINISTERED DRUGS (ALT 637 FOR MEDICARE OP, ALT 636 FOR OP/ED): Performed by: INTERNAL MEDICINE

## 2025-01-09 PROCEDURE — 86901 BLOOD TYPING SEROLOGIC RH(D): CPT

## 2025-01-09 PROCEDURE — 84132 ASSAY OF SERUM POTASSIUM: CPT | Performed by: INTERNAL MEDICINE

## 2025-01-09 PROCEDURE — P9016 RBC LEUKOCYTES REDUCED: HCPCS

## 2025-01-09 RX ORDER — ACYCLOVIR 400 MG/1
400 TABLET ORAL 2 TIMES DAILY
Qty: 60 TABLET | Refills: 11 | Status: SHIPPED | OUTPATIENT
Start: 2025-01-09 | End: 2026-01-09

## 2025-01-09 RX ORDER — METOPROLOL TARTRATE 25 MG/1
12.5 TABLET, FILM COATED ORAL 2 TIMES DAILY
Status: DISCONTINUED | OUTPATIENT
Start: 2025-01-09 | End: 2025-01-11 | Stop reason: HOSPADM

## 2025-01-09 RX ADMIN — APIXABAN 5 MG: 5 TABLET, FILM COATED ORAL at 06:26

## 2025-01-09 RX ADMIN — CETIRIZINE HYDROCHLORIDE 10 MG: 10 TABLET, FILM COATED ORAL at 08:47

## 2025-01-09 RX ADMIN — PANTOPRAZOLE SODIUM 40 MG: 40 TABLET, DELAYED RELEASE ORAL at 08:46

## 2025-01-09 RX ADMIN — ACYCLOVIR 400 MG: 400 TABLET ORAL at 08:46

## 2025-01-09 RX ADMIN — PERFLUTREN 1 ML OF DILUTION: 6.52 INJECTION, SUSPENSION INTRAVENOUS at 14:15

## 2025-01-09 RX ADMIN — METOPROLOL TARTRATE 12.5 MG: 25 TABLET, FILM COATED ORAL at 21:12

## 2025-01-09 RX ADMIN — TACROLIMUS 1 MG: 1 CAPSULE ORAL at 17:47

## 2025-01-09 RX ADMIN — Medication 27 PERCENT: at 23:00

## 2025-01-09 RX ADMIN — GABAPENTIN 300 MG: 300 CAPSULE ORAL at 21:11

## 2025-01-09 RX ADMIN — CALCIUM 1250 MG: 500 TABLET ORAL at 17:47

## 2025-01-09 RX ADMIN — MONTELUKAST 10 MG: 10 TABLET, FILM COATED ORAL at 21:11

## 2025-01-09 RX ADMIN — Medication 1000 UNITS: at 08:46

## 2025-01-09 RX ADMIN — COLISTIMETHATE SODIUM 150 MG: 150 INJECTION, POWDER, LYOPHILIZED, FOR SOLUTION INTRAMUSCULAR; INTRAVENOUS at 21:01

## 2025-01-09 RX ADMIN — LEVALBUTEROL HYDROCHLORIDE 0.63 MG: 0.63 SOLUTION RESPIRATORY (INHALATION) at 09:15

## 2025-01-09 RX ADMIN — COLISTIMETHATE SODIUM 150 MG: 150 INJECTION, POWDER, LYOPHILIZED, FOR SOLUTION INTRAMUSCULAR; INTRAVENOUS at 09:28

## 2025-01-09 RX ADMIN — TAMSULOSIN HYDROCHLORIDE 0.4 MG: 0.4 CAPSULE ORAL at 08:46

## 2025-01-09 RX ADMIN — APIXABAN 5 MG: 5 TABLET, FILM COATED ORAL at 17:47

## 2025-01-09 RX ADMIN — PENTAMIDINE ISETHIONATE 300 MG: 300 INHALANT RESPIRATORY (INHALATION) at 09:16

## 2025-01-09 RX ADMIN — SODIUM CHLORIDE 200 MG: 900 INJECTION, SOLUTION INTRAVENOUS at 08:42

## 2025-01-09 RX ADMIN — DULOXETINE HYDROCHLORIDE 60 MG: 60 CAPSULE, DELAYED RELEASE ORAL at 08:47

## 2025-01-09 RX ADMIN — ROSUVASTATIN CALCIUM 40 MG: 40 TABLET, FILM COATED ORAL at 08:46

## 2025-01-09 RX ADMIN — PREDNISONE 5 MG: 5 TABLET ORAL at 08:47

## 2025-01-09 RX ADMIN — LETERMOVIR 480 MG: 480 TABLET, FILM COATED ORAL at 22:53

## 2025-01-09 RX ADMIN — Medication 192 MG: at 22:53

## 2025-01-09 RX ADMIN — ACYCLOVIR 400 MG: 400 TABLET ORAL at 22:53

## 2025-01-09 RX ADMIN — Medication 192 MG: at 08:46

## 2025-01-09 RX ADMIN — CALCIUM 1250 MG: 500 TABLET ORAL at 08:46

## 2025-01-09 RX ADMIN — TACROLIMUS 1 MG: 1 CAPSULE ORAL at 06:25

## 2025-01-09 ASSESSMENT — PAIN - FUNCTIONAL ASSESSMENT
PAIN_FUNCTIONAL_ASSESSMENT: 0-10
PAIN_FUNCTIONAL_ASSESSMENT: 0-10

## 2025-01-09 ASSESSMENT — COGNITIVE AND FUNCTIONAL STATUS - GENERAL
DAILY ACTIVITIY SCORE: 24
MOBILITY SCORE: 24
MOBILITY SCORE: 24
DAILY ACTIVITIY SCORE: 24

## 2025-01-09 ASSESSMENT — PAIN SCALES - GENERAL
PAINLEVEL_OUTOF10: 0 - NO PAIN
PAINLEVEL_OUTOF10: 0 - NO PAIN

## 2025-01-09 NOTE — PROGRESS NOTES
"Stanford NGUYEN Caro \"Mal\" is a 69 y.o. male on day 2 of admission presenting with Symptomatic anemia.      Subjective   Vitally stable overnight, with elevated BP 140s-150s. Was on BIPAP, no desatting. Pt reported that he slept great. No complaint. On RA and mentation is great. Denied SOB, chest pain. Overnight tele NSR.       Objective     Last Recorded Vitals  /87 (BP Location: Left arm, Patient Position: Lying)   Pulse 70   Temp 36.4 °C (97.5 °F) (Temporal)   Resp 17   Wt 94.3 kg (208 lb)   SpO2 97%   Intake/Output last 3 Shifts:    Intake/Output Summary (Last 24 hours) at 1/9/2025 0923  Last data filed at 1/9/2025 0638  Gross per 24 hour   Intake 640 ml   Output 600 ml   Net 40 ml       Admission Weight  Weight: 94.3 kg (208 lb) (01/07/25 1630)    Daily Weight  01/07/25 : 94.3 kg (208 lb)    Image Results  ECG 12 lead  Normal sinus rhythm  Minimal voltage criteria for LVH, may be normal variant ( R in aVL )  Nonspecific ST abnormality  Abnormal ECG  When compared with ECG of 05-NOV-2020 13:10,  Previous ECG has undetermined rhythm, needs review  QRS duration has decreased  Non-specific change in ST segment in Lateral leads    See ED provider note for full interpretation and clinical correlation  Confirmed by Varsha Perez ([id number]) on 1/7/2025 8:04:13 PM  XR chest 2 views  Narrative: Interpreted By:  Tru Cedillo,   STUDY:  XR CHEST 2 VIEWS      INDICATION:  Signs/Symptoms:shortness of breath.      COMPARISON:  November 11, 2024      ACCESSION NUMBER(S):  [id number]      ORDERING CLINICIAN:  AMILCAR BO      FINDINGS:  Remote granulomatous changes similar to prior studies with bulky  calcified lymph nodes in the mediastinum. No consolidation, effusion,  edema, or pneumothorax. Chronic elevation left hemidiaphragm  unchanged. Thoracic spinal DISH unchanged. Surgical clips left lung  unchanged.      Impression: No evidence of acute intrathoracic abnormality.      Left lung postsurgical changes and remote " granulomatous disease  similar to the previous exam.      Signed by: Tru Cedillo 1/7/2025 6:34 PM  Dictation workstation:   [id number]      Physical Exam    PHYSICAL EXAM:  General: awake, alert, interactive   HEENT: pupils equal and round, no scleral icterus or conjunctivitis  Skin: no suspect lesions or rashes noted on visible skin  Chest: CTAB, normal respiratory effort, no wheezing or crackles.   Cardiac: regular rate and rhythm, normal s1, s2, no M/R/G, no JVD  Abdomen: soft, ND, NT, no involuntary guarding  : no flank pain or indwelling urinary catheter  EXT: no peripheral edema, no asymmetry noted  MSK: no focal joint swelling noted  Neuro: AOx4, moving all limbs spontaneously          Relevant Results    Results for orders placed or performed during the hospital encounter of 01/07/25 (from the past 24 hours)   Blood Gas Venous Full Panel   Result Value Ref Range    POCT pH, Venous 7.24 (LL) 7.33 - 7.43 pH    POCT pCO2, Venous 61 (H) 41 - 51 mm Hg    POCT pO2, Venous 42 35 - 45 mm Hg    POCT SO2, Venous 62 45 - 75 %    POCT Oxy Hemoglobin, Venous 61.0 45.0 - 75.0 %    POCT Hematocrit Calculated, Venous 32.0 (L) 41.0 - 52.0 %    POCT Sodium, Venous 142 136 - 145 mmol/L    POCT Potassium, Venous 4.0 3.5 - 5.3 mmol/L    POCT Chloride, Venous 108 (H) 98 - 107 mmol/L    POCT Ionized Calicum, Venous 1.08 (L) 1.10 - 1.33 mmol/L    POCT Glucose, Venous 90 74 - 99 mg/dL    POCT Lactate, Venous 2.8 (H) 0.4 - 2.0 mmol/L    POCT Base Excess, Venous -2.0 -2.0 - 3.0 mmol/L    POCT HCO3 Calculated, Venous 26.1 (H) 22.0 - 26.0 mmol/L    POCT Hemoglobin, Venous 10.6 (L) 13.5 - 17.5 g/dL    POCT Anion Gap, Venous 12.0 10.0 - 25.0 mmol/L    Patient Temperature 37.0 degrees Celsius    FiO2 24 %   Blood Gas Lactic Acid, Venous   Result Value Ref Range    POCT Lactate, Venous 1.0 0.4 - 2.0 mmol/L   Blood Gas Venous Full Panel   Result Value Ref Range    POCT pH, Venous 7.31 (L) 7.33 - 7.43 pH    POCT pCO2, Venous 51 41 - 51 mm  Hg    POCT pO2, Venous 75 (H) 35 - 45 mm Hg    POCT SO2, Venous 94 (H) 45 - 75 %    POCT Oxy Hemoglobin, Venous 92.4 (H) 45.0 - 75.0 %    POCT Hematocrit Calculated, Venous 28.0 (L) 41.0 - 52.0 %    POCT Sodium, Venous 137 136 - 145 mmol/L    POCT Potassium, Venous 4.6 3.5 - 5.3 mmol/L    POCT Chloride, Venous 108 (H) 98 - 107 mmol/L    POCT Ionized Calicum, Venous 1.23 1.10 - 1.33 mmol/L    POCT Glucose, Venous 119 (H) 74 - 99 mg/dL    POCT Lactate, Venous 1.1 0.4 - 2.0 mmol/L    POCT Base Excess, Venous -0.9 -2.0 - 3.0 mmol/L    POCT HCO3 Calculated, Venous 25.7 22.0 - 26.0 mmol/L    POCT Hemoglobin, Venous 9.2 (L) 13.5 - 17.5 g/dL    POCT Anion Gap, Venous 8.0 (L) 10.0 - 25.0 mmol/L    Patient Temperature 37.0 degrees Celsius    FiO2 28 %   Magnesium   Result Value Ref Range    Magnesium 1.69 1.60 - 2.40 mg/dL   CBC and Auto Differential   Result Value Ref Range    WBC 5.0 4.4 - 11.3 x10*3/uL    nRBC 0.6 (H) 0.0 - 0.0 /100 WBCs    RBC 3.31 (L) 4.50 - 5.90 x10*6/uL    Hemoglobin 8.2 (L) 13.5 - 17.5 g/dL    Hematocrit 28.2 (L) 41.0 - 52.0 %    MCV 85 80 - 100 fL    MCH 24.8 (L) 26.0 - 34.0 pg    MCHC 29.1 (L) 32.0 - 36.0 g/dL    RDW 17.1 (H) 11.5 - 14.5 %    Platelets 78 (L) 150 - 450 x10*3/uL    Neutrophils % 73.6 40.0 - 80.0 %    Immature Granulocytes %, Automated 1.0 (H) 0.0 - 0.9 %    Lymphocytes % 14.3 13.0 - 44.0 %    Monocytes % 8.9 2.0 - 10.0 %    Eosinophils % 1.6 0.0 - 6.0 %    Basophils % 0.6 0.0 - 2.0 %    Neutrophils Absolute 3.64 1.60 - 5.50 x10*3/uL    Immature Granulocytes Absolute, Automated 0.05 0.00 - 0.50 x10*3/uL    Lymphocytes Absolute 0.71 (L) 0.80 - 3.00 x10*3/uL    Monocytes Absolute 0.44 0.05 - 0.80 x10*3/uL    Eosinophils Absolute 0.08 0.00 - 0.40 x10*3/uL    Basophils Absolute 0.03 0.00 - 0.10 x10*3/uL   Comprehensive Metabolic Panel   Result Value Ref Range    Glucose 79 74 - 99 mg/dL    Sodium 141 136 - 145 mmol/L    Potassium 4.0 3.5 - 5.3 mmol/L    Chloride 107 98 - 107 mmol/L     Bicarbonate 28 21 - 32 mmol/L    Anion Gap 10 10 - 20 mmol/L    Urea Nitrogen 16 6 - 23 mg/dL    Creatinine 0.78 0.50 - 1.30 mg/dL    eGFR >90 >60 mL/min/1.73m*2    Calcium 8.3 (L) 8.6 - 10.6 mg/dL    Albumin 3.5 3.4 - 5.0 g/dL    Alkaline Phosphatase 65 33 - 136 U/L    Total Protein 5.5 (L) 6.4 - 8.2 g/dL    AST 19 9 - 39 U/L    Bilirubin, Total 1.1 0.0 - 1.2 mg/dL    ALT 15 10 - 52 U/L   BLOOD GAS VENOUS FULL PANEL   Result Value Ref Range    POCT pH, Venous 7.30 (L) 7.33 - 7.43 pH    POCT pCO2, Venous 55 (H) 41 - 51 mm Hg    POCT pO2, Venous 86 (H) 35 - 45 mm Hg    POCT SO2, Venous 97 (H) 45 - 75 %    POCT Oxy Hemoglobin, Venous 94.5 (H) 45.0 - 75.0 %    POCT Hematocrit Calculated, Venous 26.0 (L) 41.0 - 52.0 %    POCT Sodium, Venous 138 136 - 145 mmol/L    POCT Potassium, Venous 4.0 3.5 - 5.3 mmol/L    POCT Chloride, Venous 107 98 - 107 mmol/L    POCT Ionized Calicum, Venous 1.25 1.10 - 1.33 mmol/L    POCT Glucose, Venous 80 74 - 99 mg/dL    POCT Lactate, Venous 0.4 0.4 - 2.0 mmol/L    POCT Base Excess, Venous 0.3 -2.0 - 3.0 mmol/L    POCT HCO3 Calculated, Venous 27.1 (H) 22.0 - 26.0 mmol/L    POCT Hemoglobin, Venous 8.6 (L) 13.5 - 17.5 g/dL    POCT Anion Gap, Venous 8.0 (L) 10.0 - 25.0 mmol/L    Patient Temperature 37.0 degrees Celsius    FiO2 30 %   Vitamin B12   Result Value Ref Range    Vitamin B12 690 211 - 911 pg/mL     *Note: Due to a large number of results and/or encounters for the requested time period, some results have not been displayed. A complete set of results can be found in Results Review.         ECG 12 lead    Result Date: 1/7/2025  Normal sinus rhythm Minimal voltage criteria for LVH, may be normal variant ( R in aVL ) Nonspecific ST abnormality Abnormal ECG When compared with ECG of 05-NOV-2020 13:10, Previous ECG has undetermined rhythm, needs review QRS duration has decreased Non-specific change in ST segment in Lateral leads See ED provider note for full interpretation and clinical  "correlation Confirmed by Varsha Perez ([id number]) on 1/7/2025 8:04:13 PM    XR chest 2 views    Result Date: 1/7/2025  Interpreted By:  Tru Cedillo, STUDY: XR CHEST 2 VIEWS   INDICATION: Signs/Symptoms:shortness of breath.   COMPARISON: November 11, 2024   ACCESSION NUMBER(S): [id number]   ORDERING CLINICIAN: AMILCAR BO   FINDINGS: Remote granulomatous changes similar to prior studies with bulky calcified lymph nodes in the mediastinum. No consolidation, effusion, edema, or pneumothorax. Chronic elevation left hemidiaphragm unchanged. Thoracic spinal DISH unchanged. Surgical clips left lung unchanged.       No evidence of acute intrathoracic abnormality.   Left lung postsurgical changes and remote granulomatous disease similar to the previous exam.   Signed by: Tru Cedillo 1/7/2025 6:34 PM Dictation workstation:   [id number]         Assessment/Plan        Stanford NGUYEN Caro \"Mal\" is a 69 y.o. 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 Hemoglobin has steadily dropped from baseline 10 to recent baseline of 8, now 7.2 on admission. 8.6 today s/p 2U PRBC per lung transplant team. Unclear etiology of acute on chronic anemia, hem has been consulted.       -01/09/25 updates  - VBG today 7.30/55/0.4  - still pending echo   - hem consulted, recs IV iron inpatient and oral iron on dc, also recs GI consult op.  - pending pulse ox study tonight per transplant       #Shortness of breath  #Acute on chronic normocytic anemia   :: Recent baseline Hgb ~8.0, 7.2 on admission 01/07  :: 1/6/25 anemia labs c/w ANDREW: Fe 24L, %sat 6L  :: EKG NSR with trop negative, CXR nonacute   :: On Aranesp 60mcg weekly  :: last TTE 09/2023: EF 65%, mild increased thickness of LV septal wall, mild AR  :: T+S, consented for blood  :: s/p 2u leukocyte reduced PRBC transfusion per transplant recs  Plan  - hem consulted, " appreciate recs   - anemia workup: folate wnl, hapto 49, , ferritin 8, corrected retic 1.35%, direct bili 0.2, b12 wnl and peripheral smear   - echo pending   - BiPAP overnight         #s/p DLTx for IPF/PAH   :: CMV +/-, EBV positive recipient (PCR's negative on 01/06/25)  :: PFTs 11/11/24 - FEV: 1.32 45%, FVC: 2.35 60%, FEV1/FVC: 0.56 73%, - CXR  11/11/24   - c/w home prednisone 5mg daily and tacrolimus 1mg q12h  - AM tacro level daily (goal 6-8)  - Holding cellcept 250mg BID due to anemia  - For PJP prophylaxis, switch from dapsone to pentamidine inhalation 300 mg monthly  - For CMV mismatch, switching from valcyte to letermovir 480 mg daily  - C/w azithromycin 250mg MWF ROSSY ppx  - C/w home Levalbuterol BID followed by Colisitin 150mg BID and Symbicort 160/4.5 2 puffs BID (ordered as Breo-Ellipta inpatient)  - C/w home montelukast 10mg nightly     #PE (08/2020)  #Remote VTE (1993)  ::Hx PE: Noted in August 2020 with admission for septic shock. Eliquis 5mg BID, continue indefinitely  - c/w Eliquis 5 mg BID  - consider bridging with heparin gtt if plan for inpatient procedure       #Neuropathy  - C/w home gabapentin 300mg at night     #Steroid induced hyperglycemia  :: A1c 5.4% 10/2024  :: not on any medications  -POC qACHS  -Lispro SSI  -hypoglycemia protocol     #Osteoporosis  ::DEXA 11/4/24 indicative of low bone mass  -Holding home alendronate  -C/w vitamin D3 daily  -C/w calcium carbonate 1250 mg BID     #HLD  - c/w home rosuvastatin 40mg     #Nephrolithiasis  #LUTS  ::Follows with urology  -home oxybutynin 5mg BID, on hold     #Depression/CATHY  -C/w home duloxetine 60mg daily     #Miscellaneous  ::Home loratadine 10 mg  ::Home pantoprazole 40mg daily  -Cetirizine 10 mg inpatient  -C/w home PPI     Fluids: Replete PRN  Electrolytes: Keep mg >2, phos >3  and K >4  Nutrition:  Adult diet Regular   DVT PPX: Apixaban  GI PPX: Pantoprazole  Bowel Care: Miralax  Catheter: None  Lines: PIV       Code Status: DNR  and No Intubation and No ICU (confirmed on admission)   NOK:  Primary Emergency Contact: Lori Patel, Home Phone: [phone]    Edi Rosenthal MD  PGY-1 Neurology    ======  I saw and evaluated the patient. I personally obtained the key and critical portions of the history and physical exam or was physically present for key and critical portions performed by the resident/fellow. I reviewed the resident/fellow's documentation and discussed the patient with the resident/fellow. I agree with the resident/fellow's medical decision making as documented in the note.    Gregorio Cedillo MD

## 2025-01-09 NOTE — PROGRESS NOTES
"Stanford NGUYEN Caro \"Mal\" is a 69 y.o. male on day 2 of admission presenting with Symptomatic anemia.    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 His transplant course was complicated by pleural bleed with return to OR for washout on 3/23/2020, prolonged ventilatory support requiring tracheostomy (since decannulated) and discharged on 5/8/2020. Post-hospital course was complicated by septic shock with MSSA in August 20202 with PE requiring lifelong anticoagulation.     Subjective   - Overall, doing well. Presented for anemia which temporarily improved after 2u pRBC. Plan for additional 1u pRBC after CXR complete. No overt signs of bleeding, no black tarry stool/coffee ground emesis etc. Will liaise with GI outpatient for further work up, referral placed  - Tac 8.3, continue 1mg BID  - Incidentally found to be hypercapnic, some element of somnolence, improved. Will continue nocturnal BiPAP 10/5 and pulse ox study on 1/10/25 evening. Will liaise with Sleep Medicine outpatient for further work up, referral placed  - Requires 2 more days of IV Iron per Hematology     Continue daily at 0600  - CMP  - CBC with diff  - Mg  - Tac    Objective     Physical Exam  Constitutional:       General: He is not in acute distress.     Appearance: Normal appearance. He is not ill-appearing.   HENT:      Head: Normocephalic and atraumatic.      Ears:      Comments: Hearing aids     Nose: Nose normal.      Mouth/Throat:      Mouth: Mucous membranes are moist.      Pharynx: Oropharynx is clear.   Eyes:      Extraocular Movements: Extraocular movements intact.      Conjunctiva/sclera: Conjunctivae normal.      Pupils: Pupils are equal, round, and reactive to light.   Cardiovascular:      Rate and Rhythm: Normal rate and regular rhythm.      Pulses: Normal pulses.      Heart sounds: Normal heart sounds.   Pulmonary:      Effort: Pulmonary effort is normal. No tachypnea, bradypnea, accessory muscle usage, prolonged " "expiration or respiratory distress.      Breath sounds: No stridor. No wheezing, rhonchi or rales.   Chest:      Chest wall: No tenderness.   Abdominal:      General: Abdomen is flat. Bowel sounds are normal.      Palpations: Abdomen is soft.      Tenderness: There is no abdominal tenderness.   Musculoskeletal:         General: Normal range of motion.      Cervical back: Normal range of motion and neck supple.      Right lower leg: No edema.      Left lower leg: No edema.   Lymphadenopathy:      Cervical: No cervical adenopathy.   Skin:     General: Skin is warm and dry.      Capillary Refill: Capillary refill takes less than 2 seconds.      Findings: Bruising present.   Neurological:      General: No focal deficit present.      Mental Status: He is alert and oriented to person, place, and time. Mental status is at baseline.   Psychiatric:         Mood and Affect: Mood normal.         Behavior: Behavior normal.         Thought Content: Thought content normal.         Judgment: Judgment normal.         Last Recorded Vitals  Blood pressure 109/56, pulse 91, temperature 36.4 °C (97.5 °F), temperature source Temporal, resp. rate 18, height 1.753 m (5' 9\"), weight 94.3 kg (208 lb), SpO2 94%.  Intake/Output last 3 Shifts:  I/O last 3 completed shifts:  In: 1483.8 (15.7 mL/kg) [P.O.:640; Blood:843.8]  Out: 600 (6.4 mL/kg) [Urine:600 (0.2 mL/kg/hr)]  Weight: 94.3 kg     Relevant Results  Scheduled medications  acyclovir, 400 mg, oral, BID  apixaban, 5 mg, oral, q12h  azithromycin, 250 mg, oral, Once per day on Monday Wednesday Friday  calcium carbonate, 1,250 mg, oral, BID  cetirizine, 10 mg, oral, Daily  cholecalciferol, 1,000 Units, oral, Daily  colistimethate, 150 mg, nebulization, q12h ARMANDO  DULoxetine, 60 mg, oral, Daily  fluticasone furoate-vilanteroL, 1 puff, inhalation, Daily  gabapentin, 300 mg, oral, Nightly  [START ON 1/10/2025] iron sucrose, 300 mg, intravenous, Daily  letermovir, 480 mg, oral, " Daily  levalbuterol, 0.63 mg, nebulization, BID  magnesium chloride, 192 mg, oral, BID  metoprolol tartrate, 12.5 mg, oral, BID  montelukast, 10 mg, oral, Nightly  pantoprazole, 40 mg, oral, Daily  predniSONE, 5 mg, oral, Daily  rosuvastatin, 40 mg, oral, Daily  tacrolimus, 1 mg, oral, BID  tamsulosin, 0.4 mg, oral, Daily      Continuous medications     PRN medications  PRN medications: dextrose, dextrose, glucagon, glucagon, oxygen    Results for orders placed or performed during the hospital encounter of 01/07/25 (from the past 24 hours)   Blood Gas Lactic Acid, Venous   Result Value Ref Range    POCT Lactate, Venous 1.0 0.4 - 2.0 mmol/L   Blood Gas Venous Full Panel   Result Value Ref Range    POCT pH, Venous 7.31 (L) 7.33 - 7.43 pH    POCT pCO2, Venous 51 41 - 51 mm Hg    POCT pO2, Venous 75 (H) 35 - 45 mm Hg    POCT SO2, Venous 94 (H) 45 - 75 %    POCT Oxy Hemoglobin, Venous 92.4 (H) 45.0 - 75.0 %    POCT Hematocrit Calculated, Venous 28.0 (L) 41.0 - 52.0 %    POCT Sodium, Venous 137 136 - 145 mmol/L    POCT Potassium, Venous 4.6 3.5 - 5.3 mmol/L    POCT Chloride, Venous 108 (H) 98 - 107 mmol/L    POCT Ionized Calicum, Venous 1.23 1.10 - 1.33 mmol/L    POCT Glucose, Venous 119 (H) 74 - 99 mg/dL    POCT Lactate, Venous 1.1 0.4 - 2.0 mmol/L    POCT Base Excess, Venous -0.9 -2.0 - 3.0 mmol/L    POCT HCO3 Calculated, Venous 25.7 22.0 - 26.0 mmol/L    POCT Hemoglobin, Venous 9.2 (L) 13.5 - 17.5 g/dL    POCT Anion Gap, Venous 8.0 (L) 10.0 - 25.0 mmol/L    Patient Temperature 37.0 degrees Celsius    FiO2 28 %   Tacrolimus level   Result Value Ref Range    Tacrolimus  8.3 <=15.0 ng/mL   Magnesium   Result Value Ref Range    Magnesium 1.69 1.60 - 2.40 mg/dL   CBC and Auto Differential   Result Value Ref Range    WBC 5.0 4.4 - 11.3 x10*3/uL    nRBC 0.6 (H) 0.0 - 0.0 /100 WBCs    RBC 3.31 (L) 4.50 - 5.90 x10*6/uL    Hemoglobin 8.2 (L) 13.5 - 17.5 g/dL    Hematocrit 28.2 (L) 41.0 - 52.0 %    MCV 85 80 - 100 fL    MCH 24.8  (L) 26.0 - 34.0 pg    MCHC 29.1 (L) 32.0 - 36.0 g/dL    RDW 17.1 (H) 11.5 - 14.5 %    Platelets 78 (L) 150 - 450 x10*3/uL    Neutrophils % 73.6 40.0 - 80.0 %    Immature Granulocytes %, Automated 1.0 (H) 0.0 - 0.9 %    Lymphocytes % 14.3 13.0 - 44.0 %    Monocytes % 8.9 2.0 - 10.0 %    Eosinophils % 1.6 0.0 - 6.0 %    Basophils % 0.6 0.0 - 2.0 %    Neutrophils Absolute 3.64 1.60 - 5.50 x10*3/uL    Immature Granulocytes Absolute, Automated 0.05 0.00 - 0.50 x10*3/uL    Lymphocytes Absolute 0.71 (L) 0.80 - 3.00 x10*3/uL    Monocytes Absolute 0.44 0.05 - 0.80 x10*3/uL    Eosinophils Absolute 0.08 0.00 - 0.40 x10*3/uL    Basophils Absolute 0.03 0.00 - 0.10 x10*3/uL   Comprehensive Metabolic Panel   Result Value Ref Range    Glucose 79 74 - 99 mg/dL    Sodium 141 136 - 145 mmol/L    Potassium 4.0 3.5 - 5.3 mmol/L    Chloride 107 98 - 107 mmol/L    Bicarbonate 28 21 - 32 mmol/L    Anion Gap 10 10 - 20 mmol/L    Urea Nitrogen 16 6 - 23 mg/dL    Creatinine 0.78 0.50 - 1.30 mg/dL    eGFR >90 >60 mL/min/1.73m*2    Calcium 8.3 (L) 8.6 - 10.6 mg/dL    Albumin 3.5 3.4 - 5.0 g/dL    Alkaline Phosphatase 65 33 - 136 U/L    Total Protein 5.5 (L) 6.4 - 8.2 g/dL    AST 19 9 - 39 U/L    Bilirubin, Total 1.1 0.0 - 1.2 mg/dL    ALT 15 10 - 52 U/L   BLOOD GAS VENOUS FULL PANEL   Result Value Ref Range    POCT pH, Venous 7.30 (L) 7.33 - 7.43 pH    POCT pCO2, Venous 55 (H) 41 - 51 mm Hg    POCT pO2, Venous 86 (H) 35 - 45 mm Hg    POCT SO2, Venous 97 (H) 45 - 75 %    POCT Oxy Hemoglobin, Venous 94.5 (H) 45.0 - 75.0 %    POCT Hematocrit Calculated, Venous 26.0 (L) 41.0 - 52.0 %    POCT Sodium, Venous 138 136 - 145 mmol/L    POCT Potassium, Venous 4.0 3.5 - 5.3 mmol/L    POCT Chloride, Venous 107 98 - 107 mmol/L    POCT Ionized Calicum, Venous 1.25 1.10 - 1.33 mmol/L    POCT Glucose, Venous 80 74 - 99 mg/dL    POCT Lactate, Venous 0.4 0.4 - 2.0 mmol/L    POCT Base Excess, Venous 0.3 -2.0 - 3.0 mmol/L    POCT HCO3 Calculated, Venous 27.1 (H)  22.0 - 26.0 mmol/L    POCT Hemoglobin, Venous 8.6 (L) 13.5 - 17.5 g/dL    POCT Anion Gap, Venous 8.0 (L) 10.0 - 25.0 mmol/L    Patient Temperature 37.0 degrees Celsius    FiO2 30 %   Vitamin B12   Result Value Ref Range    Vitamin B12 690 211 - 911 pg/mL     *Note: Due to a large number of results and/or encounters for the requested time period, some results have not been displayed. A complete set of results can be found in Results Review.     ECG 12 lead    Result Date: 1/7/2025  Normal sinus rhythm Minimal voltage criteria for LVH, may be normal variant ( R in aVL ) Nonspecific ST abnormality Abnormal ECG When compared with ECG of 05-NOV-2020 13:10, Previous ECG has undetermined rhythm, needs review QRS duration has decreased Non-specific change in ST segment in Lateral leads See ED provider note for full interpretation and clinical correlation Confirmed by Varsha Perez ([id number]) on 1/7/2025 8:04:13 PM    XR chest 2 views    Result Date: 1/7/2025  Interpreted By:  Tru Cedillo, STUDY: XR CHEST 2 VIEWS   INDICATION: Signs/Symptoms:shortness of breath.   COMPARISON: November 11, 2024   ACCESSION NUMBER(S): [id number]   ORDERING CLINICIAN: AMILCAR BO   FINDINGS: Remote granulomatous changes similar to prior studies with bulky calcified lymph nodes in the mediastinum. No consolidation, effusion, edema, or pneumothorax. Chronic elevation left hemidiaphragm unchanged. Thoracic spinal DISH unchanged. Surgical clips left lung unchanged.       No evidence of acute intrathoracic abnormality.   Left lung postsurgical changes and remote granulomatous disease similar to the previous exam.   Signed by: Tru Cedillo 1/7/2025 6:34 PM Dictation workstation:   [id number]                     Assessment/Plan   Assessment & Plan  Symptomatic anemia    Anemia    Stanford NGUYEN Caro is a 69 y.o. 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 His transplant course was complicated by pleural bleed  with return to OR for washout on 3/23/2020, prolonged ventilatory support requiring tracheostomy (since decannulated) and discharged on 5/8/2020. Post-hospital course was complicated by septic shock with MSSA in August 20202 with PE requiring lifelong anticoagulation.      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     Pulmonary:  s/p DLTx for IPF/PAH on 3/22/20, CMV +/-, EBV positive recipient  Allograft function:  - Reporting SOB, noted to be hypercapnic, pH 7.24, improved to 7.30 with 10/5 BiPAP nocturnally, will repeat again 1/9/25 and follow pulse ox study on 1/10/25  - CXR  1/9/2025 reviewed, stable  - Liaise with sleep medicine outpatient, has history of SARA but not longer required home CPAP post-transplant, now requiring BiPAP as above  Historically:  - Hx PE: Remove history of VTE in 1993. [place] PE at same time of septic shock in August 2020, continue Eliquis 5mg BID lifelong   - Hx MSSA, Klebsiella, Corynebacterium from April 2020 bronchoscopy  - Hx Enterobacter from June 2020 bronchoscopy, sensitive to Fluoroquinolones  - Hx Pseudomonas from September 2023 bronchoscopy, completed 14 day course of Ciprofloxacin and commenced on inhaled Tobramycin  - Hx SARA: No longer using CPAP at home but with retention here, BiPAP ordered overnight   - Hx CLAD: Multifocal air trapping identified with September 2023 high resolution chest CT  - HLA Class I & II Negative Decmeber 2024, repeat March 2025  - ROSSY Prophylaxis: Azithromycin 250mg M W F, Singular 10mg nightly, Symbicort 160/4.5 2 puffs BID  - Nebulized Medications: Levalbuterol BID followed by Colisitin 150mg BID and Symbicort as above     Immunosuppression:  - Prograf goal 6-8, level 8.3. Continue 1mg BID, follow daily 0600 levels  - Prednisone 5mg daily  - HOLDING Cellcept 250mg BID due to anemia      Prophylaxis:  - PJP: Commenced on monthly inhaled Pentamidine 1/9/25, next due  2/9/25  Failed Dapsone secondary to anemia  - CMV +/- mismatch: Letermovir 480mg daily as of this admission. Follow with routine CMV PCR 1/6/25 not detected  Failed Valcyte 900mg daily secondary to anemia  - HSV: Acyclovir 400mg BID as of this admission  - EBV: Follow with routine EBV PCR 1/6/25 not detected  - Fungal: Completed course of Voriconazole  - IgG, goal > 600: Level 792 on 1/6/25     Cardiac:  - Denies chest pain, palpitations, swelling in feet/ankles  - BP Management: Resumed Metoprolol 12.5mg BID with hold parameters on 1/9/25              Advised to call Lung Transplant Office if BP consistently > 130/80 or < 100/60  - Lifelong anticoagulation with Eliquis 5mg BID  - Hx Chest Pain: August 2023, evaluated in ED, work-up unremarkable. Likely MSK etiology              TTE September 2023: EF 65% and normal biventricular function  - Hx HLD: Crestor 40mg daily              Lipid Panel December 2024: TC 98, HDL 46.8, Cholesterol/HDL 2.1, LDL 33, VLDL 19, TG 93, non HDL 51  - Primary team checking ECHO, pending     Renal  - Denies dysuria/hematuria  - Creatinine 0.78, eGFR >90  - Potassium 4.0  - Magnesium 1.69. On MagDelay 192mg BID   - Hx BPH: Sanctura 20mg BID per Urology; holding since Sanctura not on formulary, Flomax 0.4mg daily while inpatient   - Elevated PSA: 4.88 in April 2024, MRI prostate consistent with BPH. Follows with Dr. Soni of Urology  - Renally dose all medications and avoid nephrotoxins. May not have NSAIDs or IV Contrast     GI  - Denies n/v/d/c  - Protonix BID   - Elevated Alkaline Phosphatase: Improved with discontinuation of Voriconazole, MRI Liver February 2023 normal.   - Bilirubin Total elevated to 1.8  - Liaise with GI outpatient for further work up     ID  - Denies fevers/chills/night sweats. Afebrile, no leukocytosis  - Hx Septic Shock with MSSA: August 2020, prolonged course of IV antibiotics with resolution  - Hx Otitis Externa: Noted September 2020, follows by ENT. Several  "courses of antibiotics with wick insertion, developed mastoiditis. Since resolved  - Hx Sternal Wound Infection: 2 episodes of staph infection requiring debridement and wound vac              2nd episode in July 2021, no debridement at this time but completed an 8 week course of antibiotics  - Hx Covid: December 2022, recovered  - Hx positive bronchoscopy cultures as above     Endo  - Hx DM: Follows with Endocrine, last visit 10/22/24 HgBA1c 5.4 at this time              No glucose lowering agents presently, historically prescribed Jardiance  - Hx Osteoporosis: DEXA 11/4/24 indicative of low bone mass              Follows with Endocrinology (Dr. Ashok Pascual); Previously on Alendronate 70mg once weekly, discontinued due to anemia, switched to Reclast this admission  - Hx Vitamin D Deficiency: Level 41 on 12/16/24 continue Vitamin D3 1000u daily      Heme  - Iron studies: Iron 24, UIBC 377, TIBC 401, 6% saturation - Iron sucrose 300mg IV x 4 days   - Heme consulted, recs appreciated  - Denies bleeding/bruising  - Hx PE: Noted in August 2020 with admission for septic shock. Eliquis 5mg BID, continue indefinitely              IVC filter, will explore removal  - HgB 8.2, Hematocrit 22.8; s/p 2uPRBC -Leukocytes Reduced (CMV reduced risk), plan for additional 1u pRBC 1/9/2025   - WBC 5.0, ANC sufficient 3640, platelets 85     ENT  - Hx Otitis Externa with facial nerve involvement. Follows with ENT              S/p R tympanomasteoidectomy, mastoid washout while inpatient               March 2021 Osteomyelitis skull base, completed 12 week course of Meropenem  - Hx Chronic Hearing Loss: Requires hearing aids, follows with ENT     Neuro  - Neuropathy: Gabapentin 300mg at night  - Memory Loss: Reporting onset \"several months\" forgets to pay bills, forgets to complete tasks, wife reporting this is an issue. Will refer to Neurology for further work-up    DISCHARGE PLANNING:  - GI: PPI BID  - DVT: Apixiban, SCD, mobility  - " ADOD: 1/11, new prescriptions of Letermovir and Acyclovir sent to [facility]  - Will arrange follow-up with GI, Sleep Medicine  - Repeat labs 1/20  - Return to clinic with CXR and [place] week of 1/27       I spent 60 minutes in the professional and overall care of this patient.    Patient was seen and assessed with Dr. Boone, Lung Transplant Attending overseeing all care. All findings and plan discussed with Dr. Boone, Gillian RODRIGUEZ & Dr. Cedillo, nursing, PharmD, patient, and family.     Annabella Aldana, APRN-CNP

## 2025-01-09 NOTE — PROGRESS NOTES
Transitional Care Coordination Progress Note:  Plan per team: IV iron, blood transfusion today, echo pending  Payer: [employer]  Status: Inpatient  Discharge disposition: Home  Potential barriers: none  ADOD: 1-2 days  Ivana Navarro RN

## 2025-01-09 NOTE — DOCUMENTATION CLARIFICATION NOTE
"    PATIENT:               DAISY NUNEZ  ACCT #:                  [id number]  MRN:                       [id number]  :                       1955  ADMIT DATE:       2025 6:41 PM  DISCH DATE:  RESPONDING PROVIDER #:        [id number]          PROVIDER RESPONSE TEXT:    Acute Respiratory Acidosis    CDI QUERY TEXT:    Clarification      Instruction:    Based on your assessment of the patient and the clinical information, please provide the requested documentation by clicking on the appropriate radio button and enter any additional information if prompted.    Question: Is there a diagnosis indicative of the clinical information    When answering this query, please exercise your independent professional judgment. The fact that a question is being asked, does not imply that any particular answer is desired or expected.    The patient's clinical indicators include:  Clinical Information: Progress notes indicate pt is a 69yr old male double lung transplant recipient presenting with sob and anemia    Clinical Indicators:  - at 0900: SPO2 85%  - Pulm PN: \"He was desatting to 85% this morning on RA and was put on 4L NC. Pt was drowsy but readily roused during pre-rounds\"  - Transplant PN: \"pulse ox dropped overnight and pt drowsy...VBG shows hypercarbic respiratory acidosis. BiPAP ordered for HS, will repeat VBG in a.m. and check nocturnal pulse ox study on Thursday\"  - Transplant PN: \"- Reporting SOB, noted to be hypercapnic, pH 7.24, improved to 7.30 with 10/5 BiPAP nocturnally, will repeat again 25 and follow pulse ox study on 1/10/25\"    Treatment: VBG  and , supplemental oxygen, nocturnal BiPAP, pulse ox monitoring    Risk Factors: Lung transplant, Anemia  Options provided:  -- Acute Hypercapnic Respiratory Failure  -- Acute Respiratory Acidosis  -- Other - I will add my own diagnosis  -- Refer to Clinical Documentation Reviewer    Query created by: Brenna Escobar on 2025 3:00 " PM      Electronically signed by:  DAYLIN LOREDO MD 1/9/2025 3:12 PM

## 2025-01-10 DIAGNOSIS — M81.0 OSTEOPOROSIS WITHOUT CURRENT PATHOLOGICAL FRACTURE, UNSPECIFIED OSTEOPOROSIS TYPE: Primary | ICD-10-CM

## 2025-01-10 LAB
ALBUMIN SERPL BCP-MCNC: 3.8 G/DL (ref 3.4–5)
ALP SERPL-CCNC: 77 U/L (ref 33–136)
ALT SERPL W P-5'-P-CCNC: 15 U/L (ref 10–52)
ANION GAP BLDV CALCULATED.4IONS-SCNC: 6 MMOL/L (ref 10–25)
ANION GAP SERPL CALC-SCNC: 11 MMOL/L (ref 10–20)
AST SERPL W P-5'-P-CCNC: 21 U/L (ref 9–39)
BASE EXCESS BLDV CALC-SCNC: 2.2 MMOL/L (ref -2–3)
BASOPHILS # BLD AUTO: 0.04 X10*3/UL (ref 0–0.1)
BASOPHILS NFR BLD AUTO: 0.6 %
BILIRUB SERPL-MCNC: 1.4 MG/DL (ref 0–1.2)
BODY TEMPERATURE: 37 DEGREES CELSIUS
BUN SERPL-MCNC: 14 MG/DL (ref 6–23)
CA-I BLDV-SCNC: 1.3 MMOL/L (ref 1.1–1.33)
CALCIUM SERPL-MCNC: 9.1 MG/DL (ref 8.6–10.6)
CHLORIDE BLDV-SCNC: 109 MMOL/L (ref 98–107)
CHLORIDE SERPL-SCNC: 108 MMOL/L (ref 98–107)
CO2 SERPL-SCNC: 29 MMOL/L (ref 21–32)
CREAT SERPL-MCNC: 0.94 MG/DL (ref 0.5–1.3)
EGFRCR SERPLBLD CKD-EPI 2021: 87 ML/MIN/1.73M*2
EOSINOPHIL # BLD AUTO: 0.08 X10*3/UL (ref 0–0.4)
EOSINOPHIL NFR BLD AUTO: 1.3 %
ERYTHROCYTE [DISTWIDTH] IN BLOOD BY AUTOMATED COUNT: 17.3 % (ref 11.5–14.5)
GLUCOSE BLDV-MCNC: 90 MG/DL (ref 74–99)
GLUCOSE SERPL-MCNC: 83 MG/DL (ref 74–99)
HCO3 BLDV-SCNC: 28.2 MMOL/L (ref 22–26)
HCT VFR BLD AUTO: 33.6 % (ref 41–52)
HCT VFR BLD EST: 30 % (ref 41–52)
HGB BLD-MCNC: 9.6 G/DL (ref 13.5–17.5)
HGB BLDV-MCNC: 9.9 G/DL (ref 13.5–17.5)
IMM GRANULOCYTES # BLD AUTO: 0.1 X10*3/UL (ref 0–0.5)
IMM GRANULOCYTES NFR BLD AUTO: 1.6 % (ref 0–0.9)
INHALED O2 CONCENTRATION: 21 %
LACTATE BLDV-SCNC: 0.9 MMOL/L (ref 0.4–2)
LYMPHOCYTES # BLD AUTO: 0.92 X10*3/UL (ref 0.8–3)
LYMPHOCYTES NFR BLD AUTO: 14.8 %
MAGNESIUM SERPL-MCNC: 1.75 MG/DL (ref 1.6–2.4)
MCH RBC QN AUTO: 25.2 PG (ref 26–34)
MCHC RBC AUTO-ENTMCNC: 28.6 G/DL (ref 32–36)
MCV RBC AUTO: 88 FL (ref 80–100)
METHYLMALONATE SERPL-SCNC: 0.2 UMOL/L (ref 0–0.4)
MONOCYTES # BLD AUTO: 0.47 X10*3/UL (ref 0.05–0.8)
MONOCYTES NFR BLD AUTO: 7.5 %
NEUTROPHILS # BLD AUTO: 4.62 X10*3/UL (ref 1.6–5.5)
NEUTROPHILS NFR BLD AUTO: 74.2 %
NRBC BLD-RTO: 0.5 /100 WBCS (ref 0–0)
OXYHGB MFR BLDV: 65.3 % (ref 45–75)
PCO2 BLDV: 50 MM HG (ref 41–51)
PH BLDV: 7.36 PH (ref 7.33–7.43)
PLATELET # BLD AUTO: 98 X10*3/UL (ref 150–450)
PO2 BLDV: 40 MM HG (ref 35–45)
POTASSIUM BLDV-SCNC: 5.3 MMOL/L (ref 3.5–5.3)
POTASSIUM SERPL-SCNC: 4.9 MMOL/L (ref 3.5–5.3)
PROT SERPL-MCNC: 6.4 G/DL (ref 6.4–8.2)
RBC # BLD AUTO: 3.81 X10*6/UL (ref 4.5–5.9)
SAO2 % BLDV: 68 % (ref 45–75)
SODIUM BLDV-SCNC: 138 MMOL/L (ref 136–145)
SODIUM SERPL-SCNC: 143 MMOL/L (ref 136–145)
TACROLIMUS BLD-MCNC: 9.1 NG/ML
WBC # BLD AUTO: 6.2 X10*3/UL (ref 4.4–11.3)

## 2025-01-10 PROCEDURE — 84132 ASSAY OF SERUM POTASSIUM: CPT | Performed by: CLINICAL NURSE SPECIALIST

## 2025-01-10 PROCEDURE — 84132 ASSAY OF SERUM POTASSIUM: CPT | Performed by: INTERNAL MEDICINE

## 2025-01-10 PROCEDURE — 1200000002 HC GENERAL ROOM WITH TELEMETRY DAILY

## 2025-01-10 PROCEDURE — 94640 AIRWAY INHALATION TREATMENT: CPT

## 2025-01-10 PROCEDURE — 2500000004 HC RX 250 GENERAL PHARMACY W/ HCPCS (ALT 636 FOR OP/ED)

## 2025-01-10 PROCEDURE — 2500000004 HC RX 250 GENERAL PHARMACY W/ HCPCS (ALT 636 FOR OP/ED): Performed by: CLINICAL NURSE SPECIALIST

## 2025-01-10 PROCEDURE — 2500000001 HC RX 250 WO HCPCS SELF ADMINISTERED DRUGS (ALT 637 FOR MEDICARE OP)

## 2025-01-10 PROCEDURE — 2500000002 HC RX 250 W HCPCS SELF ADMINISTERED DRUGS (ALT 637 FOR MEDICARE OP, ALT 636 FOR OP/ED): Performed by: INTERNAL MEDICINE

## 2025-01-10 PROCEDURE — 36415 COLL VENOUS BLD VENIPUNCTURE: CPT | Performed by: CLINICAL NURSE SPECIALIST

## 2025-01-10 PROCEDURE — 2500000002 HC RX 250 W HCPCS SELF ADMINISTERED DRUGS (ALT 637 FOR MEDICARE OP, ALT 636 FOR OP/ED)

## 2025-01-10 PROCEDURE — 83735 ASSAY OF MAGNESIUM: CPT | Performed by: INTERNAL MEDICINE

## 2025-01-10 PROCEDURE — 2500000001 HC RX 250 WO HCPCS SELF ADMINISTERED DRUGS (ALT 637 FOR MEDICARE OP): Performed by: STUDENT IN AN ORGANIZED HEALTH CARE EDUCATION/TRAINING PROGRAM

## 2025-01-10 PROCEDURE — 80053 COMPREHEN METABOLIC PANEL: CPT | Performed by: CLINICAL NURSE SPECIALIST

## 2025-01-10 PROCEDURE — 2500000001 HC RX 250 WO HCPCS SELF ADMINISTERED DRUGS (ALT 637 FOR MEDICARE OP): Performed by: CLINICAL NURSE SPECIALIST

## 2025-01-10 PROCEDURE — 94660 CPAP INITIATION&MGMT: CPT

## 2025-01-10 PROCEDURE — 85025 COMPLETE CBC W/AUTO DIFF WBC: CPT | Performed by: INTERNAL MEDICINE

## 2025-01-10 PROCEDURE — RXMED WILLOW AMBULATORY MEDICATION CHARGE

## 2025-01-10 PROCEDURE — 99232 SBSQ HOSP IP/OBS MODERATE 35: CPT | Performed by: INTERNAL MEDICINE

## 2025-01-10 PROCEDURE — 99233 SBSQ HOSP IP/OBS HIGH 50: CPT | Performed by: INTERNAL MEDICINE

## 2025-01-10 PROCEDURE — 2500000001 HC RX 250 WO HCPCS SELF ADMINISTERED DRUGS (ALT 637 FOR MEDICARE OP): Performed by: INTERNAL MEDICINE

## 2025-01-10 PROCEDURE — 80197 ASSAY OF TACROLIMUS: CPT | Performed by: INTERNAL MEDICINE

## 2025-01-10 PROCEDURE — 2500000002 HC RX 250 W HCPCS SELF ADMINISTERED DRUGS (ALT 637 FOR MEDICARE OP, ALT 636 FOR OP/ED): Performed by: CLINICAL NURSE SPECIALIST

## 2025-01-10 RX ORDER — DIPHENHYDRAMINE HYDROCHLORIDE 50 MG/ML
50 INJECTION INTRAMUSCULAR; INTRAVENOUS AS NEEDED
OUTPATIENT
Start: 2025-01-10

## 2025-01-10 RX ORDER — MAGNESIUM CHLORIDE 64 MG
192 TABLET, DELAYED RELEASE (ENTERIC COATED) ORAL 2 TIMES DAILY
Qty: 180 TABLET | Refills: 11 | Status: SHIPPED | OUTPATIENT
Start: 2025-01-10

## 2025-01-10 RX ORDER — LORATADINE 10 MG/1
10 TABLET ORAL DAILY
Qty: 90 TABLET | Refills: 3 | Status: SHIPPED | OUTPATIENT
Start: 2025-01-10 | End: 2026-01-10

## 2025-01-10 RX ORDER — ACETAMINOPHEN 325 MG/1
650 TABLET ORAL ONCE
OUTPATIENT
Start: 2025-01-10

## 2025-01-10 RX ORDER — ACETAMINOPHEN 325 MG/1
650 TABLET ORAL ONCE
Status: COMPLETED | OUTPATIENT
Start: 2025-01-10 | End: 2025-01-10

## 2025-01-10 RX ORDER — EPINEPHRINE 0.3 MG/.3ML
0.3 INJECTION SUBCUTANEOUS EVERY 5 MIN PRN
OUTPATIENT
Start: 2025-01-10

## 2025-01-10 RX ORDER — TACROLIMUS 0.5 MG/1
0.5 CAPSULE ORAL EVERY 24 HOURS
Status: DISCONTINUED | OUTPATIENT
Start: 2025-01-11 | End: 2025-01-11 | Stop reason: HOSPADM

## 2025-01-10 RX ORDER — METOPROLOL TARTRATE 25 MG/1
12.5 TABLET, FILM COATED ORAL 2 TIMES DAILY
Qty: 30 TABLET | Refills: 11 | Status: SHIPPED | OUTPATIENT
Start: 2025-01-10 | End: 2026-01-10

## 2025-01-10 RX ORDER — FAMOTIDINE 10 MG/ML
20 INJECTION INTRAVENOUS ONCE AS NEEDED
OUTPATIENT
Start: 2025-01-10

## 2025-01-10 RX ORDER — TROSPIUM CHLORIDE 20 MG/1
20 TABLET, FILM COATED ORAL 2 TIMES DAILY
COMMUNITY

## 2025-01-10 RX ORDER — ZOLEDRONIC ACID 5 MG/100ML
5 INJECTION, SOLUTION INTRAVENOUS ONCE
OUTPATIENT
Start: 2025-01-10

## 2025-01-10 RX ORDER — TACROLIMUS 1 MG/1
1 CAPSULE ORAL EVERY 24 HOURS
Status: DISCONTINUED | OUTPATIENT
Start: 2025-01-11 | End: 2025-01-11 | Stop reason: HOSPADM

## 2025-01-10 RX ORDER — ALBUTEROL SULFATE 0.83 MG/ML
3 SOLUTION RESPIRATORY (INHALATION) AS NEEDED
OUTPATIENT
Start: 2025-01-10

## 2025-01-10 RX ORDER — TACROLIMUS 0.5 MG/1
0.5 CAPSULE ORAL ONCE
Status: COMPLETED | OUTPATIENT
Start: 2025-01-10 | End: 2025-01-10

## 2025-01-10 RX ORDER — FERROUS SULFATE 325(65) MG
325 TABLET, DELAYED RELEASE (ENTERIC COATED) ORAL
Qty: 30 TABLET | Refills: 11 | Status: SHIPPED | OUTPATIENT
Start: 2025-01-10 | End: 2026-01-10

## 2025-01-10 RX ADMIN — APIXABAN 5 MG: 5 TABLET, FILM COATED ORAL at 19:53

## 2025-01-10 RX ADMIN — METOPROLOL TARTRATE 12.5 MG: 25 TABLET, FILM COATED ORAL at 21:27

## 2025-01-10 RX ADMIN — ROSUVASTATIN CALCIUM 40 MG: 40 TABLET, FILM COATED ORAL at 09:22

## 2025-01-10 RX ADMIN — LETERMOVIR 480 MG: 480 TABLET, FILM COATED ORAL at 21:27

## 2025-01-10 RX ADMIN — ACYCLOVIR 400 MG: 400 TABLET ORAL at 11:59

## 2025-01-10 RX ADMIN — MONTELUKAST 10 MG: 10 TABLET, FILM COATED ORAL at 21:27

## 2025-01-10 RX ADMIN — APIXABAN 5 MG: 5 TABLET, FILM COATED ORAL at 06:49

## 2025-01-10 RX ADMIN — LEVALBUTEROL HYDROCHLORIDE 0.63 MG: 0.63 SOLUTION RESPIRATORY (INHALATION) at 10:32

## 2025-01-10 RX ADMIN — PREDNISONE 5 MG: 5 TABLET ORAL at 09:22

## 2025-01-10 RX ADMIN — PANTOPRAZOLE SODIUM 40 MG: 40 TABLET, DELAYED RELEASE ORAL at 09:27

## 2025-01-10 RX ADMIN — DULOXETINE HYDROCHLORIDE 60 MG: 60 CAPSULE, DELAYED RELEASE ORAL at 09:27

## 2025-01-10 RX ADMIN — AZITHROMYCIN DIHYDRATE 250 MG: 250 TABLET ORAL at 09:21

## 2025-01-10 RX ADMIN — ACETAMINOPHEN 650 MG: 325 TABLET ORAL at 06:49

## 2025-01-10 RX ADMIN — Medication 192 MG: at 21:28

## 2025-01-10 RX ADMIN — Medication 192 MG: at 09:22

## 2025-01-10 RX ADMIN — CETIRIZINE HYDROCHLORIDE 10 MG: 10 TABLET, FILM COATED ORAL at 09:22

## 2025-01-10 RX ADMIN — TAMSULOSIN HYDROCHLORIDE 0.4 MG: 0.4 CAPSULE ORAL at 09:22

## 2025-01-10 RX ADMIN — COLISTIMETHATE SODIUM 150 MG: 150 INJECTION, POWDER, LYOPHILIZED, FOR SOLUTION INTRAMUSCULAR; INTRAVENOUS at 21:07

## 2025-01-10 RX ADMIN — Medication 1000 UNITS: at 11:59

## 2025-01-10 RX ADMIN — LEVALBUTEROL HYDROCHLORIDE 0.63 MG: 0.63 SOLUTION RESPIRATORY (INHALATION) at 21:07

## 2025-01-10 RX ADMIN — COLISTIMETHATE SODIUM 150 MG: 150 INJECTION, POWDER, LYOPHILIZED, FOR SOLUTION INTRAMUSCULAR; INTRAVENOUS at 10:32

## 2025-01-10 RX ADMIN — TACROLIMUS 0.5 MG: 0.5 CAPSULE ORAL at 19:53

## 2025-01-10 RX ADMIN — CALCIUM 1250 MG: 500 TABLET ORAL at 19:53

## 2025-01-10 RX ADMIN — TACROLIMUS 1 MG: 1 CAPSULE ORAL at 06:59

## 2025-01-10 RX ADMIN — ACYCLOVIR 400 MG: 400 TABLET ORAL at 21:28

## 2025-01-10 RX ADMIN — IRON SUCROSE 300 MG: 20 INJECTION, SOLUTION INTRAVENOUS at 09:20

## 2025-01-10 RX ADMIN — CALCIUM 1250 MG: 500 TABLET ORAL at 11:59

## 2025-01-10 RX ADMIN — METOPROLOL TARTRATE 12.5 MG: 25 TABLET, FILM COATED ORAL at 09:22

## 2025-01-10 ASSESSMENT — COGNITIVE AND FUNCTIONAL STATUS - GENERAL
DAILY ACTIVITIY SCORE: 24
MOBILITY SCORE: 24

## 2025-01-10 NOTE — PROGRESS NOTES
"Stanford NGUYEN Caro \"Mal\" is a 69 y.o. male on day 3 of admission presenting with Symptomatic anemia.    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 His transplant course was complicated by pleural bleed with return to OR for washout on 3/23/2020, prolonged ventilatory support requiring tracheostomy (since decannulated) and discharged on 5/8/2020. Post-hospital course was complicated by septic shock with MSSA in August 20202 with PE requiring lifelong anticoagulation.     Subjective   - Overall, doing well. Presented for anemia which temporarily improved after 2u pRBC. Plan for additional 1u pRBC after CXR complete. No overt signs of bleeding, no black tarry stool/coffee ground emesis etc. Will liaise with GI outpatient for further work up, referral placed  - Tac 9.1, decrease to 0.5mg/1mg   - Incidentally found to be hypercapnic, some element of somnolence, improved. Plan for pulse ox study on 1/10/25 evening. Will liaise with Sleep Medicine outpatient for further work up, referral placed  - meds sent to [facility] for M2B delivery on 1/11     Continue daily at 0600  - CMP  - CBC with diff  - Mg  - Tac    Objective     Physical Exam  Constitutional:       General: He is not in acute distress.     Appearance: Normal appearance. He is not ill-appearing.   HENT:      Head: Normocephalic and atraumatic.      Ears:      Comments: Hearing aids     Nose: Nose normal.      Mouth/Throat:      Mouth: Mucous membranes are moist.      Pharynx: Oropharynx is clear.   Eyes:      Extraocular Movements: Extraocular movements intact.      Conjunctiva/sclera: Conjunctivae normal.      Pupils: Pupils are equal, round, and reactive to light.   Cardiovascular:      Rate and Rhythm: Normal rate and regular rhythm.      Pulses: Normal pulses.      Heart sounds: Normal heart sounds.   Pulmonary:      Effort: Pulmonary effort is normal. No tachypnea, bradypnea, accessory muscle usage, prolonged expiration or respiratory " "distress.      Breath sounds: No stridor. No wheezing, rhonchi or rales.   Chest:      Chest wall: No tenderness.   Abdominal:      General: Abdomen is flat. Bowel sounds are normal.      Palpations: Abdomen is soft.      Tenderness: There is no abdominal tenderness.   Musculoskeletal:         General: Normal range of motion.      Cervical back: Normal range of motion and neck supple.      Right lower leg: No edema.      Left lower leg: No edema.   Lymphadenopathy:      Cervical: No cervical adenopathy.   Skin:     General: Skin is warm and dry.      Capillary Refill: Capillary refill takes less than 2 seconds.      Findings: Bruising present.   Neurological:      General: No focal deficit present.      Mental Status: He is alert and oriented to person, place, and time. Mental status is at baseline.   Psychiatric:         Mood and Affect: Mood normal.         Behavior: Behavior normal.         Thought Content: Thought content normal.         Judgment: Judgment normal.         Last Recorded Vitals  Blood pressure 108/63, pulse 60, temperature 36.7 °C (98.1 °F), temperature source Temporal, resp. rate 18, height 1.753 m (5' 9\"), weight 94.3 kg (208 lb), SpO2 95%.  Intake/Output last 3 Shifts:  I/O last 3 completed shifts:  In: 590 (6.3 mL/kg) [P.O.:240; Blood:350]  Out: 1250 (13.2 mL/kg) [Urine:1250 (0.4 mL/kg/hr)]  Weight: 94.3 kg     Relevant Results  Scheduled medications  acyclovir, 400 mg, oral, BID  apixaban, 5 mg, oral, q12h  azithromycin, 250 mg, oral, Once per day on Monday Wednesday Friday  calcium carbonate, 1,250 mg, oral, BID  cetirizine, 10 mg, oral, Daily  cholecalciferol, 1,000 Units, oral, Daily  colistimethate, 150 mg, nebulization, q12h ARMANDO  DULoxetine, 60 mg, oral, Daily  fluticasone furoate-vilanteroL, 1 puff, inhalation, Daily  gabapentin, 300 mg, oral, Nightly  iron sucrose, 300 mg, intravenous, Daily  letermovir, 480 mg, oral, Daily  levalbuterol, 0.63 mg, nebulization, BID  magnesium chloride, " 192 mg, oral, BID  metoprolol tartrate, 12.5 mg, oral, BID  montelukast, 10 mg, oral, Nightly  pantoprazole, 40 mg, oral, Daily  predniSONE, 5 mg, oral, Daily  rosuvastatin, 40 mg, oral, Daily  tacrolimus, 1 mg, oral, BID  tamsulosin, 0.4 mg, oral, Daily      Continuous medications     PRN medications  PRN medications: dextrose, dextrose, glucagon, glucagon, oxygen    Results for orders placed or performed during the hospital encounter of 01/07/25 (from the past 24 hours)   Prepare RBC: 1 Units, Leukocytes Reduced (CMV reduced risk)   Result Value Ref Range    PRODUCT CODE E2726B57     Unit Number [id number]     Unit ABO O     Unit RH POS     XM INTEP COMP     Dispense Status TR     Blood Expiration Date 1/30/2025 11:59:00 PM EST     PRODUCT BLOOD TYPE 5100     UNIT VOLUME 290    Type and screen   Result Value Ref Range    ABO TYPE O     Rh TYPE POS     ANTIBODY SCREEN NEG    Transthoracic Echo (TTE) Complete   Result Value Ref Range    AV pk carson 1.85 m/s    AV mn grad 8 mmHg    LVOT diam 2.20 cm    LA vol index A/L 37.5 ml/m2    LV EF 58 %    RV free wall pk S' 13.50 cm/s    Aortic Valve Area by Continuity of VTI 2.36 cm2    Aortic Valve Area by Continuity of Peak Velocity 2.22 cm2    AV pk grad 14 mmHg    LV A4C EF 30.9    BLOOD GAS VENOUS FULL PANEL   Result Value Ref Range    POCT pH, Venous 7.36 7.33 - 7.43 pH    POCT pCO2, Venous 50 41 - 51 mm Hg    POCT pO2, Venous 40 35 - 45 mm Hg    POCT SO2, Venous 68 45 - 75 %    POCT Oxy Hemoglobin, Venous 65.3 45.0 - 75.0 %    POCT Hematocrit Calculated, Venous 30.0 (L) 41.0 - 52.0 %    POCT Sodium, Venous 138 136 - 145 mmol/L    POCT Potassium, Venous 5.3 3.5 - 5.3 mmol/L    POCT Chloride, Venous 109 (H) 98 - 107 mmol/L    POCT Ionized Calicum, Venous 1.30 1.10 - 1.33 mmol/L    POCT Glucose, Venous 90 74 - 99 mg/dL    POCT Lactate, Venous 0.9 0.4 - 2.0 mmol/L    POCT Base Excess, Venous 2.2 -2.0 - 3.0 mmol/L    POCT HCO3 Calculated, Venous 28.2 (H) 22.0 - 26.0  mmol/L    POCT Hemoglobin, Venous 9.9 (L) 13.5 - 17.5 g/dL    POCT Anion Gap, Venous 6.0 (L) 10.0 - 25.0 mmol/L    Patient Temperature 37.0 degrees Celsius    FiO2 21 %     *Note: Due to a large number of results and/or encounters for the requested time period, some results have not been displayed. A complete set of results can be found in Results Review.     XR chest 2 views    Result Date: 1/10/2025  Interpreted By:  Vikas Rader, STUDY: XR CHEST 2 VIEWS;  1/9/2025 12:36 pm   INDICATION: Signs/Symptoms:s/p DLTx.     COMPARISON: Two-view chest 01/07/2025; CT chest 09/18/2020   ACCESSION NUMBER(S): [id number]   ORDERING CLINICIAN: ROCK ASTORGA   TECHNIQUE: Two views of the chest utilizing dual energy subtraction   FINDINGS: The heart and mediastinum are stable in comparison to previous study. There is vascular atheromatous disease of the thoracic aorta.   There is mild elevation of the right diaphragm and linear parenchymal opacities above the diaphragm similar previous study. No acute pulmonary infiltrates, consolidations or effusions are noted. Vascular clips are demonstrated within the mediastinum.   Calcification of the anterior longitudinal ligament at multiple levels within thoracic spine is noted. Mild thoracic kyphosis is noted.       1.  No acute cardiopulmonary pathology 2. Findings consistent with developing ankylosing spondylitis.       MACRO: None   Signed by: Vikas Rader 1/10/2025 6:59 AM Dictation workstation:   [id number]    Transthoracic Echo (TTE) Complete    Result Date: 1/9/2025   [facility], [address] [place] [address]                Tel [phone] and Fax [phone] TRANSTHORACIC ECHOCARDIOGRAM REPORT  Patient Name:       DAISY Pacheco Physician:    [id number] Henrique Felix MD Study Date:         1/9/2025            Ordering Provider:    Johnathan SANZ                                                                CARMEN MRN/PID:            [id number]            Fellow: Accession#:         [id number]        Nurse:                Hoa Brown RN Date of Birth/Age:  1955 / 69      Sonographer:          Walt mcgill RDCS Gender assigned at  M                   Additional Staff: Birth: Height:             175.26 cm           Admit Date:           1/7/2025 Weight:             94.35 kg            Admission Status:     Outpatient BSA / BMI:          2.10 m2 / 30.72     Encounter#:           [id number]                     kg/m2 Blood Pressure:     109/58 mmHg         Department Location:  [facility]                                                               Non Invasive Study Type:    TRANSTHORACIC ECHO (TTE) COMPLETE Diagnosis/ICD: Lung transplant status-Z94.2; Shortness of breath-R06.02 Indication:    Hx of lung transplant, SOB CPT Code:      Echo Complete w Full Doppler-[id number] Patient History: Pertinent History: Bell's palsy, Hx of MSSA bacteremia and PE, IVC filter                    placement, GERD, Gout, DVT, SARA on CPAP, DM, PHTN. Study Detail: The following Echo studies were performed: 2D, M-Mode, Doppler and               color flow. Definity used as a contrast agent for endocardial               border definition. Total contrast used for this procedure was 1.0               mL via IV push.  PHYSICIAN INTERPRETATION: Left Ventricle: Left ventricular ejection fraction is normal, by visual estimate at 55-60%. There are no regional left ventricular wall motion abnormalities. The left ventricular cavity size is normal. Spectral Doppler shows a normal pattern of left ventricular diastolic filling. Left Atrium: The left atrium is upper limits of normal in size. Right Ventricle: The right ventricle was not well visualized. Unable to determine right ventricular systolic function. Right Atrium: The right atrium is mildly dilated.  Aortic Valve: The aortic valve is trileaflet. The aortic valve dimensionless index is 0.62. There is no evidence of aortic valve regurgitation. The peak instantaneous gradient of the aortic valve is 14 mmHg. The mean gradient of the aortic valve is 8 mmHg. Mitral Valve: The mitral valve is normal in structure. There is no evidence of mitral valve regurgitation. Tricuspid Valve: The tricuspid valve was not well visualized. Tricuspid regurgitation was not assessed. The right ventricular systolic pressure is unable to be estimated. Pulmonic Valve: The pulmonic valve is not well visualized. The pulmonic valve regurgitation was not well visualized. Pericardium: Pericardial effusion was not well visualized. Aorta: The aortic root is normal.  CONCLUSIONS:  1. Poorly visualized anatomical structures due to suboptimal image quality.  2. Left ventricular ejection fraction is normal, by visual estimate at 55-60%.  3. Unable to determine right ventricular systolic function. QUANTITATIVE DATA SUMMARY:  2D MEASUREMENTS:          Normal Ranges: LVEDV Index:     33 ml/m2  LA VOLUME:                    Normal Ranges: LA Vol A4C:        74.7 ml    (22+/-6mL/m2) LA Vol A2C:        73.9 ml LA Vol BP:         78.8 ml LA Vol Index A4C:  35.6ml/m2 LA Vol Index A2C:  35.2 ml/m2 LA Vol Index BP:   37.5 ml/m2 LA Area A4C:       24.4 cm2 LA Area A2C:       22.9 cm2 LA Major Axis A4C: 6.8 cm LA Major Axis A2C: 6.0 cm  RA VOLUME BY A/L METHOD:          Normal Ranges: RA Area A4C:             18.6 cm2  LV SYSTOLIC FUNCTION BY 2D PLANIMETRY (MOD):                      Normal Ranges: EF-A4C View:    31 % (>=55%) EF-A2C View:    52 % EF-Biplane:     22 % EF-Visual:      58 % LV EF Reported: 58 %  LV DIASTOLIC FUNCTION:           Normal Ranges: MV Peak E:             1.11 m/s  (0.7-1.2 m/s) MV e'                  0.091 m/s (>8.0) MV lateral e'          0.10 m/s MV medial e'           0.08 m/s E/e' Ratio:            12.16     (<8.0)  MITRAL VALVE:           Normal Ranges: MV DT:        157 msec (150-240msec)  AORTIC VALVE:                      Normal Ranges: AoV Vmax:                1.85 m/s  (<=1.7m/s) AoV Peak P.7 mmHg (<20mmHg) AoV Mean P.0 mmHg  (1.7-11.5mmHg) LVOT Max Ryan:            1.08 m/s  (<=1.1m/s) AoV VTI:                 35.40 cm  (18-25cm) LVOT VTI:                22.00 cm LVOT Diameter:           2.20 cm   (1.8-2.4cm) AoV Area, VTI:           2.36 cm2  (2.5-5.5cm2) AoV Area,Vmax:           2.22 cm2  (2.5-4.5cm2) AoV Dimensionless Index: 0.62  RIGHT VENTRICLE: RV Basal 3.99 cm RV Mid   2.51 cm RV Major 7.5 cm RV s'    0.14 m/s  PULMONIC VALVE:          Normal Ranges: PV Max Ryan:     1.0 m/s  (0.6-0.9m/s) PV Max PG:      3.9 mmHg  [id number] Henrique Felix MD Electronically signed on 2025 at 3:52:54 PM  ** Final **                      Assessment/Plan   Assessment & Plan  Symptomatic anemia    Anemia    Stanford NGUYEN Caro is a 69 y.o. 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 His transplant course was complicated by pleural bleed with return to OR for washout on 3/23/2020, prolonged ventilatory support requiring tracheostomy (since decannulated) and discharged on 2020. Post-hospital course was complicated by septic shock with MSSA in  with PE requiring lifelong anticoagulation.      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     Pulmonary:  s/p DLTx for IPF/PAH on 3/22/20, CMV +/-, EBV positive recipient  Allograft function:  - Reporting SOB, noted to be hypercapnic, pH 7.24, improved to 7.30 with 10/5 BiPAP nocturnally, will repeat again 25 and follow pulse ox study on 1/10/25  - CXR  2025 reviewed, stable  - Liaise with sleep medicine outpatient, has history of SARA but not longer required home CPAP post-transplant, now requiring BiPAP as above  Historically:  - Hx PE: Remove history of  VTE in 1993. Modest PE at same time of septic shock in August 2020, continue Eliquis 5mg BID lifelong   - Hx MSSA, Klebsiella, Corynebacterium from April 2020 bronchoscopy  - Hx Enterobacter from June 2020 bronchoscopy, sensitive to Fluoroquinolones  - Hx Pseudomonas from September 2023 bronchoscopy, completed 14 day course of Ciprofloxacin and commenced on inhaled Tobramycin  - Hx SARA: No longer using CPAP at home but with retention here, BiPAP ordered overnight   - Hx CLAD: Multifocal air trapping identified with September 2023 high resolution chest CT  - HLA Class I & II Negative Decmeber 2024, repeat March 2025  - ROSSY Prophylaxis: Azithromycin 250mg M W F, Singular 10mg nightly, Symbicort 160/4.5 2 puffs BID  - Nebulized Medications: Levalbuterol BID followed by Colisitin 150mg BID and Symbicort as above     Immunosuppression:  - Prograf goal 6-8, level 9.1. decrease to 0.5mg/1mg, follow daily 0600 levels  - Prednisone 5mg daily  - HOLDING Cellcept 250mg BID due to anemia      Prophylaxis:  - PJP: Commenced on monthly inhaled Pentamidine 1/9/25, next due 2/9/25  Failed Dapsone secondary to anemia  - CMV +/- mismatch: Letermovir 480mg daily as of this admission. Follow with routine CMV PCR 1/6/25 not detected  Failed Valcyte 900mg daily secondary to anemia  - HSV: Acyclovir 400mg BID as of this admission  - EBV: Follow with routine EBV PCR 1/6/25 not detected  - Fungal: Completed course of Voriconazole  - IgG, goal > 600: Level 792 on 1/6/25     Cardiac:  - Denies chest pain, palpitations, swelling in feet/ankles  - BP Management: Resumed Metoprolol 12.5mg BID with hold parameters on 1/9/25              Advised to call Lung Transplant Office if BP consistently > 130/80 or < 100/60  - Lifelong anticoagulation with Eliquis 5mg BID  - Hx Chest Pain: August 2023, evaluated in ED, work-up unremarkable. Likely MSK etiology              TTE September 2023: EF 65% and normal biventricular function  - Hx HLD: Crestor 40mg  daily              Lipid Panel December 2024: TC 98, HDL 46.8, Cholesterol/HDL 2.1, LDL 33, VLDL 19, TG 93, non HDL 51  - Primary team checking ECHO, pending     Renal  - Denies dysuria/hematuria  - Creatinine 0.94, eGFR 87  - Potassium 4.9  - Magnesium 1.75. On MagDelay 192mg BID   - Hx BPH: Sanctura 20mg BID per Urology; holding since Sanctura not on formulary, Flomax 0.4mg daily while inpatient   - Elevated PSA: 4.88 in April 2024, MRI prostate consistent with BPH. Follows with Dr. Soni of Urology  - Renally dose all medications and avoid nephrotoxins. May not have NSAIDs or IV Contrast     GI  - Denies n/v/d/c  - Protonix BID   - Elevated Alkaline Phosphatase: Improved with discontinuation of Voriconazole, MRI Liver February 2023 normal.   - Bilirubin Total elevated to 1.8 downtrended to 1.4  - Liaise with GI outpatient for further work up     ID  - Denies fevers/chills/night sweats. Afebrile, no leukocytosis  - Hx Septic Shock with MSSA: August 2020, prolonged course of IV antibiotics with resolution  - Hx Otitis Externa: Noted September 2020, follows by ENT. Several courses of antibiotics with wick insertion, developed mastoiditis. Since resolved  - Hx Sternal Wound Infection: 2 episodes of staph infection requiring debridement and wound vac              2nd episode in July 2021, no debridement at this time but completed an 8 week course of antibiotics  - Hx Covid: December 2022, recovered  - Hx positive bronchoscopy cultures as above     Endo  - Hx DM: Follows with Endocrine, last visit 10/22/24 HgBA1c 5.4 at this time              No glucose lowering agents presently, historically prescribed Jardiance  - Hx Osteoporosis: DEXA 11/4/24 indicative of low bone mass              Follows with Endocrinology (Dr. Ashok Pascual); Previously on Alendronate 70mg once weekly, discontinued due to anemia, switched to Reclast this admission  - Hx Vitamin D Deficiency: Level 41 on 12/16/24 continue Vitamin D3 1000u  "daily      Heme  - Iron studies: Iron 24, UIBC 377, TIBC 401, 6% saturation - Iron sucrose 300mg IV x 4 days   - Heme consulted, recs appreciated  - Denies bleeding/bruising  - Hx PE: Noted in August 2020 with admission for septic shock. Eliquis 5mg BID, continue indefinitely              IVC filter, will explore removal  - HgB 9.6, Hematocrit 33.6 s/p 2uPRBC -Leukocytes Reduced (CMV reduced risk), plan for additional 1u pRBC 1/9/25   - WBC 6.2, ANC sufficient 4620, platelets 98     ENT  - Hx Otitis Externa with facial nerve involvement. Follows with ENT              S/p R tympanomasteoidectomy, mastoid washout while inpatient               March 2021 Osteomyelitis skull base, completed 12 week course of Meropenem  - Hx Chronic Hearing Loss: Requires hearing aids, follows with ENT     Neuro  - Neuropathy: Gabapentin 300mg at night  - Memory Loss: Reporting onset \"several months\" forgets to pay bills, forgets to complete tasks, wife reporting this is an issue. Will refer to Neurology for further work-up    DISCHARGE PLANNING:  - GI: PPI BID  - DVT: Apixiban, SCD, mobility  - ADOD: 1/11, new prescriptions of Letermovir, Acyclovir, iron, metoprolol sent to [facility], M2B to deliver 1/11  - Will arrange follow-up with GI, Sleep Medicine  - Repeat labs 1/20  - Return to clinic with CXR and [place] week of 1/27       I spent 60 minutes in the professional and overall care of this patient.    Patient was seen and assessed with Dr. Boone, Lung Transplant Attending overseeing all care. All findings and plan discussed with Dr. Boone, Gillian RODRIGUEZ & Dr. Cedillo, nursing, PharmD, patient, and family.     Lupe Keith, APRN-CNS  "

## 2025-01-10 NOTE — DISCHARGE INSTRUCTIONS
REASON FOR ADMISSION & BRIEF DESCRIPTION OF HOSPITAL STAY:     Dear Mr. Patel,  You presented to [facility] with a chief complaint of symptomatic anemia with low hemoglobin (SOB, fatigue, dizziness/lightheadedness, dry cough).  You received 2 units of red blood cell and hemoglobin has improved from 7.2 to 8.6.  We also ordered a scan of the heart, which was overall normal.  You received another unit of red blood cell, and your hemoglobin has improved to 9.6 on 1/10.  The hematology team was also consulted, which recommended IV iron for 4 days and oral iron after discharge.  You had overnight pulse ox study on 1/10 night, which was normal overall. You'll be discharged home today.     HOW TO TAKE CARE OF YOURSELF AFTER DISCHARGE:  -Please follow-up with your doctors appointment and take medications as prescribed.       The central scheduling line is [phone] if you do not hear from one of these services or if you need to reschedule.  If you have any worsening symptoms including shortness of breath, chest pain, nausea or vomiting, or extremely high blood sugars please do not hesitate to go to the emergency room.      Medication Changes:   Medications started: Acyclovir, ferrous sulfate, letermovir, magnesium chloride, metoprolol tartrate.  Medications stopped: None      Appointments/Follow-up:  Future Appointments   Date Time Provider Department Center   1/22/2025  2:30 PM [facility] [id number] PFT  1 [id number] Academic   1/22/2025  3:30 PM Pamela Boone MD [id number] [facility]   2/3/2025  2:00 PM Rosas Soni MD [id number] [place]   4/23/2025  1:00 PM Baljinder Vazquez MD [id number] [facility]   7/15/2025  2:00 PM ALEXANDR Little, CCC-A [id number] [place]   7/15/2025  2:45 PM Lupe Canales MD [id number] [place]   10/22/2025 11:45 AM Pippa Pascual MD [id number] [facility]          Please follow up with:    Transplant, urology, neurology, ENT, endocrine      Sincerely,  Your  Care Team

## 2025-01-10 NOTE — PROGRESS NOTES
"Stanford NGUYEN Caro \"Mal\" is a 69 y.o. male on day 3 of admission presenting with Symptomatic anemia.      Subjective   Vitally stable overnight. Was on BIPAP, no desatting. Received 1u of RBC yesterday. No complaint. On RA and mentation is great. Denied SOB, chest pain. Had 1 BM. Overnight tele NSR.       Objective     Last Recorded Vitals  /76 (BP Location: Right arm, Patient Position: Lying)   Pulse 69   Temp 36.8 °C (98.2 °F) (Temporal)   Resp 18   Wt 94.3 kg (208 lb)   SpO2 93%   Intake/Output last 3 Shifts:    Intake/Output Summary (Last 24 hours) at 1/10/2025 1130  Last data filed at 1/9/2025 2030  Gross per 24 hour   Intake 590 ml   Output 200 ml   Net 390 ml       Admission Weight  Weight: 94.3 kg (208 lb) (01/07/25 1630)    Daily Weight  01/07/25 : 94.3 kg (208 lb)    Image Results  XR chest 2 views  Narrative: Interpreted By:  Vikas Rader,   STUDY:  XR CHEST 2 VIEWS;  1/9/2025 12:36 pm      INDICATION:  Signs/Symptoms:s/p DLTx.          COMPARISON:  Two-view chest 01/07/2025; CT chest 09/18/2020      ACCESSION NUMBER(S):  [id number]      ORDERING CLINICIAN:  ROCK ASTORGA      TECHNIQUE:  Two views of the chest utilizing dual energy subtraction      FINDINGS:  The heart and mediastinum are stable in comparison to previous study.  There is vascular atheromatous disease of the thoracic aorta.      There is mild elevation of the right diaphragm and linear parenchymal  opacities above the diaphragm similar previous study. No acute  pulmonary infiltrates, consolidations or effusions are noted.  Vascular clips are demonstrated within the mediastinum.      Calcification of the anterior longitudinal ligament at multiple  levels within thoracic spine is noted. Mild thoracic kyphosis is  noted.      Impression: 1.  No acute cardiopulmonary pathology  2. Findings consistent with developing ankylosing spondylitis.              MACRO:  None      Signed by: Vikas Rader 1/10/2025 6:59 AM  Dictation " workstation:   [id number]      Physical Exam    PHYSICAL EXAM:  General: awake, alert, interactive   HEENT: pupils equal and round, no scleral icterus or conjunctivitis  Skin: no suspect lesions or rashes noted on visible skin  Chest: CTAB, normal respiratory effort, no wheezing or crackles.   Cardiac: regular rate and rhythm, normal s1, s2, no M/R/G, no JVD  Abdomen: soft, ND, NT, no involuntary guarding  : no flank pain or indwelling urinary catheter  EXT: no peripheral edema, no asymmetry noted  MSK: no focal joint swelling noted  Neuro: AOx4, moving all limbs spontaneously          Relevant Results    Results for orders placed or performed during the hospital encounter of 01/07/25 (from the past 24 hours)   Prepare RBC: 1 Units, Leukocytes Reduced (CMV reduced risk)   Result Value Ref Range    PRODUCT CODE N0983X24     Unit Number [id number]     Unit ABO O     Unit RH POS     XM INTEP COMP     Dispense Status TR     Blood Expiration Date 1/30/2025 11:59:00 PM EST     PRODUCT BLOOD TYPE 5100     UNIT VOLUME 290    Type and screen   Result Value Ref Range    ABO TYPE O     Rh TYPE POS     ANTIBODY SCREEN NEG    Transthoracic Echo (TTE) Complete   Result Value Ref Range    AV pk carson 1.85 m/s    AV mn grad 8 mmHg    LVOT diam 2.20 cm    LA vol index A/L 37.5 ml/m2    LV EF 58 %    RV free wall pk S' 13.50 cm/s    Aortic Valve Area by Continuity of VTI 2.36 cm2    Aortic Valve Area by Continuity of Peak Velocity 2.22 cm2    AV pk grad 14 mmHg    LV A4C EF 30.9    BLOOD GAS VENOUS FULL PANEL   Result Value Ref Range    POCT pH, Venous 7.36 7.33 - 7.43 pH    POCT pCO2, Venous 50 41 - 51 mm Hg    POCT pO2, Venous 40 35 - 45 mm Hg    POCT SO2, Venous 68 45 - 75 %    POCT Oxy Hemoglobin, Venous 65.3 45.0 - 75.0 %    POCT Hematocrit Calculated, Venous 30.0 (L) 41.0 - 52.0 %    POCT Sodium, Venous 138 136 - 145 mmol/L    POCT Potassium, Venous 5.3 3.5 - 5.3 mmol/L    POCT Chloride, Venous 109 (H) 98 - 107 mmol/L     POCT Ionized Calicum, Venous 1.30 1.10 - 1.33 mmol/L    POCT Glucose, Venous 90 74 - 99 mg/dL    POCT Lactate, Venous 0.9 0.4 - 2.0 mmol/L    POCT Base Excess, Venous 2.2 -2.0 - 3.0 mmol/L    POCT HCO3 Calculated, Venous 28.2 (H) 22.0 - 26.0 mmol/L    POCT Hemoglobin, Venous 9.9 (L) 13.5 - 17.5 g/dL    POCT Anion Gap, Venous 6.0 (L) 10.0 - 25.0 mmol/L    Patient Temperature 37.0 degrees Celsius    FiO2 21 %   Tacrolimus level   Result Value Ref Range    Tacrolimus  9.1 <=15.0 ng/mL   Magnesium   Result Value Ref Range    Magnesium 1.75 1.60 - 2.40 mg/dL   CBC and Auto Differential   Result Value Ref Range    WBC 6.2 4.4 - 11.3 x10*3/uL    nRBC 0.5 (H) 0.0 - 0.0 /100 WBCs    RBC 3.81 (L) 4.50 - 5.90 x10*6/uL    Hemoglobin 9.6 (L) 13.5 - 17.5 g/dL    Hematocrit 33.6 (L) 41.0 - 52.0 %    MCV 88 80 - 100 fL    MCH 25.2 (L) 26.0 - 34.0 pg    MCHC 28.6 (L) 32.0 - 36.0 g/dL    RDW 17.3 (H) 11.5 - 14.5 %    Platelets 98 (L) 150 - 450 x10*3/uL    Neutrophils % 74.2 40.0 - 80.0 %    Immature Granulocytes %, Automated 1.6 (H) 0.0 - 0.9 %    Lymphocytes % 14.8 13.0 - 44.0 %    Monocytes % 7.5 2.0 - 10.0 %    Eosinophils % 1.3 0.0 - 6.0 %    Basophils % 0.6 0.0 - 2.0 %    Neutrophils Absolute 4.62 1.60 - 5.50 x10*3/uL    Immature Granulocytes Absolute, Automated 0.10 0.00 - 0.50 x10*3/uL    Lymphocytes Absolute 0.92 0.80 - 3.00 x10*3/uL    Monocytes Absolute 0.47 0.05 - 0.80 x10*3/uL    Eosinophils Absolute 0.08 0.00 - 0.40 x10*3/uL    Basophils Absolute 0.04 0.00 - 0.10 x10*3/uL   Comprehensive Metabolic Panel   Result Value Ref Range    Glucose 83 74 - 99 mg/dL    Sodium 143 136 - 145 mmol/L    Potassium 4.9 3.5 - 5.3 mmol/L    Chloride 108 (H) 98 - 107 mmol/L    Bicarbonate 29 21 - 32 mmol/L    Anion Gap 11 10 - 20 mmol/L    Urea Nitrogen 14 6 - 23 mg/dL    Creatinine 0.94 0.50 - 1.30 mg/dL    eGFR 87 >60 mL/min/1.73m*2    Calcium 9.1 8.6 - 10.6 mg/dL    Albumin 3.8 3.4 - 5.0 g/dL    Alkaline Phosphatase 77 33 - 136 U/L     Total Protein 6.4 6.4 - 8.2 g/dL    AST 21 9 - 39 U/L    Bilirubin, Total 1.4 (H) 0.0 - 1.2 mg/dL    ALT 15 10 - 52 U/L     *Note: Due to a large number of results and/or encounters for the requested time period, some results have not been displayed. A complete set of results can be found in Results Review.         XR chest 2 views    Result Date: 1/10/2025  Interpreted By:  Vikas Rader, STUDY: XR CHEST 2 VIEWS;  1/9/2025 12:36 pm   INDICATION: Signs/Symptoms:s/p DLTx.     COMPARISON: Two-view chest 01/07/2025; CT chest 09/18/2020   ACCESSION NUMBER(S): [id number]   ORDERING CLINICIAN: ROCK ASTORGA   TECHNIQUE: Two views of the chest utilizing dual energy subtraction   FINDINGS: The heart and mediastinum are stable in comparison to previous study. There is vascular atheromatous disease of the thoracic aorta.   There is mild elevation of the right diaphragm and linear parenchymal opacities above the diaphragm similar previous study. No acute pulmonary infiltrates, consolidations or effusions are noted. Vascular clips are demonstrated within the mediastinum.   Calcification of the anterior longitudinal ligament at multiple levels within thoracic spine is noted. Mild thoracic kyphosis is noted.       1.  No acute cardiopulmonary pathology 2. Findings consistent with developing ankylosing spondylitis.       MACRO: None   Signed by: Vikas Rader 1/10/2025 6:59 AM Dictation workstation:   [id number]    Transthoracic Echo (TTE) Complete    Result Date: 1/9/2025   [facility], [address] [place] [address]                Tel [phone] and Fax [phone] TRANSTHORACIC ECHOCARDIOGRAM REPORT  Patient Name:       DAISY MAYRA Pacheco Physician:    [id number] Henrique Felix MD Study Date:         1/9/2025            Ordering Provider:    [id number] BROOKE MORALES MRN/PID:             [id number]            Fellow: Accession#:         [id number]        Nurse:                Hoa Brown RN Date of Birth/Age:  1955 / 69      Sonographer:          Walt mcgill RDCS Gender assigned at  M                   Additional Staff: Birth: Height:             175.26 cm           Admit Date:           1/7/2025 Weight:             94.35 kg            Admission Status:     Outpatient BSA / BMI:          2.10 m2 / 30.72     Encounter#:           [id number]                     kg/m2 Blood Pressure:     109/58 mmHg         Department Location:  [facility]                                                               Non Invasive Study Type:    TRANSTHORACIC ECHO (TTE) COMPLETE Diagnosis/ICD: Lung transplant status-Z94.2; Shortness of breath-R06.02 Indication:    Hx of lung transplant, SOB CPT Code:      Echo Complete w Full Doppler-18021 Patient History: Pertinent History: Bell's palsy, Hx of MSSA bacteremia and PE, IVC filter                    placement, GERD, Gout, DVT, SARA on CPAP, DM, PHTN. Study Detail: The following Echo studies were performed: 2D, M-Mode, Doppler and               color flow. Definity used as a contrast agent for endocardial               border definition. Total contrast used for this procedure was 1.0               mL via IV push.  PHYSICIAN INTERPRETATION: Left Ventricle: Left ventricular ejection fraction is normal, by visual estimate at 55-60%. There are no regional left ventricular wall motion abnormalities. The left ventricular cavity size is normal. Spectral Doppler shows a normal pattern of left ventricular diastolic filling. Left Atrium: The left atrium is upper limits of normal in size. Right Ventricle: The right ventricle was not well visualized. Unable to determine right ventricular systolic function. Right Atrium: The right atrium is mildly dilated. Aortic Valve: The aortic valve is trileaflet.  The aortic valve dimensionless index is 0.62. There is no evidence of aortic valve regurgitation. The peak instantaneous gradient of the aortic valve is 14 mmHg. The mean gradient of the aortic valve is 8 mmHg. Mitral Valve: The mitral valve is normal in structure. There is no evidence of mitral valve regurgitation. Tricuspid Valve: The tricuspid valve was not well visualized. Tricuspid regurgitation was not assessed. The right ventricular systolic pressure is unable to be estimated. Pulmonic Valve: The pulmonic valve is not well visualized. The pulmonic valve regurgitation was not well visualized. Pericardium: Pericardial effusion was not well visualized. Aorta: The aortic root is normal.  CONCLUSIONS:  1. Poorly visualized anatomical structures due to suboptimal image quality.  2. Left ventricular ejection fraction is normal, by visual estimate at 55-60%.  3. Unable to determine right ventricular systolic function. QUANTITATIVE DATA SUMMARY:  2D MEASUREMENTS:          Normal Ranges: LVEDV Index:     33 ml/m2  LA VOLUME:                    Normal Ranges: LA Vol A4C:        74.7 ml    (22+/-6mL/m2) LA Vol A2C:        73.9 ml LA Vol BP:         78.8 ml LA Vol Index A4C:  35.6ml/m2 LA Vol Index A2C:  35.2 ml/m2 LA Vol Index BP:   37.5 ml/m2 LA Area A4C:       24.4 cm2 LA Area A2C:       22.9 cm2 LA Major Axis A4C: 6.8 cm LA Major Axis A2C: 6.0 cm  RA VOLUME BY A/L METHOD:          Normal Ranges: RA Area A4C:             18.6 cm2  LV SYSTOLIC FUNCTION BY 2D PLANIMETRY (MOD):                      Normal Ranges: EF-A4C View:    31 % (>=55%) EF-A2C View:    52 % EF-Biplane:     22 % EF-Visual:      58 % LV EF Reported: 58 %  LV DIASTOLIC FUNCTION:           Normal Ranges: MV Peak E:             1.11 m/s  (0.7-1.2 m/s) MV e'                  0.091 m/s (>8.0) MV lateral e'          0.10 m/s MV medial e'           0.08 m/s E/e' Ratio:            12.16     (<8.0)  MITRAL VALVE:          Normal Ranges: MV DT:        157 msec  "(150-240msec)  AORTIC VALVE:                      Normal Ranges: AoV Vmax:                1.85 m/s  (<=1.7m/s) AoV Peak P.7 mmHg (<20mmHg) AoV Mean P.0 mmHg  (1.7-11.5mmHg) LVOT Max Ryan:            1.08 m/s  (<=1.1m/s) AoV VTI:                 35.40 cm  (18-25cm) LVOT VTI:                22.00 cm LVOT Diameter:           2.20 cm   (1.8-2.4cm) AoV Area, VTI:           2.36 cm2  (2.5-5.5cm2) AoV Area,Vmax:           2.22 cm2  (2.5-4.5cm2) AoV Dimensionless Index: 0.62  RIGHT VENTRICLE: RV Basal 3.99 cm RV Mid   2.51 cm RV Major 7.5 cm RV s'    0.14 m/s  PULMONIC VALVE:          Normal Ranges: PV Max Ryan:     1.0 m/s  (0.6-0.9m/s) PV Max PG:      3.9 mmHg  [id number] Henrique Felix MD Electronically signed on 2025 at 3:52:54 PM  ** Final **          Assessment/Plan        Stanford NGUYEN Caro \"Mal\" is a 69 y.o. 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 Hemoglobin has steadily dropped from baseline 10 to recent baseline of 8, now 7.2 on admission. Hgb 8/6 s/p 2U PRBC per lung transplant team. Unclear etiology of acute on chronic anemia, hem has been consulted.       -01/10/25 updates  - VBG today 7.36/50/0.9  - Hgb 9.6 today  - echo unremarkable   - hem consulted, recs IV iron inpatient x 4 days (till ) and oral iron on dc, also recs GI consult op.  - pending pulse ox study on 1/10 night per transplant       #Shortness of breath  #Acute on chronic normocytic anemia   :: Recent baseline Hgb ~8.0, 7.2 on admission   :: 25 anemia labs c/w ANDREW: Fe 24L, %sat 6L  :: EKG NSR with trop negative, CXR nonacute   :: On Aranesp 60mcg weekly  :: last TTE 2023: EF 65%, mild increased thickness of LV septal wall, mild AR  :: T+S, consented for blood  :: s/p 2u leukocyte reduced PRBC transfusion per transplant recs  Plan  - hem consulted, appreciate recs   - " anemia workup: folate wnl, hapto 49, , ferritin 8, corrected retic 1.35%, direct bili 0.2, b12 wnl and peripheral smear   - echo pending   - BiPAP overnight         #s/p DLTx for IPF/PAH   :: CMV +/-, EBV positive recipient (PCR's negative on 01/06/25)  :: PFTs 11/11/24 - FEV: 1.32 45%, FVC: 2.35 60%, FEV1/FVC: 0.56 73%, - CXR  11/11/24   - c/w home prednisone 5mg daily and tacrolimus 1mg q12h  - AM tacro level daily (goal 6-8)  - Holding cellcept 250mg BID due to anemia  - For PJP prophylaxis, switch from dapsone to pentamidine inhalation 300 mg monthly  - For CMV mismatch, switching from valcyte to letermovir 480 mg daily  - C/w azithromycin 250mg MWF ROSSY ppx  - C/w home Levalbuterol BID followed by Colisitin 150mg BID and Symbicort 160/4.5 2 puffs BID (ordered as Breo-Ellipta inpatient)  - C/w home montelukast 10mg nightly     #PE (08/2020)  #Remote VTE (1993)  ::Hx PE: Noted in August 2020 with admission for septic shock. Eliquis 5mg BID, continue indefinitely  - c/w Eliquis 5 mg BID  - consider bridging with heparin gtt if plan for inpatient procedure       #Neuropathy  - C/w home gabapentin 300mg at night     #Steroid induced hyperglycemia  :: A1c 5.4% 10/2024  :: not on any medications  -POC qACHS  -Lispro SSI  -hypoglycemia protocol     #Osteoporosis  ::DEXA 11/4/24 indicative of low bone mass  -Holding home alendronate  -C/w vitamin D3 daily  -C/w calcium carbonate 1250 mg BID     #HLD  - c/w home rosuvastatin 40mg     #Nephrolithiasis  #LUTS  ::Follows with urology  -home oxybutynin 5mg BID, on hold     #Depression/CATHY  -C/w home duloxetine 60mg daily     #Miscellaneous  ::Home loratadine 10 mg  ::Home pantoprazole 40mg daily  -Cetirizine 10 mg inpatient  -C/w home PPI     Fluids: Replete PRN  Electrolytes: Keep mg >2, phos >3  and K >4  Nutrition:  Adult diet Regular   DVT PPX: Apixaban  GI PPX: Pantoprazole  Bowel Care: Miralax  Catheter: None  Lines: PIV       Code Status: DNR and No Intubation  and No ICU (confirmed on admission)   NOK:  Primary Emergency Contact: Lori Patel, Home Phone: [phone]    Edi Rosenthal MD  PGY-1 Neurology    ======  I saw and evaluated the patient. I personally obtained the key and critical portions of the history and physical exam or was physically present for key and critical portions performed by the resident/fellow. I reviewed the resident/fellow's documentation and discussed the patient with the resident/fellow. I agree with the resident/fellow's medical decision making as documented in the note.    Gregorio Cedillo MD

## 2025-01-10 NOTE — HOSPITAL COURSE
"Stanford NGUYEN Caro \"Mal\" is a 69 y.o. 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      Patient has been experiencing SOB, fatigue, dizziness/LH and a dry cough for the past few weeks. He has also noted that over this time period his pulse ox readings at home have been lower than normal (low 90's compared to normally high 90's). His SOB is worse with exertion but he notices it even at rest now. Denies associated CP. His inhalers do not improve his SOB. He gets monthly labs and his Hgb has steadily dropped from 8.7 in September to 8.0 > 8.1 > 7.8. > 7.0 on 01/06/25. Patient denies fevers/chills, CP, extremity swelling, orthopnea, PND, blood in stool or urine, hemoptysis, mucosal bleeding, sick contacts, new medications, recent trauma or falls. Has had some minor LUQ pain that doesn't bother him much, nonradiating and non pleuritic, doesn't correlate with PO intake. Patient visited [place] for a family wedding in October but no other recent travel. Notes he has bruised easily since transplant in 2020 but has not noticed any worsening bruising or skin lesions. Last took Apixaban this morning and reports compliance with all other meds.      Patient follows with transplant pulmonology. Of note, his transplant course was complicated by pleural bleed with return to OR for washout on 3/23/2020, prolonged ventilatory support requiring tracheostomy (since decannulated) and discharged on 5/8/2020. Post-hospital course was complicated by septic shock with MSSA in August 2022 with PE requiring lifelong anticoagulation.      Patient arrived [facility] in no acute distress, afebrile and HDS with /58 and SPO2 95% RA. CXR with no acute cardiopulmonary process. EKG NSR and troponin negative. Labs notable for Hgb 7.2, MCV 83, Plt 105, WBC 6.2, Ferritin 8, Iron 24. TIBC 401, % sat 6 Cr 1.13, BUN 24. " CMV and EBV negative. He was seen by lung transplant in the ED who recommended 2u PRBC transfusion, and Hgb improved from 7.2 to 8.6 s/p transfusion.      ED Course:  BP: 108/58  Temperature: 36.8 °C (98.2 °F)  Heart Rate: 96  Respirations: 16  Pulse Ox: 95 %    On the floor, hematology was consulted, which recommended 4 days of IV iron and oral iron supplement on discharge. They also recommended GI eval, which is scheduled as outpatient follow up per the transplant team. Echo was also completed, which was unremarkable with normal LVEF. Pt's VBG was normalized on 1/10 with wearing BIPAP overnight (7.36/50/0.9). Pt's Hgb is 9.6 on 1/10 s/p another unit of RBC transfusion. Pt completed a pulse ox study on 1/10 night, which was normal overall and will be discharged home today.

## 2025-01-11 ENCOUNTER — PHARMACY VISIT (OUTPATIENT)
Dept: PHARMACY | Facility: CLINIC | Age: 70
End: 2025-01-11
Payer: COMMERCIAL

## 2025-01-11 VITALS
DIASTOLIC BLOOD PRESSURE: 68 MMHG | HEIGHT: 69 IN | WEIGHT: 208 LBS | BODY MASS INDEX: 30.81 KG/M2 | OXYGEN SATURATION: 92 % | HEART RATE: 64 BPM | SYSTOLIC BLOOD PRESSURE: 121 MMHG | TEMPERATURE: 98.1 F | RESPIRATION RATE: 18 BRPM

## 2025-01-11 PROBLEM — D64.9 ANEMIA: Status: RESOLVED | Noted: 2023-09-30 | Resolved: 2025-01-11

## 2025-01-11 PROBLEM — D64.9 SYMPTOMATIC ANEMIA: Status: RESOLVED | Noted: 2025-01-07 | Resolved: 2025-01-11

## 2025-01-11 LAB
ALBUMIN SERPL BCP-MCNC: 3.6 G/DL (ref 3.4–5)
ALP SERPL-CCNC: 81 U/L (ref 33–136)
ALT SERPL W P-5'-P-CCNC: 14 U/L (ref 10–52)
ANION GAP BLDV CALCULATED.4IONS-SCNC: 7 MMOL/L (ref 10–25)
ANION GAP SERPL CALC-SCNC: 10 MMOL/L (ref 10–20)
AST SERPL W P-5'-P-CCNC: 19 U/L (ref 9–39)
BASE EXCESS BLDV CALC-SCNC: 2.2 MMOL/L (ref -2–3)
BASOPHILS # BLD AUTO: 0.04 X10*3/UL (ref 0–0.1)
BASOPHILS NFR BLD AUTO: 0.7 %
BILIRUB SERPL-MCNC: 0.8 MG/DL (ref 0–1.2)
BODY TEMPERATURE: 37 DEGREES CELSIUS
BUN SERPL-MCNC: 16 MG/DL (ref 6–23)
CA-I BLDV-SCNC: 1.28 MMOL/L (ref 1.1–1.33)
CALCIUM SERPL-MCNC: 9.3 MG/DL (ref 8.6–10.6)
CHLORIDE BLDV-SCNC: 108 MMOL/L (ref 98–107)
CHLORIDE SERPL-SCNC: 106 MMOL/L (ref 98–107)
CO2 SERPL-SCNC: 29 MMOL/L (ref 21–32)
CREAT SERPL-MCNC: 0.92 MG/DL (ref 0.5–1.3)
EGFRCR SERPLBLD CKD-EPI 2021: 89 ML/MIN/1.73M*2
EOSINOPHIL # BLD AUTO: 0.09 X10*3/UL (ref 0–0.4)
EOSINOPHIL NFR BLD AUTO: 1.5 %
ERYTHROCYTE [DISTWIDTH] IN BLOOD BY AUTOMATED COUNT: 17.2 % (ref 11.5–14.5)
GLUCOSE BLDV-MCNC: 87 MG/DL (ref 74–99)
GLUCOSE SERPL-MCNC: 85 MG/DL (ref 74–99)
HCO3 BLDV-SCNC: 27.3 MMOL/L (ref 22–26)
HCT VFR BLD AUTO: 33.2 % (ref 41–52)
HCT VFR BLD EST: ABNORMAL %
HGB BLD-MCNC: 9.6 G/DL (ref 13.5–17.5)
HGB BLDV-MCNC: ABNORMAL G/DL
IMM GRANULOCYTES # BLD AUTO: 0.12 X10*3/UL (ref 0–0.5)
IMM GRANULOCYTES NFR BLD AUTO: 2 % (ref 0–0.9)
INHALED O2 CONCENTRATION: 21 %
LACTATE BLDV-SCNC: 0.8 MMOL/L (ref 0.4–2)
LYMPHOCYTES # BLD AUTO: 0.89 X10*3/UL (ref 0.8–3)
LYMPHOCYTES NFR BLD AUTO: 15.1 %
MAGNESIUM SERPL-MCNC: 1.6 MG/DL (ref 1.6–2.4)
MCH RBC QN AUTO: 25.4 PG (ref 26–34)
MCHC RBC AUTO-ENTMCNC: 28.9 G/DL (ref 32–36)
MCV RBC AUTO: 88 FL (ref 80–100)
MONOCYTES # BLD AUTO: 0.53 X10*3/UL (ref 0.05–0.8)
MONOCYTES NFR BLD AUTO: 9 %
NEUTROPHILS # BLD AUTO: 4.22 X10*3/UL (ref 1.6–5.5)
NEUTROPHILS NFR BLD AUTO: 71.7 %
NRBC BLD-RTO: 0.8 /100 WBCS (ref 0–0)
OXYHGB MFR BLDV: ABNORMAL %
PCO2 BLDV: 43 MM HG (ref 41–51)
PH BLDV: 7.41 PH (ref 7.33–7.43)
PLATELET # BLD AUTO: 73 X10*3/UL (ref 150–450)
PO2 BLDV: 54 MM HG (ref 35–45)
POTASSIUM BLDV-SCNC: 4.8 MMOL/L (ref 3.5–5.3)
POTASSIUM SERPL-SCNC: 4.6 MMOL/L (ref 3.5–5.3)
PROT SERPL-MCNC: 6.1 G/DL (ref 6.4–8.2)
RBC # BLD AUTO: 3.78 X10*6/UL (ref 4.5–5.9)
SAO2 % BLDV: ABNORMAL %
SODIUM BLDV-SCNC: 137 MMOL/L (ref 136–145)
SODIUM SERPL-SCNC: 140 MMOL/L (ref 136–145)
TACROLIMUS BLD-MCNC: 7.8 NG/ML
WBC # BLD AUTO: 5.9 X10*3/UL (ref 4.4–11.3)

## 2025-01-11 PROCEDURE — 2500000004 HC RX 250 GENERAL PHARMACY W/ HCPCS (ALT 636 FOR OP/ED): Performed by: NURSE PRACTITIONER

## 2025-01-11 PROCEDURE — 2500000002 HC RX 250 W HCPCS SELF ADMINISTERED DRUGS (ALT 637 FOR MEDICARE OP, ALT 636 FOR OP/ED)

## 2025-01-11 PROCEDURE — 85025 COMPLETE CBC W/AUTO DIFF WBC: CPT | Performed by: INTERNAL MEDICINE

## 2025-01-11 PROCEDURE — 80197 ASSAY OF TACROLIMUS: CPT | Performed by: INTERNAL MEDICINE

## 2025-01-11 PROCEDURE — 99233 SBSQ HOSP IP/OBS HIGH 50: CPT | Performed by: INTERNAL MEDICINE

## 2025-01-11 PROCEDURE — 80053 COMPREHEN METABOLIC PANEL: CPT | Performed by: CLINICAL NURSE SPECIALIST

## 2025-01-11 PROCEDURE — 2500000001 HC RX 250 WO HCPCS SELF ADMINISTERED DRUGS (ALT 637 FOR MEDICARE OP): Performed by: INTERNAL MEDICINE

## 2025-01-11 PROCEDURE — 84132 ASSAY OF SERUM POTASSIUM: CPT | Performed by: CLINICAL NURSE SPECIALIST

## 2025-01-11 PROCEDURE — 36415 COLL VENOUS BLD VENIPUNCTURE: CPT | Performed by: CLINICAL NURSE SPECIALIST

## 2025-01-11 PROCEDURE — 2500000004 HC RX 250 GENERAL PHARMACY W/ HCPCS (ALT 636 FOR OP/ED)

## 2025-01-11 PROCEDURE — RXMED WILLOW AMBULATORY MEDICATION CHARGE

## 2025-01-11 PROCEDURE — 2500000002 HC RX 250 W HCPCS SELF ADMINISTERED DRUGS (ALT 637 FOR MEDICARE OP, ALT 636 FOR OP/ED): Performed by: INTERNAL MEDICINE

## 2025-01-11 PROCEDURE — 99238 HOSP IP/OBS DSCHRG MGMT 30/<: CPT

## 2025-01-11 PROCEDURE — 84132 ASSAY OF SERUM POTASSIUM: CPT | Performed by: INTERNAL MEDICINE

## 2025-01-11 PROCEDURE — 83735 ASSAY OF MAGNESIUM: CPT | Performed by: INTERNAL MEDICINE

## 2025-01-11 PROCEDURE — 2500000002 HC RX 250 W HCPCS SELF ADMINISTERED DRUGS (ALT 637 FOR MEDICARE OP, ALT 636 FOR OP/ED): Performed by: CLINICAL NURSE SPECIALIST

## 2025-01-11 PROCEDURE — 2500000001 HC RX 250 WO HCPCS SELF ADMINISTERED DRUGS (ALT 637 FOR MEDICARE OP)

## 2025-01-11 PROCEDURE — 94640 AIRWAY INHALATION TREATMENT: CPT

## 2025-01-11 PROCEDURE — 82435 ASSAY OF BLOOD CHLORIDE: CPT | Performed by: INTERNAL MEDICINE

## 2025-01-11 PROCEDURE — 2500000004 HC RX 250 GENERAL PHARMACY W/ HCPCS (ALT 636 FOR OP/ED): Performed by: CLINICAL NURSE SPECIALIST

## 2025-01-11 PROCEDURE — 2500000001 HC RX 250 WO HCPCS SELF ADMINISTERED DRUGS (ALT 637 FOR MEDICARE OP): Performed by: CLINICAL NURSE SPECIALIST

## 2025-01-11 RX ORDER — TACROLIMUS 0.5 MG/1
0.5 CAPSULE, GELATIN COATED ORAL DAILY
Qty: 63 CAPSULE | Refills: 11 | Status: SHIPPED | OUTPATIENT
Start: 2025-01-11 | End: 2026-01-11

## 2025-01-11 RX ORDER — MAGNESIUM SULFATE HEPTAHYDRATE 40 MG/ML
4 INJECTION, SOLUTION INTRAVENOUS ONCE
Status: COMPLETED | OUTPATIENT
Start: 2025-01-11 | End: 2025-01-11

## 2025-01-11 RX ADMIN — ACYCLOVIR 400 MG: 400 TABLET ORAL at 09:03

## 2025-01-11 RX ADMIN — LEVALBUTEROL HYDROCHLORIDE 0.63 MG: 0.63 SOLUTION RESPIRATORY (INHALATION) at 10:09

## 2025-01-11 RX ADMIN — PANTOPRAZOLE SODIUM 40 MG: 40 TABLET, DELAYED RELEASE ORAL at 09:03

## 2025-01-11 RX ADMIN — TACROLIMUS 0.5 MG: 0.5 CAPSULE ORAL at 06:51

## 2025-01-11 RX ADMIN — APIXABAN 5 MG: 5 TABLET, FILM COATED ORAL at 06:51

## 2025-01-11 RX ADMIN — Medication 1000 UNITS: at 09:02

## 2025-01-11 RX ADMIN — IRON SUCROSE 300 MG: 20 INJECTION, SOLUTION INTRAVENOUS at 13:39

## 2025-01-11 RX ADMIN — Medication 192 MG: at 09:03

## 2025-01-11 RX ADMIN — DULOXETINE HYDROCHLORIDE 60 MG: 60 CAPSULE, DELAYED RELEASE ORAL at 09:06

## 2025-01-11 RX ADMIN — TAMSULOSIN HYDROCHLORIDE 0.4 MG: 0.4 CAPSULE ORAL at 09:03

## 2025-01-11 RX ADMIN — MAGNESIUM SULFATE 4 G: 4 INJECTION INTRAVENOUS at 09:07

## 2025-01-11 RX ADMIN — ROSUVASTATIN CALCIUM 40 MG: 40 TABLET, FILM COATED ORAL at 09:02

## 2025-01-11 RX ADMIN — METOPROLOL TARTRATE 12.5 MG: 25 TABLET, FILM COATED ORAL at 09:02

## 2025-01-11 RX ADMIN — CETIRIZINE HYDROCHLORIDE 10 MG: 10 TABLET, FILM COATED ORAL at 09:03

## 2025-01-11 RX ADMIN — CALCIUM 1250 MG: 500 TABLET ORAL at 09:13

## 2025-01-11 RX ADMIN — PREDNISONE 5 MG: 5 TABLET ORAL at 09:03

## 2025-01-11 ASSESSMENT — COGNITIVE AND FUNCTIONAL STATUS - GENERAL
DAILY ACTIVITIY SCORE: 24
MOBILITY SCORE: 24

## 2025-01-11 ASSESSMENT — PAIN - FUNCTIONAL ASSESSMENT
PAIN_FUNCTIONAL_ASSESSMENT: 0-10
PAIN_FUNCTIONAL_ASSESSMENT: 0-10

## 2025-01-11 ASSESSMENT — PAIN SCALES - GENERAL
PAINLEVEL_OUTOF10: 0 - NO PAIN
PAINLEVEL_OUTOF10: 0 - NO PAIN

## 2025-01-11 NOTE — CARE PLAN
Problem: Skin  Goal: Prevent/minimize sheer/friction injuries  Outcome: Met   The patient's goals for the shift include  discharge.    The clinical goals for the shift include safety, monitor h/h.    Over the shift, the patient did not make progress toward the following goals none. Barriers to progression include n/a. Recommendations to address these barriers include n/a.

## 2025-01-11 NOTE — PROGRESS NOTES
"Stanford NGUYEN Caro \"Mal\" is a 69 y.o. male on day 4 of admission presenting with Symptomatic anemia.    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 His transplant course was complicated by pleural bleed with return to OR for washout on 3/23/2020, prolonged ventilatory support requiring tracheostomy (since decannulated) and discharged on 5/8/2020. Post-hospital course was complicated by septic shock with MSSA in August 20202 with PE requiring lifelong anticoagulation.   - Overall, doing well. H&H Improved   - Tac 7.8, no changes in tac dose to 0.5mg/1mg   - Incidentally found to be hypercapnic, some element of somnolence, improved. Plan for pulse ox study on 1/10/25 evening. Will liaise with Sleep Medicine outpatient for further work up, referral placed  - meds sent to [facility] for M2B delivery on 1/11     Continue daily at 0600  - CMP  - CBC with diff  - Mg  - Tac    Subjective   Patient had an uneventful night, doing well   Denies chest pain, SOB, palpitations or bilateral calf tenderness.  + flatus, + BM   Tolerating diet w/o N/V   Denies any fevers or chills.    Objective     Physical Exam  Vitals and nursing note reviewed.   Constitutional:       Appearance: Normal appearance.      Comments: Cooperative, laying in bed    HENT:      Head: Normocephalic and atraumatic.      Ears:      Comments: Hearing aids     Mouth/Throat:      Pharynx: Oropharynx is clear.   Eyes:      Conjunctiva/sclera: Conjunctivae normal.   Cardiovascular:      Rate and Rhythm: Normal rate and regular rhythm.      Pulses: Normal pulses.      Heart sounds: Normal heart sounds.   Pulmonary:      Effort: Pulmonary effort is normal. No tachypnea, bradypnea, accessory muscle usage or prolonged expiration.      Breath sounds: Normal breath sounds.   Abdominal:      General: Bowel sounds are normal.      Palpations: Abdomen is soft.   Genitourinary:     Comments: Voids independently   Musculoskeletal:         General: " "Normal range of motion.      Cervical back: Normal range of motion and neck supple.   Skin:     General: Skin is warm and dry.      Capillary Refill: Capillary refill takes less than 2 seconds.      Comments: Old incision well healed    Neurological:      General: No focal deficit present.      Mental Status: He is alert and oriented to person, place, and time. Mental status is at baseline.   Psychiatric:         Mood and Affect: Mood normal.         Behavior: Behavior normal.         Thought Content: Thought content normal.         Judgment: Judgment normal.         Last Recorded Vitals  Blood pressure 137/68, pulse 71, temperature 36 °C (96.8 °F), temperature source Temporal, resp. rate 18, height 1.753 m (5' 9\"), weight 94.3 kg (208 lb), SpO2 91%.    Heart Rate:  [66-71]   Temp:  [36 °C (96.8 °F)-36.7 °C (98.1 °F)]   Resp:  [18]   BP: (111-137)/(57-69)   SpO2:  [91 %-95 %]     Intake/Output last 3 Shifts:  I/O last 3 completed shifts:  In: 930 (9.9 mL/kg) [P.O.:580; Blood:350]  Out: 200 (2.1 mL/kg) [Urine:200 (0.1 mL/kg/hr)]  Weight: 94.3 kg     Scheduled medications  acyclovir, 400 mg, oral, BID  apixaban, 5 mg, oral, q12h  azithromycin, 250 mg, oral, Once per day on Monday Wednesday Friday  calcium carbonate, 1,250 mg, oral, BID  cetirizine, 10 mg, oral, Daily  cholecalciferol, 1,000 Units, oral, Daily  colistimethate, 150 mg, nebulization, q12h ARMANDO  DULoxetine, 60 mg, oral, Daily  fluticasone furoate-vilanteroL, 1 puff, inhalation, Daily  iron sucrose, 300 mg, intravenous, Daily  letermovir, 480 mg, oral, Daily  levalbuterol, 0.63 mg, nebulization, BID  magnesium chloride, 192 mg, oral, BID  magnesium sulfate, 4 g, intravenous, Once  metoprolol tartrate, 12.5 mg, oral, BID  montelukast, 10 mg, oral, Nightly  pantoprazole, 40 mg, oral, Daily  predniSONE, 5 mg, oral, Daily  rosuvastatin, 40 mg, oral, Daily  tacrolimus, 0.5 mg, oral, q24h   And  tacrolimus, 1 mg, oral, q24h  tamsulosin, 0.4 mg, oral, " Daily      Continuous medications     PRN medications  PRN medications: dextrose, dextrose, glucagon, glucagon, oxygen    Results for orders placed or performed during the hospital encounter of 01/07/25 (from the past 24 hours)   Tacrolimus level   Result Value Ref Range    Tacrolimus  7.8 <=15.0 ng/mL   Magnesium   Result Value Ref Range    Magnesium 1.60 1.60 - 2.40 mg/dL   CBC and Auto Differential   Result Value Ref Range    WBC 5.9 4.4 - 11.3 x10*3/uL    nRBC 0.8 (H) 0.0 - 0.0 /100 WBCs    RBC 3.78 (L) 4.50 - 5.90 x10*6/uL    Hemoglobin 9.6 (L) 13.5 - 17.5 g/dL    Hematocrit 33.2 (L) 41.0 - 52.0 %    MCV 88 80 - 100 fL    MCH 25.4 (L) 26.0 - 34.0 pg    MCHC 28.9 (L) 32.0 - 36.0 g/dL    RDW 17.2 (H) 11.5 - 14.5 %    Platelets 73 (L) 150 - 450 x10*3/uL    Neutrophils % 71.7 40.0 - 80.0 %    Immature Granulocytes %, Automated 2.0 (H) 0.0 - 0.9 %    Lymphocytes % 15.1 13.0 - 44.0 %    Monocytes % 9.0 2.0 - 10.0 %    Eosinophils % 1.5 0.0 - 6.0 %    Basophils % 0.7 0.0 - 2.0 %    Neutrophils Absolute 4.22 1.60 - 5.50 x10*3/uL    Immature Granulocytes Absolute, Automated 0.12 0.00 - 0.50 x10*3/uL    Lymphocytes Absolute 0.89 0.80 - 3.00 x10*3/uL    Monocytes Absolute 0.53 0.05 - 0.80 x10*3/uL    Eosinophils Absolute 0.09 0.00 - 0.40 x10*3/uL    Basophils Absolute 0.04 0.00 - 0.10 x10*3/uL   Comprehensive Metabolic Panel   Result Value Ref Range    Glucose 85 74 - 99 mg/dL    Sodium 140 136 - 145 mmol/L    Potassium 4.6 3.5 - 5.3 mmol/L    Chloride 106 98 - 107 mmol/L    Bicarbonate 29 21 - 32 mmol/L    Anion Gap 10 10 - 20 mmol/L    Urea Nitrogen 16 6 - 23 mg/dL    Creatinine 0.92 0.50 - 1.30 mg/dL    eGFR 89 >60 mL/min/1.73m*2    Calcium 9.3 8.6 - 10.6 mg/dL    Albumin 3.6 3.4 - 5.0 g/dL    Alkaline Phosphatase 81 33 - 136 U/L    Total Protein 6.1 (L) 6.4 - 8.2 g/dL    AST 19 9 - 39 U/L    Bilirubin, Total 0.8 0.0 - 1.2 mg/dL    ALT 14 10 - 52 U/L   BLOOD GAS VENOUS FULL PANEL   Result Value Ref Range    POCT pH,  Venous 7.41 7.33 - 7.43 pH    POCT pCO2, Venous 43 41 - 51 mm Hg    POCT pO2, Venous 54 (H) 35 - 45 mm Hg    POCT SO2, Venous      POCT Oxy Hemoglobin, Venous      POCT Hematocrit Calculated, Venous      POCT Sodium, Venous 137 136 - 145 mmol/L    POCT Potassium, Venous 4.8 3.5 - 5.3 mmol/L    POCT Chloride, Venous 108 (H) 98 - 107 mmol/L    POCT Ionized Calicum, Venous 1.28 1.10 - 1.33 mmol/L    POCT Glucose, Venous 87 74 - 99 mg/dL    POCT Lactate, Venous 0.8 0.4 - 2.0 mmol/L    POCT Base Excess, Venous 2.2 -2.0 - 3.0 mmol/L    POCT HCO3 Calculated, Venous 27.3 (H) 22.0 - 26.0 mmol/L    POCT Hemoglobin, Venous      POCT Anion Gap, Venous 7.0 (L) 10.0 - 25.0 mmol/L    Patient Temperature 37.0 degrees Celsius    FiO2 21 %     *Note: Due to a large number of results and/or encounters for the requested time period, some results have not been displayed. A complete set of results can be found in Results Review.     IMAGES  I have personally reviewed the following test result(s):  XR CHEST 2 VIEWS; 1/9/2025 12:36 pm   IMPRESSION:  1.  No acute cardiopulmonary pathology  2. Findings consistent with developing ankylosing spondylitis.    TRANSTHORACIC ECHOCARDIOGRAM REPORT Study Date: 1/9/2025      CONCLUSIONS:   1. Poorly visualized anatomical structures due to suboptimal image quality.   2. Left ventricular ejection fraction is normal, by visual estimate at 55-60%.   3. Unable to determine right ventricular systolic function.    EKG 1/6/2025  Normal sinus rhythm  Minimal voltage criteria for LVH, may be normal variant ( R in aVL )  Nonspecific ST abnormality  Abnormal ECG  When compared with ECG of 05-NOV-2020 13:10,  Previous ECG has undetermined rhythm, needs review  QRS duration has decreased  Non-specific change in ST segment in Lateral leads     Assessment & Plan  Symptomatic anemia    Anemia    Stanford NGUYEN Caro is a 69 y.o. 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 His  transplant course was complicated by pleural bleed with return to OR for washout on 3/23/2020, prolonged ventilatory support requiring tracheostomy (since decannulated) and discharged on 5/8/2020. Post-hospital course was complicated by septic shock with MSSA in August 20202 with PE requiring lifelong anticoagulation.      He underwent a double lung transplant on 3/22/2020     Presenting to ED on 1/6/2025 due to anemia (Hgb 7.0), fatigue and SOB.     Pulmonary:    -> s/p DLTx for IPF/PAH on 3/22/20, CMV +/-, EBV positive recipient  Allograft function:  - Reporting SOB, noted to be hypercapnic, pH 7.24, improved to 7.30 with 10/5 BiPAP nocturnally, will repeat again 1/9/25 and follow pulse ox study on 1/10/25  - CXR  1/9/2025 reviewed, stable  - Liaise with sleep medicine outpatient, history of SARA but not longer required home CPAP post-transplant, now requiring BiPAP as above  -> Oxymetry summary 1/10/2025 The longest continues time with sat <89 was 13 minutes wich started at 23:59 pm   Historically:  - Hx PE: Remove history of VTE in 1993. Modest PE at same time of septic shock in August 2020, continue Eliquis 5mg BID lifelong   - Hx MSSA, Klebsiella, Corynebacterium from April 2020 bronchoscopy  - Hx Enterobacter from June 2020 bronchoscopy, sensitive to Fluoroquinolones  - Hx Pseudomonas from September 2023 bronchoscopy, completed 14 day course of Ciprofloxacin and commenced on inhaled Tobramycin  - Hx SARA: No longer using CPAP at home but with retention here, BiPAP ordered overnight   - Hx CLAD: Multifocal air trapping identified with September 2023 high resolution chest CT  - HLA Class I & II Negative Decmeber 2024, repeat March 2025  - ROSSY Prophylaxis: Azithromycin 250mg M W F, Singular 10mg nightly, Symbicort 160/4.5 2 puffs BID  - Nebulized Medications: Levalbuterol BID followed by Colisitin 150mg BID and Symbicort as above     Immunosuppression:  - Prograf goal 6-8  Tacrolimus    Date Value Ref Range Status    2025 7.8 <=15.0 ng/mL Final   01/10/2025 9.1 <=15.0 ng/mL Final   -> Dose  0.5mg AM /1mg PM , follow daily 0600 levels ( decreased am dose)   - Prednisone 5mg daily  -  Cellcept 250mg BID  due to anemia on Hold at discharge      Prophylaxis:  - PJP: Commenced on monthly inhaled Pentamidine 25, next due 25  -> Failed Dapsone secondary to anemia  - CMV +/- mismatch: Letermovir 480mg daily as of this admission. Follow with routine   Recent Labs     25  1152 24  1129 10/02/23  1122 23  1102 23  1201   CMVPCRL  --   --   --  NOT CALCULATED NOT CALCULATED   CMVDNAPCR Not Detected Not Detected   < >  --   --     < > = values in this interval not displayed.   ->Failed Valcyte 900mg daily secondary to anemia  - HSV: Acyclovir 400mg BID as of this admission  - EBV:  Recent Labs     25  1152 24  1129   EBVDNAPCR Not Detected Not Detected   -> Follow with routine   - Fungal: Completed course of Voriconazole  - Hypogammaglobulinemia: IgG, goal > 600:  Recent Labs     25  1152 24  1129    834          Cardiac:  - telemetry; NSR  Hr 70s. Denies chest pain, palpitations, swelling in feet/ankles  -Hypertension Systolic (24hrs), Av , Min:111 , Max:137   ->  Resumed Metoprolol 12.5mg BID with hold parameters on 25  -> Advised to call Lung Transplant Office if BP consistently > 130/80 or < 100/60  - Hx of PE: Lifelong anticoagulation with Eliquis 5mg BID          - Hx HLD: Crestor 40mg daily  Lab Results   Component Value Date    CHOL 98 2024    HDL 46.8 2024    VLDL 19 2024    TRIG 93 2024    NHDL 51 2024   ->follow up lipid panel with PCP/ cardio as appropriate       Renal  - CKD stage II Baseline 0.94, eGFR 87   Creatinine   Date Value Ref Range Status   2025 0.92 0.50 - 1.30 mg/dL Final   01/10/2025 0.94 0.50 - 1.30 mg/dL Final   2025 0.78 0.50 - 1.30 mg/dL Final   -> daily RFP & diuretics as indicated  ->  renally dose all medications & avoid nephrotoxic drugs  - Hyperkaliemia  Potassium   Date Value Ref Range Status   2025 4.6 3.5 - 5.3 mmol/L Final   01/10/2025 4.9 3.5 - 5.3 mmol/L Final   -> monitor K levels   - Hypomagnesemia   Magnesium   Date Value Ref Range Status   2025 1.60 1.60 - 2.40 mg/dL Final   01/10/2025 1.75 1.60 - 2.40 mg/dL Final   - >On MagDelay 192mg BID & IV MAG infusion as needed   -> IV MAG Infusion received on   - Hx BPH: Elevated PSA: 4.88 in 2024, MRI prostate consistent with BPH. Follows with Dr. Soni of Urology  -> Sanctura 20mg BID per Urology, resumed at discharge on   ->holding since Sanctura not on formulary, Flomax 0.4mg daily while inpatient   - May not have NSAIDs or IV Contrast     GI  LFTS elevation   Recent Labs     25  0643 01/10/25  0700 25  0623 25  0642 25  1719 23  1127 23  1038   ALBUMIN 3.6 3.8 3.5   < > 3.9  4.0   < > 3.5  CANCELED   AST 19 21 19   < > 19  21   < > 28  CANCELED   ALT 14 15 15   < > 16  16   < > 20  CANCELED   ALKPHOS 81 77 65   < > 73  73   < > 135  CANCELED   BILITOT 0.8 1.4* 1.1   < > 0.6  0.6   < > 0.4  CANCELED   BILIDIR  --   --   --   --  0.2  --  0.1   PROT 6.1* 6.4 5.5*   < > 6.4  6.4   < > 6.0*  CANCELED*    < > = values in this interval not displayed.   -> Improved with discontinuation of Voriconazole, MRI Liver 2023 normal.   - Protonix Daily    - Liaise with GI outpatient for further work up     ID      No results found for the last 90 days.    WBC   Date Value Ref Range Status   2025 5.9 4.4 - 11.3 x10*3/uL Final   01/10/2025 6.2 4.4 - 11.3 x10*3/uL Final   2025 5.0 4.4 - 11.3 x10*3/uL Final     Temp (36hrs), Av.4 °C (97.6 °F), Min:36 °C (96.8 °F), Max:36.8 °C (98.2 °F)  - Denies fevers/chills/night sweats. Afebrile, no leukocytosis  - Hx Septic Shock with MSSA: 2020, prolonged course of IV antibiotics with resolution  - Hx Otitis  Externa: Noted September 2020, follows by ENT. Several courses of antibiotics with wick insertion, developed mastoiditis. Since resolved  - Hx Sternal Wound Infection: 2 episodes of staph infection requiring debridement and wound vac              2nd episode in July 2021, no debridement at this time but completed an 8 week course of antibiotics  - Hx Covid: December 2022, recovered  - Hx positive bronchoscopy cultures as above     Endo  - DM type 2, Home meds No glucose lowering agents presently, historically prescribed Jardiance  Lab Results   Component Value Date    HGBA1C 5.4 10/22/2024   ->  diabetic diet 60 grams CHO&  Hypoglycemic protocol   - > Follows with Endocrine, last visit 10/22/24 HgBA1c 5.4 at this time  - Hx Osteoporosis: DEXA 11/4/24 indicative of low bone mass              Follows with Endocrinology (Dr. Ashok Pascual); Previously on Alendronate 70mg once weekly, discontinued due to anemia, switched to Reclast this admission  - Hx  Vitamin D Deficiency:   Recent Labs     12/16/24  1129 09/04/24  1142   VITD25 41 61   -> continue Vitamin D3 1000u daily      Heme  Iron deficiency Anemia   Recent Labs     01/11/25  0643 01/10/25  0700   HGB 9.6* 9.6*   HCT 33.2* 33.6*   - Transfused with 2UPRBC on admission 1/8/25; additional 1 UPRBC on 1/9  - hematology consulted recs appreciated as followed   => calculated iron deficit is 1270 mg - please give IV venofer 300 mg daily for 4 days  => at time of discharge can transition to ferrous sulfate 325 mg every other day  => would recommend GI evaluation given ANDREW  -Iron studies   Recent Labs     01/06/25  1152 07/24/24  1131 10/04/22  0947 06/06/22  1540 02/28/22  0941 12/03/21  1405 10/09/18  0845   IRON 24* 41 42 61 51 20* 72   TIBC 401 382 365 358 343 402 326   IRONSAT 6* 11* 12* 17* 15* 5* 22*   -> IV Venofer 300 mg daily x4 days   -> Discharge on feSulfate 325 mg  every other day   - Leukopenia   Recent Labs     01/11/25  0643 01/10/25  0700   WBC 5.9  "6.2     Thrombocytopenia  Recent Labs     01/11/25  0643 01/10/25  0700   PLT 73* 98*   - Hx PE: Noted in August 2020 with admission for septic shock. Eliquis 5mg BID, continue indefinitely              IVC filter, will explore removal  - ANC sufficient 4620,     ENT  - Hx Otitis Externa with facial nerve involvement. Follows with ENT              S/p R tympanomasteoidectomy, mastoid washout while inpatient               March 2021 Osteomyelitis skull base, completed 12 week course of Meropenem  - Hx Chronic Hearing Loss: Requires hearing aids, follows with ENT     Neuro  - Peripheral Neuropathy: Gabapentin 300mg at night.  -> Stopped per patient request on 1/8/2025  \" no doing any thing and making me  more forgetful\"  - Memory Loss: Reporting onset \"several months\" forgets to pay bills, forgets to complete tasks, wife reporting this is an issue.   -> Will refer to Neurology for further work-up as out patient     DISCHARGE PLANNING:  - GI: PPI daily   - DVT: Apixiban, SCD, mobility  - ADOD: 1/11, new prescriptions of Letermovir, Acyclovir, iron, metoprolol sent to [facility], M2B to deliver 1/11  - Will arrange follow-up with, Sleep Medicine  - Repeat labs 1/20  - Return to clinic with CXR and Geovanny week of 1/27    Patient was seen and assessed by me d/w dr Boone, Lung Transplant Attending.  Gillian RODRIGUEZ  nursing,  patient, .     Winifred Martinez, APRN-CNP  Lung Transplant  [facility]  [facility]      "

## 2025-01-11 NOTE — DISCHARGE SUMMARY
"Discharge Diagnosis  Symptomatic anemia    Issues Requiring Follow-Up  - GI eval, a referral has been made  - Sleep medicine for [facility] eval     Discharge Meds     Medication List      START taking these medications     acyclovir 400 mg tablet; Commonly known as: Zovirax; Take 1 tablet (400   mg) by mouth 2 times a day.   ferrous sulfate 325 (65 Fe) MG EC tablet; Take 1 tablet by mouth once   daily with breakfast. Do not crush, chew, or split.   Mag 64 64 mg EC tablet; Generic drug: magnesium chloride; Take 3 tablets   (192 mg) by mouth 2 times a day. Separate from Tacrolimus by 2 hours.   metoprolol tartrate 25 mg tablet; Commonly known as: Lopressor; Take 0.5   tablets (12.5 mg) by mouth 2 times a day.   Prevymis 480 mg tablet; Generic drug: letermovir; Take 1 tablet (480 mg)   by mouth once daily.     CHANGE how you take these medications     tacrolimus 0.5 mg capsule; Commonly known as: Prograf; Take 1 tab in the   am  and in the evening take 2 tabs; What changed: medication strength, how   much to take, when to take this     CONTINUE taking these medications     alcohol swabs pads, medicated; Apply 1 Swab topically 1 (one) time per   week. Use before aranesp injection.   Aranesp (in polysorbate) 60 mcg/mL injection; Generic drug: darbepoetin   alex in polysorbat; Inject 1 mL (60 mcg) under the skin 1 (one) time per   week.   azithromycin 250 mg tablet; Commonly known as: Zithromax; Take 1 tablet   (250 mg) by mouth once a day on Monday, Wednesday, and Friday.   BD Luer-Silvia Syringe 10 mL 21 x 1 1/2\" syringe; Generic drug: syringe   with needle; Use as directed to mix normal saline and Colistin twice   daily.   BD Luer-Silvia Syringe 3 mL 25 gauge x 1\" syringe; Generic drug: syringe   with needle; Use 1 Syringe 1 (one) time per week as directed.   budesonide-formoteroL 160-4.5 mcg/actuation inhaler; Commonly known as:   Symbicort; Inhale 2 puffs by mouth 2 times a day. Rinse mouth with water   after use to reduce " aftertaste and incidence of candidiasis. Do not   swallow.   Centrum Silver 0.4 mg-300 mcg- 250 mcg; Generic drug: multivitamin with   minerals iron-free; Take 1 tablet by mouth once daily.   cholecalciferol 25 MCG (1000 UT) capsule; Commonly known as: Vitamin   D-3; Take 1 capsule (25 mcg) by mouth once daily.   colistimethate 150 mg vial; Commonly known as: Colymycin; Take 150 mg (1   vial) by nebulization 2 times a day. Mix 150 mg vial with 5 mL normal   saline, insert into nebulizer and inhale medication 2 times a day   DULoxetine 60 mg DR capsule; Commonly known as: Cymbalta; Take 1 capsule   (60 mg) by mouth once daily. Do not crush or chew.   Eliquis 5 mg tablet; Generic drug: apixaban; Take 1 tablet (5 mg) by   mouth every 12 hours.   levalbuterol 0.63 mg/3 mL nebulizer solution; Commonly known as:   Xopenex; Inhale one (1) ampule via nebulizer twice daily.   loratadine 10 mg tablet; Commonly known as: Claritin; TAKE ONE (1)   TABLET BY MOUTH ONCE DAILY.   montelukast 10 mg tablet; Commonly known as: Singulair; Take 1 tablet   (10 mg) by mouth once daily at bedtime.   OneTouch Verio test strips strip; Generic drug: blood sugar diagnostic;   Use as directed 3 times a day before meals.   Oyster Shell Calcium 500 500 mg calcium (1,250 mg) tablet; Generic drug:   calcium carbonate; Take 1 tablet (1,250 mg) by mouth 2 times daily   (morning and late afternoon).   pantoprazole 40 mg EC tablet; Commonly known as: ProtoNix; Take 1 tablet   (40 mg) by mouth once daily.   predniSONE 5 mg tablet; Commonly known as: Deltasone; Take 1 tablet (5   mg) by mouth once daily.   rosuvastatin 40 mg tablet; Commonly known as: Crestor; Take 1 tablet (40   mg) by mouth once daily.   sharps container; 1 each if needed (For aranesp needles).   sodium chloride (PF) 0.9% injection; Mix with Colistin as directed:   withdraw 5 mL of sodium chloride and add to Colistin vial twice daily.    Discard remainder of vial after each use.    "trospium 20 mg tablet; Commonly known as: Sanctura     STOP taking these medications     dapsone 100 mg tablet   gabapentin 300 mg capsule; Commonly known as: Neurontin   magnesium oxide 400 mg tablet; Commonly known as: Mag-Ox   mycophenolate 250 mg capsule; Commonly known as: Cellcept   valGANciclovir 450 mg tablet; Commonly known as: [facility] Course  Stanford NGUYEN Caro \"George" is a 69 y.o. 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      Patient has been experiencing SOB, fatigue, dizziness/LH and a dry cough for the past few weeks. He has also noted that over this time period his pulse ox readings at home have been lower than normal (low 90's compared to normally high 90's). His SOB is worse with exertion but he notices it even at rest now. Denies associated CP. His inhalers do not improve his SOB. He gets monthly labs and his Hgb has steadily dropped from 8.7 in September to 8.0 > 8.1 > 7.8. > 7.0 on 01/06/25. Patient denies fevers/chills, CP, extremity swelling, orthopnea, PND, blood in stool or urine, hemoptysis, mucosal bleeding, sick contacts, new medications, recent trauma or falls. Has had some minor LUQ pain that doesn't bother him much, nonradiating and non pleuritic, doesn't correlate with PO intake. Patient visited [place] for a family wedding in October but no other recent travel. Notes he has bruised easily since transplant in 2020 but has not noticed any worsening bruising or skin lesions. Last took Apixaban this morning and reports compliance with all other meds.      Patient follows with transplant pulmonology. Of note, his transplant course was complicated by pleural bleed with return to OR for washout on 3/23/2020, prolonged ventilatory support requiring tracheostomy (since decannulated) and discharged on 5/8/2020. Post-hospital course was complicated " by septic shock with MSSA in August 2022 with PE requiring lifelong anticoagulation.      Patient arrived to [facility] in no acute distress, afebrile and HDS with /58 and SPO2 95% RA. CXR with no acute cardiopulmonary process. EKG NSR and troponin negative. Labs notable for Hgb 7.2, MCV 83, Plt 105, WBC 6.2, Ferritin 8, Iron 24. TIBC 401, % sat 6 Cr 1.13, BUN 24. CMV and EBV negative. He was seen by lung transplant in the ED who recommended 2u PRBC transfusion, and Hgb improved from 7.2 to 8.6 s/p transfusion.      ED Course:  BP: 108/58  Temperature: 36.8 °C (98.2 °F)  Heart Rate: 96  Respirations: 16  Pulse Ox: 95 %    On the floor, hematology was consulted, which recommended 4 days of IV iron and oral iron supplement on discharge. They also recommended GI eval, which is scheduled as outpatient follow up per the transplant team. Echo was also completed, which was unremarkable with normal LVEF. Pt's VBG was normalized on 1/10 with wearing BIPAP overnight (7.36/50/0.9). Pt's Hgb is 9.6 on 1/10 s/p another unit of RBC transfusion. Pt completed a pulse ox study on 1/10 night, which was normal overall and will be discharged home today.     Pertinent Physical Exam At Time of Discharge  Physical Exam  General: awake, alert, interactive   HEENT: pupils equal and round, no scleral icterus or conjunctivitis  Skin: no suspect lesions or rashes noted on visible skin  Chest: CTAB, normal respiratory effort, no wheezing or crackles.   Cardiac: regular rate and rhythm, normal s1, s2, no M/R/G, no JVD  Abdomen: soft, ND, NT, no involuntary guarding  : no flank pain or indwelling urinary catheter  EXT: no peripheral edema, no asymmetry noted  MSK: no focal joint swelling noted  Neuro: AOx4, moving all limbs spontaneously       Outpatient Follow-Up  Future Appointments   Date Time Provider Department Center   1/22/2025  2:30 PM [facility] [id number] PFT RM 1 [id number] Academic   1/22/2025  3:30 PM Pamela Boone MD [id number]  Academic   2/3/2025  2:00 PM Rosas Soni MD [id number] [place]   4/23/2025  1:00 PM Baljinder Vazquez MD [id number] Academic   7/15/2025  2:00 PM ALEXANDR Little, CCC-A [id number] [facility]   7/15/2025  2:45 PM Lupe Canales MD [id number] [facility]   10/22/2025 11:45 AM Pippa Pascual MD [id number] [facility]         Edi Rosenthal MD  PGY-1 Neurology

## 2025-01-11 NOTE — NURSING NOTE
Pt discharged in stable condition via wheelchair with transport.  Pt has all belongings.  PIV discontinued.  Tele box mx [phone] returned.  Discharge instructions, follow up appointments, medications reviewed.  Meds to beds delivered (pt had two deliveries, checked AVS to meds delivered and all meds are there). Pt has no further questions.

## 2025-01-13 ENCOUNTER — SPECIALTY PHARMACY (OUTPATIENT)
Dept: PHARMACY | Facility: CLINIC | Age: 70
End: 2025-01-13

## 2025-01-13 ENCOUNTER — APPOINTMENT (OUTPATIENT)
Dept: RESPIRATORY THERAPY | Facility: HOSPITAL | Age: 70
End: 2025-01-13
Payer: MEDICARE

## 2025-01-13 ENCOUNTER — APPOINTMENT (OUTPATIENT)
Dept: TRANSPLANT | Facility: HOSPITAL | Age: 70
End: 2025-01-13
Payer: MEDICARE

## 2025-01-13 ENCOUNTER — TELEPHONE (OUTPATIENT)
Dept: TRANSPLANT | Facility: HOSPITAL | Age: 70
End: 2025-01-13
Payer: MEDICARE

## 2025-01-13 DIAGNOSIS — Z94.2 LUNG TRANSPLANTED (MULTI): ICD-10-CM

## 2025-01-13 PROCEDURE — RXMED WILLOW AMBULATORY MEDICATION CHARGE

## 2025-01-13 NOTE — TELEPHONE ENCOUNTER
PT called about a medication he did not receive when he left the hospital. Asked for a call back

## 2025-01-13 NOTE — TELEPHONE ENCOUNTER
Called patient, confirmed that he should not be taking bactrim after discharge. Went over recent medication changes. Patient verbalizes understanding.

## 2025-01-15 ENCOUNTER — TELEPHONE (OUTPATIENT)
Dept: TRANSPLANT | Facility: HOSPITAL | Age: 70
End: 2025-01-15
Payer: MEDICARE

## 2025-01-15 ENCOUNTER — DOCUMENTATION (OUTPATIENT)
Dept: TRANSPLANT | Facility: HOSPITAL | Age: 70
End: 2025-01-15
Payer: MEDICARE

## 2025-01-15 ENCOUNTER — PHARMACY VISIT (OUTPATIENT)
Dept: PHARMACY | Facility: CLINIC | Age: 70
End: 2025-01-15
Payer: COMMERCIAL

## 2025-01-15 NOTE — TELEPHONE ENCOUNTER
Called patient and updated him that [facility] would be calling him to see if they can get him a sleep study appt earlier than 2/19. Also emphasized labs are due 1/20. Patient verbalized understanding and has no further questions at this time.

## 2025-01-15 NOTE — PROGRESS NOTES
Called [facility]  at [phone]. Left voicemail and asked for callback in regards to scheduling patient sooner at an earlier date.     Received call back from [facility], faxed note, facesheet and order for sleep study to [phone].

## 2025-01-15 NOTE — TELEPHONE ENCOUNTER
Maryam from [facility] called and stated the order that was sent is missing the PT .

## 2025-01-15 NOTE — TELEPHONE ENCOUNTER
Called Maryam back at [facility] at [phone]. Re-sent fax with order and patient's . Confirmed that their office has no further needs at this time.

## 2025-01-17 ENCOUNTER — TELEPHONE (OUTPATIENT)
Dept: TRANSPLANT | Facility: HOSPITAL | Age: 70
End: 2025-01-17

## 2025-01-17 ENCOUNTER — LAB (OUTPATIENT)
Dept: LAB | Facility: LAB | Age: 70
End: 2025-01-17
Payer: MEDICARE

## 2025-01-17 DIAGNOSIS — M81.0 OSTEOPOROSIS WITHOUT CURRENT PATHOLOGICAL FRACTURE, UNSPECIFIED OSTEOPOROSIS TYPE: ICD-10-CM

## 2025-01-17 DIAGNOSIS — Z94.2 S/P LUNG TRANSPLANT (MULTI): ICD-10-CM

## 2025-01-17 DIAGNOSIS — Z94.2 LUNG TRANSPLANT RECIPIENT (MULTI): ICD-10-CM

## 2025-01-17 DIAGNOSIS — E87.29 CO2 RETENTION: ICD-10-CM

## 2025-01-17 LAB
ALBUMIN SERPL BCP-MCNC: 3.9 G/DL (ref 3.4–5)
ALP SERPL-CCNC: 85 U/L (ref 33–136)
ALT SERPL W P-5'-P-CCNC: 20 U/L (ref 10–52)
ANION GAP SERPL CALC-SCNC: 9 MMOL/L (ref 10–20)
AST SERPL W P-5'-P-CCNC: 26 U/L (ref 9–39)
BASOPHILS # BLD AUTO: 0.05 X10*3/UL (ref 0–0.1)
BASOPHILS NFR BLD AUTO: 0.8 %
BILIRUB SERPL-MCNC: 0.5 MG/DL (ref 0–1.2)
BUN SERPL-MCNC: 21 MG/DL (ref 6–23)
CALCIUM SERPL-MCNC: 8.9 MG/DL (ref 8.6–10.3)
CHLORIDE SERPL-SCNC: 104 MMOL/L (ref 98–107)
CO2 SERPL-SCNC: 32 MMOL/L (ref 21–32)
CREAT SERPL-MCNC: 0.93 MG/DL (ref 0.5–1.3)
DACRYOCYTES BLD QL SMEAR: NORMAL
EGFRCR SERPLBLD CKD-EPI 2021: 88 ML/MIN/1.73M*2
EOSINOPHIL # BLD AUTO: 0.11 X10*3/UL (ref 0–0.4)
EOSINOPHIL NFR BLD AUTO: 1.8 %
ERYTHROCYTE [DISTWIDTH] IN BLOOD BY AUTOMATED COUNT: 22.4 % (ref 11.5–14.5)
GLUCOSE SERPL-MCNC: 74 MG/DL (ref 74–99)
HCT VFR BLD AUTO: 39.9 % (ref 41–52)
HGB BLD-MCNC: 11.2 G/DL (ref 13.5–17.5)
HYPOCHROMIA BLD QL SMEAR: NORMAL
IGG SERPL-MCNC: 856 MG/DL (ref 700–1600)
IMM GRANULOCYTES # BLD AUTO: 0.06 X10*3/UL (ref 0–0.5)
IMM GRANULOCYTES NFR BLD AUTO: 1 % (ref 0–0.9)
LYMPHOCYTES # BLD AUTO: 1.03 X10*3/UL (ref 0.8–3)
LYMPHOCYTES NFR BLD AUTO: 16.5 %
MAGNESIUM SERPL-MCNC: 1.54 MG/DL (ref 1.6–2.4)
MCH RBC QN AUTO: 26.5 PG (ref 26–34)
MCHC RBC AUTO-ENTMCNC: 28.1 G/DL (ref 32–36)
MCV RBC AUTO: 94 FL (ref 80–100)
MONOCYTES # BLD AUTO: 1.17 X10*3/UL (ref 0.05–0.8)
MONOCYTES NFR BLD AUTO: 18.7 %
NEUTROPHILS # BLD AUTO: 3.84 X10*3/UL (ref 1.6–5.5)
NEUTROPHILS NFR BLD AUTO: 61.2 %
NRBC BLD-RTO: 0 /100 WBCS (ref 0–0)
OVALOCYTES BLD QL SMEAR: NORMAL
PLATELET # BLD AUTO: 95 X10*3/UL (ref 150–450)
POTASSIUM SERPL-SCNC: 4.4 MMOL/L (ref 3.5–5.3)
PROT SERPL-MCNC: 6 G/DL (ref 6.4–8.2)
RBC # BLD AUTO: 4.23 X10*6/UL (ref 4.5–5.9)
RBC MORPH BLD: NORMAL
SODIUM SERPL-SCNC: 141 MMOL/L (ref 136–145)
TACROLIMUS BLD-MCNC: 5.1 NG/ML
WBC # BLD AUTO: 6.3 X10*3/UL (ref 4.4–11.3)

## 2025-01-17 PROCEDURE — 82784 ASSAY IGA/IGD/IGG/IGM EACH: CPT

## 2025-01-17 PROCEDURE — 80197 ASSAY OF TACROLIMUS: CPT

## 2025-01-17 PROCEDURE — 80053 COMPREHEN METABOLIC PANEL: CPT

## 2025-01-17 PROCEDURE — 87799 DETECT AGENT NOS DNA QUANT: CPT

## 2025-01-17 PROCEDURE — 85025 COMPLETE CBC W/AUTO DIFF WBC: CPT

## 2025-01-17 PROCEDURE — 83735 ASSAY OF MAGNESIUM: CPT

## 2025-01-17 NOTE — TELEPHONE ENCOUNTER
Returned patient's call, he is wondering if he can get labs drawn at [facility] at his local doctor's office. I let him know that we have a meeting on Tuesday for the [facility] transition, and I would find an answer and update him in clinic on Wednesday. Also updated patient that he should call local pharmacy for medication disposal. Patient verbalized understanding.

## 2025-01-17 NOTE — TELEPHONE ENCOUNTER
Pt called asking if there was anything we could do with the medications he is no longer on but are not . Requested call back.

## 2025-01-20 ENCOUNTER — DOCUMENTATION (OUTPATIENT)
Dept: TRANSPLANT | Facility: HOSPITAL | Age: 70
End: 2025-01-20
Payer: MEDICARE

## 2025-01-20 ENCOUNTER — TELEPHONE (OUTPATIENT)
Dept: TRANSPLANT | Facility: HOSPITAL | Age: 70
End: 2025-01-20
Payer: MEDICARE

## 2025-01-20 DIAGNOSIS — Z94.2 LUNG TRANSPLANTED (MULTI): ICD-10-CM

## 2025-01-20 RX ORDER — TACROLIMUS 0.5 MG/1
1 CAPSULE, GELATIN COATED ORAL EVERY 12 HOURS
Qty: 63 CAPSULE | Refills: 11 | Status: SHIPPED | OUTPATIENT
Start: 2025-01-20 | End: 2026-01-20

## 2025-01-20 NOTE — TELEPHONE ENCOUNTER
Labs reviewed on 1/20/2025   WBC 6.3 ANC 3.84   Hbg 11.2   Platelets 95   Creatinine 0.93   Magnesium 1.54   IgG 856   Prograf 5.1 Goal 6-8 Dose 0.5/1     -Changes made: Increase tacro to 1/1. Ensure he is taking magnesium 192 mg BID.   -Labs due next 2/10 (Week of next pentamidine)     Called patient with above results and plan. Verbalized understanding.

## 2025-01-20 NOTE — PROGRESS NOTES
Called Dr. Soto's office at [phone] to update them on reason for new GI referral. Faxed recent labs, note and face sheet to [phone].

## 2025-01-22 ENCOUNTER — HOSPITAL ENCOUNTER (OUTPATIENT)
Dept: RESPIRATORY THERAPY | Facility: HOSPITAL | Age: 70
Discharge: HOME | End: 2025-01-22
Payer: MEDICARE

## 2025-01-22 ENCOUNTER — OFFICE VISIT (OUTPATIENT)
Dept: TRANSPLANT | Facility: HOSPITAL | Age: 70
End: 2025-01-22
Payer: MEDICARE

## 2025-01-22 ENCOUNTER — HOSPITAL ENCOUNTER (OUTPATIENT)
Dept: RADIOLOGY | Facility: HOSPITAL | Age: 70
Discharge: HOME | End: 2025-01-22
Payer: MEDICARE

## 2025-01-22 VITALS
BODY MASS INDEX: 31.4 KG/M2 | HEART RATE: 74 BPM | DIASTOLIC BLOOD PRESSURE: 74 MMHG | SYSTOLIC BLOOD PRESSURE: 138 MMHG | HEIGHT: 69 IN | WEIGHT: 212 LBS | TEMPERATURE: 98.1 F | OXYGEN SATURATION: 96 %

## 2025-01-22 DIAGNOSIS — E87.29 CO2 RETENTION: ICD-10-CM

## 2025-01-22 DIAGNOSIS — R73.9 STEROID-INDUCED HYPERGLYCEMIA: ICD-10-CM

## 2025-01-22 DIAGNOSIS — J38.00 VOCAL FOLD PARESIS: ICD-10-CM

## 2025-01-22 DIAGNOSIS — Z94.2 LUNG TRANSPLANT RECIPIENT (MULTI): ICD-10-CM

## 2025-01-22 DIAGNOSIS — I10 PRIMARY HYPERTENSION: ICD-10-CM

## 2025-01-22 DIAGNOSIS — T38.0X5A STEROID-INDUCED HYPERGLYCEMIA: ICD-10-CM

## 2025-01-22 DIAGNOSIS — M81.0 OSTEOPOROSIS WITHOUT CURRENT PATHOLOGICAL FRACTURE, UNSPECIFIED OSTEOPOROSIS TYPE: ICD-10-CM

## 2025-01-22 DIAGNOSIS — E78.00 PURE HYPERCHOLESTEROLEMIA: Primary | ICD-10-CM

## 2025-01-22 DIAGNOSIS — K21.9 GASTROESOPHAGEAL REFLUX DISEASE WITHOUT ESOPHAGITIS: ICD-10-CM

## 2025-01-22 DIAGNOSIS — Z94.2 LUNG TRANSPLANTED (MULTI): ICD-10-CM

## 2025-01-22 DIAGNOSIS — D84.9 IMMUNOSUPPRESSION: ICD-10-CM

## 2025-01-22 DIAGNOSIS — Z94.2 S/P LUNG TRANSPLANT (MULTI): ICD-10-CM

## 2025-01-22 DIAGNOSIS — K59.00 CONSTIPATION, UNSPECIFIED CONSTIPATION TYPE: ICD-10-CM

## 2025-01-22 LAB
MGC ASCENT PFT - FEV1 - PRE: 1.2
MGC ASCENT PFT - FEV1 - PRE: 1.29
MGC ASCENT PFT - FEV1 - PREDICTED: 2.91
MGC ASCENT PFT - FEV1 - PREDICTED: 2.91
MGC ASCENT PFT - FVC - PRE: 2.26
MGC ASCENT PFT - FVC - PRE: 2.27
MGC ASCENT PFT - FVC - PREDICTED: 3.86
MGC ASCENT PFT - FVC - PREDICTED: 3.86

## 2025-01-22 PROCEDURE — 71046 X-RAY EXAM CHEST 2 VIEWS: CPT | Performed by: RADIOLOGY

## 2025-01-22 PROCEDURE — 1126F AMNT PAIN NOTED NONE PRSNT: CPT | Performed by: INTERNAL MEDICINE

## 2025-01-22 PROCEDURE — 3008F BODY MASS INDEX DOCD: CPT | Performed by: INTERNAL MEDICINE

## 2025-01-22 PROCEDURE — 1159F MED LIST DOCD IN RCRD: CPT | Performed by: INTERNAL MEDICINE

## 2025-01-22 PROCEDURE — 1036F TOBACCO NON-USER: CPT | Performed by: INTERNAL MEDICINE

## 2025-01-22 PROCEDURE — 1111F DSCHRG MED/CURRENT MED MERGE: CPT | Performed by: INTERNAL MEDICINE

## 2025-01-22 PROCEDURE — 3078F DIAST BP <80 MM HG: CPT | Performed by: INTERNAL MEDICINE

## 2025-01-22 PROCEDURE — 71046 X-RAY EXAM CHEST 2 VIEWS: CPT

## 2025-01-22 PROCEDURE — 99215 OFFICE O/P EST HI 40 MIN: CPT | Performed by: INTERNAL MEDICINE

## 2025-01-22 PROCEDURE — 3075F SYST BP GE 130 - 139MM HG: CPT | Performed by: INTERNAL MEDICINE

## 2025-01-22 RX ORDER — SENNOSIDES 8.6 MG/1
1 TABLET ORAL DAILY PRN
Qty: 30 TABLET | Refills: 11 | Status: SHIPPED | OUTPATIENT
Start: 2025-01-22 | End: 2026-01-22

## 2025-01-22 ASSESSMENT — ENCOUNTER SYMPTOMS
WEIGHT LOSS: 0
NUMBNESS: 0
BLURRED VISION: 0
CHILLS: 0
SHORTNESS OF BREATH: 0
TREMORS: 0
DOUBLE VISION: 0
SORE THROAT: 0
CONSTIPATION: 0
DIARRHEA: 0
WEIGHT GAIN: 0
COLOR CHANGE: 0
CHANGE IN BOWEL HABIT: 0
NAUSEA: 0
PARESTHESIAS: 0
WEAKNESS: 0
SPUTUM PRODUCTION: 0
MUSCLE CRAMPS: 1
FALLS: 0
LIGHT-HEADEDNESS: 0
COUGH: 0
VOMITING: 0
PALPITATIONS: 0
DIZZINESS: 0
DYSPNEA ON EXERTION: 0
SUSPICIOUS LESIONS: 0
EYE PAIN: 0
FEVER: 0
IRREGULAR HEARTBEAT: 0
BRUISES/BLEEDS EASILY: 1
HEMATURIA: 0
HEADACHES: 0

## 2025-01-22 ASSESSMENT — PAIN SCALES - GENERAL: PAINLEVEL_OUTOF10: 0-NO PAIN

## 2025-01-22 NOTE — PROGRESS NOTES
Review of Systems   Constitutional: Negative for chills, fever, malaise/fatigue (occasional), weight gain and weight loss (More active, eating healthier).   HENT:  Negative for congestion and sore throat.    Eyes:  Negative for blurred vision, double vision and pain.   Cardiovascular:  Negative for chest pain, dyspnea on exertion, irregular heartbeat, leg swelling and palpitations.   Respiratory:  Negative for cough, shortness of breath and sputum production (Brown/yellow. In the morning.).    Hematologic/Lymphatic: Bruises/bleeds easily.   Skin:  Negative for color change and suspicious lesions.   Musculoskeletal:  Positive for muscle cramps (Toes and feet.). Negative for falls and muscle weakness.   Gastrointestinal:  Negative for change in bowel habit, constipation, diarrhea, nausea and vomiting.   Genitourinary: Negative.  Negative for hematuria and urgency.   Neurological:  Negative for dizziness, headaches, light-headedness, numbness, paresthesias, tremors and weakness.     Patient seen in clinic today. Sometimes has difficulty initiating urinary stream, following up with urology 2/3/2025. States that his colymycin is foamy during nebulization, wondering if this is normal. Will follow up with Pharmacy. Will also add PRN senokot for constipation in setting of new iron pills.  Discussed with Winifred CHARLES. Plan to RTC in 2 months with CXR and jelly. Monthly jelly. Next bronchoscopy PRN. Labs due 2/10/2025. Pentamidine next 2/11/2025.     Routine Care Addressed:   -Vaccines:  -->2024 COVID 10/11/2024  -->2024 Flu 10/11/2024  -->RSV Done  -Colonoscopy: 4/2022--> Next due 4/2027  -DEXA Scan: 11/4/2024--> Osteopenia   -->Treatments: Following with Endocrine--> Dr. Ashok Pascual   - Endocrine: 10/22/2025  -PSA: Elevated at 4.88 in 4/2024   - Urology: 2/3/2025  -Dentist: Following   -Dermatology: Following with Dr. Ty to home.

## 2025-01-22 NOTE — PATIENT INSTRUCTIONS
Thank you for coming in to clinic today, it was nice to see you.     Today we discussed the following:  - Sleep Study--> Call back [facility] at your earliest convenience. Otherwise, we have you scheduled at  for 2/19/2025.  - We will follow up with Pharmacy staff in regards to Colymycin foaming during nebulization.       Medication Changes:  - We added an stool softener (senokot) that you can take one time daily if needed for constipation. [facility]. Your iron pills can cause constipation.      Follow-up  - Return to clinic in 2 months with chest xray and jelly.  - [facility] at  [place] in 1 month.  - Pentamidine (new, monthly inhaled medication) next on 2/11/2025.  - Labs due week of 2/10/2025. Riky will email you orders to print and bring to your [facility] lab.     As a friendly reminder:  - Call the office if you develop any symptoms including but not limited to fevers, chills, nausea, vomiting, aches, shortness of breath, or coughing  - Do not let your medications run out, try to maintain at least a 1 week supply at all times. Call the office early if refills are needed as these can be difficult to obtain on weekends  - Please keep all your appointments    As always, call the office at [phone] or [phone] for any issues during normal business hours    Out of hours, please call the Lung Transplant On-Call [phone] for emergencies

## 2025-01-22 NOTE — Clinical Note
Hi! Ky, can you schedule him for jelly at  [place] 2/24. Then jelly, clinic, pentamidine 3/12? Thanks!

## 2025-01-22 NOTE — PROGRESS NOTES
Lung Transplant Office Note     Mr. 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 His transplant course was complicated by pleural bleed with return to OR for washout on 3/23/2020, prolonged ventilatory support requiring tracheostomy (since decannulated) and discharged on 5/8/2020. Post-hospital course was complicated by septic shock with MSSA in August 20202 with PE requiring lifelong anticoagulation.      Presenting today for follow-up visit     Interval Medical History:  11/11/24: Breathing feels okay, no coughing/wheezing/SOB. Reporting muscle cramping, mostly in the evening, will space Magnesium to BID advised to call if diarrhea becomes an issue. Staying active at home, working outdoors. Reporting memory concerns, first noticed by wife, frequently forgets tasks, prior engagements, wife concerned. Will refer to Neurology.      9/9/24: Breathing overall stable on RA, trying to be more active. No nausea or vomiting, moving bowels. Working on weight loss, BMI 30. BP overall stable, 120's systolic. Lesion on scalp biopsied, negative for malignancy. Elevated PSA, seen by Urology, MRI prostate 9/15/24. Started on Sanctura for BPH. Ongoing issues with neuropathy in feet/hands. Amenable to starting Aranesp injections.     6/3/24: Breathing overall stable on RA, trying to increase activity. No nausea or vomiting, moving bowels. No LE edema. Working on weight loss, lost 10 lbs. BP overall improved, 120's systolic. Following with Dermatology, lesion on scalp removed, biopsy pending, will fax us results. Mood overall improved, met with Dr. Vincent, feeling better.     4/3/24: Breathing overall stable on RA, no nausea or vomiting, moving bowels, feeling well. Trying to maintain adequate po fluid intake, no LE edema. Working on weight loss but not having much success, BMI 33. Depressed about weight gain. Trying to increase activity, knows he needs to go back to the gym. BP lower  100's systolic.     1/10/24: Breathing overall stable in RA, no nausea or vomiting, moving bowels. No LE edema. Feeling well. Saw Dermatology lesion on forehead consistent with SCC, removed, no further lesions. Gained 10 lbs since last visit, BMI 30.     11/10/23: Breathing overall stable on RA, no nausea or vomiting, moving bowels. No LE edema. Maintaining adequate po fluid intake. New lesion on forehead, planning to see Dermatology soon.     10/13/23: Bronchoscopy on 9/19/23 with multiple strains of Pseudomonas, completed a 14 day course of Ciprofloxacin and started on inhaled VICENTE, some tremors with albuterol. TBBX with no ACR or OB. Feels breathing and cough is overall improved, no MERINO. No nausea or vomiting, tolerating po intake and moving bowels.     9/7/23: Seen in ER 8/25/23 for chest pain, sharp increased with movement. Workup negative including nuclear stress test. Started on Crestor. Still present at times but improved, feels musculoskeletal in nature. Breathing overall stable, still has a cough with brownish sputum despite course of Ciprofloxacin after last visit, RVP negative. No nausea or vomiting, moving bowels regularly.     8/2/23: Breathing overall stable, walking daily. Cough with brownish sputum for the past 2 weeks, no fevers, chills. No nausea or vomiting, moving bowels, no LE edema, maintaining adequate po fluid intake.     6/12/23: Breathing stable, working on weight loss. No nausea or vomiting, moving bowels. No LE edema. No longer working due to hours. Trying to be more active.     4/5/23: Feels that breathing is overall stable, no MERINO or cough. Tolerating PO diet, no nausea or vomiting, moving bowels, no LE edema. Notes 15lb weight gain self reported due to inactivity - no orthopnea or PND. Notes skin lesion on scalp present for last few months, not yet evaluated by dermatology. Blood glucose range , 115 on 12.5mg Jardiance bid, BP max 115 at home. 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 Subsequently was discharge home  with an stable H&H. Today he present to clinic fr Follow       --------------  Vital signs   Patient Vitals for the past 24 hrs:   BP Temp Temp src Pulse SpO2 Height Weight   01/22/25 1447 138/74 36.7 °C (98.1 °F) Oral 74 96 % 1.753 m (5' 9\") 96.2 kg (212 lb)        Review of Systems   Review of Systems   All other systems reviewed and are negative.       Physical Exam  Physical Exam  Vitals and nursing note reviewed.   Constitutional:       Appearance: Normal appearance.   HENT:      Mouth/Throat:      Mouth: Mucous membranes are moist.   Eyes:      Conjunctiva/sclera: Conjunctivae normal.   Cardiovascular:      Rate and Rhythm: Normal rate and regular rhythm.   Pulmonary:      Effort: Pulmonary effort is normal.      Breath sounds: Normal breath sounds.   Abdominal:      General: Bowel sounds are normal.   Genitourinary:     Comments: Void independently   Musculoskeletal:         General: Normal range of motion.   Skin:     General: Skin is warm and dry.      Capillary Refill: Capillary refill takes less than 2 seconds.   Neurological:      General: No focal deficit present.      Mental Status: He is alert and oriented to person, place, and time.   Psychiatric:         Mood and Affect: Mood normal.         Behavior: Behavior normal.         Thought Content: Thought content normal.         Judgment: Judgment normal.        Medications  Current Outpatient Medications   Medication Instructions    0.9 % sodium chloride (sodium chloride, PF, 0.9%) injection Mix with Colistin as directed: withdraw 5 mL of sodium chloride and add to Colistin vial twice daily.  Discard remainder of vial after each use.    acyclovir (ZOVIRAX) 400 mg, oral, 2 times daily    alcohol swabs " "pads, medicated 1 Swab, topical (top), Weekly, Use before aranesp injection.    Aranesp (in polysorbate) 60 mcg, subcutaneous, Weekly    azithromycin (ZITHROMAX) 250 mg, oral, Every Mon/Wed/Fri    blood sugar diagnostic (OneTouch Verio test strips) strip Use as directed 3 times a day before meals.    budesonide-formoteroL (Symbicort) 160-4.5 mcg/actuation inhaler Inhale 2 puffs by mouth 2 times a day. Rinse mouth with water after use to reduce aftertaste and incidence of candidiasis. Do not swallow.    cholecalciferol (VITAMIN D-3) 25 mcg, oral, Daily    colistimethate (Colymycin) 150 mg vial Take 150 mg (1 vial) by nebulization 2 times a day. Mix 150 mg vial with 5 mL normal saline, insert into nebulizer and inhale medication 2 times a day    DULoxetine (CYMBALTA) 60 mg, oral, Daily, Do not crush or chew.    Eliquis 5 mg, oral, Every 12 hours    ferrous sulfate 325 (65 Fe) MG EC tablet 1 tablet, oral, Daily with breakfast, Do not crush, chew, or split.    levalbuterol (Xopenex) 0.63 mg/3 mL nebulizer solution Inhale one (1) ampule via nebulizer twice daily.    loratadine (Claritin) 10 mg tablet TAKE ONE (1) TABLET BY MOUTH ONCE DAILY.    Mag 64 192 mg, oral, 2 times daily, Separate from Tacrolimus by 2 hours.    metoprolol tartrate (LOPRESSOR) 12.5 mg, oral, 2 times daily    montelukast (SINGULAIR) 10 mg, oral, Nightly    multivitamin with minerals iron-free (Centrum Silver) 1 tablet, oral, Daily    Oyster Shell Calcium 500 1,250 mg, oral, 2 times daily (morning and late afternoon)    pantoprazole (PROTONIX) 40 mg, oral, Daily    predniSONE (DELTASONE) 5 mg, oral, Daily    Prevymis 480 mg, oral, Daily    Prograf 1 mg, oral, Every 12 hours    rosuvastatin (CRESTOR) 40 mg, oral, Daily    sharps container 1 each, miscellaneous, As needed    syringe with needle (BD Luer-Silvia Syringe) 10 mL 21 x 1 1/2\" syringe Use as directed to mix normal saline and Colistin twice daily.    syringe with needle (Syringe 3cc/25Gx1\") 3 mL " "25 gauge x 1\" syringe Use 1 Syringe 1 (one) time per week as directed.    trospium (SANCTURA) 20 mg, 2 times daily        Transplant Labs  Lab Results   Component Value Date    WBC 6.3 01/17/2025    WBC 5.9 01/11/2025    WBC 6.2 01/10/2025    HGB 11.2 (L) 01/17/2025    HGB 9.6 (L) 01/11/2025    HGB 9.6 (L) 01/10/2025    HCT 39.9 (L) 01/17/2025    HCT 33.2 (L) 01/11/2025    HCT 33.6 (L) 01/10/2025    MCV 94 01/17/2025    MCV 88 01/11/2025    MCV 88 01/10/2025    PLT 95 (L) 01/17/2025    PLT 73 (L) 01/11/2025    PLT 98 (L) 01/10/2025    NEUTROABS 3.84 01/17/2025    NEUTROABS 4.22 01/11/2025    NEUTROABS 4.62 01/10/2025     Lab Results   Component Value Date    CALCIUM 8.9 01/17/2025    CALCIUM 9.3 01/11/2025    CALCIUM 9.1 01/10/2025     01/17/2025     01/11/2025     01/10/2025    K 4.4 01/17/2025    K 4.6 01/11/2025    K 4.9 01/10/2025    CO2 32 01/17/2025    CO2 29 01/11/2025    CO2 29 01/10/2025     01/17/2025     01/11/2025     (H) 01/10/2025    BUN 21 01/17/2025    BUN 16 01/11/2025    BUN 14 01/10/2025    CREATININE 0.93 01/17/2025    CREATININE 0.92 01/11/2025    CREATININE 0.94 01/10/2025     Lab Results   Component Value Date    ALT 20 01/17/2025    ALT 14 01/11/2025    ALT 15 01/10/2025    AST 26 01/17/2025    AST 19 01/11/2025    AST 21 01/10/2025     (H) 04/06/2021    ALKPHOS 85 01/17/2025    ALKPHOS 81 01/11/2025    ALKPHOS 77 01/10/2025    BILITOT 0.5 01/17/2025    BILITOT 0.8 01/11/2025    BILITOT 1.4 (H) 01/10/2025     Lab Results   Component Value Date    INR 1.0 01/05/2023    INR 1.2 (H) 03/02/2021    INR 1.2 (H) 03/02/2021     Lab Results   Component Value Date    HIV1X2 NONREACTIVE 12/10/2020    HIV1X2 NONREACTIVE 08/14/2020    HIV1X2 NONREACTIVE 04/23/2020     No results found for: \"PTT\"  Lab Results   Component Value Date    TACROLIMUS 5.1 01/17/2025    TACROLIMUS 7.8 01/11/2025    TACROLIMUS 9.1 01/10/2025     No results found for: \"CYCLOSPORN\"   "       IMAGES  I have personally reviewed the following test result(s):     XR CHEST 2 VIEWS; 1/22/25  IMPRESSION:  1.  No acute cardiopulmonary pathology  2. Findings consistent with developing ankylosing spondylitis.    Pulmonary  Function Test (PFTs)     Spirometry: 1/22/2025  FVC: 2.26-58%  FEV1: 12.0-41%  EGV42-70: 0.43-18 %    11/11/24:  FEV: 1.32 45%  FVC: 2.35 60%  FEV1/FVC: 0.56 73%  FYW76-21: 0.56 24%     9/9/24: Spirometry:  FVC: 2.40 62%  FEV1: 1.36 46%  25-75: 0. 64 27%     6/3/24: Spirometry:  FVC: 2.48 64%  FEV1: 1.44 49%  25-75: 0.92 39%     4/3/24: Spirometry:  FVC:  2.41 59%  FEV1: 1.48 48%  25-75: 0.78 32%     1/10/24: Spirometry:  FVC: 2.45 60%  FEV1: 1.55 50%  25-75: 0.97 39%     11/10/23 Spirometry:  FVC: 2.33, 55%  FEV1: 1.47 46%  25-75: 0.95 39%     10/13/23 Spirometry:  FVC: 2.29 52%  FEV1: 1.35 42%  25-75: 0.88 36%     9/7/23 Spirometry:  FVC: 2.25, 53%  FEV1: 1.41, 44%  25-75: 0.82, 34%     8/2/23 Spirometry:  FVC: 2.48, 59%  FEV1: 1.52, 47%  25-75: 1.03, 42%     6/12/23 Spirometry:  FVC 2.65 62%  FEV1: 1.48, 46%  25-75: 0.89, 36%     5/2/23 Spirometry:  FVC: 2.58, 61%  FEV1: 1.51, 47%  25-75: 0.88, 36%     4/5/23 Spirometry:  FVC: 2.47 58%  FEV1: 1.50 46%  FEF 25-75: 31%    ----------------------------  Assessment & Plan     Mr. Patel is a 69 year old male with a PMH of IPH/PAH, GERD, HTN, DM, HLD     He underwent a double lung transplant on 3/22/2020     Pulmonary:  s/p DLTx for IPF/PAH on 3/22/20, CMV +/-, EBV positive recipient  Allograft function:  - Doing well from pulmonary standpoint, denies coughing/wheezing/SOB   - Hx PE: Remove history of VTE in 1993. Modest PE at same time of septic shock in August 2020, continue Eliquis 5mg BID lifelong   - Hx MSSA, Klebsiella, Corynebacterium from April 2020 bronchoscopy  - Hx Enterobacter from June 2020 bronchoscopy, sensitive to Fluoroquinolones  - Hx Pseudomonas from September 2023 bronchoscopy, completed 14 day course of Ciprofloxacin and  commenced on inhaled Tobramycin  - PFTs The reduced FVC could be due to concurrent restriction. FEV1 trend decreasing over time. as above  - CXR  reviewed with patient, stable  - Hx SARA: sleep study  schedule  for 02/19/2025  - Hx CLAD: Multifocal air trapping identified with September 2023 high resolution chest CT  - HLA 12/16/2024  CLASS I (Negative)  CLASS II (Negative)   - ROSYS Prophylaxis: Azithromycin 250mg M W F, Singular 10mg nightly, Symbicort 160/4.5 2 puffs BID  - Nebulized Medications: Levalbuterol BID followed by Tobramycin 300mg BID and Symbicort as above     Immunosuppression:  - Prograf goal 6-8, Tacrolimus level   Tacrolimus    Date Value Ref Range Status   01/17/2025 5.1 <=15.0 ng/mL Final   01/11/2025 7.8 <=15.0 ng/mL Final   -> continue 1mg BID  - Prednisone 5mg daily     Prophylaxis:  - PJP: pentamidine inhaled, next dose next month  -> Failed Dapsone secondary to anemia   - CMV +/- mismatch: letemovir 480 mg  Recent Labs     01/17/25  0955 01/06/25  1152 10/02/23  1122 08/24/23  1102 07/26/23  1201   CMVPCRL  --   --   --  NOT CALCULATED NOT CALCULATED   CMVDNAPCR Not Detected Not Detected   < >  --   --     < > = values in this interval not displayed.     - EBV: Follow with routine   Recent Labs     01/17/25  0955 01/06/25  1152   EBVDNAPCR Not Detected Not Detected   - Fungal: Completed course of Voriconazole  - Hypogammaglobulinemia: IgG, goal > 600:  Recent Labs     01/17/25  0955 01/06/25  1152    792          Cardiac:  - Denies chest pain, palpitations, swelling in feet/ankles  - BP Management: No current antihypertensives, historically required Lopressor but then discontinued secondary to hypotension              Advised to call Lung Transplant Office if BP consistently > 130/80 or < 100/60  - Chronic anticoagulation; Lifelong anticoagulation with Eliquis 5mg BID  - Hx Chest Pain: August 2023, evaluated in ED, work-up unremarkable. Likely MSK etiology              TTE September  2023: EF 65% and normal biventricular function  - Hx HLD: Crestor 40mg daily  Lab Results   Component Value Date    CHOL 98 12/16/2024    HDL 46.8 12/16/2024    VLDL 19 12/16/2024    TRIG 93 12/16/2024    NHDL 51 12/16/2024   ->  Repeat  in 3 months March 2025     Renal  - Denies dysuria/hematuria  - Creatinine 0.99, eGFR 81  - Potassium 4.0  - Magnesium 1.62. Increase to 800mg BID  - Hx BPH: Sanctura 20mg BID per Urology  - Elevated PSA: 4.88 in April 2024, MRI prostate consistent with BPH. Follows with Dr. Soni of Urology  - Renally dose all medications and avoid nephrotoxins. May not have NSAIDs or IV Contrast     GI  GERD   - Cont PPI daily  & avoid acidic foods  - Avoid taking pills on an empty stomach  - Eat small frequent meals & eat sitting up in the chair   - Transsaminitis   Recent Labs     01/17/25  0955 01/11/25  0643 01/10/25  0700 01/08/25  0642 01/07/25  1719 06/01/23  1127 04/29/23  1038   ALBUMIN 3.9 3.6 3.8   < > 3.9  4.0   < > 3.5  CANCELED   AST 26 19 21   < > 19  21   < > 28  CANCELED   ALT 20 14 15   < > 16  16   < > 20  CANCELED   ALKPHOS 85 81 77   < > 73  73   < > 135  CANCELED   BILITOT 0.5 0.8 1.4*   < > 0.6  0.6   < > 0.4  CANCELED   BILIDIR  --   --   --   --  0.2  --  0.1   PROT 6.0* 6.1* 6.4   < > 6.4  6.4   < > 6.0*  CANCELED*    < > = values in this interval not displayed.   -> Improved with discontinuation of Voriconazole, MRI Liver February 2023 normal. Not presently an issue       ID:       No results found for the last 90 days.    WBC   Date Value Ref Range Status   01/17/2025 6.3 4.4 - 11.3 x10*3/uL Final   01/11/2025 5.9 4.4 - 11.3 x10*3/uL Final   01/10/2025 6.2 4.4 - 11.3 x10*3/uL Final   01/09/2025 5.0 4.4 - 11.3 x10*3/uL Final   01/08/2025 6.0 4.4 - 11.3 x10*3/uL Final   - Denies fevers/chills/night sweats. Afebrile, no leukocytosis  - Hx Septic Shock with MSSA: August 2020, prolonged course of IV antibiotics with resolution  - Hx Otitis Externa: Noted  September 2020, follows by ENT. Several courses of antibiotics with wick insertion, developed mastoiditis. Since resolved  - Hx Sternal Wound Infection: 2 episodes of staph infection requiring debridement and wound vac              2nd episode in July 2021, no debridement at this time but completed an 8 week course of antibiotics  - Hx Covid: December 2022, recovered  - Hx positive bronchoscopy cultures as above     Endo  - Hx DM: Follows with Endocrine HgBA1c 5.4 at this time              No glucose lowering agents presently, historically prescribed Jardiance    Next visit 10/22/25  - Hx Osteoporosis: DEXA 11/4/24 indicative of low bone mass              Follows with Endocrinology. Alendronate 70mg once weekly  - Hx Vitamin D Deficiency:  Recent Labs     12/16/24  1129 09/04/24  1142   VITD25 41 61   ->  continue Vitamin D3 1000u daily      Heme  - Denies bleeding/bruising  - Hx PE: Noted in August 2020 with admission for septic shock. Eliquis 5mg BID, continue indefinitely              IVC filter, will explore removal  -ANDREW Aranesp 60mcg weekly  Recent Labs     01/17/25  0955 01/11/25  0643   HGB 11.2* 9.6*   HCT 39.9* 33.2*   - Admitted to the hospital for symptomatic anemia on 1/6/2025  Hematology consulted. Transfused with a total of 3 UPRBC and Loaded with IV Iron. Discharge Home On PO Iron daily    - Continue PPI  daily   Thrombocytopenia  Recent Labs     01/17/25  0955 01/11/25  0643   PLT 95* 73*   - monitor for bleeding     Absolute Neutrophil Count   Date Value Ref Range Status   03/09/2021 3.40 1.20 - 7.70 x10E9/L Final   03/08/2021 4.76 1.20 - 7.70 x10E9/L Final     Neutrophils Absolute   Date Value Ref Range Status   01/17/2025 3.84 1.60 - 5.50 x10*3/uL Final     Comment:     Percent differential counts (%) should be interpreted in the context of the absolute cell counts (cells/uL).   01/11/2025 4.22 1.60 - 5.50 x10*3/uL Final     Comment:     Percent differential counts (%) should be interpreted in  "the context of the absolute cell counts (cells/uL).        ENT  - Hx Otitis Externa with facial nerve involvement. Follows with ENT              S/p R tympanomasteoidectomy, mastoid washout while inpatient               March 2021 Osteomyelitis skull base, completed 12 week course of Meropenem  - Hx Chronic Hearing Loss: Requires hearing aids, follows with ENT     Neuro  - Neuropathy: Gabapentin 300mg at night  -> Per patient  request  was  discontinue  while inpatient on 1/10   - Memory Loss: Reporting onset \"several months\" forgets to pay bills, forgets to complete tasks, wife reporting this is an issue. Will refer to Neurology for further work-up schedule for April, 2025     Preventative Health:  -Vaccines:              Influenza: 10/11/24              Covid: 2/4/21, 3/12/21 8/16/21, 2/25/22, 10/13/22, 10/9/23, 10/11/24              Pneumonia: [id number] 9/22/16              RSV: 11/1/23              Shingrix: 6/21/19, 3/6/19  - Colonoscopy: Last done April 2018, tubular adenoma. Repeat interval not documented, should continue colon cancer screening until age 73  - Dermatology: Hx SCC. Follows with Dermatology, last visit November 2024,. Melanoma removed from left ear. Dr. Liza Okeefe  - Urology: Elevated PSA of 4.88 April 2024. MRI prostate consistent with BPH, follows with Dr. Soni  - Dental: Up to day     FOLLOW  UP   RTC 8 weeks   LABS & DONTA 1 month, may do at  [facility]  [facility] as needed  Greater  than 2 years  from transplant   Sleep study  schedule  for 2/19/25   Neurology FU schedule for  4/23/25    MARGOT Iqbal-CNP  Lung Transplant  [facility]  [facility]    1/22/2025  3:32 PM   "

## 2025-01-23 PROCEDURE — RXMED WILLOW AMBULATORY MEDICATION CHARGE

## 2025-01-24 ENCOUNTER — APPOINTMENT (OUTPATIENT)
Dept: RESPIRATORY THERAPY | Facility: HOSPITAL | Age: 70
End: 2025-01-24
Payer: MEDICARE

## 2025-01-30 ENCOUNTER — PHARMACY VISIT (OUTPATIENT)
Dept: PHARMACY | Facility: CLINIC | Age: 70
End: 2025-01-30
Payer: COMMERCIAL

## 2025-02-02 ENCOUNTER — SPECIALTY PHARMACY (OUTPATIENT)
Dept: PHARMACY | Facility: CLINIC | Age: 70
End: 2025-02-02

## 2025-02-02 PROCEDURE — RXMED WILLOW AMBULATORY MEDICATION CHARGE

## 2025-02-03 ENCOUNTER — APPOINTMENT (OUTPATIENT)
Dept: UROLOGY | Facility: CLINIC | Age: 70
End: 2025-02-03
Payer: MEDICARE

## 2025-02-03 VITALS
WEIGHT: 209.2 LBS | DIASTOLIC BLOOD PRESSURE: 69 MMHG | HEART RATE: 66 BPM | SYSTOLIC BLOOD PRESSURE: 127 MMHG | BODY MASS INDEX: 30.89 KG/M2

## 2025-02-03 DIAGNOSIS — R97.20 ELEVATED PSA: ICD-10-CM

## 2025-02-03 DIAGNOSIS — N52.03 COMBINED ARTERIAL INSUFFICIENCY AND CORPORO-VENOUS OCCLUSIVE ERECTILE DYSFUNCTION: ICD-10-CM

## 2025-02-03 DIAGNOSIS — R32 URINARY INCONTINENCE, UNSPECIFIED TYPE: Primary | ICD-10-CM

## 2025-02-03 PROCEDURE — 3074F SYST BP LT 130 MM HG: CPT | Performed by: UROLOGY

## 2025-02-03 PROCEDURE — 1160F RVW MEDS BY RX/DR IN RCRD: CPT | Performed by: UROLOGY

## 2025-02-03 PROCEDURE — 1036F TOBACCO NON-USER: CPT | Performed by: UROLOGY

## 2025-02-03 PROCEDURE — 99214 OFFICE O/P EST MOD 30 MIN: CPT | Performed by: UROLOGY

## 2025-02-03 PROCEDURE — 1111F DSCHRG MED/CURRENT MED MERGE: CPT | Performed by: UROLOGY

## 2025-02-03 PROCEDURE — 1159F MED LIST DOCD IN RCRD: CPT | Performed by: UROLOGY

## 2025-02-03 PROCEDURE — G2211 COMPLEX E/M VISIT ADD ON: HCPCS | Performed by: UROLOGY

## 2025-02-03 PROCEDURE — 3078F DIAST BP <80 MM HG: CPT | Performed by: UROLOGY

## 2025-02-03 RX ORDER — MIRABEGRON 50 MG/1
50 TABLET, FILM COATED, EXTENDED RELEASE ORAL DAILY
Qty: 90 TABLET | Refills: 3 | Status: SHIPPED | OUTPATIENT
Start: 2025-02-03 | End: 2026-02-03

## 2025-02-03 ASSESSMENT — ENCOUNTER SYMPTOMS
OCCASIONAL FEELINGS OF UNSTEADINESS: 0
LOSS OF SENSATION IN FEET: 0
DEPRESSION: 0

## 2025-02-03 NOTE — PROGRESS NOTES
CHIEF COMPLAINT:  1. Elevated PSA  2. Lower urinary tract symptoms/overactive bladder  3. Erectile dysfunction    PAST UROLOGICAL HISTORY:  69-year-old man with idiopathic pulmonary fibrosis and pulmonary hypertension s/p  donor lung transplant . Family history significant for prostate cancer in father and 2-3 brothers (none metastatic or death). Initial PSA 4.88 in 2024. Started on trospium 20mg BID in 2024 for overactive bladder symptoms. MRI prostate on 09/15/2024 showed PIRADS 2 lesions only, prostate volume 34.7g.    HPI TODAY 2025:  - Reports persistent urinary frequency, urgency, and daily urge incontinence  - No improvement with trospium  - Nocturia: 2-3x/night  - Denies: hesitancy, weak stream, intermittency, straining, hematuria  - Prior MRI reviewed: PIRADS 2 lesions only, prostate 34.7g  - PSA density: 0.14 (PSA 4.88/34.7g)    PMH, PSH, MEDS, ALLERGIES, SH, and FH:  - PMH: DVT/PE on anticoagulation, hypertension, hyperlipidemia, type 2 diabetes  - Labs: Creatinine 0.93, UA trace leukocyte esterase  - On room air    PHYSICAL EXAMINATION:  Constitutional: NAD, appears stated age  Respiratory: normal effort  Cardiovascular: no edema    ASSESSMENT AND PLAN:  69-year-old man with multiple urologic complaints including elevated PSA, overactive bladder symptoms, and ED.    1. Elevated PSA (R97.20)  - Assessment: PSA 4.88, MRI with PIRADS 2 lesions only  - Plan: Continue PSA monitoring  - Counseling: Discussed false negative rate with MRI, need for continued monitoring    2. Overactive Bladder (N32.81)  - Assessment: Failed trial of trospium  - Plan:  * Discontinue trospium  * Start Myrbetriq 50mg daily. discussed risk of htn  * If no improvement, proceed with cystoscopy and urodynamics  - Counseling: Discussed treatment options including Botox and sacral neuromodultion if medical therapy fails    3. Erectile Dysfunction (N52.9)  - Assessment: Reports rare nocturnal erections, difficulty  maintaining  - Plan: Patient declines treatment at this time    FOLLOW UP:  Return in 1 year with PSA, sooner if urinary symptoms persist after 1 month trial of Myrbetriq    SHORT SUMMARY:  69-year-old man with elevated PSA, MRI showing PIRADS 2 lesions only. Failed trial of trospium for OAB symptoms, switching to Myrbetriq with plan for cystoscopy/urodynamics if no improvement.

## 2025-02-04 ENCOUNTER — TELEPHONE (OUTPATIENT)
Dept: TRANSPLANT | Facility: HOSPITAL | Age: 70
End: 2025-02-04
Payer: MEDICARE

## 2025-02-04 ENCOUNTER — PHARMACY VISIT (OUTPATIENT)
Dept: PHARMACY | Facility: CLINIC | Age: 70
End: 2025-02-04
Payer: COMMERCIAL

## 2025-02-04 ENCOUNTER — SPECIALTY PHARMACY (OUTPATIENT)
Dept: PHARMACY | Facility: CLINIC | Age: 70
End: 2025-02-04

## 2025-02-04 DIAGNOSIS — I10 PRIMARY HYPERTENSION: ICD-10-CM

## 2025-02-04 DIAGNOSIS — E83.42 HYPOMAGNESEMIA: ICD-10-CM

## 2025-02-04 DIAGNOSIS — Z94.2 LUNG TRANSPLANTED (MULTI): ICD-10-CM

## 2025-02-04 DIAGNOSIS — Z94.2 S/P LUNG TRANSPLANT (MULTI): ICD-10-CM

## 2025-02-04 DIAGNOSIS — D64.9 ANEMIA: ICD-10-CM

## 2025-02-04 DIAGNOSIS — D50.8 IRON DEFICIENCY ANEMIA SECONDARY TO INADEQUATE DIETARY IRON INTAKE: ICD-10-CM

## 2025-02-04 PROCEDURE — RXMED WILLOW AMBULATORY MEDICATION CHARGE

## 2025-02-04 RX ORDER — FERROUS SULFATE 325(65) MG
325 TABLET, DELAYED RELEASE (ENTERIC COATED) ORAL
Qty: 90 TABLET | Refills: 3 | Status: SHIPPED | OUTPATIENT
Start: 2025-02-04 | End: 2026-02-04

## 2025-02-04 RX ORDER — MAGNESIUM CHLORIDE 64 MG
192 TABLET, DELAYED RELEASE (ENTERIC COATED) ORAL 2 TIMES DAILY
Qty: 540 TABLET | Refills: 3 | Status: SHIPPED | OUTPATIENT
Start: 2025-02-04 | End: 2026-02-04

## 2025-02-04 RX ORDER — ACYCLOVIR 400 MG/1
400 TABLET ORAL 2 TIMES DAILY
Qty: 180 TABLET | Refills: 3 | Status: SHIPPED | OUTPATIENT
Start: 2025-02-04 | End: 2026-02-04

## 2025-02-04 RX ORDER — METOPROLOL TARTRATE 25 MG/1
12.5 TABLET, FILM COATED ORAL 2 TIMES DAILY
Qty: 90 TABLET | Refills: 3 | Status: SHIPPED | OUTPATIENT
Start: 2025-02-04 | End: 2026-02-04

## 2025-02-04 NOTE — TELEPHONE ENCOUNTER
Returned patient's call, he is requesting smaller needles for aranesp. Orders placed. Also requesting that all meds at [facility] be transferred to  Specialty. Meds pended to provider. Patient discussed potentially cancelling sleep study. Extensive education provided encouraging patient to keep appt. Also provided patient with number to [facility] since it is closer to his home. Patient verbalized understanding.

## 2025-02-05 PROCEDURE — RXMED WILLOW AMBULATORY MEDICATION CHARGE

## 2025-02-06 PROCEDURE — RXMED WILLOW AMBULATORY MEDICATION CHARGE

## 2025-02-10 ENCOUNTER — APPOINTMENT (OUTPATIENT)
Dept: RESPIRATORY THERAPY | Facility: HOSPITAL | Age: 70
End: 2025-02-10
Payer: MEDICARE

## 2025-02-10 ENCOUNTER — LAB (OUTPATIENT)
Dept: LAB | Facility: HOSPITAL | Age: 70
End: 2025-02-10
Payer: MEDICARE

## 2025-02-10 ENCOUNTER — PHARMACY VISIT (OUTPATIENT)
Dept: PHARMACY | Facility: CLINIC | Age: 70
End: 2025-02-10
Payer: COMMERCIAL

## 2025-02-10 DIAGNOSIS — Z94.2 LUNG TRANSPLANTED (MULTI): ICD-10-CM

## 2025-02-10 LAB
ALBUMIN SERPL BCP-MCNC: 3.8 G/DL (ref 3.4–5)
ALP SERPL-CCNC: 94 U/L (ref 33–136)
ALT SERPL W P-5'-P-CCNC: 32 U/L (ref 10–52)
ANION GAP SERPL CALC-SCNC: 10 MMOL/L (ref 10–20)
AST SERPL W P-5'-P-CCNC: 37 U/L (ref 9–39)
BASOPHILS # BLD AUTO: 0.03 X10*3/UL (ref 0–0.1)
BASOPHILS NFR BLD AUTO: 0.5 %
BILIRUB SERPL-MCNC: 0.5 MG/DL (ref 0–1.2)
BUN SERPL-MCNC: 16 MG/DL (ref 6–23)
CALCIUM SERPL-MCNC: 8.9 MG/DL (ref 8.6–10.3)
CHLORIDE SERPL-SCNC: 105 MMOL/L (ref 98–107)
CO2 SERPL-SCNC: 31 MMOL/L (ref 21–32)
CREAT SERPL-MCNC: 1 MG/DL (ref 0.5–1.3)
EGFRCR SERPLBLD CKD-EPI 2021: 80 ML/MIN/1.73M*2
EOSINOPHIL # BLD AUTO: 0.27 X10*3/UL (ref 0–0.4)
EOSINOPHIL NFR BLD AUTO: 4.5 %
ERYTHROCYTE [DISTWIDTH] IN BLOOD BY AUTOMATED COUNT: 23.5 % (ref 11.5–14.5)
GIANT PLATELETS BLD QL SMEAR: NORMAL
GLUCOSE SERPL-MCNC: 107 MG/DL (ref 74–99)
HCT VFR BLD AUTO: 42.3 % (ref 41–52)
HGB BLD-MCNC: 12.2 G/DL (ref 13.5–17.5)
IMM GRANULOCYTES # BLD AUTO: 0.03 X10*3/UL (ref 0–0.5)
IMM GRANULOCYTES NFR BLD AUTO: 0.5 % (ref 0–0.9)
LYMPHOCYTES # BLD AUTO: 0.83 X10*3/UL (ref 0.8–3)
LYMPHOCYTES NFR BLD AUTO: 13.7 %
MAGNESIUM SERPL-MCNC: 1.66 MG/DL (ref 1.6–2.4)
MCH RBC QN AUTO: 28.2 PG (ref 26–34)
MCHC RBC AUTO-ENTMCNC: 28.8 G/DL (ref 32–36)
MCV RBC AUTO: 98 FL (ref 80–100)
MONOCYTES # BLD AUTO: 0.67 X10*3/UL (ref 0.05–0.8)
MONOCYTES NFR BLD AUTO: 11.1 %
NEUTROPHILS # BLD AUTO: 4.22 X10*3/UL (ref 1.6–5.5)
NEUTROPHILS NFR BLD AUTO: 69.7 %
NRBC BLD-RTO: 0 /100 WBCS (ref 0–0)
PLATELET # BLD AUTO: 68 X10*3/UL (ref 150–450)
POTASSIUM SERPL-SCNC: 4.6 MMOL/L (ref 3.5–5.3)
PROT SERPL-MCNC: 6.1 G/DL (ref 6.4–8.2)
RBC # BLD AUTO: 4.32 X10*6/UL (ref 4.5–5.9)
RBC MORPH BLD: NORMAL
SODIUM SERPL-SCNC: 141 MMOL/L (ref 136–145)
WBC # BLD AUTO: 6.1 X10*3/UL (ref 4.4–11.3)

## 2025-02-10 PROCEDURE — 82784 ASSAY IGA/IGD/IGG/IGM EACH: CPT

## 2025-02-10 PROCEDURE — 83735 ASSAY OF MAGNESIUM: CPT

## 2025-02-10 PROCEDURE — 80197 ASSAY OF TACROLIMUS: CPT

## 2025-02-10 PROCEDURE — 80053 COMPREHEN METABOLIC PANEL: CPT

## 2025-02-10 PROCEDURE — 87799 DETECT AGENT NOS DNA QUANT: CPT

## 2025-02-10 PROCEDURE — 85025 COMPLETE CBC W/AUTO DIFF WBC: CPT

## 2025-02-10 PROCEDURE — RXMED WILLOW AMBULATORY MEDICATION CHARGE

## 2025-02-10 PROCEDURE — 84153 ASSAY OF PSA TOTAL: CPT

## 2025-02-11 ENCOUNTER — TELEPHONE (OUTPATIENT)
Dept: TRANSPLANT | Facility: HOSPITAL | Age: 70
End: 2025-02-11
Payer: MEDICARE

## 2025-02-11 ENCOUNTER — HOSPITAL ENCOUNTER (OUTPATIENT)
Dept: RESPIRATORY THERAPY | Facility: HOSPITAL | Age: 70
Discharge: HOME | End: 2025-02-11
Payer: MEDICARE

## 2025-02-11 ENCOUNTER — PHARMACY VISIT (OUTPATIENT)
Dept: PHARMACY | Facility: CLINIC | Age: 70
End: 2025-02-11
Payer: COMMERCIAL

## 2025-02-11 DIAGNOSIS — Z94.2 LUNG TRANSPLANTED (MULTI): ICD-10-CM

## 2025-02-11 LAB
CMV DNA SERPL NAA+PROBE-LOG IU: NORMAL {LOG_IU}/ML
EBV DNA SPEC NAA+PROBE-LOG#: NORMAL {LOG_COPIES}/ML
IGG SERPL-MCNC: 937 MG/DL (ref 700–1600)
LABORATORY COMMENT REPORT: NOT DETECTED
LABORATORY COMMENT REPORT: NOT DETECTED
PSA SERPL-MCNC: 2.36 NG/ML
TACROLIMUS BLD-MCNC: 5.5 NG/ML

## 2025-02-11 PROCEDURE — RXMED WILLOW AMBULATORY MEDICATION CHARGE

## 2025-02-11 RX ORDER — TACROLIMUS 0.5 MG/1
CAPSULE, GELATIN COATED ORAL
Qty: 150 CAPSULE | Refills: 11 | Status: SHIPPED | OUTPATIENT
Start: 2025-02-11 | End: 2026-02-11

## 2025-02-11 RX ORDER — PENTAMIDINE ISETHIONATE 300 MG/300MG
300 INHALANT RESPIRATORY (INHALATION)
Qty: 300 MG | Refills: 11 | Status: SHIPPED | OUTPATIENT
Start: 2025-02-11 | End: 2026-02-11

## 2025-02-11 NOTE — TELEPHONE ENCOUNTER
Labs reviewed on 2/11/2025  WBC 6.1 ANC 4.22  Hbg 12.2  Platelets 68  Creatinine 1.0  Magnesium 1.66  IgG Pending  Prograf 5.5 Goal 6-8 Dose 1/1     -Changes made: Increase tacro to 1/1.5  -Labs due next 2/24    Unable to leave a voicemail for patient as mailbox was full. Left voicemail with above results and plan to Lori and asked for a callback.

## 2025-02-11 NOTE — TELEPHONE ENCOUNTER
Incoming call from RT in regards to patient needing pentamidine script sent to [facility]. Script pended to provider.

## 2025-02-11 NOTE — TELEPHONE ENCOUNTER
Called patient and left him a voicemail letting him know that pentamidine was sent to [facility]. Called [facility] and updated them on urgent need of medication.

## 2025-02-11 NOTE — PROCEDURES
"09:10  Pt. Arrived early but did NOT have Pentamidine med.  \"No one told me to bring medicine.  I wasn't sure what I was doing at PFT lab today.\"  Called Transplant team to get Pentamidine order entered (not on pt.'s med list) and team sent Rx to [facility] pharmacy after waiting for signature.  10:15  Pt. Came back to lab w/ Pentamidine 300 mg vial.  Med dissolved in 6 ml sterile water.  P.ox 95%/ HR 76/ BS clear but dim. On left.  Reviewed preventing pneumonia w/ Pentamidine aerosol txs.  Pt. Denies dizziness, dyspnea, or nausea.  10:20  Albuterol MDI w/ spacer, 2 puffs given.  Pt. Able to hold deep breath > 5 sec.  Due to Bell's Palsy on R. Side of mouth, he does not seal around spacer mouthpiece but able to pull in deep breath.  P.ox 94%/ HR 92/ BS clear uppers, dim. Left.  10:40  Pentamidine aerosol tx started.  Pt. Takes steady breaths w/ occas. Deep breath.  P.ox 95%/ HR 70/ BS clear uppers, dim. Left.  10:50  P.ox 92%/ HR 68/ BS clear uppers, dim. Left.  Pt. Sleepy and needs to be reminded to breathe through the mouthpiece. Liquid accumulated in aerosol tubing drained as needed.  11:00  P.ox 94%. HR 70/ BS clear uppers,  Pt. Jerked awake and almost dropped aerosol nebulizer but caught it.  Reminded pt. More often, To breathe through mouthpiece to keep him awake.  11:08  Pentamidine aerosol tx finished.  Pt. Rinsed mouth and throat w/ water and took sips of water.  Pt. Denies dyspnea, dizziness, or nausea.  P.ox 94%/ HR 70/ BS clear bilat. But dim. Left.  11:15  Ready to dismiss pt. From lab but he had a question:  \"How long do lung transplant patients live after the transplant?\"  Explained that every pt. Is different but preventing lung infections & treating any that happen are both helpful in extending transplant pt.'s years of living.  He stated that it is coming up on 5 yrs since his transplant so he starting thinking about how long he's got and plans to write a letter of gratitude to his donor's " family.  11:20  Pt. Dismissed from lab w/o complaint.

## 2025-02-12 ENCOUNTER — PHARMACY VISIT (OUTPATIENT)
Dept: PHARMACY | Facility: CLINIC | Age: 70
End: 2025-02-12
Payer: COMMERCIAL

## 2025-02-13 ENCOUNTER — SPECIALTY PHARMACY (OUTPATIENT)
Dept: PHARMACY | Facility: CLINIC | Age: 70
End: 2025-02-13

## 2025-02-17 PROCEDURE — RXMED WILLOW AMBULATORY MEDICATION CHARGE

## 2025-02-18 ENCOUNTER — PHARMACY VISIT (OUTPATIENT)
Dept: PHARMACY | Facility: CLINIC | Age: 70
End: 2025-02-18
Payer: COMMERCIAL

## 2025-02-19 ENCOUNTER — APPOINTMENT (OUTPATIENT)
Dept: SLEEP MEDICINE | Facility: HOSPITAL | Age: 70
End: 2025-02-19
Payer: MEDICARE

## 2025-02-20 ENCOUNTER — SPECIALTY PHARMACY (OUTPATIENT)
Dept: PHARMACY | Facility: CLINIC | Age: 70
End: 2025-02-20

## 2025-02-24 ENCOUNTER — LAB (OUTPATIENT)
Dept: LAB | Facility: HOSPITAL | Age: 70
End: 2025-02-24
Payer: MEDICARE

## 2025-02-24 ENCOUNTER — HOSPITAL ENCOUNTER (OUTPATIENT)
Dept: RESPIRATORY THERAPY | Facility: HOSPITAL | Age: 70
Discharge: HOME | End: 2025-02-24
Payer: MEDICARE

## 2025-02-24 DIAGNOSIS — Z94.2 LUNG TRANSPLANTED (MULTI): ICD-10-CM

## 2025-02-24 DIAGNOSIS — Z94.2 LUNG TRANSPLANT STATUS: Primary | ICD-10-CM

## 2025-02-24 LAB
ALBUMIN SERPL BCP-MCNC: 3.8 G/DL (ref 3.4–5)
ALP SERPL-CCNC: 89 U/L (ref 33–136)
ALT SERPL W P-5'-P-CCNC: 28 U/L (ref 10–52)
ANION GAP SERPL CALC-SCNC: 8 MMOL/L (ref 10–20)
AST SERPL W P-5'-P-CCNC: 37 U/L (ref 9–39)
BASOPHILS # BLD AUTO: 0.03 X10*3/UL (ref 0–0.1)
BASOPHILS NFR BLD AUTO: 0.4 %
BILIRUB SERPL-MCNC: 0.6 MG/DL (ref 0–1.2)
BUN SERPL-MCNC: 26 MG/DL (ref 6–23)
CALCIUM SERPL-MCNC: 9.2 MG/DL (ref 8.6–10.3)
CHLORIDE SERPL-SCNC: 108 MMOL/L (ref 98–107)
CO2 SERPL-SCNC: 30 MMOL/L (ref 21–32)
CREAT SERPL-MCNC: 1.12 MG/DL (ref 0.5–1.3)
EGFRCR SERPLBLD CKD-EPI 2021: 70 ML/MIN/1.73M*2
EOSINOPHIL # BLD AUTO: 0.15 X10*3/UL (ref 0–0.4)
EOSINOPHIL NFR BLD AUTO: 2 %
ERYTHROCYTE [DISTWIDTH] IN BLOOD BY AUTOMATED COUNT: 21.9 % (ref 11.5–14.5)
GIANT PLATELETS BLD QL SMEAR: NORMAL
GLUCOSE SERPL-MCNC: 99 MG/DL (ref 74–99)
HCT VFR BLD AUTO: 42.8 % (ref 41–52)
HGB BLD-MCNC: 12.9 G/DL (ref 13.5–17.5)
IMM GRANULOCYTES # BLD AUTO: 0.02 X10*3/UL (ref 0–0.5)
IMM GRANULOCYTES NFR BLD AUTO: 0.3 % (ref 0–0.9)
LYMPHOCYTES # BLD AUTO: 0.6 X10*3/UL (ref 0.8–3)
LYMPHOCYTES NFR BLD AUTO: 8.1 %
MAGNESIUM SERPL-MCNC: 1.66 MG/DL (ref 1.6–2.4)
MCH RBC QN AUTO: 29.5 PG (ref 26–34)
MCHC RBC AUTO-ENTMCNC: 30.1 G/DL (ref 32–36)
MCV RBC AUTO: 98 FL (ref 80–100)
MGC ASCENT PFT - FEV1 - PRE: 1.33
MGC ASCENT PFT - FEV1 - PREDICTED: 2.91
MGC ASCENT PFT - FVC - PRE: 2.23
MGC ASCENT PFT - FVC - PREDICTED: 3.85
MONOCYTES # BLD AUTO: 0.56 X10*3/UL (ref 0.05–0.8)
MONOCYTES NFR BLD AUTO: 7.6 %
NEUTROPHILS # BLD AUTO: 6.02 X10*3/UL (ref 1.6–5.5)
NEUTROPHILS NFR BLD AUTO: 81.6 %
NRBC BLD-RTO: 0 /100 WBCS (ref 0–0)
PLATELET # BLD AUTO: 100 X10*3/UL (ref 150–450)
POTASSIUM SERPL-SCNC: 5.6 MMOL/L (ref 3.5–5.3)
PROT SERPL-MCNC: 6.2 G/DL (ref 6.4–8.2)
RBC # BLD AUTO: 4.37 X10*6/UL (ref 4.5–5.9)
RBC MORPH BLD: NORMAL
SODIUM SERPL-SCNC: 140 MMOL/L (ref 136–145)
WBC # BLD AUTO: 7.4 X10*3/UL (ref 4.4–11.3)

## 2025-02-24 PROCEDURE — 80053 COMPREHEN METABOLIC PANEL: CPT

## 2025-02-24 PROCEDURE — 80197 ASSAY OF TACROLIMUS: CPT

## 2025-02-24 PROCEDURE — 87799 DETECT AGENT NOS DNA QUANT: CPT

## 2025-02-24 PROCEDURE — 85025 COMPLETE CBC W/AUTO DIFF WBC: CPT

## 2025-02-24 PROCEDURE — 82784 ASSAY IGA/IGD/IGG/IGM EACH: CPT

## 2025-02-24 PROCEDURE — 94010 BREATHING CAPACITY TEST: CPT

## 2025-02-24 PROCEDURE — 83735 ASSAY OF MAGNESIUM: CPT

## 2025-02-25 LAB
CMV DNA SERPL NAA+PROBE-LOG IU: NORMAL {LOG_IU}/ML
EBV DNA SPEC NAA+PROBE-LOG#: NORMAL {LOG_COPIES}/ML
IGG SERPL-MCNC: 921 MG/DL (ref 700–1600)
LABORATORY COMMENT REPORT: NOT DETECTED
LABORATORY COMMENT REPORT: NOT DETECTED
TACROLIMUS BLD-MCNC: 8 NG/ML

## 2025-02-26 ENCOUNTER — TELEPHONE (OUTPATIENT)
Dept: TRANSPLANT | Facility: HOSPITAL | Age: 70
End: 2025-02-26
Payer: MEDICARE

## 2025-02-26 ENCOUNTER — LAB (OUTPATIENT)
Dept: LAB | Facility: HOSPITAL | Age: 70
End: 2025-02-26
Payer: MEDICARE

## 2025-02-26 DIAGNOSIS — Z94.2 LUNG TRANSPLANT STATUS: Primary | ICD-10-CM

## 2025-02-26 PROCEDURE — 80053 COMPREHEN METABOLIC PANEL: CPT

## 2025-02-26 PROCEDURE — 36415 COLL VENOUS BLD VENIPUNCTURE: CPT

## 2025-02-26 NOTE — TELEPHONE ENCOUNTER
Called patient to ask if he could get CMP drawn today to recheck potassium. Emailed script to patient.

## 2025-02-27 ENCOUNTER — TELEPHONE (OUTPATIENT)
Dept: TRANSPLANT | Facility: HOSPITAL | Age: 70
End: 2025-02-27
Payer: MEDICARE

## 2025-02-27 DIAGNOSIS — M89.9 DISORDER OF BONE, UNSPECIFIED: ICD-10-CM

## 2025-02-27 DIAGNOSIS — I10 ESSENTIAL (PRIMARY) HYPERTENSION: ICD-10-CM

## 2025-02-27 DIAGNOSIS — Z94.2 LUNG TRANSPLANTED (MULTI): ICD-10-CM

## 2025-02-27 LAB
ALBUMIN SERPL BCP-MCNC: 4 G/DL (ref 3.4–5)
ALP SERPL-CCNC: 93 U/L (ref 33–136)
ALT SERPL W P-5'-P-CCNC: 30 U/L (ref 10–52)
ANION GAP SERPL CALC-SCNC: 9 MMOL/L (ref 10–20)
AST SERPL W P-5'-P-CCNC: 34 U/L (ref 9–39)
BILIRUB SERPL-MCNC: 1.3 MG/DL (ref 0–1.2)
BUN SERPL-MCNC: 21 MG/DL (ref 6–23)
CALCIUM SERPL-MCNC: 9.4 MG/DL (ref 8.6–10.3)
CHLORIDE SERPL-SCNC: 106 MMOL/L (ref 98–107)
CO2 SERPL-SCNC: 31 MMOL/L (ref 21–32)
CREAT SERPL-MCNC: 1.05 MG/DL (ref 0.5–1.3)
EGFRCR SERPLBLD CKD-EPI 2021: 76 ML/MIN/1.73M*2
GLUCOSE SERPL-MCNC: 140 MG/DL (ref 74–99)
POTASSIUM SERPL-SCNC: 4.6 MMOL/L (ref 3.5–5.3)
PROT SERPL-MCNC: 6.5 G/DL (ref 6.4–8.2)
SODIUM SERPL-SCNC: 141 MMOL/L (ref 136–145)

## 2025-02-27 NOTE — TELEPHONE ENCOUNTER
Labs reviewed on 2/26/2025  WBC 7.4 ANC 6.02  Hbg 12.9  Platelets 100  Creatinine 1.12  Magnesium 1.66  Potassium 5.6  IgG 921  Prograf 8 Goal 6-8 Dose 1/1.5     -Changes made: None  -Labs due next  Repeat K--> 2/26: 4.6. Next labs due 3/24.       Called patient with above results and plan. Also notified him that his jelly this week was stable and improving. Patient verbalized understanding.

## 2025-02-28 ENCOUNTER — SPECIALTY PHARMACY (OUTPATIENT)
Dept: PHARMACY | Facility: CLINIC | Age: 70
End: 2025-02-28

## 2025-02-28 PROCEDURE — RXMED WILLOW AMBULATORY MEDICATION CHARGE

## 2025-03-03 PROCEDURE — RXMED WILLOW AMBULATORY MEDICATION CHARGE

## 2025-03-04 ENCOUNTER — PHARMACY VISIT (OUTPATIENT)
Dept: PHARMACY | Facility: CLINIC | Age: 70
End: 2025-03-04
Payer: COMMERCIAL

## 2025-03-06 ENCOUNTER — PHARMACY VISIT (OUTPATIENT)
Dept: PHARMACY | Facility: CLINIC | Age: 70
End: 2025-03-06
Payer: COMMERCIAL

## 2025-03-10 ENCOUNTER — LAB (OUTPATIENT)
Dept: LAB | Facility: HOSPITAL | Age: 70
End: 2025-03-10
Payer: MEDICARE

## 2025-03-10 DIAGNOSIS — Z94.2 LUNG TRANSPLANT STATUS: Primary | ICD-10-CM

## 2025-03-10 DIAGNOSIS — Z94.2 LUNG TRANSPLANTED (MULTI): ICD-10-CM

## 2025-03-10 LAB
ALBUMIN SERPL BCP-MCNC: 3.7 G/DL (ref 3.4–5)
ALP SERPL-CCNC: 81 U/L (ref 33–136)
ALT SERPL W P-5'-P-CCNC: 14 U/L (ref 10–52)
ANION GAP SERPL CALC-SCNC: 10 MMOL/L (ref 10–20)
AST SERPL W P-5'-P-CCNC: 24 U/L (ref 9–39)
BASOPHILS # BLD AUTO: 0.03 X10*3/UL (ref 0–0.1)
BASOPHILS NFR BLD AUTO: 0.5 %
BILIRUB SERPL-MCNC: 0.7 MG/DL (ref 0–1.2)
BUN SERPL-MCNC: 15 MG/DL (ref 6–23)
CALCIUM SERPL-MCNC: 9 MG/DL (ref 8.6–10.6)
CHLORIDE SERPL-SCNC: 103 MMOL/L (ref 98–107)
CO2 SERPL-SCNC: 33 MMOL/L (ref 21–32)
CREAT SERPL-MCNC: 0.96 MG/DL (ref 0.5–1.3)
EGFRCR SERPLBLD CKD-EPI 2021: 85 ML/MIN/1.73M*2
EOSINOPHIL # BLD AUTO: 0.22 X10*3/UL (ref 0–0.4)
EOSINOPHIL NFR BLD AUTO: 3.4 %
ERYTHROCYTE [DISTWIDTH] IN BLOOD BY AUTOMATED COUNT: 19.4 % (ref 11.5–14.5)
GLUCOSE SERPL-MCNC: 93 MG/DL (ref 74–99)
HCT VFR BLD AUTO: 44.4 % (ref 41–52)
HGB BLD-MCNC: 13.5 G/DL (ref 13.5–17.5)
IGG SERPL-MCNC: 906 MG/DL (ref 700–1600)
IMM GRANULOCYTES # BLD AUTO: 0.03 X10*3/UL (ref 0–0.5)
IMM GRANULOCYTES NFR BLD AUTO: 0.5 % (ref 0–0.9)
LYMPHOCYTES # BLD AUTO: 0.9 X10*3/UL (ref 0.8–3)
LYMPHOCYTES NFR BLD AUTO: 14 %
MAGNESIUM SERPL-MCNC: 1.36 MG/DL (ref 1.6–2.4)
MCH RBC QN AUTO: 30.4 PG (ref 26–34)
MCHC RBC AUTO-ENTMCNC: 30.4 G/DL (ref 32–36)
MCV RBC AUTO: 100 FL (ref 80–100)
MONOCYTES # BLD AUTO: 0.6 X10*3/UL (ref 0.05–0.8)
MONOCYTES NFR BLD AUTO: 9.3 %
NEUTROPHILS # BLD AUTO: 4.66 X10*3/UL (ref 1.6–5.5)
NEUTROPHILS NFR BLD AUTO: 72.3 %
NRBC BLD-RTO: 0 /100 WBCS (ref 0–0)
PLATELET # BLD AUTO: 108 X10*3/UL (ref 150–450)
POTASSIUM SERPL-SCNC: 4.1 MMOL/L (ref 3.5–5.3)
PROT SERPL-MCNC: 6.2 G/DL (ref 6.4–8.2)
RBC # BLD AUTO: 4.44 X10*6/UL (ref 4.5–5.9)
SODIUM SERPL-SCNC: 142 MMOL/L (ref 136–145)
TACROLIMUS BLD-MCNC: 9 NG/ML
WBC # BLD AUTO: 6.4 X10*3/UL (ref 4.4–11.3)

## 2025-03-10 PROCEDURE — 82784 ASSAY IGA/IGD/IGG/IGM EACH: CPT

## 2025-03-10 PROCEDURE — 36415 COLL VENOUS BLD VENIPUNCTURE: CPT

## 2025-03-10 PROCEDURE — 87799 DETECT AGENT NOS DNA QUANT: CPT

## 2025-03-10 PROCEDURE — 83735 ASSAY OF MAGNESIUM: CPT

## 2025-03-10 PROCEDURE — 85025 COMPLETE CBC W/AUTO DIFF WBC: CPT

## 2025-03-10 PROCEDURE — 80053 COMPREHEN METABOLIC PANEL: CPT

## 2025-03-10 PROCEDURE — 80197 ASSAY OF TACROLIMUS: CPT

## 2025-03-10 NOTE — PROGRESS NOTES
Mr. 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 His transplant course was complicated by pleural bleed with return to OR for washout on 3/23/2020, prolonged ventilatory support requiring tracheostomy (since decannulated) and discharged on 5/8/2020. Post-hospital course was complicated by septic shock with MSSA in August 20202 with PE requiring lifelong anticoagulation.      Presenting today for follow-up visit.     Interval Medical History:  3/12/25: Breathing overall stable, no cough. No nausea or vomiting, moving bowels. Hg overall stable. Continues on monthly pentamidine. Scheduled to see Neuro 4/23/25. Trying to increase activity. BMI stable at 31. Has been out of Letermovir for past week, needs a new prescription.    1/22/25: Admitted to the hospital in the setting of symptomatic anemia with a normocytic MCV, hematology was consulted, patient was transfused  and received iron supplementation IV and oral. Changed to Pentamidine and Letermovir. Cellcept held. Concern for SARA and plan for PSG as outpatient, however patient refusing.    11/11/24: Breathing feels okay, no coughing/wheezing/SOB. Reporting muscle cramping, mostly in the evening, will space Magnesium to BID advised to call if diarrhea becomes an issue. Staying active at home, working outdoors. Reporting memory concerns, first noticed by wife, frequently forgets tasks, prior engagements, wife concerned. Will refer to Neurology.      9/9/24: Breathing overall stable on RA, trying to be more active. No nausea or vomiting, moving bowels. Working on weight loss, BMI 30. BP overall stable, 120's systolic. Lesion on scalp biopsied, negative for malignancy. Elevated PSA, seen by Urology, MRI prostate 9/15/24. Started on Sanctura for BPH. Ongoing issues with neuropathy in feet/hands. Amenable to starting Aranesp injections.     6/3/24: Breathing overall stable on RA, trying to increase activity. No nausea or  vomiting, moving bowels. No LE edema. Working on weight loss, lost 10 lbs. BP overall improved, 120's systolic. Following with Dermatology, lesion on scalp removed, biopsy pending, will fax us results. Mood overall improved, met with Dr. Vincent, feeling better.     4/3/24: Breathing overall stable on RA, no nausea or vomiting, moving bowels, feeling well. Trying to maintain adequate po fluid intake, no LE edema. Working on weight loss but not having much success, BMI 33. Depressed about weight gain. Trying to increase activity, knows he needs to go back to the gym. BP lower 100's systolic.     1/10/24: Breathing overall stable in RA, no nausea or vomiting, moving bowels. No LE edema. Feeling well. Saw Dermatology lesion on forehead consistent with SCC, removed, no further lesions. Gained 10 lbs since last visit, BMI 30.     11/10/23: Breathing overall stable on RA, no nausea or vomiting, moving bowels. No LE edema. Maintaining adequate po fluid intake. New lesion on forehead, planning to see Dermatology soon.     10/13/23: Bronchoscopy on 9/19/23 with multiple strains of Pseudomonas, completed a 14 day course of Ciprofloxacin and started on inhaled VICENTE, some tremors with albuterol. TBBX with no ACR or OB. Feels breathing and cough is overall improved, no MERINO. No nausea or vomiting, tolerating po intake and moving bowels.     9/7/23: Seen in ER 8/25/23 for chest pain, sharp increased with movement. Workup negative including nuclear stress test. Started on Crestor. Still present at times but improved, feels musculoskeletal in nature. Breathing overall stable, still has a cough with brownish sputum despite course of Ciprofloxacin after last visit, RVP negative. No nausea or vomiting, moving bowels regularly.     8/2/23: Breathing overall stable, walking daily. Cough with brownish sputum for the past 2 weeks, no fevers, chills. No nausea or vomiting, moving bowels, no LE edema, maintaining adequate po fluid intake.      6/12/23: Breathing stable, working on weight loss. No nausea or vomiting, moving bowels. No LE edema. No longer working due to hours. Trying to be more active.     4/5/23: Feels that breathing is overall stable, no MERINO or cough. Tolerating PO diet, no nausea or vomiting, moving bowels, no LE edema. Notes 15lb weight gain self reported due to inactivity - no orthopnea or PND. Notes skin lesion on scalp present for last few months, not yet evaluated by dermatology. Blood glucose range , 115 on 12.5mg Jardiance bid, BP max 115 at home. 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  Discard remainder of vial after each use., Disp: 600 mL, Rfl: 11    acyclovir (Zovirax) 400 mg tablet, Take 1 tablet (400 mg) by mouth 2 times a day., Disp: 180 tablet, Rfl: 3    alcohol swabs pads, medicated, Apply 1 Swab topically 1 (one) time per week. Use before aranesp injection., Disp: 140 each, Rfl: 3    apixaban (Eliquis) 5 mg tablet, Take 1 tablet (5 mg) by mouth every 12 hours., Disp: 180 tablet, Rfl: 3    azithromycin (Zithromax) 250 mg tablet, Take 1 tablet (250 mg) by mouth once a day on Monday, Wednesday, and Friday., Disp: 36 tablet, Rfl: 3    blood sugar diagnostic (OneTouch Verio test strips) strip, Use as directed 3 times a day before meals., Disp: 300 each, Rfl: 3    budesonide-formoteroL (Symbicort) 160-4.5 mcg/actuation inhaler, Inhale 2 puffs by mouth 2 times a day. Rinse mouth with water after use to reduce aftertaste and incidence of candidiasis. Do not swallow., Disp: 10.2 g, Rfl: 11    calcium carbonate (Oscal) 500 mg calcium (1,250 mg) tablet, Take 1 tablet (1,250 mg) by mouth 2 times daily (morning and late afternoon)., Disp: 180 tablet, Rfl: 3    cholecalciferol (Vitamin D-3) 25 MCG  (1000 UT) capsule, Take 1 capsule (25 mcg) by mouth once daily., Disp: 90 capsule, Rfl: 3    colistimethate (Colymycin) 150 mg vial, Take 150 mg (1 vial) by nebulization 2 times a day. Mix 150 mg vial with 5 mL normal saline, insert into nebulizer and inhale medication 2 times a day, Disp: 60 each, Rfl: 11    darbepoetin alex in polysorbat (Aranesp, in polysorbate,) 60 mcg/mL injection, Inject 1 mL (60 mcg) under the skin 1 (one) time per week., Disp: 4 mL, Rfl: 11    DULoxetine (Cymbalta) 60 mg DR capsule, Take 1 capsule (60 mg) by mouth once daily. Do not crush or chew., Disp: 90 capsule, Rfl: 3    ferrous sulfate 325 (65 Fe) MG EC tablet, Take 1 tablet by mouth once daily with breakfast. Do not crush, chew, or split., Disp: 90 tablet, Rfl: 3    letermovir (Prevymis) 480 mg tablet, Take 1 tablet (480 mg) by mouth once daily., Disp: 90 tablet, Rfl: 3    levalbuterol (Xopenex) 0.63 mg/3 mL nebulizer solution, Inhale one (1) ampule via nebulizer twice daily., Disp: 180 mL, Rfl: 11    loratadine (Claritin) 10 mg tablet, TAKE ONE (1) TABLET BY MOUTH ONCE DAILY., Disp: 90 tablet, Rfl: 3    magnesium chloride (MagDelay) 64 mg EC tablet, Take 3 tablets (192 mg) by mouth 2 times a day. Separate from Tacrolimus by 2 hours., Disp: 540 tablet, Rfl: 3    metoprolol tartrate (Lopressor) 25 mg tablet, Take 0.5 tablets (12.5 mg) by mouth 2 times a day., Disp: 90 tablet, Rfl: 3    mirabegron (Myrbetriq) 50 mg tablet extended release 24 hr 24 hr tablet, Take 1 tablet (50 mg) by mouth once daily., Disp: 90 tablet, Rfl: 3    montelukast (Singulair) 10 mg tablet, Take 1 tablet (10 mg) by mouth once daily at bedtime., Disp: 90 tablet, Rfl: 3    multivitamin with minerals iron-free (Centrum Silver), Take 1 tablet by mouth once daily., Disp: 90 tablet, Rfl: 3    pantoprazole (ProtoNix) 40 mg EC tablet, Take 1 tablet (40 mg) by mouth once daily., Disp: 90 tablet, Rfl: 3    Pentamidine (Pentam) 300 mg inhalation solution, Inhale 300 mg  "every 28 (twenty-eight) days., Disp: 300 mg, Rfl: 11    predniSONE (Deltasone) 5 mg tablet, Take 1 tablet (5 mg) by mouth once daily., Disp: 90 tablet, Rfl: 3    rosuvastatin (Crestor) 40 mg tablet, Take 1 tablet (40 mg) by mouth once daily., Disp: 90 tablet, Rfl: 3    sennosides (Senokot) 8.6 mg tablet, Take 1 tablet (8.6 mg) by mouth once daily as needed for constipation., Disp: 30 tablet, Rfl: 11    sharps container, 1 each if needed (For aranesp needles)., Disp: 1 each, Rfl: 11    syringe with needle (BD Luer-Silvia Syringe) 10 mL 21 x 1 1/2\" syringe, Use as directed to mix normal saline and Colistin twice daily., Disp: 60 each, Rfl: 11    syringe with needle 3 mL 25 x 5/8\" syringe, 1 Syringe 1 (one) time per week. Use for Aranesp injections once weekly., Disp: 50 each, Rfl: 2    tacrolimus (Prograf) 0.5 mg capsule, Take 2 capsules (1 mg) by mouth once daily in the morning AND 2 capsules (1 mg) once daily in the evening., Disp: 150 capsule, Rfl: 11    ROS:      Constitutional: no chills, no fever, no night sweats, not feeling poorly and not feeling tired.   Eyes: no blurred vision, no eyesight problems, no dryness of the eyes and no eye pain.   ENT: hearing loss, but no nasal congestion, no nasal discharge, no hoarseness, no sore throat, no earache, the ears do not feel full, no tinnitus, no nosebleeds, no postnasal drip, no rhinorrhea and no sinus pressure.   Neck: no mass(es) and no swelling.   Cardiovascular: no chest pain, no intermittent leg claudication, no lower extremity edema, no palpitations, no syncope, no tachycardia and no bradycardia.   Respiratory: no cough, no shortness of breath during exertion, no shortness of breath at rest, no wheezing, non dry cough, non productive cough and no hemoptysis.   Gastrointestinal: no abdominal pain, no bloating, no nausea and no vomiting.   Musculoskeletal: no arthralgias, no back pain, no myalgias, no history of falls, no localized joint pain, no joint redness, no " joint stiffness, no joint swelling, no limb pain, no limb swelling and no neck pain.   Integumentary: no rashes.   Neurological: no convulsions, no difficulty walking, no diplopia, no dizziness, no dysarthria, no facial weakness, no headache, no limb weakness, no memory changes, no numbness, no speech difficulties, no tingling, no confusion and no syncope.   Psychiatric: no anxiety, no depression, no anhedonia, no substance use disorders, no personality change, no sleep disturbances and no suicidal ideations.   Endocrine: no changes in appetite, no feelings of weakness, no hair thinning or loss, no heat/cold intolerance, no polydipsia, no deepening of the voice, no polyuria, no muscle weakness and no proptosis.   Hematologic/Lymphatic: a tendency for easy bruising and a tendency for easy bleeding, but no swollen glands and no recurrent infections.   All other systems have been reviewed and are negative for complaint.          Physical Exam: VSS: 132/73, 69,12, 94% on RA, temp 36.7  Constitutional: Alert and in no acute distress. Well developed, well nourished.   Head and Face: Head and face: Normal.  (Moon facies from steroid, right facial droop unchanged).   Ears, Nose, Mouth, and Throat: External inspection of ears and nose: Normal.  Nasal mucosa, septum, and turbinates: Normal.  Lips, teeth, and gums: Normal.    Neck: No neck mass was observed. Supple.   Pulmonary: Chest: Normal to inspection.  (No wound) Respiratory effort: No increased work of breathing or signs of respiratory distress.  Auscultation of lungs: Clear to auscultation bilaterally.    Cardiovascular: Heart rate and rhythm were normal, normal S1 and S2, no gallops, no murmurs and no pericardial rub. Pedal pulses: Normal. No peripheral edema.   Abdomen: Soft, nontender; no abdominal mass palpated. The abdomen was obese. Normal bowel sounds.   Lymphatic: No lymphadenopathy.   Musculoskeletal: No joint swelling seen, normal movements of all  extremities. Digits and nails: Abnormal.  Examination of the extremities revealed fingernail clubbing.   Neurologic: cranial nerves 2-12 were intact.   Psychiatric: Judgment and insight: Intact. Alert and oriented to person, place and time. Mood and affect:     Labwork:  Na 142, potassium 4.1, Cr 0.96, Mag 1.36, has missed doses, LFT's stable  WBC 6.4, Hg 13.5, Hematocrit 44.4, platelets 108, ANC acceptable 4660  Prograf 9.0, dose reduced, CMV PCR negative 3/10/25, EBV PCR negative on 3/10/25  Vitamin D 41 on 12/16/24, IgG 906, HLA Ab negative 12/16/25  Lipid panel 12/16/25 Cholesterol 98 TG 93, PSA 2.36 2/10/25, iron 26, TIBC 401, 1/6/25, received IV iron and started on po.         CXR: 3/12/25 reviewed, clear lung fields bilaterally, no acute infiltrates, elevated left hemidiaphragm (chronic).    Results:  3/10/25: Spirometry:   FVC: 2.21 57%  FEV1: 1.18 40%  SRW25-34: 0.25 10%    1/22/25: Spirometry:   FVC: 2.26 58%  FEV1: 12.0 41%  YOT75-63: 0.43 18 %    11/11/24: Spirometry  FEV: 1.32 45%  FVC: 2.35 60%  ZFA70-70: 0.56 24%     9/9/24: Spirometry:  FVC: 2.40 62%  FEV1: 1.36 46%  25-75: 0. 64 27%     6/3/24: Spirometry:  FVC: 2.48 64%  FEV1: 1.44 49%  25-75: 0.92 39%     4/3/24: Spirometry:  FVC:  2.41 59%  FEV1: 1.48 48%  25-75: 0.78 32%     1/10/24: Spirometry:  FVC: 2.45 60%  FEV1: 1.55 50%  25-75: 0.97 39%     11/10/23 Spirometry:  FVC: 2.33, 55%  FEV1: 1.47 46%  25-75: 0.95 39%     10/13/23 Spirometry:  FVC: 2.29 52%  FEV1: 1.35 42%  25-75: 0.88 36%     9/7/23 Spirometry:  FVC: 2.25, 53%  FEV1: 1.41, 44%  25-75: 0.82, 34%     8/2/23 Spirometry:  FVC: 2.48, 59%  FEV1: 1.52, 47%  25-75: 1.03, 42%     6/12/23 Spirometry:  FVC 2.65 62%  FEV1: 1.48, 46%  25-75: 0.89, 36%     5/2/23 Spirometry:  FVC: 2.58, 61%  FEV1: 1.51, 47%  25-75: 0.88, 36%     4/5/23 Spirometry:  FVC: 2.47 58%  FEV1: 1.50 46%  FEF 25-75: 31%    Assessment/Plan:  Mr. Patel is a 70 yo M with history of IPF/PAH s/p DLTx 3/22/20, GERD,  hypertension, DM, hyperlipidemia who presents for follow-up visit for lung transplant. Overall doing well. Seen in ER 8/25/23 for chest pain with negative workup. Admitted 1/25 for symptomatic anemia, improved with IV iron, started on po as well along with medication changes.      1. Pulmonary- s/p DLTx for IPF/PAH on 3/22/20, CMV mismatch, EBV positive recipient  Allograft function:  -Initial complications- minimal PGD, pleural bleed with washout 3/23 and need for trach, discharged on 5/8/2020.   -Modest PE at same presentation as septic shock 8/2020 - lifelong anticoagulation given second episode. (Remote VTE 1993). Currently on Eliquis BID.  -TBBX in 4/20 with A2, treated with pulse Solumedrol, TBBX in 5 and 6/20 with minimal ACR, subsequent TBBX negative, last 2/23/21.  -History of MSSA, Klebsiella, Corynebacterium from 4/20 cultures, Enterobacter from 6/20 cultures, sensitive to fluoroquinolones.  -Bronchoscopy on 9/19/23, cultures with multiple strains of Pseudomonas, completed a 14 day course of Ciprofloxacin and started on inhaled VICENTE. TBBX with no ACR or OB.   -Changed to inhaled colistin due to hearing loss with VICENTE, tolerating well.  -PFT's today overall stable compared to prior FEV11.18(40%); FEF 25-75 slightly decreased 0.25 (10%),continue to follow serial exams, continue to work on weight loss and conditioning, BMI improved at 31, trying to increase activity.  -CXR reviewed, clear lung fields bilaterally, elevated left hemidiaphragm (chronic), continue to follow serial exams. HRCT on 9/18/23 with relatively clear lung fields bilaterally, multifocal expiratory air trapping, suggestive of CLAD.  -History of SARA- evidence of this during admission 1/25, plan for outpatient PSG, patient refusing at this time.  -HLA Ab negative 12/16/24, no significant history of DSA post transplant, follow serially, every 3 months  -Continue Azithromycin 250 mg qM,W,F for ROSSY prophylaxis along with Singulair 10 mg QHS.  Also added Symbicort BID.     Immunosuppression:  -Prograf goal 6-8, level 9.0, decreased to 1 mg BID. Continue to follow closely, repeat on 3/24/25.  -Cellcept held 1/25 due to anemia, also given history of SCC. Everolimus likely not a viable option due to chronic thrombocytopenia.  -Prednisone 5 mg daily     Prophylaxis:  -Dapsone held due to anemia, continues on inhaled Pentamidine monthly, tolerating, last dose 3/12/25.  -CMV mismatch- previously on Valcyte, now on Letermovir 480 mg daily due to anemia, new prescription sent, CMV PCR's negative, last 3/10/25, no history of viremia. Also on Acyclovir for HSV prophylaxis.  -Previously on voriconazole for anti-fungal prophylaxis; no fungal infections, LFT's stable with elevated alk phos and normal MRI of liver; discontinued voriconazole 4/5/23 with improvement of alkaline phosphatase  -IgG level 906, adequate, continue to follow serial levels, goal greater than 600.     2. CV  -History of hypertension- Lopressor restarted at 12.5 mg BID, BP stable, ranging 120's sytolic. Advised to continue to follow BP closely and call us if greater than 130/80 or less than 100/60 consistently.   -Seen in ER 8/25/23 for chest pain, workup negative, including nuclear stress test. Overall improved, appears musculoskeletal in nature. TTE 9/26/23 with EF 65%, normal LV and RV function  -Hyperlipidemia- Lipid panel on 12/16/24 with acceptable Cholesterol and TG, continue Crestor 40 mg daily (for cardioprotective effect), repeat with next labwork.     3. Renal  -Cr overall stable at 0.96, advised to maintain adequate po hydration.  -Magnesium 1.36, reports he has missed doses at times. Advised to take regularly, continue SloMag 3 tabs BID.  -Renally dose all medications and avoid nephrotoxins  -History of nephrolithiasis in the past, no issues currently.  -Elevated PSA, overactive bladder symptoms, and erectile dysfunction- following with Urology, Dr Soni. MRI 9/15/24 with BPH, low  concern for malignancy. No improvement with Sanctura, changed to Mybetriq, will follow response. If no improvement, can consider cystoscopy and urodynamics. PSA normal at 2.36 on 2/10/25.     4. GI  -Continue Protonix 40 mg daily before meals   -Persistently elevated AP- MRI lever 2/9/23 with normal appearance; improved after discontinuation of voriconazole, will follow.   -Will follow after start of Crestor as above, stable.     5. ID  -History of septic shock with MSSA 8/2020. Received prolonged course of IV Abx with resolution.  -Otitis external starting 9/8/2020 with ENT involvement , multiple rounds ab and wick insertion, developed mastoiditis  -Sternal wound infection- 2 episodes of Staph s/p debridement, wound vac removed. Repeat episode 7/19-7/23/2021, no debridement, was discharged on Abx and completed a 8 week course.  -COVID 12/22, recovered and overall improved.  -Bronchoscopy on 9/19/23 cultures with multiple strains of Pseudomonas, completed a 14 day course of Ciprofloxacin and started on inhaled VICENTE, changed to inhaled Colistin due to hearing loss, tolerating well.  -Afebrile, no signs of new infection currently.     6. Endo  -History of DM- BG levels well-controlled  -History of osteoporosis- previously on Fosamax, DEXA 11/4/24 with osteopenia. Following with Dr. Pascual, planning for Reclast once insurance authorization obtained.  -Continue with vitamin D supplementation, level acceptable at 41, repeat with next labwork.     7. Heme  -History of PE at same presentation as septic shock 8/2020 - lifelong anticoagulation given second episode. (Remote VTE 1993). On Eliquis, increased to 5 mg BID as off voriconazole. Also has IVF filter in place.   -H/H stable at 13.5/44.4. Admitted 1/25 for symptomatic anemia. S/p IV iron, currently on 325 mg po daily, tolerating. Continues on Aranesp 60 mg mcg weekly. Cellcept discontinued as above. Changed to Pentamidine and Letermovir.      -WBC intact at 6.4,  "platelets stable at 108, chronic thrombocytopenia     8. ENT  -Otitis extena with facial nerve involvement. 10/17 Right tympanomastoidectomy, mastoid washout while inpatient. 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  -Chronic hearing loss with hearing aids, following with ENT, not a candidate for cochlear implant.    9. Neuro  - History of neuropathy, previously on Gabapentin, now discontinued.  - Memory Loss- reporting onset \"several months\" forgets to pay bills, forgets to complete tasks, wife reporting this is an issue. Scheduled to see Neurology on 4/23 for further evaluation.     10. Preventative Health:  -Vaccines:              Influenza: 10/11/24              Covid: 2/4/21, 3/12/21 8/16/21, 2/25/22, 10/13/22, 10/9/23, 10/11/24              Pneumonia: [id number] 9/22/16              RSV: 11/23              Shingrix: 6/21/19, 3/6/19  - Colonoscopy: Last done April 2018, tubular adenoma. Repeat interval not documented, should continue colon cancer screening until age 73  - Dermatology: Hx SCC. Follows with Dermatology, last visit November 2024. Melanoma removed from left ear. Dr. Liza Okeefe  - Dental: Up to date  -Continue to work on weight loss and increased activity, seen by Nutrition, weight stable since last visit, BMI 31.    RTC in 2 months with PFT's, CXR, pentamidine, continue with monthly Pentamidine/PFT's. Bronchoscopy only as clinically indicated.   Plan of care discussed at length with patient, all questions answered, total of 45 minutes spent regarding counseling and coordination of care.           "

## 2025-03-11 ENCOUNTER — TELEPHONE (OUTPATIENT)
Dept: TRANSPLANT | Facility: HOSPITAL | Age: 70
End: 2025-03-11
Payer: MEDICARE

## 2025-03-11 ENCOUNTER — DOCUMENTATION (OUTPATIENT)
Dept: TRANSPLANT | Facility: HOSPITAL | Age: 70
End: 2025-03-11
Payer: MEDICARE

## 2025-03-11 DIAGNOSIS — Z94.2 LUNG TRANSPLANTED (MULTI): ICD-10-CM

## 2025-03-11 DIAGNOSIS — M89.9 DISORDER OF BONE, UNSPECIFIED: ICD-10-CM

## 2025-03-11 DIAGNOSIS — I10 ESSENTIAL (PRIMARY) HYPERTENSION: ICD-10-CM

## 2025-03-11 PROCEDURE — RXMED WILLOW AMBULATORY MEDICATION CHARGE

## 2025-03-11 RX ORDER — TACROLIMUS 0.5 MG/1
1 CAPSULE, GELATIN COATED ORAL 2 TIMES DAILY
Qty: 150 CAPSULE | Refills: 11 | Status: CANCELLED | OUTPATIENT
Start: 2025-03-11 | End: 2026-03-11

## 2025-03-11 RX ORDER — TACROLIMUS 1 MG/1
1 CAPSULE ORAL 2 TIMES DAILY
Qty: 180 CAPSULE | Refills: 3 | Status: SHIPPED | OUTPATIENT
Start: 2025-03-11 | End: 2026-03-11

## 2025-03-11 NOTE — PROGRESS NOTES
Called Dr. Soto's office at [phone]  to request records for colonoscopy that was completed last week per patient. Will require authorization form to be completed and faxed to [phone].

## 2025-03-11 NOTE — TELEPHONE ENCOUNTER
Labs reviewed on 3/11/2025  WBC 6.4 ANC 4.66  Hbg 13.5  Platelets 108  Creatinine 0.96  Magnesium 1.36. Takes 192mg BID.   Potassium 4.1  IgG 906  Prograf 9 Goal 6-8 Dose 1/1.5    -Changes made: Decrease tacro to 1/1. Ensure that he is taking mag.   -Labs due next 4/7     Called patient with above results and plan. Had colonoscopy week of 3/3, will call office and ask for reports. He reports that he normally takes mag in the AM prior to lab draw, but did not this time around. Patient verbalized understanding of plan.

## 2025-03-12 ENCOUNTER — DOCUMENTATION (OUTPATIENT)
Dept: TRANSPLANT | Facility: HOSPITAL | Age: 70
End: 2025-03-12

## 2025-03-12 ENCOUNTER — HOSPITAL ENCOUNTER (OUTPATIENT)
Dept: RESPIRATORY THERAPY | Facility: HOSPITAL | Age: 70
Discharge: HOME | End: 2025-03-12
Payer: MEDICARE

## 2025-03-12 ENCOUNTER — PHARMACY VISIT (OUTPATIENT)
Dept: PHARMACY | Facility: CLINIC | Age: 70
End: 2025-03-12
Payer: COMMERCIAL

## 2025-03-12 ENCOUNTER — HOSPITAL ENCOUNTER (OUTPATIENT)
Dept: RADIOLOGY | Facility: HOSPITAL | Age: 70
Discharge: HOME | End: 2025-03-12
Payer: MEDICARE

## 2025-03-12 ENCOUNTER — OFFICE VISIT (OUTPATIENT)
Dept: TRANSPLANT | Facility: HOSPITAL | Age: 70
End: 2025-03-12
Payer: MEDICARE

## 2025-03-12 VITALS
DIASTOLIC BLOOD PRESSURE: 73 MMHG | TEMPERATURE: 98.1 F | HEIGHT: 69 IN | WEIGHT: 212 LBS | OXYGEN SATURATION: 94 % | BODY MASS INDEX: 31.4 KG/M2 | HEART RATE: 69 BPM | SYSTOLIC BLOOD PRESSURE: 132 MMHG

## 2025-03-12 DIAGNOSIS — J31.0 CHRONIC RHINITIS: ICD-10-CM

## 2025-03-12 DIAGNOSIS — Z94.2 S/P LUNG TRANSPLANT (MULTI): ICD-10-CM

## 2025-03-12 DIAGNOSIS — I10 ESSENTIAL (PRIMARY) HYPERTENSION: ICD-10-CM

## 2025-03-12 DIAGNOSIS — Z94.2 LUNG TRANSPLANTED (MULTI): ICD-10-CM

## 2025-03-12 DIAGNOSIS — D84.9 IMMUNOSUPPRESSION: ICD-10-CM

## 2025-03-12 DIAGNOSIS — M81.0 OSTEOPOROSIS WITHOUT CURRENT PATHOLOGICAL FRACTURE, UNSPECIFIED OSTEOPOROSIS TYPE: ICD-10-CM

## 2025-03-12 DIAGNOSIS — Z94.2 LUNG TRANSPLANTED (MULTI): Primary | ICD-10-CM

## 2025-03-12 DIAGNOSIS — J84.10 PULMONARY FIBROSIS (MULTI): ICD-10-CM

## 2025-03-12 DIAGNOSIS — K21.9 GASTROESOPHAGEAL REFLUX DISEASE WITHOUT ESOPHAGITIS: ICD-10-CM

## 2025-03-12 DIAGNOSIS — I26.99 PULMONARY EMBOLISM, UNSPECIFIED CHRONICITY, UNSPECIFIED PULMONARY EMBOLISM TYPE, UNSPECIFIED WHETHER ACUTE COR PULMONALE PRESENT (MULTI): ICD-10-CM

## 2025-03-12 DIAGNOSIS — E83.42 HYPOMAGNESEMIA: ICD-10-CM

## 2025-03-12 DIAGNOSIS — D50.8 IRON DEFICIENCY ANEMIA SECONDARY TO INADEQUATE DIETARY IRON INTAKE: ICD-10-CM

## 2025-03-12 PROCEDURE — 3075F SYST BP GE 130 - 139MM HG: CPT | Performed by: INTERNAL MEDICINE

## 2025-03-12 PROCEDURE — 99215 OFFICE O/P EST HI 40 MIN: CPT | Performed by: INTERNAL MEDICINE

## 2025-03-12 PROCEDURE — RXMED WILLOW AMBULATORY MEDICATION CHARGE

## 2025-03-12 PROCEDURE — 3008F BODY MASS INDEX DOCD: CPT | Performed by: INTERNAL MEDICINE

## 2025-03-12 PROCEDURE — 94010 BREATHING CAPACITY TEST: CPT | Performed by: INTERNAL MEDICINE

## 2025-03-12 PROCEDURE — 3078F DIAST BP <80 MM HG: CPT | Performed by: INTERNAL MEDICINE

## 2025-03-12 PROCEDURE — 94010 BREATHING CAPACITY TEST: CPT

## 2025-03-12 PROCEDURE — 1126F AMNT PAIN NOTED NONE PRSNT: CPT | Performed by: INTERNAL MEDICINE

## 2025-03-12 PROCEDURE — 1036F TOBACCO NON-USER: CPT | Performed by: INTERNAL MEDICINE

## 2025-03-12 PROCEDURE — 1159F MED LIST DOCD IN RCRD: CPT | Performed by: INTERNAL MEDICINE

## 2025-03-12 PROCEDURE — 99215 OFFICE O/P EST HI 40 MIN: CPT | Mod: 25 | Performed by: INTERNAL MEDICINE

## 2025-03-12 PROCEDURE — 71046 X-RAY EXAM CHEST 2 VIEWS: CPT

## 2025-03-12 PROCEDURE — G2211 COMPLEX E/M VISIT ADD ON: HCPCS | Performed by: INTERNAL MEDICINE

## 2025-03-12 PROCEDURE — 1160F RVW MEDS BY RX/DR IN RCRD: CPT | Performed by: INTERNAL MEDICINE

## 2025-03-12 PROCEDURE — 2500000001 HC RX 250 WO HCPCS SELF ADMINISTERED DRUGS (ALT 637 FOR MEDICARE OP): Performed by: NURSE PRACTITIONER

## 2025-03-12 PROCEDURE — 94642 AEROSOL INHALATION TREATMENT: CPT

## 2025-03-12 RX ORDER — ALBUTEROL SULFATE 90 UG/1
2 INHALANT RESPIRATORY (INHALATION) ONCE
Status: COMPLETED | OUTPATIENT
Start: 2025-03-12 | End: 2025-03-12

## 2025-03-12 RX ORDER — ALBUTEROL SULFATE 90 UG/1
2 INHALANT RESPIRATORY (INHALATION) ONCE
Status: CANCELLED | OUTPATIENT
Start: 2025-03-12

## 2025-03-12 RX ADMIN — ALBUTEROL SULFATE 2 PUFF: 90 AEROSOL, METERED RESPIRATORY (INHALATION) at 14:13

## 2025-03-12 ASSESSMENT — ENCOUNTER SYMPTOMS
CHANGE IN BOWEL HABIT: 0
COUGH: 0
FALLS: 0
DIZZINESS: 0
FEVER: 0
CONSTIPATION: 0
SUSPICIOUS LESIONS: 0
BLURRED VISION: 0
EYE PAIN: 0
NAUSEA: 0
WEIGHT GAIN: 0
SORE THROAT: 0
DOUBLE VISION: 0
VOMITING: 0
COLOR CHANGE: 0
HEMATURIA: 0
NUMBNESS: 0
DIARRHEA: 0
PALPITATIONS: 0
PARESTHESIAS: 0
BRUISES/BLEEDS EASILY: 1
MUSCLE CRAMPS: 1
HEADACHES: 0
DYSPNEA ON EXERTION: 1
WEAKNESS: 0
WEIGHT LOSS: 0
LIGHT-HEADEDNESS: 0
IRREGULAR HEARTBEAT: 0
SPUTUM PRODUCTION: 0
GASTROINTESTINAL NEGATIVE: 1
TREMORS: 0
SHORTNESS OF BREATH: 0
CHILLS: 0

## 2025-03-12 ASSESSMENT — PAIN SCALES - GENERAL: PAINLEVEL_OUTOF10: 0-NO PAIN

## 2025-03-12 NOTE — PROGRESS NOTES
Faxed signed authorization requesting EGD and colonoscopy results to Dr. Soto's office at [phone].

## 2025-03-12 NOTE — PROCEDURES
Patient arrived to PFT lab with own Pentamidine 300 mg dose; added 6 mLs sterile water. Patient is not reporting SOB, nausea or dizziness.    1359- SpO2 97%, HR 66 bpm, RR 18 breaths/min, breath sounds (BS)- rhonchi throughout with good aeration, patient reports expectorating thick, brown sputum this morning; Albuterol 2 puffs via MDI with spacer given  1411- SpO2 97%, HR 70 bpm, RR 18 breaths/min, BS- rhonchi auscultated posteriorly in RUL, NEFTALY, LLL, clear RML + RLL, clear anterior BS; Pentamidine 300 mg started via nebulizer with filter and one-way valves  1421- SpO2 98%, HR 71, RR 18 breaths/min, BS- clear throughout right, slightly diminished left with rhonchi lower lobe  1431- SpO2 99%, HR 71 bpm, RR 16 breaths/min, BS- diminished RLL, clear throughout, otherwise; Pentamidine complete  1436- SpO2 97%, HR 70 bpm, RR 16 breaths/min, BS- diminished LLL, rhonchi throughout right, clear NEFTALY    Patient left PFT lab with no complaints of SOB, dizziness or nausea. He experienced intermittent coughing throughout but SpO2/WOB stayed WNL.

## 2025-03-12 NOTE — PROGRESS NOTES
Review of Systems   Constitutional: Negative for chills, fever, malaise/fatigue (occasional), weight gain and weight loss (More active, eating healthier).   HENT:  Negative for congestion and sore throat.    Eyes:  Negative for blurred vision, double vision and pain.   Cardiovascular:  Positive for dyspnea on exertion. Negative for chest pain, irregular heartbeat, leg swelling and palpitations.   Respiratory:  Negative for cough, shortness of breath and sputum production (Brown/yellow. In the morning.).    Hematologic/Lymphatic: Bruises/bleeds easily.   Skin:  Negative for color change and suspicious lesions.   Musculoskeletal:  Positive for muscle cramps (Toes and feet.). Negative for falls and muscle weakness.   Gastrointestinal: Negative.  Negative for change in bowel habit, constipation, diarrhea, nausea and vomiting.   Genitourinary:  Negative for hematuria and urgency.        Incontinence at times, following uro.   Neurological:  Negative for dizziness, headaches, light-headedness, numbness, paresthesias, tremors and weakness.     Patient seen in clinic today. Sometimes has difficulty initiating urinary stream and also has some periods of incontinence, following with Uro. Unsure if he should still be taking letermovir. Discussed with Dr. Boone. Verified that patient should continue taking letermovir. Will need to follow up with Endo in regards to Reclast for Osteopenia. Plan to RTC in 2 months with CXR jelly and pentamidine. Monthly jelly and pentamidine. Next bronchoscopy PRN. Labs due 3/24/2025. Pentamidine next 4/16/2025.     Routine Care Addressed:   -Vaccines:  -->2024 COVID 10/11/2024  -->2024 Flu 10/11/2024  -->RSV 11/1/2023  -Colonoscopy: 4/2022--> Done, awaiting report from Dr. Arndt's office  -DEXA Scan: 11/4/2024--> Osteopenia   -->Treatments: Following with Endocrine--> Dr. Ashok Pascual   - Endocrine: 10/22/2025  -PSA: Due 2/2026  - Urology: Following   -Dentist: Following   -Dermatology:  Following with Dr. Ty to home.

## 2025-03-12 NOTE — PROGRESS NOTES
Called  [facility] at [phone] to see if letermovir can be expedited. They will deliver this either tomorrow or Friday.

## 2025-03-12 NOTE — PATIENT INSTRUCTIONS
Thank you for coming in to clinic today, it was nice to see you.     Today we discussed the following:  - We need to follow up with your Endocrinology team to make sure that reclast infusions for osteopenia (low bone mass) are being coordinated.   - Riky is requesting records for your colonoscopy and EGD from last week.       Medication Changes:  - Please make sure that you are taking your letermovir (PREVYMIS) 480mg daily. This medication should not be stopped.       Follow-up  - Jelly and pentamidine next on 4/16.   - Return to clinic in months with pentamidine, jelly and chest xray.   - Labs due next 3/24/2025.    As a friendly reminder:  - Call the office if you develop any symptoms including but not limited to fevers, chills, nausea, vomiting, aches, shortness of breath, or coughing  - Do not let your medications run out, try to maintain at least a 1 week supply at all times. Call the office early if refills are needed as these can be difficult to obtain on weekends  - Please keep all your appointments    As always, call the office at [phone] or [phone] for any issues during normal business hours    Out of hours, please call the [facility] [phone] for emergencies

## 2025-03-13 ENCOUNTER — PHARMACY VISIT (OUTPATIENT)
Dept: PHARMACY | Facility: CLINIC | Age: 70
End: 2025-03-13
Payer: COMMERCIAL

## 2025-03-13 DIAGNOSIS — M81.0 OSTEOPOROSIS WITHOUT CURRENT PATHOLOGICAL FRACTURE, UNSPECIFIED OSTEOPOROSIS TYPE: Primary | ICD-10-CM

## 2025-03-14 ENCOUNTER — SPECIALTY PHARMACY (OUTPATIENT)
Dept: PHARMACY | Facility: CLINIC | Age: 70
End: 2025-03-14

## 2025-03-14 ENCOUNTER — TELEPHONE (OUTPATIENT)
Dept: TRANSPLANT | Facility: HOSPITAL | Age: 70
End: 2025-03-14
Payer: MEDICARE

## 2025-03-14 LAB
MGC ASCENT PFT - FEV1 - PRE: 1.18
MGC ASCENT PFT - FEV1 - PREDICTED: 2.91
MGC ASCENT PFT - FVC - PRE: 2.21
MGC ASCENT PFT - FVC - PREDICTED: 3.85

## 2025-03-14 NOTE — TELEPHONE ENCOUNTER
Called patient in regards to letermovir, patient has not received med from  [facility] yet, but anticipates that it should arrive today. Reiterated that patient should take this medication daily. Also informed patient that [facility] would be calling him in the next few days to schedule his reclast. Answered all patient questions and needs.

## 2025-03-14 NOTE — TELEPHONE ENCOUNTER
Returned patient's call, advised that he call  [facility] to coordinate delivery as there are enough refills of Aranesp medications currently.

## 2025-03-15 ENCOUNTER — PHARMACY VISIT (OUTPATIENT)
Dept: PHARMACY | Facility: CLINIC | Age: 70
End: 2025-03-15
Payer: COMMERCIAL

## 2025-03-17 ENCOUNTER — TELEPHONE (OUTPATIENT)
Dept: TRANSPLANT | Facility: HOSPITAL | Age: 70
End: 2025-03-17
Payer: MEDICARE

## 2025-03-17 ENCOUNTER — SPECIALTY PHARMACY (OUTPATIENT)
Dept: PHARMACY | Facility: CLINIC | Age: 70
End: 2025-03-17

## 2025-03-17 ENCOUNTER — PHARMACY VISIT (OUTPATIENT)
Dept: PHARMACY | Facility: CLINIC | Age: 70
End: 2025-03-17
Payer: COMMERCIAL

## 2025-03-17 DIAGNOSIS — Z94.2 LUNG TRANSPLANTED (MULTI): Primary | ICD-10-CM

## 2025-03-17 PROCEDURE — RXMED WILLOW AMBULATORY MEDICATION CHARGE

## 2025-03-17 RX ORDER — FLUTICASONE PROPIONATE 50 MCG
1 SPRAY, SUSPENSION (ML) NASAL DAILY
Qty: 16 G | Refills: 12 | Status: SHIPPED | OUTPATIENT
Start: 2025-03-17 | End: 2026-03-17

## 2025-03-17 NOTE — TELEPHONE ENCOUNTER
Patient called stating he has questions regarding a prescription, fluticasone. Script sent to provider.

## 2025-03-18 ENCOUNTER — PHARMACY VISIT (OUTPATIENT)
Dept: PHARMACY | Facility: CLINIC | Age: 70
End: 2025-03-18
Payer: COMMERCIAL

## 2025-03-18 ENCOUNTER — TELEPHONE (OUTPATIENT)
Dept: TRANSPLANT | Facility: HOSPITAL | Age: 70
End: 2025-03-18
Payer: MEDICARE

## 2025-03-18 ENCOUNTER — TELEPHONE (OUTPATIENT)
Dept: UROLOGY | Facility: CLINIC | Age: 70
End: 2025-03-18
Payer: MEDICARE

## 2025-03-18 PROCEDURE — RXMED WILLOW AMBULATORY MEDICATION CHARGE

## 2025-03-18 NOTE — TELEPHONE ENCOUNTER
Called patient to check on whether he received his prevymis from  [facility], and to confirm that he is taking this medication daily. Left voicemail, awaiting callback.

## 2025-03-24 ENCOUNTER — LAB (OUTPATIENT)
Dept: LAB | Facility: HOSPITAL | Age: 70
End: 2025-03-24
Payer: MEDICARE

## 2025-03-24 DIAGNOSIS — Z94.2 LUNG TRANSPLANT STATUS: Primary | ICD-10-CM

## 2025-03-24 DIAGNOSIS — M89.9 DISORDER OF BONE, UNSPECIFIED: ICD-10-CM

## 2025-03-24 DIAGNOSIS — I10 ESSENTIAL (PRIMARY) HYPERTENSION: ICD-10-CM

## 2025-03-24 DIAGNOSIS — Z94.2 LUNG TRANSPLANTED (MULTI): ICD-10-CM

## 2025-03-24 LAB
ALBUMIN SERPL BCP-MCNC: 3.9 G/DL (ref 3.4–5)
ALP SERPL-CCNC: 102 U/L (ref 33–136)
ALT SERPL W P-5'-P-CCNC: 31 U/L (ref 10–52)
ANION GAP SERPL CALC-SCNC: 8 MMOL/L (ref 10–20)
AST SERPL W P-5'-P-CCNC: 31 U/L (ref 9–39)
BASOPHILS # BLD AUTO: 0.02 X10*3/UL (ref 0–0.1)
BASOPHILS NFR BLD AUTO: 0.3 %
BILIRUB SERPL-MCNC: 0.5 MG/DL (ref 0–1.2)
BUN SERPL-MCNC: 20 MG/DL (ref 6–23)
CALCIUM SERPL-MCNC: 9.6 MG/DL (ref 8.6–10.3)
CHLORIDE SERPL-SCNC: 105 MMOL/L (ref 98–107)
CHOLEST SERPL-MCNC: 123 MG/DL (ref 0–199)
CHOLESTEROL/HDL RATIO: 2
CO2 SERPL-SCNC: 35 MMOL/L (ref 21–32)
CREAT SERPL-MCNC: 0.96 MG/DL (ref 0.5–1.3)
EGFRCR SERPLBLD CKD-EPI 2021: 85 ML/MIN/1.73M*2
EOSINOPHIL # BLD AUTO: 0.25 X10*3/UL (ref 0–0.4)
EOSINOPHIL NFR BLD AUTO: 3.8 %
ERYTHROCYTE [DISTWIDTH] IN BLOOD BY AUTOMATED COUNT: 18.1 % (ref 11.5–14.5)
GLUCOSE SERPL-MCNC: 87 MG/DL (ref 74–99)
HCT VFR BLD AUTO: 45.6 % (ref 41–52)
HDLC SERPL-MCNC: 60.4 MG/DL
HGB BLD-MCNC: 13.7 G/DL (ref 13.5–17.5)
IMM GRANULOCYTES # BLD AUTO: 0.03 X10*3/UL (ref 0–0.5)
IMM GRANULOCYTES NFR BLD AUTO: 0.5 % (ref 0–0.9)
LDLC SERPL CALC-MCNC: 45 MG/DL
LYMPHOCYTES # BLD AUTO: 1.1 X10*3/UL (ref 0.8–3)
LYMPHOCYTES NFR BLD AUTO: 16.8 %
MAGNESIUM SERPL-MCNC: 1.54 MG/DL (ref 1.6–2.4)
MCH RBC QN AUTO: 30.6 PG (ref 26–34)
MCHC RBC AUTO-ENTMCNC: 30 G/DL (ref 32–36)
MCV RBC AUTO: 102 FL (ref 80–100)
MONOCYTES # BLD AUTO: 0.68 X10*3/UL (ref 0.05–0.8)
MONOCYTES NFR BLD AUTO: 10.4 %
NEUTROPHILS # BLD AUTO: 4.48 X10*3/UL (ref 1.6–5.5)
NEUTROPHILS NFR BLD AUTO: 68.2 %
NON HDL CHOLESTEROL: 63 MG/DL (ref 0–149)
NRBC BLD-RTO: 0 /100 WBCS (ref 0–0)
PLATELET # BLD AUTO: 108 X10*3/UL (ref 150–450)
POTASSIUM SERPL-SCNC: 4.8 MMOL/L (ref 3.5–5.3)
PROT SERPL-MCNC: 6.6 G/DL (ref 6.4–8.2)
RBC # BLD AUTO: 4.48 X10*6/UL (ref 4.5–5.9)
SODIUM SERPL-SCNC: 143 MMOL/L (ref 136–145)
TRIGL SERPL-MCNC: 90 MG/DL (ref 0–149)
VLDL: 18 MG/DL (ref 0–40)
WBC # BLD AUTO: 6.6 X10*3/UL (ref 4.4–11.3)

## 2025-03-24 PROCEDURE — 85025 COMPLETE CBC W/AUTO DIFF WBC: CPT

## 2025-03-24 PROCEDURE — RXMED WILLOW AMBULATORY MEDICATION CHARGE

## 2025-03-24 PROCEDURE — 83735 ASSAY OF MAGNESIUM: CPT

## 2025-03-24 PROCEDURE — 82784 ASSAY IGA/IGD/IGG/IGM EACH: CPT

## 2025-03-24 PROCEDURE — 86832 HLA CLASS I HIGH DEFIN QUAL: CPT

## 2025-03-24 PROCEDURE — 36415 COLL VENOUS BLD VENIPUNCTURE: CPT

## 2025-03-24 PROCEDURE — 82306 VITAMIN D 25 HYDROXY: CPT

## 2025-03-24 PROCEDURE — 87799 DETECT AGENT NOS DNA QUANT: CPT

## 2025-03-24 PROCEDURE — 86833 HLA CLASS II HIGH DEFIN QUAL: CPT

## 2025-03-24 PROCEDURE — 80053 COMPREHEN METABOLIC PANEL: CPT

## 2025-03-24 PROCEDURE — 80061 LIPID PANEL: CPT

## 2025-03-24 PROCEDURE — 80197 ASSAY OF TACROLIMUS: CPT

## 2025-03-25 ENCOUNTER — TELEPHONE (OUTPATIENT)
Dept: TRANSPLANT | Facility: HOSPITAL | Age: 70
End: 2025-03-25
Payer: MEDICARE

## 2025-03-25 ENCOUNTER — SPECIALTY PHARMACY (OUTPATIENT)
Dept: PHARMACY | Facility: CLINIC | Age: 70
End: 2025-03-25

## 2025-03-25 DIAGNOSIS — Z94.2 LUNG TRANSPLANTED (MULTI): ICD-10-CM

## 2025-03-25 LAB
25(OH)D3 SERPL-MCNC: 46 NG/ML (ref 30–100)
CMV DNA SERPL NAA+PROBE-LOG IU: NORMAL {LOG_IU}/ML
EBV DNA SPEC NAA+PROBE-LOG#: NORMAL {LOG_COPIES}/ML
IGG SERPL-MCNC: 980 MG/DL (ref 700–1600)
LABORATORY COMMENT REPORT: NOT DETECTED
LABORATORY COMMENT REPORT: NOT DETECTED
TACROLIMUS BLD-MCNC: 6.9 NG/ML

## 2025-03-25 PROCEDURE — RXMED WILLOW AMBULATORY MEDICATION CHARGE

## 2025-03-25 NOTE — TELEPHONE ENCOUNTER
Labs reviewed on 3/25/2025   WBC 6.6 ANC 4.48   Hbg 13.7   Platelets 108   Creatinine 0.96   Magnesium 1.54   Potassium 4.8   IgG Pending   Prograf 6.9 Goal 6-8 Dose 1/1     -Changes made: None! Mag better at 1.54...   -Labs due next 4/21    Called patient with above results and plan. Left voicemail.

## 2025-03-26 LAB
HLA RESULTS: NORMAL
HLA-A+B+C AB NFR SER: NORMAL %
HLA-DP+DQ+DR AB NFR SER: NORMAL %

## 2025-03-27 ENCOUNTER — APPOINTMENT (OUTPATIENT)
Dept: UROLOGY | Facility: CLINIC | Age: 70
End: 2025-03-27
Payer: MEDICARE

## 2025-03-27 ENCOUNTER — PHARMACY VISIT (OUTPATIENT)
Dept: PHARMACY | Facility: CLINIC | Age: 70
End: 2025-03-27
Payer: COMMERCIAL

## 2025-03-27 DIAGNOSIS — R32 URINARY INCONTINENCE, UNSPECIFIED TYPE: ICD-10-CM

## 2025-03-27 PROCEDURE — RXMED WILLOW AMBULATORY MEDICATION CHARGE

## 2025-03-27 RX ORDER — WATER 1000 ML/1000ML
5 INJECTION, SOLUTION INTRAVENOUS 2 TIMES DAILY
Qty: 900 ML | Refills: 3 | Status: SHIPPED | OUTPATIENT
Start: 2025-03-27 | End: 2025-03-27 | Stop reason: WASHOUT

## 2025-03-27 NOTE — TELEPHONE ENCOUNTER
Pt called requesting a refill of his sterile water to the [facility] if it can be filled by 2 pm today, or  [facility] if it cannot be filled today.

## 2025-03-27 NOTE — PROGRESS NOTES
Stanford Patel 69 y.o. male  Procedures:  Patient referred by Dr. Soni for urodynamics to evaluate urinary incontinence. Dr. Alcantara was present in office at time of study. Pre-uroflow was completed today with a pvr of 100 ml. Urine was clear for uti today. Patient did not leak on CLPP/VLPP. Patient did have DO prior to void. Pvr after study was 0 ml.  Patient instructed to increase fluids if he has any burning or blood in urine. Patient made aware he may have blood rectally due to abdominal catheter insertion.  Patient understood and consented to urodynamics. Patient will follow up with Dr. Soni for cystoscopy and to review results. 03/27/2025/glendy

## 2025-03-27 NOTE — TELEPHONE ENCOUNTER
Called  [facility], updated them that patient needs delivery with sodium chloride for nebulizer. Returned patient's call and updated him. Verbalized understanding.

## 2025-03-28 ENCOUNTER — PHARMACY VISIT (OUTPATIENT)
Dept: PHARMACY | Facility: CLINIC | Age: 70
End: 2025-03-28
Payer: COMMERCIAL

## 2025-03-28 RX ORDER — LIDOCAINE HYDROCHLORIDE 20 MG/ML
1 JELLY TOPICAL ONCE
Status: COMPLETED | OUTPATIENT
Start: 2025-03-31 | End: 2025-03-31

## 2025-03-31 ENCOUNTER — APPOINTMENT (OUTPATIENT)
Dept: UROLOGY | Facility: CLINIC | Age: 70
End: 2025-03-31
Payer: MEDICARE

## 2025-03-31 VITALS — HEART RATE: 87 BPM | DIASTOLIC BLOOD PRESSURE: 62 MMHG | SYSTOLIC BLOOD PRESSURE: 105 MMHG | TEMPERATURE: 98.2 F

## 2025-03-31 DIAGNOSIS — N52.03 COMBINED ARTERIAL INSUFFICIENCY AND CORPORO-VENOUS OCCLUSIVE ERECTILE DYSFUNCTION: ICD-10-CM

## 2025-03-31 DIAGNOSIS — N35.812 OTHER STRICTURE OF BULBOUS URETHRA IN MALE: ICD-10-CM

## 2025-03-31 DIAGNOSIS — R32 URINARY INCONTINENCE, UNSPECIFIED TYPE: ICD-10-CM

## 2025-03-31 DIAGNOSIS — R97.20 ELEVATED PSA: Primary | ICD-10-CM

## 2025-03-31 LAB
POC APPEARANCE, URINE: CLEAR
POC BILIRUBIN, URINE: NEGATIVE
POC BLOOD, URINE: NEGATIVE
POC COLOR, URINE: YELLOW
POC GLUCOSE, URINE: NEGATIVE MG/DL
POC KETONES, URINE: NEGATIVE MG/DL
POC LEUKOCYTES, URINE: NEGATIVE
POC NITRITE,URINE: NEGATIVE
POC PH, URINE: 5.5 PH
POC PROTEIN, URINE: ABNORMAL MG/DL
POC SPECIFIC GRAVITY, URINE: 1.02
POC UROBILINOGEN, URINE: 0.2 EU/DL

## 2025-03-31 PROCEDURE — 52281 CYSTOSCOPY AND TREATMENT: CPT | Performed by: UROLOGY

## 2025-03-31 PROCEDURE — 51797 INTRAABDOMINAL PRESSURE TEST: CPT | Performed by: UROLOGY

## 2025-03-31 PROCEDURE — 51729 CYSTOMETROGRAM W/VP&UP: CPT | Performed by: UROLOGY

## 2025-03-31 PROCEDURE — 51741 ELECTRO-UROFLOWMETRY FIRST: CPT | Performed by: UROLOGY

## 2025-03-31 PROCEDURE — 51784 ANAL/URINARY MUSCLE STUDY: CPT | Performed by: UROLOGY

## 2025-03-31 PROCEDURE — 81003 URINALYSIS AUTO W/O SCOPE: CPT | Performed by: UROLOGY

## 2025-03-31 PROCEDURE — 99214 OFFICE O/P EST MOD 30 MIN: CPT | Performed by: UROLOGY

## 2025-03-31 RX ADMIN — LIDOCAINE HYDROCHLORIDE 1 APPLICATION: 20 JELLY TOPICAL at 14:27

## 2025-03-31 NOTE — PROGRESS NOTES
PAST UROLOGICAL HISTORY:  69-year-old man with idiopathic pulmonary fibrosis and pulmonary hypertension status post  donor lung transplant in . Family history significant for prostate cancer in father and 2-3 brothers (none metastatic or death). Initial PSA 4.88 in 2024. Started on trospium 20mg BID in 2024 for overactive bladder symptoms. MRI prostate on 09/15/2024 showed PIRADS 2 lesions only, prostate volume 34.7g.    I have independently reviewed and interpreted the notes, images, and tests as noted herein.    HPI TODAY 2025:    Elevated PSA:  - PSA 4.88 in 2024  - MRI prostate on 09/15/2024 showed PIRADS 2 lesions only  - Prostate volume 34.7g  - PSA density: 0.14 (PSA 4.88/34.7g)    Lower urinary tract symptoms/overactive bladder:  - Reports persistent urinary frequency, urgency, and daily urge incontinence  - No improvement with trospium  - Nocturia: 2-3x/night  - Denies: hesitancy, weak stream, intermittency, straining, hematuria  - Improvement noted since last procedure (urodynamics)  - Still room for improvement in symptoms    Urodynamics (2025):  - Free uroflow: Voided 180 ccs over 56 seconds, max flow rate 6.2 ml/second  - Post-void residual: 100 cc  - Filled to capacity of 368 ccs  - No significant detrusor overactivity until 367 ccs, then some DO without leak  - No compliance changes  - With permission to void, strong detrusor pressure at 70 cm of water  - Peak flow rate of 11 ml/second    Cystoscopy findings (today):  - Narrowing throughout urethra  - Some stricturing in urethra  - Prostate slightly enlarged, but not significantly  - Bladder shows some trabeculation (muscle build-up)  - Whitish discoloration and scar tissue in urethra    Erectile Dysfunction:  - Reports rare nocturnal erections, difficulty maintaining    PSA History:  - 2024: 4.88    PAST MEDICAL HISTORY:  - Idiopathic pulmonary fibrosis  - Pulmonary hypertension  - Status post  donor lung  "transplant (2020)  - DVT/PE on anticoagulation  - Hypertension  - Hyperlipidemia  - Type 2 diabetes    SOCIAL:  Mr. Patel underwent a pill enterography today, where he swallowed a camera to examine his intestines from the inside. He was wearing a sling with a detector to communicate wirelessly with the camera.    REVIEW OF SYSTEMS: A tailored review of systems was performed and all pertinent positives and negatives are listed in the HPI.    PHYSICAL EXAMINATION:  Gen: NAD, appears stated age  Pulm: No increased WOB on RA  Cards: WWP  Constitutional: No acute distress, appears stated age  Respiratory: Normal effort  Cardiovascular: No edema    Patient ID: Stanford NGUYEN Caro \"George" is a 69 y.o. male.    Cystoscopy    Date/Time: 3/31/2025 4:19 PM    Performed by: Rosas Soni MD  Authorized by: Rosas Soni MD      Comments:      The R/B/A to the following procedure were discussed and an informed consent was signed. A time-out was performed confirming the correct procedure, laterality (if applicable), and plan.    The patient was prepped and draped in the standard sterile fashion. A 16 Portuguese flexible cystoscope was inserted through the penis. The following finding were noted:    Anterior urethra -some wide caliber urethral strictures easily passed with the scope.  Within his bulbar urethra he had very narrow strictures that were slightly smaller than the diameter of the scope 12-14 Indonesian send was able to passively dilate there were 3-4 of these within the length of the bulbar urethra  Prostate -mildly enlarged mildly obstructing but not overtly obstructing no significant intravesical protrusion  Bladder mucosa-1+ trabeculation without any other concerning findings  Ureteral orifices -visualized in orthotopic position    The patient tolerated the procedure well.          ASSESSMENT/PLAN:    Elevated PSA (R97.20)  - Assessment: PSA 4.88, MRI with PIRADS 2 lesions only  - Plan:  * Continue PSA monitoring  * Return in " 1 year with PSA  - Counseling: Discussed false negative rate with MRI, need for continued monitoring    Overactive Bladder (N32.81)  - Assessment: Failed trial of trospium, some improvement noted since urodynamics  - Plan:  * Discontinue trospium  * Start Myrbetriq 50mg daily  * Discussed risk of hypertension  * If no improvement after 1 month, proceed with cystoscopy and urodynamics  - Counseling: Discussed treatment options including Botox and sacral neuromodulation if medical therapy fails. Risks, benefits, and alternatives were discussed including but not limited to urinary retention, urinary tract infection, and need for self-catheterization.    Urethral Stricture (N35.9)  - Assessment: Cystoscopy revealed narrowing and stricturing throughout urethra  - Plan:  * Monitor symptoms  * If symptoms recur or worsen, refer to partner specializing in urethral reconstruction  - Counseling: Explained that scar tissue can reform over time, potentially requiring future intervention    Benign Prostatic Hyperplasia (N40.1)  - Assessment: Slightly enlarged prostate on cystoscopy, urodynamics suggestive of obstruction  - Plan: Monitor symptoms    Erectile Dysfunction (N52.9)  - Assessment: Reports rare nocturnal erections, difficulty maintaining  - Plan: Patient declines treatment at this time    Follow-up: Return in 6 months to check on symptoms, sooner if urinary symptoms persist after 1 month trial of Myrbetriq.

## 2025-04-07 ENCOUNTER — SPECIALTY PHARMACY (OUTPATIENT)
Dept: PHARMACY | Facility: CLINIC | Age: 70
End: 2025-04-07

## 2025-04-07 DIAGNOSIS — M89.9 DISORDER OF BONE, UNSPECIFIED: ICD-10-CM

## 2025-04-07 DIAGNOSIS — I10 ESSENTIAL (PRIMARY) HYPERTENSION: ICD-10-CM

## 2025-04-07 DIAGNOSIS — Z94.2 LUNG TRANSPLANTED (MULTI): ICD-10-CM

## 2025-04-07 PROCEDURE — RXMED WILLOW AMBULATORY MEDICATION CHARGE

## 2025-04-08 ENCOUNTER — PHARMACY VISIT (OUTPATIENT)
Dept: PHARMACY | Facility: CLINIC | Age: 70
End: 2025-04-08
Payer: COMMERCIAL

## 2025-04-09 PROCEDURE — RXMED WILLOW AMBULATORY MEDICATION CHARGE

## 2025-04-10 ENCOUNTER — TELEPHONE (OUTPATIENT)
Dept: RESPIRATORY THERAPY | Facility: HOSPITAL | Age: 70
End: 2025-04-10
Payer: MEDICARE

## 2025-04-10 NOTE — TELEPHONE ENCOUNTER
Returned patient's call, answered questions about upcoming reclast infusion.    Alternatives Discussed Intro (Do Not Add Period): I discussed alternative treatments to Mohs surgery and specifically discussed the risks and benefits of

## 2025-04-11 ENCOUNTER — LAB (OUTPATIENT)
Dept: LAB | Facility: HOSPITAL | Age: 70
End: 2025-04-11
Payer: MEDICARE

## 2025-04-11 DIAGNOSIS — M81.0 AGE-RELATED OSTEOPOROSIS WITHOUT CURRENT PATHOLOGICAL FRACTURE: Primary | ICD-10-CM

## 2025-04-11 PROCEDURE — 82330 ASSAY OF CALCIUM: CPT

## 2025-04-12 LAB — CA-I BLD-SCNC: 1.24 MMOL/L (ref 1.1–1.33)

## 2025-04-15 ENCOUNTER — INFUSION (OUTPATIENT)
Dept: INFUSION THERAPY | Facility: HOSPITAL | Age: 70
End: 2025-04-15
Payer: MEDICARE

## 2025-04-15 VITALS
RESPIRATION RATE: 16 BRPM | HEART RATE: 73 BPM | DIASTOLIC BLOOD PRESSURE: 72 MMHG | SYSTOLIC BLOOD PRESSURE: 141 MMHG | TEMPERATURE: 97.9 F | OXYGEN SATURATION: 94 %

## 2025-04-15 DIAGNOSIS — M81.0 OSTEOPOROSIS WITHOUT CURRENT PATHOLOGICAL FRACTURE, UNSPECIFIED OSTEOPOROSIS TYPE: ICD-10-CM

## 2025-04-15 PROCEDURE — 96365 THER/PROPH/DIAG IV INF INIT: CPT | Mod: INF

## 2025-04-15 PROCEDURE — 2500000001 HC RX 250 WO HCPCS SELF ADMINISTERED DRUGS (ALT 637 FOR MEDICARE OP): Performed by: INTERNAL MEDICINE

## 2025-04-15 PROCEDURE — 2500000004 HC RX 250 GENERAL PHARMACY W/ HCPCS (ALT 636 FOR OP/ED): Performed by: INTERNAL MEDICINE

## 2025-04-15 RX ORDER — ACETAMINOPHEN 325 MG/1
650 TABLET ORAL ONCE
Status: COMPLETED | OUTPATIENT
Start: 2025-04-15 | End: 2025-04-15

## 2025-04-15 RX ORDER — ZOLEDRONIC ACID 5 MG/100ML
5 INJECTION, SOLUTION INTRAVENOUS ONCE
Status: COMPLETED | OUTPATIENT
Start: 2025-04-15 | End: 2025-04-15

## 2025-04-15 RX ORDER — ACETAMINOPHEN 325 MG/1
650 TABLET ORAL ONCE
Status: CANCELLED | OUTPATIENT
Start: 2025-04-15

## 2025-04-15 RX ORDER — FAMOTIDINE 10 MG/ML
20 INJECTION, SOLUTION INTRAVENOUS ONCE AS NEEDED
OUTPATIENT
Start: 2025-04-15

## 2025-04-15 RX ORDER — ALBUTEROL SULFATE 0.83 MG/ML
3 SOLUTION RESPIRATORY (INHALATION) AS NEEDED
OUTPATIENT
Start: 2025-04-15

## 2025-04-15 RX ORDER — EPINEPHRINE 0.3 MG/.3ML
0.3 INJECTION SUBCUTANEOUS EVERY 5 MIN PRN
OUTPATIENT
Start: 2025-04-15

## 2025-04-15 RX ORDER — DIPHENHYDRAMINE HYDROCHLORIDE 50 MG/ML
50 INJECTION, SOLUTION INTRAMUSCULAR; INTRAVENOUS AS NEEDED
OUTPATIENT
Start: 2025-04-15

## 2025-04-15 RX ORDER — ZOLEDRONIC ACID 5 MG/100ML
5 INJECTION, SOLUTION INTRAVENOUS ONCE
Status: CANCELLED | OUTPATIENT
Start: 2025-04-15

## 2025-04-15 RX ADMIN — ZOLEDRONIC ACID 5 MG: 5 INJECTION, SOLUTION INTRAVENOUS at 11:43

## 2025-04-15 RX ADMIN — ACETAMINOPHEN 650 MG: 325 TABLET ORAL at 11:28

## 2025-04-15 ASSESSMENT — ENCOUNTER SYMPTOMS
OCCASIONAL FEELINGS OF UNSTEADINESS: 1
LOSS OF SENSATION IN FEET: 0
DEPRESSION: 0

## 2025-04-15 ASSESSMENT — COLUMBIA-SUICIDE SEVERITY RATING SCALE - C-SSRS
6. HAVE YOU EVER DONE ANYTHING, STARTED TO DO ANYTHING, OR PREPARED TO DO ANYTHING TO END YOUR LIFE?: NO
1. IN THE PAST MONTH, HAVE YOU WISHED YOU WERE DEAD OR WISHED YOU COULD GO TO SLEEP AND NOT WAKE UP?: NO
2. HAVE YOU ACTUALLY HAD ANY THOUGHTS OF KILLING YOURSELF?: NO

## 2025-04-15 ASSESSMENT — LIFESTYLE VARIABLES
HOW MANY STANDARD DRINKS CONTAINING ALCOHOL DO YOU HAVE ON A TYPICAL DAY: PATIENT DOES NOT DRINK
AUDIT-C TOTAL SCORE: 0
HOW OFTEN DO YOU HAVE A DRINK CONTAINING ALCOHOL: NEVER
HOW OFTEN DO YOU HAVE SIX OR MORE DRINKS ON ONE OCCASION: NEVER
SKIP TO QUESTIONS 9-10: 1

## 2025-04-15 ASSESSMENT — PATIENT HEALTH QUESTIONNAIRE - PHQ9
2. FEELING DOWN, DEPRESSED OR HOPELESS: NOT AT ALL
SUM OF ALL RESPONSES TO PHQ9 QUESTIONS 1 AND 2: 0
1. LITTLE INTEREST OR PLEASURE IN DOING THINGS: NOT AT ALL

## 2025-04-16 ENCOUNTER — PHARMACY VISIT (OUTPATIENT)
Dept: PHARMACY | Facility: CLINIC | Age: 70
End: 2025-04-16
Payer: COMMERCIAL

## 2025-04-16 ENCOUNTER — HOSPITAL ENCOUNTER (OUTPATIENT)
Dept: RESPIRATORY THERAPY | Facility: HOSPITAL | Age: 70
Discharge: HOME | End: 2025-04-16
Payer: MEDICARE

## 2025-04-16 DIAGNOSIS — Z94.2 LUNG TRANSPLANTED (MULTI): ICD-10-CM

## 2025-04-16 LAB
MGC ASCENT PFT - FEV1 - PRE: 1.15
MGC ASCENT PFT - FEV1 - PREDICTED: 2.9
MGC ASCENT PFT - FVC - PRE: 2.42
MGC ASCENT PFT - FVC - PREDICTED: 3.85

## 2025-04-16 PROCEDURE — 94642 AEROSOL INHALATION TREATMENT: CPT

## 2025-04-16 PROCEDURE — RXMED WILLOW AMBULATORY MEDICATION CHARGE

## 2025-04-16 PROCEDURE — 2500000001 HC RX 250 WO HCPCS SELF ADMINISTERED DRUGS (ALT 637 FOR MEDICARE OP): Performed by: INTERNAL MEDICINE

## 2025-04-16 PROCEDURE — 94010 BREATHING CAPACITY TEST: CPT

## 2025-04-16 PROCEDURE — 94010 BREATHING CAPACITY TEST: CPT | Performed by: INTERNAL MEDICINE

## 2025-04-16 RX ORDER — ALBUTEROL SULFATE 0.83 MG/ML
3 SOLUTION RESPIRATORY (INHALATION) ONCE
Status: COMPLETED | OUTPATIENT
Start: 2025-04-16 | End: 2025-04-16

## 2025-04-16 RX ORDER — ALBUTEROL SULFATE 90 UG/1
1 INHALANT RESPIRATORY (INHALATION) ONCE
Status: COMPLETED | OUTPATIENT
Start: 2025-04-16 | End: 2025-04-16

## 2025-04-16 RX ADMIN — ALBUTEROL SULFATE 2 PUFF: 90 AEROSOL, METERED RESPIRATORY (INHALATION) at 10:45

## 2025-04-16 NOTE — PROCEDURES
1040  After spirometry PFT done, Pentamidine 300 mg that pt. Brought w/ him to lab, dissolved in 6 ml sterile water.  P.ox 94%/ HR 65/ Breath Sounds clear, equal bilat. uppers, L. lower diminished.  Pt. Denies dyspnea, dizziness, or nausea.  Reviewed preventing pneumonia w/ Pentamidine aerosol txs.  1043  Albuterol MDI 2 puffs w/ spacer given.  Pt. Able to hold deep breaths > 5 sec.  P.ox 95%/ HR 64/ BS clear uppers.  1053  Pentamidine aerosol tx started.  Pt. Takes steady breaths w/ occas. Deep breath.  P.ox 95%/ HR 63/ BS clear uppers.  1103  P.ox 95%/ HR 62/ BS clear uppers.  Liquid accumulated in aerosol tubing drained as needed.    1113  P.ox 94%/ HR 60/ BS clear uppers.  1123  P.ox  95%/ HR 59/ BS clear uppers.  1129  Pentamidine aerosol tx finished.  Pt. Rinsed mouth and throat w/ water before taking sips of water.  Pt. Denies dyspnea, dizziness, or nausea.  P.ox 97%/ HR 62/ BS clear bilat. Uppers, L. Lower dim.  1134  Pt. Dismissed from lab w/o complaint.

## 2025-04-18 ENCOUNTER — TELEPHONE (OUTPATIENT)
Dept: TRANSPLANT | Facility: HOSPITAL | Age: 70
End: 2025-04-18
Payer: MEDICARE

## 2025-04-18 NOTE — TELEPHONE ENCOUNTER
Called patient to let him know that his spirometry from this week is stable. Continue monthly jelly with pentamidine. Unable to reach patient, will send Searchbox message.

## 2025-04-21 DIAGNOSIS — Z94.2 LUNG TRANSPLANTED (MULTI): ICD-10-CM

## 2025-04-21 PROCEDURE — RXMED WILLOW AMBULATORY MEDICATION CHARGE

## 2025-04-23 ENCOUNTER — OFFICE VISIT (OUTPATIENT)
Dept: NEUROLOGY | Facility: HOSPITAL | Age: 70
End: 2025-04-23
Payer: MEDICARE

## 2025-04-23 ENCOUNTER — PHARMACY VISIT (OUTPATIENT)
Dept: PHARMACY | Facility: CLINIC | Age: 70
End: 2025-04-23
Payer: COMMERCIAL

## 2025-04-23 DIAGNOSIS — Z94.2 S/P LUNG TRANSPLANT (MULTI): ICD-10-CM

## 2025-04-23 DIAGNOSIS — Z94.2 LUNG TRANSPLANTED (MULTI): ICD-10-CM

## 2025-04-23 DIAGNOSIS — R06.83 SNORING: ICD-10-CM

## 2025-04-23 DIAGNOSIS — G31.84 MCI (MILD COGNITIVE IMPAIRMENT): Primary | ICD-10-CM

## 2025-04-23 PROCEDURE — 1159F MED LIST DOCD IN RCRD: CPT

## 2025-04-23 PROCEDURE — 99204 OFFICE O/P NEW MOD 45 MIN: CPT

## 2025-04-23 PROCEDURE — G2211 COMPLEX E/M VISIT ADD ON: HCPCS

## 2025-04-23 PROCEDURE — 1036F TOBACCO NON-USER: CPT

## 2025-04-23 PROCEDURE — 99214 OFFICE O/P EST MOD 30 MIN: CPT | Mod: GC

## 2025-04-23 NOTE — PATIENT INSTRUCTIONS
Dear Kj Patel,    You were seen in Neurology clinic today by Dr. Preston Moody (Resident) and Dr. Rao Medrano (Atttending), for memory issues.    Seems that you have been having short term memory issues for the past 5 years since your surgery. Overall, your neurologic exam was unremarkable and reassuring. We also did a cognitive test, where you did pretty well. Your memory issues seem to be consistent with mild cognitive impairment (MCI). Recommend repeating a sleep study to evaluate for sleep apnea as it can contribute to memory issues. Also recommend obtaining some labs to evaluate for reversible causes of your memory issues. Can also wean off benadryl use, as it can contribute to memory issues.    We will schedule a follow-up appointment in 6 months. Please contact [phone] if there is a change and you need an appointment sooner.     Thank you for allowing us to participate in your care.  -  Neurology

## 2025-04-23 NOTE — PROGRESS NOTES
"NEUROLOGY CLINIC NOTE    History of Present Illness:   Stanford NGUYEN Caro \"Mal\" is a 69 y.o. 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 Pt referred by Dr. Pamela Boone (transplant).     Patient began to notice his memory issues following his lung transplant surgery in . He says the memory issues have gotten worse over the past 5 years, and they now occur on a daily basis. Some examples of his memory issues are forgetting where he put his phone and keys and needing to write down conversations he has with people in order to remember them accurately. He also says he sometimes forgets who people are, even people who he has known for a while. Reports some irritability and word finding difficulties. He denies getting lost in familiar places and is still able to drive on his own.    Patient has had about 5-6 medication changes in the past year. He does not remember specifics and does not think that any are specifically tied to his worsening memory. Of note, he takes Benadryl 4-5 times per week at night before bed for itching. He has also started taking Tylenol PM most nights in the past 6 months for pain.    Patient has a history of SARA that he says was diagnosed before his lung transplant. Prior to the surgery, he was on CPAP and was waking up tired in the morning. He denies any trouble with sleeping now, says he is sleeping usually 5-6 hours a night which is better than he used to sleep. He does wake up 2-3 times a night to go to bathroom. Has a history of BPH and was on medication, but it was stopped- he is unsure of the medication  or why it was stopped. Denies snoring and any dyspnea when laying down and says he only feels tired in the morning 20% of the time.    Patient " says his mood is generally well. He says there are occasional times he feels sad or depressed but would not describe that as his overall mood.    ROS: All systems reviewed and were negative except for report of newer muscle weakness in upper and lower extremities, says he drops things more frequently (like a piece of paper falling though his fingers)     Home Medications:    Current Outpatient Medications   Medication Instructions    0.9 % sodium chloride (sodium chloride, PF, 0.9%) injection Mix with Colistin as directed: withdraw 5 mL of sodium chloride and add to Colistin vial twice daily.  Discard remainder of vial after each use.    acyclovir (ZOVIRAX) 400 mg, oral, 2 times daily    Aranesp (in polysorbate) 60 mcg, subcutaneous, Weekly    azithromycin (ZITHROMAX) 250 mg, oral, Every Mon/Wed/Fri    blood sugar diagnostic (OneTouch Verio test strips) strip Use as directed 3 times a day before meals.    budesonide-formoterol (Symbicort) 160-4.5 mcg/actuation inhaler Inhale 2 puffs by mouth 2 times a day. Rinse mouth with water after use to reduce aftertaste and incidence of candidiasis. Do not swallow.    calcium carbonate (OSCAL) 1,250 mg, oral, 2 times daily (morning and late afternoon)    cholecalciferol (VITAMIN D-3) 25 mcg, oral, Daily    colistimethate (Colymycin) 150 mg vial Take 150 mg (1 vial) by nebulization 2 times a day. Mix 150 mg vial with 5 mL normal saline, insert into nebulizer and inhale medication 2 times a day    DULoxetine (CYMBALTA) 60 mg, oral, Daily, Do not crush or chew.    Eliquis 5 mg, oral, Every 12 hours    ferrous sulfate 325 (65 Fe) MG EC tablet 1 tablet, oral, Daily with breakfast, Do not crush, chew, or split.    fluticasone (Flonase) 50 mcg/actuation nasal spray 1 spray, Each Nostril, Daily, Shake gently. Before first use, prime pump. After use, clean tip and replace cap.    levalbuterol (Xopenex) 0.63 mg/3 mL nebulizer solution Inhale one (1) ampule via nebulizer twice daily.     "loratadine (Claritin) 10 mg tablet TAKE ONE (1) TABLET BY MOUTH ONCE DAILY.    Mag 64 192 mg, oral, 2 times daily, Separate from Tacrolimus by 2 hours.    metoprolol tartrate (LOPRESSOR) 12.5 mg, oral, 2 times daily    montelukast (SINGULAIR) 10 mg, oral, Nightly    multivitamin with minerals iron-free (Centrum Silver) 1 tablet, oral, Daily    Myrbetriq 50 mg, oral, Daily    pantoprazole (PROTONIX) 40 mg, oral, Daily    Pentamidine (PENTAM) 300 mg, inhalation, Every 28 days    predniSONE (DELTASONE) 5 mg, oral, Daily    Prevymis 480 mg, oral, Daily    rosuvastatin (CRESTOR) 40 mg, oral, Daily    sharps container 1 each, miscellaneous, As needed    syringe with needle (BD Luer-Silvia Syringe) 10 mL 21 x 1 1/2\" syringe Use as directed to mix normal saline and Colistin twice daily.    syringe with needle 3 mL 25 x 5/8\" syringe 1 Syringe, miscellaneous, Once Weekly, Use for Aranesp injections once weekly.    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), Obstructive sleep apnea (adult) (pediatric) (08/09/2018), Other long term (current) drug therapy (12/07/2021), Other specified disorders of nose and nasal sinuses (05/09/2021), Personal history of other diseases of the musculoskeletal system and connective tissue (08/09/2018), Personal history of other diseases of the respiratory system (08/09/2018), Personal history of other endocrine, nutritional and metabolic disease (08/09/2018), Personal history of other endocrine, nutritional and metabolic disease (02/21/2019), Personal history of other venous thrombosis and embolism (08/09/2018), Personal history of urinary calculi (08/09/2018), Personal " history of urinary calculi (09/06/2018), and Personal history of urinary calculi (02/25/2019).    Past Surgical History:    has a past surgical history that includes Tonsillectomy (08/09/2018); Vasectomy (08/09/2018); Other surgical history (06/03/2020); MR angio head wo IV contrast (10/15/2020); and MR angio neck wo IV contrast (10/15/2020).    Allergies:   RX Allergies[1]    Family History:   Family History[2]    Past Social History:   Social History[3]    Vitals:        Physical Exam:   GENERAL APPEARANCE:  No distress, alert, interactive and cooperative. Hearing aid in place in left ear. Usually wears bilateral, but right one fell and broke.    CARDIOVASCULAR: Regular, rate and rhythm. Radial pulses +2 and equal in both extremities.      MENTAL STATE:   Orientation was normal to time, place and person. Language intact to fluency, expression, naming, repetition and comprehension. Able to follow multi-step commands.    MOCA:   - Visuospatial/executive:   - Trails 0/1   - Copy cube 0/1   - Draw clock 2/3 (did not write all numbers)  - Naming 3/3  - Memory - immediate recall 5/5 (only done once and not specified that will be tested later)  - Attention:   - Digits repetition 2/2    - Finger tapping 1/1   - Serial 7s 3/3  - Abstraction 2/2   - Memory - delayed recall 0/5 with MIS 4/15 (limited by unclear instruction as above)  - Orientation 6/6    Total: 22/30 (confounded by unclear instruction for delayed recall)    OPHTHALMOSCOPIC:   The ophthalmoscopic exam was deferred.    CRANIAL NERVES:   CN 2   Visual fields full to confrontation.   CN 3, 4, 6   Pupils round, 4 mm in diameter, equally reactive to light. Lids symmetric; no ptosis. EOMs normal alignment, full range with normal saccades, pursuit and convergence. No nystagmus.   CN 5   Facial sensation intact bilaterally.   CN 7   Normal and symmetric facial strength. Nasolabial folds symmetric.   CN 8   Hearing intact to finger rub.   CN 9   Palate elevates  "symmetrically.   CN 11   Normal strength of shoulder shrug and neck turning.   CN 12   Tongue midline, with normal bulk and strength; no fasciculations.     MOTOR:   Muscle bulk and tone were normal in both upper and lower extremities.   No fasciculations or other abnormal movements were present. Mild postural tremor in bilateral upper extremities.  No parkinsonian features on exam - cogwheel rigidity, bradykinesia, decrement with finger tapping, resting tremor.    R          L  Deltoids 5 5  Biceps  5 5  Triceps  5 5  Wrist Flex 5 5  Wrist Ext 5 5    Hip Flex 5 5  Knee Flex 5 5  Knee Ext 5 5  Dorsiflex 5 5  Plantarflex 5 5    REFLEXES:   R          L  Biceps:  2 2  Triceps:  2 2  Knee:  2 2  Ankle:  2 2    No clonus or other pathologic reflexes present.     SENSORY:   In both upper and lower extremities, sensation was intact to light touch    COORDINATION:    In both upper extremities, finger-nose-finger was intact without dysmetria or overshoot.     GAIT:   Station was stable with a normal base. Gait was stable with a normal arm swing and speed. No ataxia, shuffling, steppage or waddling was present. No circumduction was present.     Imaging:  MRI IAC w/wo (2021):  1. Slightly improved marrow signal abnormality and enhancement in the right greater than left skull base and adjacent soft tissues, consistent with skull base osteomyelitis. No new abnormal signal or associated fluid collection.  2. No acute intracranial pathology.      Assessment:  Stanford NGUYEN Caro \"George" is a 69 y.o. 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 Pt referred by Dr. Pamela Boone (transplant). MOCA 22/ (confounded by unclear instruction for delayed recall). " Patient's presentation is most consistent with mild cognitive impairment given patient's history and MOCA assessment. History of  brain MRI in 2022 with mild generalized atrophy but no other concerns. Patient also with frequent Benadryl use in evening and history of SARA, currently not using CPAP, which may be contributing to his symptoms. Other consideration would be tacrolimus-associated neurotoxicity however, presentation is not classic for that and symptoms judged to be mild at this time so no indication to d/c tacrolimus currently. Will start by referring to sleep medicine for re-eval as well as reversible encephalopathy workup as below. Offered cognitive rehab with speech therapy however, pt declined at this time and said he would consider if his symptoms got worse and began to cause more significant functional impairment.      Recommendations:  - Referral to sleep medicine for evaluation and treatment of SARA  - Reversible encephalopathy workup: TSH, free t4, B12, MMA, B1  - Offered cognitive rehab with speech therapy however, pt declined at this time and said he would consider if his symptoms got worse and began to cause more significant functional impairment  - Counseling on reduction of Benadryl use in evenings, recommended use of melatonin as substitute to aid with sleep if needed    - RTC in 6 months       This patient was seen, discussed and examined with the attending, Dr. Medrano, who agrees with the management plan.    Dacia Thomas, MS-3  Neurology    I personally saw, examined, and discussed the patient above. I have reviewed and agree with the excellent medical student note above. All edits or corrections have been made directly into the note, including the physical exam and assessment/plan. Patient was seen and discussed with the attending, Dr. Medrano, who agrees with the management plan.     Preston Moody MD  PGY-3, Neurology           [1]   Allergies  Allergen Reactions     Gadolinium-Containing Contrast Media Other     Acute renal insufficiency s/p double lung transplant    Iodinated Contrast Media Other     Acute renal insufficiency s/p double lung transplant    Nsaids (Non-Steroidal Anti-Inflammatory Drug) Other     Acute renal insufficiency s/p double lung transplant   [2] No family history on file.  [3]   Social History  Tobacco Use    Smoking status: Former     Types: Cigarettes    Smokeless tobacco: Never   Substance Use Topics    Alcohol use: Not Currently    Drug use: Never

## 2025-04-28 PROCEDURE — RXMED WILLOW AMBULATORY MEDICATION CHARGE

## 2025-04-29 ENCOUNTER — PHARMACY VISIT (OUTPATIENT)
Dept: PHARMACY | Facility: CLINIC | Age: 70
End: 2025-04-29
Payer: COMMERCIAL

## 2025-04-29 ENCOUNTER — TELEPHONE (OUTPATIENT)
Dept: TRANSPLANT | Facility: HOSPITAL | Age: 70
End: 2025-04-29
Payer: MEDICARE

## 2025-04-29 NOTE — TELEPHONE ENCOUNTER
Called patient and left voicemail asking him to get labs drawn this week. Asked that patient call back with any questions or concerns.

## 2025-05-01 ENCOUNTER — LAB (OUTPATIENT)
Dept: LAB | Facility: HOSPITAL | Age: 70
End: 2025-05-01
Payer: MEDICARE

## 2025-05-01 ENCOUNTER — SPECIALTY PHARMACY (OUTPATIENT)
Dept: PHARMACY | Facility: CLINIC | Age: 70
End: 2025-05-01

## 2025-05-01 DIAGNOSIS — Z94.2 LUNG TRANSPLANT STATUS: Primary | ICD-10-CM

## 2025-05-01 LAB
ALBUMIN SERPL BCP-MCNC: 3.7 G/DL (ref 3.4–5)
ALP SERPL-CCNC: 86 U/L (ref 33–136)
ALT SERPL W P-5'-P-CCNC: 20 U/L (ref 10–52)
ANION GAP SERPL CALC-SCNC: <7 MMOL/L (ref 10–20)
AST SERPL W P-5'-P-CCNC: 24 U/L (ref 9–39)
BASOPHILS # BLD AUTO: 0.04 X10*3/UL (ref 0–0.1)
BASOPHILS NFR BLD AUTO: 0.6 %
BILIRUB SERPL-MCNC: 0.5 MG/DL (ref 0–1.2)
BUN SERPL-MCNC: 23 MG/DL (ref 6–23)
CALCIUM SERPL-MCNC: 8.9 MG/DL (ref 8.6–10.3)
CHLORIDE SERPL-SCNC: 108 MMOL/L (ref 98–107)
CO2 SERPL-SCNC: 31 MMOL/L (ref 21–32)
CREAT SERPL-MCNC: 1.29 MG/DL (ref 0.5–1.3)
EGFRCR SERPLBLD CKD-EPI 2021: 59 ML/MIN/1.73M*2
EOSINOPHIL # BLD AUTO: 0.42 X10*3/UL (ref 0–0.4)
EOSINOPHIL NFR BLD AUTO: 6.2 %
ERYTHROCYTE [DISTWIDTH] IN BLOOD BY AUTOMATED COUNT: 14.4 % (ref 11.5–14.5)
GLUCOSE SERPL-MCNC: 95 MG/DL (ref 74–99)
HCT VFR BLD AUTO: 44.7 % (ref 41–52)
HGB BLD-MCNC: 13.8 G/DL (ref 13.5–17.5)
IGG SERPL-MCNC: 796 MG/DL (ref 700–1600)
IMM GRANULOCYTES # BLD AUTO: 0.03 X10*3/UL (ref 0–0.5)
IMM GRANULOCYTES NFR BLD AUTO: 0.4 % (ref 0–0.9)
LYMPHOCYTES # BLD AUTO: 0.98 X10*3/UL (ref 0.8–3)
LYMPHOCYTES NFR BLD AUTO: 14.5 %
MAGNESIUM SERPL-MCNC: 1.68 MG/DL (ref 1.6–2.4)
MCH RBC QN AUTO: 31.2 PG (ref 26–34)
MCHC RBC AUTO-ENTMCNC: 30.9 G/DL (ref 32–36)
MCV RBC AUTO: 101 FL (ref 80–100)
MONOCYTES # BLD AUTO: 0.71 X10*3/UL (ref 0.05–0.8)
MONOCYTES NFR BLD AUTO: 10.5 %
NEUTROPHILS # BLD AUTO: 4.6 X10*3/UL (ref 1.6–5.5)
NEUTROPHILS NFR BLD AUTO: 67.8 %
NRBC BLD-RTO: 0 /100 WBCS (ref 0–0)
PLATELET # BLD AUTO: 106 X10*3/UL (ref 150–450)
POTASSIUM SERPL-SCNC: 4.3 MMOL/L (ref 3.5–5.3)
PROT SERPL-MCNC: 6 G/DL (ref 6.4–8.2)
RBC # BLD AUTO: 4.43 X10*6/UL (ref 4.5–5.9)
SODIUM SERPL-SCNC: 140 MMOL/L (ref 136–145)
TACROLIMUS BLD-MCNC: 6.8 NG/ML
WBC # BLD AUTO: 6.8 X10*3/UL (ref 4.4–11.3)

## 2025-05-01 PROCEDURE — 85025 COMPLETE CBC W/AUTO DIFF WBC: CPT

## 2025-05-01 PROCEDURE — RXMED WILLOW AMBULATORY MEDICATION CHARGE

## 2025-05-01 PROCEDURE — 36415 COLL VENOUS BLD VENIPUNCTURE: CPT

## 2025-05-02 ENCOUNTER — PHARMACY VISIT (OUTPATIENT)
Dept: PHARMACY | Facility: CLINIC | Age: 70
End: 2025-05-02
Payer: COMMERCIAL

## 2025-05-02 ENCOUNTER — TELEPHONE (OUTPATIENT)
Dept: TRANSPLANT | Facility: HOSPITAL | Age: 70
End: 2025-05-02
Payer: MEDICARE

## 2025-05-02 DIAGNOSIS — Z94.2 LUNG TRANSPLANTED (MULTI): ICD-10-CM

## 2025-05-02 NOTE — TELEPHONE ENCOUNTER
Labs reviewed on 5/2/2025   WBC 6.8 ANC 4.6   Hbg 13.8   Platelets 106   Creatinine 1.29   Magnesium 1.68   Potassium 4.3   IgG 796   Prograf 6.8 Goal 6-8 Dose 1/1     -Changes made: None! Continue to watch plts.   -Labs due next 6/2/2025     Called patient and left voicemail with above results and plan. Asked that he call back with any questions or concerns.

## 2025-05-07 ENCOUNTER — SPECIALTY PHARMACY (OUTPATIENT)
Dept: PHARMACY | Facility: CLINIC | Age: 70
End: 2025-05-07

## 2025-05-07 PROCEDURE — RXMED WILLOW AMBULATORY MEDICATION CHARGE

## 2025-05-08 ENCOUNTER — PHARMACY VISIT (OUTPATIENT)
Dept: PHARMACY | Facility: CLINIC | Age: 70
End: 2025-05-08
Payer: COMMERCIAL

## 2025-05-11 NOTE — PROGRESS NOTES
Mr. 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 His transplant course was complicated by pleural bleed with return to OR for washout on 3/23/2020, prolonged ventilatory support requiring tracheostomy (since decannulated) and discharged on 5/8/2020. Post-hospital course was complicated by septic shock with MSSA in August 20202 with PE requiring lifelong anticoagulation.      Presenting today for follow-up visit.     Interval Medical History:  5/14/25: Breathing overall stable, no cough. No nausea or vomiting, moving bowels, no diarrhea. Hg stable. Seen by Neurology, recommended referral to Sleep Medicine and treatment of SARA, agreeable to PSA, will arrange. Increased anxiety, currently on Cymbalta, does not feel it is effective, will discuss changes with PCP.    3/12/25: Breathing overall stable, no cough. No nausea or vomiting, moving bowels. Hg overall stable. Continues on monthly pentamidine. Scheduled to see Neuro 4/23/25. Trying to increase activity. BMI stable at 31. Has been out of Letermovir for past week, needs a new prescription.    1/22/25: Admitted to the hospital in the setting of symptomatic anemia with a normocytic MCV, hematology was consulted, patient was transfused  and received iron supplementation IV and oral. Changed to Pentamidine and Letermovir. Cellcept held. Concern for SARA and plan for PSG as outpatient, however patient refusing.    11/11/24: Breathing feels okay, no coughing/wheezing/SOB. Reporting muscle cramping, mostly in the evening, will space Magnesium to BID advised to call if diarrhea becomes an issue. Staying active at home, working outdoors. Reporting memory concerns, first noticed by wife, frequently forgets tasks, prior engagements, wife concerned. Will refer to Neurology.      9/9/24: Breathing overall stable on RA, trying to be more active. No nausea or vomiting, moving bowels. Working on weight loss, BMI 30. BP overall stable,  120's systolic. Lesion on scalp biopsied, negative for malignancy. Elevated PSA, seen by Urology, MRI prostate 9/15/24. Started on Sanctura for BPH. Ongoing issues with neuropathy in feet/hands. Amenable to starting Aranesp injections.     6/3/24: Breathing overall stable on RA, trying to increase activity. No nausea or vomiting, moving bowels. No LE edema. Working on weight loss, lost 10 lbs. BP overall improved, 120's systolic. Following with Dermatology, lesion on scalp removed, biopsy pending, will fax us results. Mood overall improved, met with Dr. Vincent, feeling better.     4/3/24: Breathing overall stable on RA, no nausea or vomiting, moving bowels, feeling well. Trying to maintain adequate po fluid intake, no LE edema. Working on weight loss but not having much success, BMI 33. Depressed about weight gain. Trying to increase activity, knows he needs to go back to the gym. BP lower 100's systolic.     1/10/24: Breathing overall stable in RA, no nausea or vomiting, moving bowels. No LE edema. Feeling well. Saw Dermatology lesion on forehead consistent with SCC, removed, no further lesions. Gained 10 lbs since last visit, BMI 30.     11/10/23: Breathing overall stable on RA, no nausea or vomiting, moving bowels. No LE edema. Maintaining adequate po fluid intake. New lesion on forehead, planning to see Dermatology soon.     10/13/23: Bronchoscopy on 9/19/23 with multiple strains of Pseudomonas, completed a 14 day course of Ciprofloxacin and started on inhaled VICENTE, some tremors with albuterol. TBBX with no ACR or OB. Feels breathing and cough is overall improved, no MERINO. No nausea or vomiting, tolerating po intake and moving bowels.     9/7/23: Seen in ER 8/25/23 for chest pain, sharp increased with movement. Workup negative including nuclear stress test. Started on Crestor. Still present at times but improved, feels musculoskeletal in nature. Breathing overall stable, still has a cough with brownish sputum  despite course of Ciprofloxacin after last visit, RVP negative. No nausea or vomiting, moving bowels regularly.     8/2/23: Breathing overall stable, walking daily. Cough with brownish sputum for the past 2 weeks, no fevers, chills. No nausea or vomiting, moving bowels, no LE edema, maintaining adequate po fluid intake.     6/12/23: Breathing stable, working on weight loss. No nausea or vomiting, moving bowels. No LE edema. No longer working due to hours. Trying to be more active.     4/5/23: Feels that breathing is overall stable, no MERINO or cough. Tolerating PO diet, no nausea or vomiting, moving bowels, no LE edema. Notes 15lb weight gain self reported due to inactivity - no orthopnea or PND. Notes skin lesion on scalp present for last few months, not yet evaluated by dermatology. Blood glucose range , 115 on 12.5mg Jardiance bid, BP max 115 at home. 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  Discard remainder of vial after each use., Disp: 600 mL, Rfl: 11    acyclovir (Zovirax) 400 mg tablet, Take 1 tablet (400 mg) by mouth 2 times a day., Disp: 180 tablet, Rfl: 3    alcohol swabs pads, medicated, Apply 1 Swab topically 1 (one) time per week. Use before aranesp injection., Disp: 140 each, Rfl: 3    apixaban (Eliquis) 5 mg tablet, Take 1 tablet (5 mg) by mouth every 12 hours., Disp: 180 tablet, Rfl: 3    azithromycin (Zithromax) 250 mg tablet, Take 1 tablet (250 mg) by mouth once a day on Monday, Wednesday, and Friday., Disp: 36 tablet, Rfl: 3    blood sugar diagnostic (OneTouch Verio test strips) strip, Use as directed 3 times a day before meals., Disp: 300 each, Rfl: 3    budesonide-formoteroL (Symbicort) 160-4.5 mcg/actuation inhaler, Inhale 2 puffs by mouth 2 times a day. Rinse  mouth with water after use to reduce aftertaste and incidence of candidiasis. Do not swallow., Disp: 10.2 g, Rfl: 11    calcium carbonate (Oscal) 500 mg calcium (1,250 mg) tablet, Take 1 tablet (1,250 mg) by mouth 2 times daily (morning and late afternoon)., Disp: 180 tablet, Rfl: 3    cholecalciferol (Vitamin D-3) 25 MCG (1000 UT) capsule, Take 1 capsule (25 mcg) by mouth once daily., Disp: 90 capsule, Rfl: 3    colistimethate (Colymycin) 150 mg vial, Take 150 mg (1 vial) by nebulization 2 times a day. Mix 150 mg vial with 5 mL normal saline, insert into nebulizer and inhale medication 2 times a day, Disp: 60 each, Rfl: 11    darbepoetin alex in polysorbat (Aranesp, in polysorbate,) 60 mcg/mL injection, Inject 1 mL (60 mcg) under the skin 1 (one) time per week., Disp: 4 mL, Rfl: 11    DULoxetine (Cymbalta) 60 mg DR capsule, Take 1 capsule (60 mg) by mouth once daily. Do not crush or chew., Disp: 90 capsule, Rfl: 3    ferrous sulfate 325 (65 Fe) MG EC tablet, Take 1 tablet by mouth once daily with breakfast. Do not crush, chew, or split., Disp: 90 tablet, Rfl: 3    letermovir (Prevymis) 480 mg tablet, Take 1 tablet (480 mg) by mouth once daily., Disp: 90 tablet, Rfl: 3    levalbuterol (Xopenex) 0.63 mg/3 mL nebulizer solution, Inhale one (1) ampule via nebulizer twice daily., Disp: 180 mL, Rfl: 11    loratadine (Claritin) 10 mg tablet, TAKE ONE (1) TABLET BY MOUTH ONCE DAILY., Disp: 90 tablet, Rfl: 3    magnesium chloride (MagDelay) 64 mg EC tablet, Take 3 tablets (192 mg) by mouth 2 times a day. Separate from Tacrolimus by 2 hours., Disp: 540 tablet, Rfl: 3    metoprolol tartrate (Lopressor) 25 mg tablet, Take 0.5 tablets (12.5 mg) by mouth 2 times a day., Disp: 90 tablet, Rfl: 3    mirabegron (Myrbetriq) 50 mg tablet extended release 24 hr 24 hr tablet, Take 1 tablet (50 mg) by mouth once daily., Disp: 90 tablet, Rfl: 3    montelukast (Singulair) 10 mg tablet, Take 1 tablet (10 mg) by mouth once daily at bedtime.,  "Disp: 90 tablet, Rfl: 3    multivitamin with minerals iron-free (Centrum Silver), Take 1 tablet by mouth once daily., Disp: 90 tablet, Rfl: 3    pantoprazole (ProtoNix) 40 mg EC tablet, Take 1 tablet (40 mg) by mouth once daily., Disp: 90 tablet, Rfl: 3    Pentamidine (Pentam) 300 mg inhalation solution, Inhale 300 mg every 28 (twenty-eight) days., Disp: 300 mg, Rfl: 11    predniSONE (Deltasone) 5 mg tablet, Take 1 tablet (5 mg) by mouth once daily., Disp: 90 tablet, Rfl: 3    rosuvastatin (Crestor) 40 mg tablet, Take 1 tablet (40 mg) by mouth once daily., Disp: 90 tablet, Rfl: 3    sennosides (Senokot) 8.6 mg tablet, Take 1 tablet (8.6 mg) by mouth once daily as needed for constipation., Disp: 30 tablet, Rfl: 11    sharps container, 1 each if needed (For aranesp needles)., Disp: 1 each, Rfl: 11    syringe with needle (BD Luer-Silvia Syringe) 10 mL 21 x 1 1/2\" syringe, Use as directed to mix normal saline and Colistin twice daily., Disp: 60 each, Rfl: 11    syringe with needle 3 mL 25 x 5/8\" syringe, 1 Syringe 1 (one) time per week. Use for Aranesp injections once weekly., Disp: 50 each, Rfl: 2    tacrolimus (Prograf) 0.5 mg capsule, Take 2 capsules (1 mg) by mouth once daily in the morning AND 2 capsules (1 mg) once daily in the evening., Disp: 150 capsule, Rfl: 11    ROS:      Constitutional: no chills, no fever, no night sweats, not feeling poorly and not feeling tired.   Eyes: no blurred vision, no eyesight problems, no dryness of the eyes and no eye pain.   ENT: hearing loss, but no nasal congestion, no nasal discharge, no hoarseness, no sore throat, no earache, the ears do not feel full, no tinnitus, no nosebleeds, no postnasal drip, no rhinorrhea and no sinus pressure.   Neck: no mass(es) and no swelling.   Cardiovascular: no chest pain, no intermittent leg claudication, no lower extremity edema, no palpitations, no syncope, no tachycardia and no bradycardia.   Respiratory: no cough, no shortness of breath " during exertion, no shortness of breath at rest, no wheezing, non dry cough, non productive cough and no hemoptysis.   Gastrointestinal: no abdominal pain, no bloating, no nausea and no vomiting.   Musculoskeletal: no arthralgias, no back pain, no myalgias, no history of falls, no localized joint pain, no joint redness, no joint stiffness, no joint swelling, no limb pain, no limb swelling and no neck pain.   Integumentary: no rashes.   Neurological: no convulsions, no difficulty walking, no diplopia, no dizziness, no dysarthria, no facial weakness, no headache, no limb weakness, no memory changes, no numbness, no speech difficulties, no tingling, no confusion and no syncope.   Psychiatric: no anxiety, no depression, no anhedonia, no substance use disorders, no personality change, no sleep disturbances and no suicidal ideations.   Endocrine: no changes in appetite, no feelings of weakness, no hair thinning or loss, no heat/cold intolerance, no polydipsia, no deepening of the voice, no polyuria, no muscle weakness and no proptosis.   Hematologic/Lymphatic: a tendency for easy bruising and a tendency for easy bleeding, but no swollen glands and no recurrent infections.   All other systems have been reviewed and are negative for complaint.          Physical Exam: VSS: 130/80, 60,12, 97 %on RA, temp 36.5  Constitutional: Alert and in no acute distress. Well developed, well nourished.   Head and Face: Head and face: Normal.  (Moon facies from steroid, right facial droop unchanged).   Ears, Nose, Mouth, and Throat: External inspection of ears and nose: Normal.  Nasal mucosa, septum, and turbinates: Normal.  Lips, teeth, and gums: Normal.    Neck: No neck mass was observed. Supple.   Pulmonary: Chest: Normal to inspection.  (No wound) Respiratory effort: No increased work of breathing or signs of respiratory distress.  Auscultation of lungs: Clear to auscultation bilaterally.    Cardiovascular: Heart rate and rhythm were  normal, normal S1 and S2, no gallops, no murmurs and no pericardial rub. Pedal pulses: Normal. No peripheral edema.   Abdomen: Soft, nontender; no abdominal mass palpated. The abdomen was obese. Normal bowel sounds.   Lymphatic: No lymphadenopathy.   Musculoskeletal: No joint swelling seen, normal movements of all extremities. Digits and nails: Abnormal.  Examination of the extremities revealed fingernail clubbing.   Neurologic: cranial nerves 2-12 were intact.   Psychiatric: Judgment and insight: Intact. Alert and oriented to person, place and time. Mood and affect:     Labwork:  Na 140, potassium 4.3, Cr 1.29, Mag 1.68, LFT's stable  WBC 6.8, Hg 13.8, Hematocrit 44.7, platelets 106, ANC acceptable 4600  Prograf 6.8, CMV PCR negative 5/1/25, EBV PCR negative on 5/1/25  Vitamin D  46 on 3/24/25, IgG 796, HLA 46 Ab negative 3/24/25  Lipid panel 3/24/25 Cholesterol 123 TG 90, PSA 2.36 2/10/25, iron 26, TIBC 401, 1/6/25, received IV iron and started on po.         CXR: 5/14/25 reviewed, clear lung fields bilaterally, no acute infiltrates, elevated left hemidiaphragm (chronic).    Results:  3/10/25: Spirometry:   FVC:  2.29 59%  FEV1: 1.25 43%  [id number]: 0.60 26%    3/10/25: Spirometry:   FVC: 2.21 57%  FEV1: 1.18 40%  [id number]: 0.25 10%    1/22/25: Spirometry:   FVC: 2.26 58%  FEV1: 12.0 41%  [id number]: 0.43 18 %    11/11/24: Spirometry  FEV: 1.32 45%  FVC: 2.35 60%  [id number]: 0.56 24%     9/9/24: Spirometry:  FVC: 2.40 62%  FEV1: 1.36 46%  25-75: 0. 64 27%     6/3/24: Spirometry:  FVC: 2.48 64%  FEV1: 1.44 49%  25-75: 0.92 39%     4/3/24: Spirometry:  FVC:  2.41 59%  FEV1: 1.48 48%  25-75: 0.78 32%     1/10/24: Spirometry:  FVC: 2.45 60%  FEV1: 1.55 50%  25-75: 0.97 39%     11/10/23 Spirometry:  FVC: 2.33, 55%  FEV1: 1.47 46%  25-75: 0.95 39%     10/13/23 Spirometry:  FVC: 2.29 52%  FEV1: 1.35 42%  25-75: 0.88 36%     9/7/23 Spirometry:  FVC: 2.25, 53%  FEV1: 1.41, 44%  25-75: 0.82, 34%     8/2/23 Spirometry:  FVC:  2.48, 59%  FEV1: 1.52, 47%  25-75: 1.03, 42%     6/12/23 Spirometry:  FVC 2.65 62%  FEV1: 1.48, 46%  25-75: 0.89, 36%     5/2/23 Spirometry:  FVC: 2.58, 61%  FEV1: 1.51, 47%  25-75: 0.88, 36%     4/5/23 Spirometry:  FVC: 2.47 58%  FEV1: 1.50 46%  FEF 25-75: 31%    Assessment/Plan:  Mr. Patel is a 68 yo M with history of IPF/PAH s/p DLTx 3/22/20, GERD, hypertension, DM, hyperlipidemia who presents for follow-up visit for lung transplant. Overall doing well. Seen in ER 8/25/23 for chest pain with negative workup. Admitted 1/25 for symptomatic anemia, improved with IV iron, started on po as well along with medication changes.      1. Pulmonary- s/p DLTx for IPF/PAH on 3/22/20, CMV mismatch, EBV positive recipient  Allograft function:  -Initial complications- minimal PGD, pleural bleed with washout 3/23 and need for trach, discharged on 5/8/2020.   -Modest PE at same presentation as septic shock 8/2020 - lifelong anticoagulation given second episode. (Remote VTE 1993). Currently on Eliquis BID.  -TBBX in 4/20 with A2, treated with pulse Solumedrol, TBBX in 5 and 6/20 with minimal ACR, subsequent TBBX negative, last 2/23/21.  -History of MSSA, Klebsiella, Corynebacterium from 4/20 cultures, Enterobacter from 6/20 cultures, sensitive to fluoroquinolones.  -Bronchoscopy on 9/19/23, cultures with multiple strains of Pseudomonas, completed a 14 day course of Ciprofloxacin and started on inhaled VICENTE. TBBX with no ACR or OB.   -Changed to inhaled colistin due to hearing loss with VICENTE, tolerating well.  -PFT's overall stable compared to prior FEV11.18(40%); FEF 25-75 slightly decreased 0.25 (10%), 5/14/25 stable compared to baseline FEV1 1.25(43%),continue to follow serial exams, continue to work on weight loss and conditioning, BMI improved at 31, trying to increase activity.  -CXR reviewed, clear lung fields bilaterally, elevated left hemidiaphragm (chronic), continue to follow serial exams. HRCT on 9/18/23 with relatively  clear lung fields bilaterally, multifocal expiratory air trapping, suggestive of CLAD.  -History of SARA- evidence of this during admission 1/25, plan for outpatient PSG, again recommended patient proceed with this, agreeable, will schedule.  -HLA Ab negative 3/24/25, no significant history of DSA post transplant, follow serially, every 3 months  -Continue Azithromycin 250 mg qM,W,F for ROSSY prophylaxis along with Singulair 10 mg QHS. Also added Symbicort BID.     Immunosuppression:  -Prograf goal 6-8, level 6.8, continue 1 mg BID. Continue to follow closely, repeat on 6/2/25.  -Cellcept held 1/25 due to anemia, also given history of SCC. Everolimus likely not a viable option due to chronic thrombocytopenia.  -Prednisone 5 mg daily     Prophylaxis:  -Dapsone held due to anemia, continues on inhaled Pentamidine monthly, tolerating, last dose 5/14/25.  -CMV mismatch- previously on Valcyte, now on Letermovir 480 mg daily due to anemia, new prescription sent, CMV PCR's negative, last 5/1/25, no history of viremia. Also on Acyclovir for HSV prophylaxis.  -Previously on voriconazole for anti-fungal prophylaxis; no fungal infections, LFT's stable with elevated alk phos and normal MRI of liver; discontinued voriconazole 4/5/23 with improvement of alkaline phosphatase  -IgG level 796, adequate, continue to follow serial levels, goal greater than 600.     2. CV  -History of hypertension- Lopressor restarted at 12.5 mg BID, BP stable, ranging 120's sytolic. Advised to continue to follow BP closely and call us if greater than 130/80 or less than 100/60 consistently.   -Seen in ER 8/25/23 for chest pain, workup negative, including nuclear stress test. Overall improved, appears musculoskeletal in nature. TTE 9/26/23 with EF 65%, normal LV and RV function  -Hyperlipidemia- Lipid panel on 3/24/25 with acceptable Cholesterol and TG, continue Crestor 40 mg daily (for cardioprotective effect), repeat with next labwork.     3. Renal  -Cr  overall stable at 1.29, advised to maintain adequate po hydration.  -Magnesium 1.68, continue SloMag 3 tabs BID.  -Renally dose all medications and avoid nephrotoxins  -History of nephrolithiasis in the past, no issues currently.  -Elevated PSA, overactive bladder symptoms, and erectile dysfunction- following with Urology, Dr Soni. MRI 9/15/24 with BPH, low concern for malignancy. No improvement with Sanctura, changed to Mybetriq, will follow response, improved. If no improvement, can consider cystoscopy and urodynamics. PSA normal at 2.36 on 2/10/25.     4. GI  -Continue Protonix 40 mg daily before meals   -Persistently elevated AP- MRI lever 2/9/23 with normal appearance; improved after discontinuation of voriconazole, will follow.   -Will follow after start of Crestor as above, stable.     5. ID  -History of septic shock with MSSA 8/2020. Received prolonged course of IV Abx with resolution.  -Otitis external starting 9/8/2020 with ENT involvement , multiple rounds ab and wick insertion, developed mastoiditis  -Sternal wound infection- 2 episodes of Staph s/p debridement, wound vac removed. Repeat episode 7/19-7/23/2021, no debridement, was discharged on Abx and completed a 8 week course.  -COVID 12/22, recovered and overall improved.  -Bronchoscopy on 9/19/23 cultures with multiple strains of Pseudomonas, completed a 14 day course of Ciprofloxacin and started on inhaled VICENTE, changed to inhaled Colistin due to hearing loss, tolerating well.  -Afebrile, no signs of new infection currently.     6. Endo  -History of DM- BG levels well-controlled  -History of osteoporosis- previously on Fosamax, DEXA 11/4/24 with osteopenia. Following with Dr. Pascual, s/p Reclast 4/15/25, tolerated well, continue with yearly treatments.  -Continue with vitamin D supplementation, level acceptable at 46, follow serial levels.     7. Heme  -History of PE at same presentation as septic shock 8/2020 - lifelong anticoagulation given  "second episode. (Remote VTE 1993). On Eliquis, increased to 5 mg BID as off voriconazole. Also has IVF filter in place.   -H/H stable at 13.8/44. Admitted 1/25 for symptomatic anemia. S/p IV iron, currently on 325 mg po daily, tolerating. Continues on Aranesp 60 mg mcg weekly. Cellcept discontinued as above. Changed to Pentamidine and Letermovir.      -WBC intact at 6.8, platelets stable at 106, chronic thrombocytopenia     8. ENT  -Otitis extena with facial nerve involvement. 10/17 Right tympanomastoidectomy, mastoid washout while inpatient. 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  -Chronic hearing loss with hearing aids, following with ENT, not a candidate for cochlear implant.    9. Neuro  - History of neuropathy, previously on Gabapentin, now discontinued.  - Memory Loss- reporting onset \"several months\" forgets to pay bills, forgets to complete tasks, wife reporting this is an issue. Tucker by Neurology on 4/23 for further evaluation, recommended PSG and evaluation with Sleep medicine, will schedule PSG, also labwork B12, TSH, MMA, will complete with next labwork.     10. Preventative Health:  -Vaccines:              Influenza: 10/11/24              Covid: 2/4/21, 3/12/21 8/16/21, 2/25/22, 10/13/22, 10/9/23, 10/11/24              Pneumonia: [id number] 9/22/16              RSV: 11/23              Shingrix: 6/21/19, 3/6/19  - Colonoscopy: Last done April 2018, tubular adenoma. Repeat interval not documented, should continue colon cancer screening until age 73  - Dermatology: Hx SCC. Follows with Dermatology, last visit November 2024. Melanoma removed from left ear. Dr. Liza Okeefe  - Dental: Up to date  -Continue to work on weight loss and increased activity, seen by Nutrition, weight stable since last visit, BMI 31.    RTC in 3 months with PFT's, CXR, pentamidine, continue with monthly Pentamidine/PFT's. Bronchoscopy only as clinically indicated.   Plan of " care discussed at length with patient, all questions answered, total of 45 minutes spent regarding counseling and coordination of care.

## 2025-05-12 PROCEDURE — RXMED WILLOW AMBULATORY MEDICATION CHARGE

## 2025-05-13 PROCEDURE — RXMED WILLOW AMBULATORY MEDICATION CHARGE

## 2025-05-14 ENCOUNTER — HOSPITAL ENCOUNTER (OUTPATIENT)
Dept: RESPIRATORY THERAPY | Facility: HOSPITAL | Age: 70
Discharge: HOME | End: 2025-05-14
Payer: MEDICARE

## 2025-05-14 ENCOUNTER — SOCIAL WORK (OUTPATIENT)
Dept: TRANSPLANT | Facility: HOSPITAL | Age: 70
End: 2025-05-14
Payer: MEDICARE

## 2025-05-14 ENCOUNTER — TELEPHONE (OUTPATIENT)
Dept: TRANSPLANT | Facility: HOSPITAL | Age: 70
End: 2025-05-14

## 2025-05-14 ENCOUNTER — OFFICE VISIT (OUTPATIENT)
Dept: TRANSPLANT | Facility: HOSPITAL | Age: 70
End: 2025-05-14
Payer: MEDICARE

## 2025-05-14 ENCOUNTER — HOSPITAL ENCOUNTER (OUTPATIENT)
Dept: RADIOLOGY | Facility: HOSPITAL | Age: 70
Discharge: HOME | End: 2025-05-14
Payer: MEDICARE

## 2025-05-14 ENCOUNTER — PHARMACY VISIT (OUTPATIENT)
Dept: PHARMACY | Facility: CLINIC | Age: 70
End: 2025-05-14
Payer: COMMERCIAL

## 2025-05-14 ENCOUNTER — TELEPHONE (OUTPATIENT)
Dept: TRANSPLANT | Facility: HOSPITAL | Age: 70
End: 2025-05-14
Payer: MEDICARE

## 2025-05-14 VITALS
BODY MASS INDEX: 31.1 KG/M2 | HEIGHT: 69 IN | WEIGHT: 210 LBS | HEART RATE: 60 BPM | DIASTOLIC BLOOD PRESSURE: 80 MMHG | SYSTOLIC BLOOD PRESSURE: 130 MMHG | OXYGEN SATURATION: 97 % | TEMPERATURE: 97.7 F

## 2025-05-14 DIAGNOSIS — D84.9 IMMUNOSUPPRESSION: ICD-10-CM

## 2025-05-14 DIAGNOSIS — J31.0 CHRONIC RHINITIS: ICD-10-CM

## 2025-05-14 DIAGNOSIS — I26.99 PULMONARY EMBOLISM, UNSPECIFIED CHRONICITY, UNSPECIFIED PULMONARY EMBOLISM TYPE, UNSPECIFIED WHETHER ACUTE COR PULMONALE PRESENT (MULTI): ICD-10-CM

## 2025-05-14 DIAGNOSIS — D50.8 IRON DEFICIENCY ANEMIA SECONDARY TO INADEQUATE DIETARY IRON INTAKE: ICD-10-CM

## 2025-05-14 DIAGNOSIS — Z94.2 LUNG TRANSPLANTED (MULTI): ICD-10-CM

## 2025-05-14 DIAGNOSIS — J84.10 PULMONARY FIBROSIS (MULTI): ICD-10-CM

## 2025-05-14 DIAGNOSIS — Z94.2 LUNG TRANSPLANTED (MULTI): Primary | ICD-10-CM

## 2025-05-14 DIAGNOSIS — D61.89 ANEMIA DUE TO OTHER BONE MARROW FAILURE: ICD-10-CM

## 2025-05-14 DIAGNOSIS — I10 ESSENTIAL (PRIMARY) HYPERTENSION: ICD-10-CM

## 2025-05-14 DIAGNOSIS — E83.42 HYPOMAGNESEMIA: ICD-10-CM

## 2025-05-14 DIAGNOSIS — K21.9 GASTROESOPHAGEAL REFLUX DISEASE WITHOUT ESOPHAGITIS: ICD-10-CM

## 2025-05-14 DIAGNOSIS — M81.0 OSTEOPOROSIS WITHOUT CURRENT PATHOLOGICAL FRACTURE, UNSPECIFIED OSTEOPOROSIS TYPE: ICD-10-CM

## 2025-05-14 PROCEDURE — 99215 OFFICE O/P EST HI 40 MIN: CPT | Performed by: INTERNAL MEDICINE

## 2025-05-14 PROCEDURE — 1036F TOBACCO NON-USER: CPT | Performed by: INTERNAL MEDICINE

## 2025-05-14 PROCEDURE — 71046 X-RAY EXAM CHEST 2 VIEWS: CPT

## 2025-05-14 PROCEDURE — G2211 COMPLEX E/M VISIT ADD ON: HCPCS | Performed by: INTERNAL MEDICINE

## 2025-05-14 PROCEDURE — 3075F SYST BP GE 130 - 139MM HG: CPT | Performed by: INTERNAL MEDICINE

## 2025-05-14 PROCEDURE — 99215 OFFICE O/P EST HI 40 MIN: CPT | Mod: 25 | Performed by: INTERNAL MEDICINE

## 2025-05-14 PROCEDURE — 94640 AIRWAY INHALATION TREATMENT: CPT

## 2025-05-14 PROCEDURE — 94010 BREATHING CAPACITY TEST: CPT

## 2025-05-14 PROCEDURE — 71046 X-RAY EXAM CHEST 2 VIEWS: CPT | Performed by: RADIOLOGY

## 2025-05-14 PROCEDURE — 3079F DIAST BP 80-89 MM HG: CPT | Performed by: INTERNAL MEDICINE

## 2025-05-14 PROCEDURE — 3008F BODY MASS INDEX DOCD: CPT | Performed by: INTERNAL MEDICINE

## 2025-05-14 PROCEDURE — 2500000001 HC RX 250 WO HCPCS SELF ADMINISTERED DRUGS (ALT 637 FOR MEDICARE OP): Performed by: INTERNAL MEDICINE

## 2025-05-14 PROCEDURE — 94010 BREATHING CAPACITY TEST: CPT | Performed by: STUDENT IN AN ORGANIZED HEALTH CARE EDUCATION/TRAINING PROGRAM

## 2025-05-14 PROCEDURE — RXMED WILLOW AMBULATORY MEDICATION CHARGE

## 2025-05-14 PROCEDURE — 1125F AMNT PAIN NOTED PAIN PRSNT: CPT | Performed by: INTERNAL MEDICINE

## 2025-05-14 PROCEDURE — 1159F MED LIST DOCD IN RCRD: CPT | Performed by: INTERNAL MEDICINE

## 2025-05-14 RX ORDER — PENTAMIDINE ISETHIONATE 300 MG/300MG
300 INHALANT RESPIRATORY (INHALATION)
Qty: 300 MG | Refills: 11 | Status: SHIPPED | OUTPATIENT
Start: 2025-05-14 | End: 2026-05-14

## 2025-05-14 RX ORDER — ALBUTEROL SULFATE 0.83 MG/ML
3 SOLUTION RESPIRATORY (INHALATION) ONCE
Status: COMPLETED | OUTPATIENT
Start: 2025-05-14 | End: 2025-05-14

## 2025-05-14 RX ORDER — ALBUTEROL SULFATE 90 UG/1
1 INHALANT RESPIRATORY (INHALATION) ONCE
Status: COMPLETED | OUTPATIENT
Start: 2025-05-14 | End: 2025-05-14

## 2025-05-14 RX ORDER — DULOXETIN HYDROCHLORIDE 60 MG/1
60 CAPSULE, DELAYED RELEASE ORAL DAILY
Qty: 90 CAPSULE | Refills: 3 | Status: SHIPPED | OUTPATIENT
Start: 2025-05-14 | End: 2026-05-14

## 2025-05-14 RX ADMIN — ALBUTEROL SULFATE 2 PUFF: 90 AEROSOL, METERED RESPIRATORY (INHALATION) at 10:53

## 2025-05-14 SDOH — ECONOMIC STABILITY: INCOME INSECURITY: IN THE LAST 12 MONTHS, WAS THERE A TIME WHEN YOU WERE NOT ABLE TO PAY THE MORTGAGE OR RENT ON TIME?: NO

## 2025-05-14 SDOH — ECONOMIC STABILITY: FOOD INSECURITY: WITHIN THE PAST 12 MONTHS, YOU WORRIED THAT YOUR FOOD WOULD RUN OUT BEFORE YOU GOT MONEY TO BUY MORE.: NEVER TRUE

## 2025-05-14 SDOH — ECONOMIC STABILITY: FOOD INSECURITY: WITHIN THE PAST 12 MONTHS, THE FOOD YOU BOUGHT JUST DIDN'T LAST AND YOU DIDN'T HAVE MONEY TO GET MORE.: NEVER TRUE

## 2025-05-14 SDOH — ECONOMIC STABILITY: TRANSPORTATION INSECURITY
IN THE PAST 12 MONTHS, HAS THE LACK OF TRANSPORTATION KEPT YOU FROM MEDICAL APPOINTMENTS OR FROM GETTING MEDICATIONS?: NO

## 2025-05-14 SDOH — ECONOMIC STABILITY: TRANSPORTATION INSECURITY
IN THE PAST 12 MONTHS, HAS LACK OF TRANSPORTATION KEPT YOU FROM MEETINGS, WORK, OR FROM GETTING THINGS NEEDED FOR DAILY LIVING?: NO

## 2025-05-14 ASSESSMENT — SOCIAL DETERMINANTS OF HEALTH (SDOH)
WITHIN THE LAST YEAR, HAVE YOU BEEN HUMILIATED OR EMOTIONALLY ABUSED IN OTHER WAYS BY YOUR PARTNER OR EX-PARTNER?: NO
WITHIN THE LAST YEAR, HAVE YOU BEEN AFRAID OF YOUR PARTNER OR EX-PARTNER?: NO
WITHIN THE LAST YEAR, HAVE YOU BEEN KICKED, HIT, SLAPPED, OR OTHERWISE PHYSICALLY HURT BY YOUR PARTNER OR EX-PARTNER?: NO
WITHIN THE LAST YEAR, HAVE TO BEEN RAPED OR FORCED TO HAVE ANY KIND OF SEXUAL ACTIVITY BY YOUR PARTNER OR EX-PARTNER?: NO
IN THE PAST 12 MONTHS, HAS THE ELECTRIC, GAS, OIL, OR WATER COMPANY THREATENED TO SHUT OFF SERVICE IN YOUR HOME?: NO
HOW HARD IS IT FOR YOU TO PAY FOR THE VERY BASICS LIKE FOOD, HOUSING, MEDICAL CARE, AND HEATING?: NOT HARD AT ALL

## 2025-05-14 ASSESSMENT — ENCOUNTER SYMPTOMS
GASTROINTESTINAL NEGATIVE: 1
CONSTIPATION: 0
DOUBLE VISION: 0
WEIGHT LOSS: 0
CHILLS: 0
FEVER: 0
DYSPNEA ON EXERTION: 0
WEIGHT GAIN: 0
SPUTUM PRODUCTION: 0
COUGH: 0
SUSPICIOUS LESIONS: 0
COLOR CHANGE: 0
EYE PAIN: 0
PND: 0
HEADACHES: 0
NUMBNESS: 0
TREMORS: 0
CHANGE IN BOWEL HABIT: 0
NAUSEA: 0
VOMITING: 0
SHORTNESS OF BREATH: 0
SORE THROAT: 0
DIZZINESS: 0
BLURRED VISION: 0
LIGHT-HEADEDNESS: 0
FALLS: 0
PALPITATIONS: 0
DIARRHEA: 0
BRUISES/BLEEDS EASILY: 1
HEMATURIA: 0
PARESTHESIAS: 0
WEAKNESS: 0
MUSCLE CRAMPS: 0
IRREGULAR HEARTBEAT: 0

## 2025-05-14 ASSESSMENT — PATIENT HEALTH QUESTIONNAIRE - PHQ9
2. FEELING DOWN, DEPRESSED OR HOPELESS: SEVERAL DAYS
8. MOVING OR SPEAKING SO SLOWLY THAT OTHER PEOPLE COULD HAVE NOTICED. OR THE OPPOSITE, BEING SO FIGETY OR RESTLESS THAT YOU HAVE BEEN MOVING AROUND A LOT MORE THAN USUAL: NOT AT ALL
5. POOR APPETITE OR OVEREATING: NOT AT ALL
3. TROUBLE FALLING OR STAYING ASLEEP OR SLEEPING TOO MUCH: NOT AT ALL
9. THOUGHTS THAT YOU WOULD BE BETTER OFF DEAD, OR OF HURTING YOURSELF: SEVERAL DAYS
6. FEELING BAD ABOUT YOURSELF - OR THAT YOU ARE A FAILURE OR HAVE LET YOURSELF OR YOUR FAMILY DOWN: SEVERAL DAYS
7. TROUBLE CONCENTRATING ON THINGS, SUCH AS READING THE NEWSPAPER OR WATCHING TELEVISION: SEVERAL DAYS
4. FEELING TIRED OR HAVING LITTLE ENERGY: NOT AT ALL
1. LITTLE INTEREST OR PLEASURE IN DOING THINGS: SEVERAL DAYS
SUM OF ALL RESPONSES TO PHQ QUESTIONS 1-9: 5
SUM OF ALL RESPONSES TO PHQ9 QUESTIONS 1 & 2: 2

## 2025-05-14 ASSESSMENT — ANXIETY QUESTIONNAIRES
5. BEING SO RESTLESS THAT IT IS HARD TO SIT STILL: SEVERAL DAYS
1. FEELING NERVOUS, ANXIOUS, OR ON EDGE: SEVERAL DAYS
2. NOT BEING ABLE TO STOP OR CONTROL WORRYING: SEVERAL DAYS
7. FEELING AFRAID AS IF SOMETHING AWFUL MIGHT HAPPEN: NEARLY EVERY DAY
6. BECOMING EASILY ANNOYED OR IRRITABLE: SEVERAL DAYS
3. WORRYING TOO MUCH ABOUT DIFFERENT THINGS: SEVERAL DAYS
GAD7 TOTAL SCORE: 9
4. TROUBLE RELAXING: SEVERAL DAYS

## 2025-05-14 ASSESSMENT — PAIN SCALES - GENERAL: PAINLEVEL_OUTOF10: 5

## 2025-05-14 NOTE — PROGRESS NOTES
Review of Systems   Constitutional: Positive for malaise/fatigue (occasional). Negative for chills, fever, weight gain and weight loss (More active, eating healthier).   HENT:  Negative for congestion and sore throat.    Eyes:  Negative for blurred vision, double vision and pain.   Cardiovascular:  Negative for chest pain, dyspnea on exertion, irregular heartbeat, leg swelling, palpitations and paroxysmal nocturnal dyspnea.   Respiratory:  Negative for cough, shortness of breath and sputum production (Brown/yellow. In the morning.).    Hematologic/Lymphatic: Bruises/bleeds easily.   Skin:  Negative for color change and suspicious lesions.   Musculoskeletal:  Negative for falls, muscle cramps and muscle weakness.   Gastrointestinal: Negative.  Negative for change in bowel habit, constipation, diarrhea, nausea and vomiting.   Genitourinary:  Negative for hematuria and urgency.        Incontinence at times (rarely), following uro.   Neurological:  Negative for dizziness, headaches, light-headedness, numbness, paresthesias, tremors and weakness.     Patient seen in clinic today. He saw Neurology 4/23 for impaired memory--> Recommending repeat sleep study (patient refusing at this time), additional labs, and RTC 6 months to reassess memory issues. Discussed with Dr. Boone. Plan to RTC in 3 months with CXR, jelly and pentamidine. Monthly jelly and pentamidine at [facility]. Next bronchoscopy PRN. Labs due 6/2/2025. Pentamidine next 6/16/2025.     Routine Care Addressed:   -Vaccines:  -->2024 COVID 10/11/2024  -->2024 Flu 10/11/2024  -->RSV 11/1/2023  -Colonoscopy: 3/2025--> Biopsies negative. Will have aged out by time   -DEXA Scan: 11/4/2024--> Osteopenia   -->Treatments: Following with Endocrine--> Dr. Ashok Pascual   - Endocrine: 10/22/2025  -PSA: Due 2/2026  - Urology: Following   -Dentist: Following   -Dermatology: Following with Dr. Ty to home.

## 2025-05-14 NOTE — TELEPHONE ENCOUNTER
Received call from patient who is waiting for pentamidine at [facility]. Disconnected from patient, returned call and left voicemail.

## 2025-05-14 NOTE — PROGRESS NOTES
SW met with pt in [place] [address] for a follow up appt. Pt was active and engaged during visit. Pt shard his information remains the same including address, phone, and insurance. Pt shared he has been doing well overall at home. Pt's mood has been a little up and down. SW had pt complete PHQ-9 and CATHY. Pt scored a 5 on PHQ-9 and 9 on CATHY meaning mild depression and mild anxiety symptoms at this time. Pt confirmd he has been taking his mental health medications but is feeling like they may not be fully helping his current symptoms. SW and pt reviewed potential counseling services or maybe an increase in his current medications. Pt not interested in individual counseling at this time, might be open to couples counseling in the future and is open to discussing med change with provider. Pt shared he was feeling a little frustrated as he also lost his wallet recently. Pt shared his sleep has been fine, appetite has been too good. Pt shared he has lots of things on his mind that hinder his sleep. SW provided encouragement and support to pt. Pt shared he is looking [place] as he feels better physically and is hoping he can take a full swing. Pt shared his medication was going well at home, not having any trouble with his pharmacy. Pt shared he is just taking things day by day. SW confirmed with pt he had no additional questions or concerns at this time. SW to follow up with pt routinely.     Sheryl TANG

## 2025-05-14 NOTE — PATIENT INSTRUCTIONS
Thank you for coming in to clinic today, it was nice to see you.     Today we discussed the following:  - Let us know if we can help you schedule your sleep study to assess for sleep apnea. Treatment of this can help with your memory issues.   - We will schedule a virtual appointment with Dr. Meghann Vincent (our transplant Psychologist) in regards to your increase in anxiety at home. She will be helpful in recommending any strategies or medication adjustments for relief.     Medication Changes:  - None.       Follow-up  - Labs due 6/2/2025.  - Spirometry and pentamidine next 6/16/2025, then 7/16/2025.   - Return to clinic in 3 months with chest xray, jelly, and pentamidine.       As a friendly reminder:  - Call the office if you develop any symptoms including but not limited to fevers, chills, nausea, vomiting, aches, shortness of breath, or coughing  - Do not let your medications run out, try to maintain at least a 1 week supply at all times. Call the office early if refills are needed as these can be difficult to obtain on weekends  - Please keep all your appointments    As always, call the office at [phone] or [phone] for any issues during normal business hours    Out of hours, please call the [facility] Trans[facility] On-Call [phone] for emergencies

## 2025-05-14 NOTE — PROCEDURES
1048  [facility] pharmacy kept pt. Waiting 45 min. To  Pentamidine 300 mg vial, so late to appt.  After Spirometry PFT done, pt. Gave tech. Pentamidine and med dissolved in 6 ml sterile water.  Pt. Denies dyspnea, dizziness, or nausea.  Reviewed preventing pneumonia w/ Pentamidine aerosol txs.  P.ox 96%/ HR 66/ Breath Sounds clear bilat, but Left side dim. Until pt. Took deep breath and LLL equal to RLL.  1053  Albuterol MDI 2 puffs w/ spacer given.  Pt. Able to hold deep breaths > 5 sec.  P.ox 95%/ HR 64/ BS clear uppers.  1103  Pentamidine aerosol tx started.  Pt. Takes steady breaths w/ occas. Deeper breath.  Pt. Is sleepy so nods off to sleep once in a while but wakes right back up and continues tx, breathing through mouthpiece.  P.ox 95%/ HR 68/ BS clear uppers but NEFTALY dim.  1108  P.ox 96%/ HR 64/ BS clear uppers, NEFTALY dim.  Liquid accumulated in aerosol tubing drained as needed.  1113  P.ox 95%/ HR 63/ BS clear uppers, NEFTALY dim.  1123  P.ox 94%/ HR 68/ BS clear uppers, NEFTALY dim.  1128  P.ox 93%/ HR 67/ BS clear uppers, NEFTALY dim.  1130  Pentamidine aerosol tx finished.  Pt. Rinsed mouth and throat w/ water and took sips of water.  Pt. Denies dyspnea, dizziness, or nausea.  P.ox 95%/ HR 66/ BS clear bilat. But left side dim.  1135  Pt. Dismissed from lab w/o complaint.

## 2025-05-14 NOTE — Clinical Note
Hi! Please schedule him for jelly and pentam on 6/16 and 7/16 at [facility]. Then schedule jelly, pentamidine and clinic with Dr. Mcelroy 8/18. Can you also schedule a virtual visit with Dr. Vincent sometime soon? Thank you so much!

## 2025-05-15 ENCOUNTER — SPECIALTY PHARMACY (OUTPATIENT)
Dept: PHARMACY | Facility: CLINIC | Age: 70
End: 2025-05-15

## 2025-05-15 ENCOUNTER — DOCUMENTATION (OUTPATIENT)
Dept: TRANSPLANT | Facility: HOSPITAL | Age: 70
End: 2025-05-15
Payer: MEDICARE

## 2025-05-15 LAB
MGC ASCENT PFT - FEV1 - PRE: 1.25
MGC ASCENT PFT - FEV1 - PREDICTED: 2.9
MGC ASCENT PFT - FVC - PRE: 2.29
MGC ASCENT PFT - FVC - PREDICTED: 3.85

## 2025-05-15 NOTE — PROGRESS NOTES
Called [facility]  at [phone], left voicemail asking for assistance to schedule the patient for a sleep study. Awaiting callback.

## 2025-05-16 ENCOUNTER — DOCUMENTATION (OUTPATIENT)
Dept: TRANSPLANT | Facility: HOSPITAL | Age: 70
End: 2025-05-16
Payer: MEDICARE

## 2025-05-16 ENCOUNTER — SPECIALTY PHARMACY (OUTPATIENT)
Dept: PHARMACY | Facility: CLINIC | Age: 70
End: 2025-05-16

## 2025-05-16 NOTE — PROGRESS NOTES
Faxed referral information and request for Sleep Study to [facility] at [phone].

## 2025-05-19 ENCOUNTER — APPOINTMENT (OUTPATIENT)
Dept: TRANSPLANT | Facility: HOSPITAL | Age: 70
End: 2025-05-19
Payer: MEDICARE

## 2025-05-19 ENCOUNTER — SPECIALTY PHARMACY (OUTPATIENT)
Dept: PHARMACY | Facility: CLINIC | Age: 70
End: 2025-05-19

## 2025-05-19 PROCEDURE — RXMED WILLOW AMBULATORY MEDICATION CHARGE

## 2025-05-20 ENCOUNTER — PHARMACY VISIT (OUTPATIENT)
Dept: PHARMACY | Facility: CLINIC | Age: 70
End: 2025-05-20
Payer: COMMERCIAL

## 2025-05-20 ENCOUNTER — SPECIALTY PHARMACY (OUTPATIENT)
Dept: PHARMACY | Facility: CLINIC | Age: 70
End: 2025-05-20

## 2025-05-22 ENCOUNTER — TELEPHONE (OUTPATIENT)
Dept: TRANSPLANT | Facility: HOSPITAL | Age: 70
End: 2025-05-22
Payer: MEDICARE

## 2025-05-22 NOTE — TELEPHONE ENCOUNTER
Maryam from [facility] needed clarification on practice providers, no other concerns at this time.

## 2025-05-27 ENCOUNTER — PHARMACY VISIT (OUTPATIENT)
Dept: PHARMACY | Facility: CLINIC | Age: 70
End: 2025-05-27
Payer: COMMERCIAL

## 2025-05-27 ENCOUNTER — SPECIALTY PHARMACY (OUTPATIENT)
Dept: PHARMACY | Facility: CLINIC | Age: 70
End: 2025-05-27

## 2025-05-27 PROCEDURE — RXMED WILLOW AMBULATORY MEDICATION CHARGE

## 2025-05-29 ENCOUNTER — DOCUMENTATION (OUTPATIENT)
Dept: TRANSPLANT | Facility: HOSPITAL | Age: 70
End: 2025-05-29
Payer: MEDICARE

## 2025-06-02 DIAGNOSIS — Z94.2 LUNG TRANSPLANTED (MULTI): ICD-10-CM

## 2025-06-03 ENCOUNTER — APPOINTMENT (OUTPATIENT)
Dept: GENERAL RADIOLOGY | Age: 70
End: 2025-06-03
Payer: MEDICARE

## 2025-06-03 ENCOUNTER — APPOINTMENT (OUTPATIENT)
Dept: CT IMAGING | Age: 70
End: 2025-06-03
Payer: MEDICARE

## 2025-06-03 ENCOUNTER — HOSPITAL ENCOUNTER (EMERGENCY)
Age: 70
Discharge: HOME | End: 2025-06-04
Attending: EMERGENCY MEDICINE
Payer: MEDICARE

## 2025-06-03 DIAGNOSIS — S52.001A CLOSED FRACTURE OF PROXIMAL END OF RIGHT ULNA, UNSPECIFIED FRACTURE MORPHOLOGY, INITIAL ENCOUNTER: ICD-10-CM

## 2025-06-03 DIAGNOSIS — S61.412A LACERATION OF LEFT HAND, FOREIGN BODY PRESENCE UNSPECIFIED, INITIAL ENCOUNTER: ICD-10-CM
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
## 2025-06-03 LAB
CHP ED QC CHECK: NORMAL
GLUCOSE BLD-MCNC: 124 MG/DL (ref 74–99)

## 2025-06-03 PROCEDURE — 85025 COMPLETE CBC W/AUTO DIFF WBC: CPT

## 2025-06-03 PROCEDURE — 73130 X-RAY EXAM OF HAND: CPT

## 2025-06-03 PROCEDURE — 82607 VITAMIN B-12: CPT

## 2025-06-03 PROCEDURE — 6360000002 HC RX W HCPCS

## 2025-06-03 PROCEDURE — 84443 ASSAY THYROID STIM HORMONE: CPT

## 2025-06-03 PROCEDURE — 82784 ASSAY IGA/IGD/IGG/IGM EACH: CPT

## 2025-06-03 PROCEDURE — 90471 IMMUNIZATION ADMIN: CPT

## 2025-06-03 PROCEDURE — 90715 TDAP VACCINE 7 YRS/> IM: CPT

## 2025-06-03 PROCEDURE — 73521 X-RAY EXAM HIPS BI 2 VIEWS: CPT

## 2025-06-03 PROCEDURE — 83735 ASSAY OF MAGNESIUM: CPT

## 2025-06-03 PROCEDURE — 83921 ORGANIC ACID SINGLE QUANT: CPT

## 2025-06-03 PROCEDURE — 87799 DETECT AGENT NOS DNA QUANT: CPT

## 2025-06-03 PROCEDURE — 12004 RPR S/N/AX/GEN/TRK7.6-12.5CM: CPT

## 2025-06-03 PROCEDURE — 73070 X-RAY EXAM OF ELBOW: CPT

## 2025-06-03 PROCEDURE — 36415 COLL VENOUS BLD VENIPUNCTURE: CPT

## 2025-06-03 PROCEDURE — 71250 CT THORAX DX C-: CPT

## 2025-06-03 PROCEDURE — 84425 ASSAY OF VITAMIN B-1: CPT

## 2025-06-03 PROCEDURE — 99284 EMERGENCY DEPT VISIT MOD MDM: CPT

## 2025-06-03 PROCEDURE — 80053 COMPREHEN METABOLIC PANEL: CPT

## 2025-06-03 PROCEDURE — 82962 GLUCOSE BLOOD TEST: CPT

## 2025-06-03 PROCEDURE — 73560 X-RAY EXAM OF KNEE 1 OR 2: CPT

## 2025-06-03 PROCEDURE — 6370000000 HC RX 637 (ALT 250 FOR IP)

## 2025-06-03 PROCEDURE — 70486 CT MAXILLOFACIAL W/O DYE: CPT

## 2025-06-03 PROCEDURE — 80197 ASSAY OF TACROLIMUS: CPT

## 2025-06-03 PROCEDURE — 72125 CT NECK SPINE W/O DYE: CPT

## 2025-06-03 PROCEDURE — 70450 CT HEAD/BRAIN W/O DYE: CPT

## 2025-06-03 RX ORDER — HYDROCODONE BITARTRATE AND ACETAMINOPHEN 5; 325 MG/1; MG/1
1 TABLET ORAL ONCE
Status: COMPLETED | OUTPATIENT
Start: 2025-06-03 | End: 2025-06-03

## 2025-06-03 RX ORDER — LIDOCAINE HYDROCHLORIDE AND EPINEPHRINE 10; 10 MG/ML; UG/ML
20 INJECTION, SOLUTION INFILTRATION; PERINEURAL ONCE
Status: COMPLETED | OUTPATIENT
Start: 2025-06-03 | End: 2025-06-03

## 2025-06-03 RX ORDER — OXYCODONE AND ACETAMINOPHEN 5; 325 MG/1; MG/1
1 TABLET ORAL ONCE
Refills: 0 | Status: COMPLETED | OUTPATIENT
Start: 2025-06-03 | End: 2025-06-03

## 2025-06-03 RX ADMIN — OXYCODONE AND ACETAMINOPHEN 1 TABLET: 5; 325 TABLET ORAL at 23:04

## 2025-06-03 RX ADMIN — LIDOCAINE HYDROCHLORIDE AND EPINEPHRINE 20 ML: 10; 10 INJECTION, SOLUTION INFILTRATION; PERINEURAL at 20:46

## 2025-06-03 RX ADMIN — HYDROCODONE BITARTRATE AND ACETAMINOPHEN 1 TABLET: 5; 325 TABLET ORAL at 20:39

## 2025-06-03 RX ADMIN — TETANUS TOXOID, REDUCED DIPHTHERIA TOXOID AND ACELLULAR PERTUSSIS VACCINE, ADSORBED 0.5 ML: 5; 2.5; 8; 8; 2.5 SUSPENSION INTRAMUSCULAR at 22:09

## 2025-06-03 ASSESSMENT — PAIN DESCRIPTION - LOCATION: LOCATION: HAND

## 2025-06-03 ASSESSMENT — PAIN DESCRIPTION - DESCRIPTORS: DESCRIPTORS: BURNING;THROBBING

## 2025-06-03 ASSESSMENT — PAIN DESCRIPTION - ORIENTATION: ORIENTATION: LEFT

## 2025-06-03 ASSESSMENT — PAIN SCALES - GENERAL
PAINLEVEL_OUTOF10: 4
PAINLEVEL_OUTOF10: 6

## 2025-06-03 ASSESSMENT — PAIN - FUNCTIONAL ASSESSMENT: PAIN_FUNCTIONAL_ASSESSMENT: 0-10

## 2025-06-04 ENCOUNTER — APPOINTMENT (OUTPATIENT)
Dept: LAB | Facility: HOSPITAL | Age: 70
End: 2025-06-04
Payer: MEDICARE

## 2025-06-04 ENCOUNTER — LAB (OUTPATIENT)
Dept: LAB | Facility: HOSPITAL | Age: 70
End: 2025-06-04
Payer: MEDICARE

## 2025-06-04 VITALS
TEMPERATURE: 97.7 F | WEIGHT: 205 LBS | HEIGHT: 69 IN | DIASTOLIC BLOOD PRESSURE: 64 MMHG | SYSTOLIC BLOOD PRESSURE: 106 MMHG | HEART RATE: 64 BPM | RESPIRATION RATE: 20 BRPM | OXYGEN SATURATION: 98 % | BODY MASS INDEX: 30.36 KG/M2

## 2025-06-04 DIAGNOSIS — R41.3 OTHER AMNESIA: Primary | ICD-10-CM

## 2025-06-04 LAB
ALBUMIN SERPL BCP-MCNC: 4 G/DL (ref 3.4–5)
ALP SERPL-CCNC: 91 U/L (ref 33–136)
ALT SERPL W P-5'-P-CCNC: 33 U/L (ref 10–52)
ANION GAP SERPL CALC-SCNC: 10 MMOL/L (ref 10–20)
AST SERPL W P-5'-P-CCNC: 29 U/L (ref 9–39)
BASOPHILS # BLD AUTO: 0.02 X10*3/UL (ref 0–0.1)
BASOPHILS NFR BLD AUTO: 0.3 %
BILIRUB SERPL-MCNC: 0.7 MG/DL (ref 0–1.2)
BUN SERPL-MCNC: 24 MG/DL (ref 6–23)
CALCIUM SERPL-MCNC: 9.4 MG/DL (ref 8.6–10.6)
CHLORIDE SERPL-SCNC: 107 MMOL/L (ref 98–107)
CMV DNA SERPL NAA+PROBE-LOG IU: NORMAL {LOG_IU}/ML
CO2 SERPL-SCNC: 29 MMOL/L (ref 21–32)
CREAT SERPL-MCNC: 1.03 MG/DL (ref 0.5–1.3)
EBV DNA SPEC NAA+PROBE-LOG#: NORMAL {LOG_COPIES}/ML
EGFRCR SERPLBLD CKD-EPI 2021: 77 ML/MIN/1.73M*2
EOSINOPHIL # BLD AUTO: 0.17 X10*3/UL (ref 0–0.4)
EOSINOPHIL NFR BLD AUTO: 2.5 %
ERYTHROCYTE [DISTWIDTH] IN BLOOD BY AUTOMATED COUNT: 14.6 % (ref 11.5–14.5)
GLUCOSE SERPL-MCNC: 97 MG/DL (ref 74–99)
HCT VFR BLD AUTO: 46.5 % (ref 41–52)
HGB BLD-MCNC: 13.9 G/DL (ref 13.5–17.5)
IGG SERPL-MCNC: 792 MG/DL (ref 700–1600)
IMM GRANULOCYTES # BLD AUTO: 0.02 X10*3/UL (ref 0–0.5)
IMM GRANULOCYTES NFR BLD AUTO: 0.3 % (ref 0–0.9)
LABORATORY COMMENT REPORT: NOT DETECTED
LABORATORY COMMENT REPORT: NOT DETECTED
LYMPHOCYTES # BLD AUTO: 0.79 X10*3/UL (ref 0.8–3)
LYMPHOCYTES NFR BLD AUTO: 11.5 %
MAGNESIUM SERPL-MCNC: 2.04 MG/DL (ref 1.6–2.4)
MCH RBC QN AUTO: 31.9 PG (ref 26–34)
MCHC RBC AUTO-ENTMCNC: 29.9 G/DL (ref 32–36)
MCV RBC AUTO: 107 FL (ref 80–100)
MONOCYTES # BLD AUTO: 0.57 X10*3/UL (ref 0.05–0.8)
MONOCYTES NFR BLD AUTO: 8.3 %
NEUTROPHILS # BLD AUTO: 5.27 X10*3/UL (ref 1.6–5.5)
NEUTROPHILS NFR BLD AUTO: 77.1 %
NRBC BLD-RTO: 0 /100 WBCS (ref 0–0)
PLATELET # BLD AUTO: 115 X10*3/UL (ref 150–450)
POTASSIUM SERPL-SCNC: 4.9 MMOL/L (ref 3.5–5.3)
PROT SERPL-MCNC: 6.3 G/DL (ref 6.4–8.2)
RBC # BLD AUTO: 4.36 X10*6/UL (ref 4.5–5.9)
SODIUM SERPL-SCNC: 141 MMOL/L (ref 136–145)
TACROLIMUS BLD-MCNC: 5.9 NG/ML
TSH SERPL-ACNC: 1.79 MIU/L (ref 0.44–3.98)
VIT B12 SERPL-MCNC: 498 PG/ML (ref 211–911)
WBC # BLD AUTO: 6.8 X10*3/UL (ref 4.4–11.3)

## 2025-06-04 PROCEDURE — 6370000000 HC RX 637 (ALT 250 FOR IP)

## 2025-06-04 RX ORDER — GINSENG 100 MG
CAPSULE ORAL
Status: COMPLETED
Start: 2025-06-04 | End: 2025-06-04

## 2025-06-04 RX ORDER — BACITRACIN ZINC 500 [USP'U]/G
OINTMENT TOPICAL ONCE
Status: COMPLETED | OUTPATIENT
Start: 2025-06-04 | End: 2025-06-04

## 2025-06-04 RX ADMIN — BACITRACIN: 500 OINTMENT TOPICAL at 01:18

## 2025-06-04 RX ADMIN — BACITRACIN ZINC: 500 OINTMENT TOPICAL at 01:18

## 2025-06-04 NOTE — DISCHARGE INSTRUCTIONS
Please return to the ER for any new or worsening symptoms including but not limited to Chest pain or difficulty breathing, Inability to keep down liquids, Numbness or weakness anywhere, or Worsening abdominal pain or any other concerning symptoms  If prescribed, please be sure to  your prescriptions from the pharmacy  Please follow-up with Primary care provider as instructed  Please follow-up with your primary care or urgent care or emergency department for suture removal in 7 to 10 days.

## 2025-06-04 NOTE — ED PROVIDER NOTES
Department of Emergency Medicine     Written by: Delon Centeno MD  Patient Name: Jerry VACA Caro  Visit Date: 6/3/2025  7:54 PM  MRN: [id number]                   : 1955    ------------------------- CC-------------------------  Chief Complaint   Patient presents with    Fall     Pt comes to the ER with c/o of falling/sliding down a ladder 6ft. Pt did hit head -LOC +thinners, laceration left hand and right knee abrasions/laceration to chin     ------------------------- HPI -------------------------    Jerry is a 70 y.o. year old male history of VIDAL, heart failure with preserved ejection fracture, incisional lung disease, double lung transplant, hypertension, COPD and gout presents emergency department via EMS for fall.  Patient was on a ladder changing a light when the ladder collapsed and he fell forward on his face.  Describes a lot of 6 foot, the light bulb also shattered on the floor.  Denies any symptoms prior to the mechanical fall.  Takes Eliquis.    He has a laceration on his right hand and reports pain on both hands.  Also endorses that he scraped his knee as well.  Reports chest discomfort underneath his ribs specially on the right side.  Denies any other symptoms    There are no family/friends at bedside. The history is provided by patient, who is felt to be a good historian.    Nursing notes were all reviewed and agreed with or any disagreements were addressed in the HPI.    REVIEW OF SYSTEMS:    Review of Systems:   Please see HPI above. All bolded are positive. All un-bolded are negative.  Constitutional Symptoms: fever, chills, fatigue, generalized weakness, diaphoresis, increase in thirst, loss of appetite  Eyes: vision change   Ears, Nose, Mouth, Throat: hearing loss, nasal congestion, sores in the mouth  Cardiovascular: chest pain, chest heaviness, palpitations  Respiratory: shortness of breath, wheezing, coughing  Gastrointestinal: abdominal pain, nausea, vomiting, diarrhea, constipation,

## 2025-06-05 ENCOUNTER — TELEPHONE (OUTPATIENT)
Dept: TRANSPLANT | Facility: HOSPITAL | Age: 70
End: 2025-06-05
Payer: MEDICARE

## 2025-06-05 DIAGNOSIS — E78.5 HYPERLIPIDEMIA, UNSPECIFIED HYPERLIPIDEMIA TYPE: ICD-10-CM

## 2025-06-05 DIAGNOSIS — Z94.2 LUNG TRANSPLANTED (MULTI): Primary | ICD-10-CM

## 2025-06-05 DIAGNOSIS — E55.9 VITAMIN D DEFICIENCY: ICD-10-CM

## 2025-06-05 DIAGNOSIS — S41.111A LACERATION OF RIGHT UPPER EXTREMITY, INITIAL ENCOUNTER: ICD-10-CM

## 2025-06-05 NOTE — TELEPHONE ENCOUNTER
Patient returned call to transplant office. He stated he fell off a ladder 6/3/25, about 6 feet. Lacerated his hand, has about 13 stitches. They did CT scans of his head and chest. Patient stated he is sore, but is overall okay. Has follow up appointment with PCP for suture removal. Patient was not prescribed an antibiotic. Provider to advise.     All items shared with Dr. Mcelroy.     Patient verbalized he will get labs 7/7/25.     Patient inquiring about getting pentamidine and spirometry appts moved to [place]. Admin assistance to advise.     No other questions or concerns.

## 2025-06-05 NOTE — TELEPHONE ENCOUNTER
----- Message from Thai Mcelroy sent at 6/5/2025  9:01 AM EDT -----  agree  ----- Message -----  From: Clare Espinoza RN  Sent: 6/4/2025   3:23 PM EDT  To: Thai Mcelroy DO    Labs reviewed on 6/4/2025  WBC 6.8 ANC 5.27  Hbg 13.9  Platelets 115  Creatinine 1.03  Magnesium 2.04  Potassium 4.9  IgG Pending  Prograf 5.9 Goal 6-8 Dose 1/1    -Changes made: None, watch tacro with next draw. Has been stable.   -Labs due next 7/7/2025      Coordinator calls patient to review the above. Orders pended to provider. Voicemail left for patient.

## 2025-06-06 RX ORDER — CEPHALEXIN 250 MG/1
250 CAPSULE ORAL 4 TIMES DAILY
Qty: 28 CAPSULE | Refills: 0 | Status: SHIPPED | OUTPATIENT
Start: 2025-06-06 | End: 2025-06-13

## 2025-06-06 NOTE — TELEPHONE ENCOUNTER
Orders:    Comprehensive metabolic panel; Future    TSH, 3rd generation with Free T4 reflex; Future    LDL cholesterol, direct; Future    Pending labs. Patient declines statin despite increased ASCVD risk. She prefers to continue Red Yeast Rice 600mg QD - AMA. Recommend lifestyle modifications.     The 10-year ASCVD risk score (Lucho SEXTON, et al., 2019) is: 8.9%    Values used to calculate the score:      Age: 64 years      Sex: Female      Is Non- : No      Diabetic: Yes      Tobacco smoker: No      Systolic Blood Pressure: 116 mmHg      Is BP treated: No      HDL Cholesterol: 66 mg/dL      Total Cholesterol: 190 mg/dL     Called patient and left voicemail in regards to starting Keflex 250 mg QID x 7 days for laceration on arm. Asked for a callback with any questions or concerns.

## 2025-06-07 ENCOUNTER — TELEPHONE (OUTPATIENT)
Dept: TRANSPLANT | Facility: HOSPITAL | Age: 70
End: 2025-06-07
Payer: MEDICARE

## 2025-06-07 LAB — VIT B1 PYROPHOSHATE BLD-SCNC: 201 NMOL/L (ref 70–180)

## 2025-06-07 NOTE — TELEPHONE ENCOUNTER
ON-CALL NOTE:    11:50: Patient calls stating that keflex abx script was never received by local pharmacy. Noted in EPIC that script was received by pharmacy. Called and spoke with representative, per rep script is ready for . Notified patient, no further questions or concerns at this time.

## 2025-06-09 ENCOUNTER — TELEPHONE (OUTPATIENT)
Dept: RESPIRATORY THERAPY | Facility: HOSPITAL | Age: 70
End: 2025-06-09
Payer: MEDICARE

## 2025-06-09 ENCOUNTER — SPECIALTY PHARMACY (OUTPATIENT)
Dept: PHARMACY | Facility: CLINIC | Age: 70
End: 2025-06-09

## 2025-06-09 LAB — METHYLMALONATE SERPL-SCNC: 0.26 UMOL/L (ref 0–0.4)

## 2025-06-09 PROCEDURE — RXMED WILLOW AMBULATORY MEDICATION CHARGE

## 2025-06-09 NOTE — TELEPHONE ENCOUNTER
Patient called, asking if he could take a probiotic amongst the antibiotic medications he's taking. Requesting a call back from coord.

## 2025-06-09 NOTE — TELEPHONE ENCOUNTER
Returned patient's call, left voicemail. Advised that he avoid probiotics due to risk of infection. Asked for patient to call back with any questions or concerns.

## 2025-06-12 ENCOUNTER — PHARMACY VISIT (OUTPATIENT)
Dept: PHARMACY | Facility: CLINIC | Age: 70
End: 2025-06-12
Payer: COMMERCIAL

## 2025-06-12 ENCOUNTER — TELEPHONE (OUTPATIENT)
Dept: TRANSPLANT | Facility: HOSPITAL | Age: 70
End: 2025-06-12
Payer: MEDICARE

## 2025-06-12 NOTE — TELEPHONE ENCOUNTER
Called patient and left voicemail inquiring how his arm laceration was appearing after starting abx.

## 2025-06-16 ENCOUNTER — HOSPITAL ENCOUNTER (OUTPATIENT)
Dept: RESPIRATORY THERAPY | Facility: HOSPITAL | Age: 70
Discharge: HOME | End: 2025-06-16
Payer: MEDICARE

## 2025-06-16 ENCOUNTER — TELEPHONE (OUTPATIENT)
Dept: TRANSPLANT | Facility: HOSPITAL | Age: 70
End: 2025-06-16

## 2025-06-16 DIAGNOSIS — Z94.2 LUNG TRANSPLANTED (MULTI): ICD-10-CM

## 2025-06-16 LAB
MGC ASCENT PFT - FEV1 - PRE: 1.14
MGC ASCENT PFT - FEV1 - PREDICTED: 2.9
MGC ASCENT PFT - FVC - PRE: 2.45
MGC ASCENT PFT - FVC - PREDICTED: 3.85

## 2025-06-16 PROCEDURE — 94010 BREATHING CAPACITY TEST: CPT

## 2025-06-16 PROCEDURE — 94010 BREATHING CAPACITY TEST: CPT | Performed by: STUDENT IN AN ORGANIZED HEALTH CARE EDUCATION/TRAINING PROGRAM

## 2025-06-16 PROCEDURE — 94640 AIRWAY INHALATION TREATMENT: CPT | Mod: 59

## 2025-06-16 NOTE — PROCEDURES
Pentamidine Aerosol treatment:  10:10 - Albuterol 2 puffs via spacer given to patient. Breath sounds clear, diminished left lower lobe. SPO2 98%, Heart rate 62 beats per minute.  10:20 - Pentamidine 300 mg/6 ml sterile water started.  10:30 - SPO2 - 96%, heart rate - 60 beats per minute. Breath sound: diminished left lower lobe. Patient denies shortness of breath, cough or dizziness.  10:40 - SPO2 - 98%, heart rate -63 beats per minute. Breath sounds diminished over left lung field. No cough, no shortness of breath or dizziness noted by patient.  10:50 - Aerosol treatment completed. Breath sounds - Clear; slightly increased in left lower lobe. Patient denies shortness of breath, cough, cheat pain or nausea.   10:55 - SPO2 95%, heart rate - 60 beats per minute. Patient is discharged from [facility] PFT lab without complaints.

## 2025-06-16 NOTE — TELEPHONE ENCOUNTER
Called patient and left voicemail due to slight drop in jelly. Wanted to check to see if he has any SOB or infectious symptoms. Also mentioned wanting to repeat jelly next week to see if results continue to drop. Awaiting callback from patient.

## 2025-06-17 ENCOUNTER — TELEPHONE (OUTPATIENT)
Dept: TRANSPLANT | Facility: HOSPITAL | Age: 70
End: 2025-06-17
Payer: MEDICARE

## 2025-06-17 ENCOUNTER — TELEPHONE (OUTPATIENT)
Dept: RESPIRATORY THERAPY | Facility: HOSPITAL | Age: 70
End: 2025-06-17
Payer: MEDICARE

## 2025-06-18 ENCOUNTER — SPECIALTY PHARMACY (OUTPATIENT)
Dept: PHARMACY | Facility: CLINIC | Age: 70
End: 2025-06-18

## 2025-06-18 ENCOUNTER — TELEPHONE (OUTPATIENT)
Dept: RESPIRATORY THERAPY | Facility: HOSPITAL | Age: 70
End: 2025-06-18
Payer: MEDICARE

## 2025-06-18 ENCOUNTER — TELEPHONE (OUTPATIENT)
Dept: TRANSPLANT | Facility: HOSPITAL | Age: 70
End: 2025-06-18
Payer: MEDICARE

## 2025-06-18 PROCEDURE — RXMED WILLOW AMBULATORY MEDICATION CHARGE

## 2025-06-19 ENCOUNTER — TELEPHONE (OUTPATIENT)
Dept: TRANSPLANT | Facility: HOSPITAL | Age: 70
End: 2025-06-19
Payer: MEDICARE

## 2025-06-19 ENCOUNTER — PHARMACY VISIT (OUTPATIENT)
Dept: PHARMACY | Facility: CLINIC | Age: 70
End: 2025-06-19
Payer: COMMERCIAL

## 2025-06-19 DIAGNOSIS — Z94.2 LUNG TRANSPLANTED (MULTI): ICD-10-CM

## 2025-06-19 NOTE — TELEPHONE ENCOUNTER
Left voicemail for patient, let him know that we are going to schedule repeat jelly next week at [facility]. Asked for a call back with any questions or concerns.

## 2025-06-24 ENCOUNTER — APPOINTMENT (OUTPATIENT)
Dept: RESPIRATORY THERAPY | Facility: HOSPITAL | Age: 70
End: 2025-06-24
Payer: MEDICARE

## 2025-06-30 ENCOUNTER — HOSPITAL ENCOUNTER (OUTPATIENT)
Dept: RESPIRATORY THERAPY | Facility: HOSPITAL | Age: 70
Discharge: HOME | End: 2025-06-30
Payer: MEDICARE

## 2025-06-30 DIAGNOSIS — Z94.2 LUNG TRANSPLANTED (MULTI): ICD-10-CM

## 2025-06-30 LAB
MGC ASCENT PFT - FEV1 - PRE: 1.3
MGC ASCENT PFT - FEV1 - PREDICTED: 2.9
MGC ASCENT PFT - FVC - PRE: 2.49
MGC ASCENT PFT - FVC - PREDICTED: 3.85

## 2025-06-30 PROCEDURE — 94010 BREATHING CAPACITY TEST: CPT

## 2025-06-30 PROCEDURE — RXMED WILLOW AMBULATORY MEDICATION CHARGE

## 2025-07-01 ENCOUNTER — TELEPHONE (OUTPATIENT)
Dept: TRANSPLANT | Facility: HOSPITAL | Age: 70
End: 2025-07-01
Payer: MEDICARE

## 2025-07-03 PROCEDURE — RXMED WILLOW AMBULATORY MEDICATION CHARGE

## 2025-07-07 DIAGNOSIS — Z94.2 LUNG TRANSPLANTED (MULTI): ICD-10-CM

## 2025-07-07 DIAGNOSIS — E78.5 HYPERLIPIDEMIA, UNSPECIFIED HYPERLIPIDEMIA TYPE: ICD-10-CM

## 2025-07-07 DIAGNOSIS — E55.9 VITAMIN D DEFICIENCY: ICD-10-CM

## 2025-07-08 ENCOUNTER — PHARMACY VISIT (OUTPATIENT)
Dept: PHARMACY | Facility: CLINIC | Age: 70
End: 2025-07-08
Payer: COMMERCIAL

## 2025-07-08 ENCOUNTER — TELEPHONE (OUTPATIENT)
Dept: TRANSPLANT | Facility: HOSPITAL | Age: 70
End: 2025-07-08
Payer: MEDICARE

## 2025-07-10 ENCOUNTER — SPECIALTY PHARMACY (OUTPATIENT)
Dept: PHARMACY | Facility: CLINIC | Age: 70
End: 2025-07-10

## 2025-07-10 PROCEDURE — RXMED WILLOW AMBULATORY MEDICATION CHARGE

## 2025-07-11 ENCOUNTER — PHARMACY VISIT (OUTPATIENT)
Dept: PHARMACY | Facility: CLINIC | Age: 70
End: 2025-07-11
Payer: COMMERCIAL

## 2025-07-14 PROCEDURE — RXMED WILLOW AMBULATORY MEDICATION CHARGE

## 2025-07-15 ENCOUNTER — APPOINTMENT (OUTPATIENT)
Dept: OTOLARYNGOLOGY | Facility: CLINIC | Age: 70
End: 2025-07-15
Payer: MEDICARE

## 2025-07-15 ENCOUNTER — APPOINTMENT (OUTPATIENT)
Dept: AUDIOLOGY | Facility: CLINIC | Age: 70
End: 2025-07-15
Payer: MEDICARE

## 2025-07-16 ENCOUNTER — HOSPITAL ENCOUNTER (OUTPATIENT)
Dept: RESPIRATORY THERAPY | Facility: HOSPITAL | Age: 70
Discharge: HOME | End: 2025-07-16
Payer: MEDICARE

## 2025-07-16 DIAGNOSIS — Z94.2 LUNG TRANSPLANTED (MULTI): ICD-10-CM

## 2025-07-16 LAB
MGC ASCENT PFT - FEV1 - PRE: 1.29
MGC ASCENT PFT - FEV1 - PREDICTED: 2.9
MGC ASCENT PFT - FVC - PRE: 2.31
MGC ASCENT PFT - FVC - PREDICTED: 3.84

## 2025-07-16 PROCEDURE — 94640 AIRWAY INHALATION TREATMENT: CPT

## 2025-07-16 PROCEDURE — 94010 BREATHING CAPACITY TEST: CPT

## 2025-07-16 PROCEDURE — 94010 BREATHING CAPACITY TEST: CPT | Performed by: STUDENT IN AN ORGANIZED HEALTH CARE EDUCATION/TRAINING PROGRAM

## 2025-07-16 PROCEDURE — 2500000001 HC RX 250 WO HCPCS SELF ADMINISTERED DRUGS (ALT 637 FOR MEDICARE OP): Performed by: INTERNAL MEDICINE

## 2025-07-16 RX ORDER — ALBUTEROL SULFATE 90 UG/1
1 INHALANT RESPIRATORY (INHALATION) ONCE
Status: COMPLETED | OUTPATIENT
Start: 2025-07-16 | End: 2025-07-16

## 2025-07-16 RX ORDER — ALBUTEROL SULFATE 0.83 MG/ML
3 SOLUTION RESPIRATORY (INHALATION) ONCE
Status: COMPLETED | OUTPATIENT
Start: 2025-07-16 | End: 2025-07-16

## 2025-07-16 RX ADMIN — ALBUTEROL SULFATE 2 PUFF: 90 AEROSOL, METERED RESPIRATORY (INHALATION) at 11:05

## 2025-07-16 NOTE — PROCEDURES
1100  Pt. Brought Pentamidine 300 mg vial w/ him to lab.  After Spirometry PFT completed, med dissolved in 6 ml sterile water.  Pt. Denies dyspnea, dizziness, or nausea.  P.ox 96%/ HR 68/ Breath Sounds clear bilat., dim. L.    1105  Albuterol MDI 2 puffs w/ spacer given.  Pt. Able to hold deep breaths > 10 sec.  R. Side of pt.'s mouth is only place he can get good seal on mouthpiece.  P.ox 96%/ HR 66/ BS clear uppers anteriorly.  1115  Pentamidine aerosol tx started.  Pt. Takes steady breaths w/ occas. Deep breath.  P.ox 95%/ HR 67/ BS clear uppers, anteriorly.  1125  P.ox 94%/ HR 65/ BS clear uppers, anteriorly.  Liquid accumulated in aerosol tubing drained as needed, sometimes by pt.  1135  P.ox 95%/ HR 65/ BS clear uppers, anteriorly.  1142  Pentamidine aerosol tx finished.  Pt. Rinsed mouth & throat w/ water & took sips of water.  Pt denies dyspnea, dizziness, or nausea.  P.ox 96%/ HR 68/ BS clear bilat,, L. Less dim.  1147  Pt. Dismissed from lab w/o complaint.

## 2025-07-21 ENCOUNTER — TELEPHONE (OUTPATIENT)
Dept: TRANSPLANT | Facility: HOSPITAL | Age: 70
End: 2025-07-21
Payer: MEDICARE

## 2025-07-21 PROCEDURE — RXMED WILLOW AMBULATORY MEDICATION CHARGE

## 2025-07-21 NOTE — TELEPHONE ENCOUNTER
Called patient and left voicemail. Called wife, Lori, and spoke with her about patient, She states that patient has been doing well at home, and she will remind him to check his messages.

## 2025-07-22 ENCOUNTER — LAB (OUTPATIENT)
Dept: LAB | Facility: HOSPITAL | Age: 70
End: 2025-07-22
Payer: MEDICARE

## 2025-07-22 DIAGNOSIS — E55.9 VITAMIN D DEFICIENCY, UNSPECIFIED: ICD-10-CM

## 2025-07-22 DIAGNOSIS — Z94.2 LUNG TRANSPLANT STATUS: Primary | ICD-10-CM

## 2025-07-22 DIAGNOSIS — E78.5 HYPERLIPIDEMIA, UNSPECIFIED: ICD-10-CM

## 2025-07-22 LAB
ALBUMIN SERPL BCP-MCNC: 3.9 G/DL (ref 3.4–5)
ALP SERPL-CCNC: 73 U/L (ref 33–136)
ALT SERPL W P-5'-P-CCNC: 18 U/L (ref 10–52)
ANION GAP SERPL CALC-SCNC: 10 MMOL/L (ref 10–20)
AST SERPL W P-5'-P-CCNC: 22 U/L (ref 9–39)
BASOPHILS # BLD AUTO: 0.03 X10*3/UL (ref 0–0.1)
BASOPHILS NFR BLD AUTO: 0.5 %
BILIRUB SERPL-MCNC: 0.6 MG/DL (ref 0–1.2)
BUN SERPL-MCNC: 15 MG/DL (ref 6–23)
CALCIUM SERPL-MCNC: 8.8 MG/DL (ref 8.6–10.3)
CHLORIDE SERPL-SCNC: 106 MMOL/L (ref 98–107)
CHOLEST SERPL-MCNC: 114 MG/DL (ref 0–199)
CHOLESTEROL/HDL RATIO: 2.2
CO2 SERPL-SCNC: 29 MMOL/L (ref 21–32)
CREAT SERPL-MCNC: 0.96 MG/DL (ref 0.5–1.3)
EGFRCR SERPLBLD CKD-EPI 2021: 84 ML/MIN/1.73M*2
EOSINOPHIL # BLD AUTO: 0.32 X10*3/UL (ref 0–0.4)
EOSINOPHIL NFR BLD AUTO: 4.9 %
ERYTHROCYTE [DISTWIDTH] IN BLOOD BY AUTOMATED COUNT: 14 % (ref 11.5–14.5)
GLUCOSE SERPL-MCNC: 117 MG/DL (ref 74–99)
HCT VFR BLD AUTO: 46 % (ref 41–52)
HDLC SERPL-MCNC: 50.9 MG/DL
HGB BLD-MCNC: 14 G/DL (ref 13.5–17.5)
IMM GRANULOCYTES # BLD AUTO: 0.02 X10*3/UL (ref 0–0.5)
IMM GRANULOCYTES NFR BLD AUTO: 0.3 % (ref 0–0.9)
LDLC SERPL CALC-MCNC: 41 MG/DL
LYMPHOCYTES # BLD AUTO: 1.02 X10*3/UL (ref 0.8–3)
LYMPHOCYTES NFR BLD AUTO: 15.5 %
MAGNESIUM SERPL-MCNC: 1.77 MG/DL (ref 1.6–2.4)
MCH RBC QN AUTO: 31.5 PG (ref 26–34)
MCHC RBC AUTO-ENTMCNC: 30.4 G/DL (ref 32–36)
MCV RBC AUTO: 103 FL (ref 80–100)
MONOCYTES # BLD AUTO: 0.63 X10*3/UL (ref 0.05–0.8)
MONOCYTES NFR BLD AUTO: 9.6 %
NEUTROPHILS # BLD AUTO: 4.57 X10*3/UL (ref 1.6–5.5)
NEUTROPHILS NFR BLD AUTO: 69.2 %
NON HDL CHOLESTEROL: 63 MG/DL (ref 0–149)
NRBC BLD-RTO: 0 /100 WBCS (ref 0–0)
PLATELET # BLD AUTO: 106 X10*3/UL (ref 150–450)
POTASSIUM SERPL-SCNC: 4.3 MMOL/L (ref 3.5–5.3)
PROT SERPL-MCNC: 6.2 G/DL (ref 6.4–8.2)
RBC # BLD AUTO: 4.45 X10*6/UL (ref 4.5–5.9)
SODIUM SERPL-SCNC: 141 MMOL/L (ref 136–145)
TRIGL SERPL-MCNC: 112 MG/DL (ref 0–149)
VLDL: 22 MG/DL (ref 0–40)
WBC # BLD AUTO: 6.6 X10*3/UL (ref 4.4–11.3)

## 2025-07-22 PROCEDURE — 36415 COLL VENOUS BLD VENIPUNCTURE: CPT

## 2025-07-22 PROCEDURE — 86833 HLA CLASS II HIGH DEFIN QUAL: CPT

## 2025-07-22 PROCEDURE — 86832 HLA CLASS I HIGH DEFIN QUAL: CPT

## 2025-07-22 PROCEDURE — 87799 DETECT AGENT NOS DNA QUANT: CPT

## 2025-07-22 PROCEDURE — 80061 LIPID PANEL: CPT

## 2025-07-22 PROCEDURE — 82784 ASSAY IGA/IGD/IGG/IGM EACH: CPT

## 2025-07-22 PROCEDURE — 80053 COMPREHEN METABOLIC PANEL: CPT

## 2025-07-22 PROCEDURE — 80197 ASSAY OF TACROLIMUS: CPT

## 2025-07-22 PROCEDURE — 82306 VITAMIN D 25 HYDROXY: CPT

## 2025-07-22 PROCEDURE — 83735 ASSAY OF MAGNESIUM: CPT

## 2025-07-22 PROCEDURE — 85025 COMPLETE CBC W/AUTO DIFF WBC: CPT

## 2025-07-23 ENCOUNTER — SPECIALTY PHARMACY (OUTPATIENT)
Dept: PHARMACY | Facility: CLINIC | Age: 70
End: 2025-07-23

## 2025-07-23 LAB
25(OH)D3 SERPL-MCNC: 53 NG/ML (ref 30–100)
CMV DNA SERPL NAA+PROBE-LOG IU: NORMAL {LOG_IU}/ML
EBV DNA SPEC NAA+PROBE-LOG#: NORMAL {LOG_COPIES}/ML
HLA RESULTS: NORMAL
HLA-A+B+C AB NFR SER: NORMAL %
HLA-DP+DQ+DR AB NFR SER: NORMAL %
IGG SERPL-MCNC: 850 MG/DL (ref 700–1600)
LABORATORY COMMENT REPORT: NOT DETECTED
LABORATORY COMMENT REPORT: NOT DETECTED
TACROLIMUS BLD-MCNC: 7.3 NG/ML

## 2025-07-23 PROCEDURE — RXMED WILLOW AMBULATORY MEDICATION CHARGE

## 2025-07-25 ENCOUNTER — PHARMACY VISIT (OUTPATIENT)
Dept: PHARMACY | Facility: CLINIC | Age: 70
End: 2025-07-25
Payer: COMMERCIAL

## 2025-07-25 ENCOUNTER — TELEPHONE (OUTPATIENT)
Dept: TRANSPLANT | Facility: HOSPITAL | Age: 70
End: 2025-07-25
Payer: MEDICARE

## 2025-07-25 DIAGNOSIS — Z94.2 LUNG TRANSPLANTED (MULTI): ICD-10-CM

## 2025-07-25 NOTE — TELEPHONE ENCOUNTER
Labs reviewed on 7/23/2025  WBC 6.6 ANC 4.57  Hbg 14  Platelets 106 (last few 115, 106, 108, 108)   Creatinine 0.96  Magnesium 1.77  Potassium 4.3  IgG Pending   Prograf 7.3 Goal 6-8 Dose 1/1     -Changes made: None  -Labs due next: 8/25/2025    Called patient with above results and plan. Left voicemail.

## 2025-07-28 ENCOUNTER — SPECIALTY PHARMACY (OUTPATIENT)
Dept: PHARMACY | Facility: CLINIC | Age: 70
End: 2025-07-28

## 2025-07-28 PROCEDURE — RXMED WILLOW AMBULATORY MEDICATION CHARGE

## 2025-07-29 ENCOUNTER — PHARMACY VISIT (OUTPATIENT)
Dept: PHARMACY | Facility: CLINIC | Age: 70
End: 2025-07-29
Payer: COMMERCIAL

## 2025-07-30 ENCOUNTER — SPECIALTY PHARMACY (OUTPATIENT)
Dept: PHARMACY | Facility: CLINIC | Age: 70
End: 2025-07-30

## 2025-07-31 PROCEDURE — RXMED WILLOW AMBULATORY MEDICATION CHARGE

## 2025-08-01 ENCOUNTER — PHARMACY VISIT (OUTPATIENT)
Dept: PHARMACY | Facility: CLINIC | Age: 70
End: 2025-08-01
Payer: COMMERCIAL

## 2025-08-11 ENCOUNTER — TELEPHONE (OUTPATIENT)
Dept: RESPIRATORY THERAPY | Facility: HOSPITAL | Age: 70
End: 2025-08-11
Payer: MEDICARE

## 2025-08-12 ENCOUNTER — TELEPHONE (OUTPATIENT)
Dept: TRANSPLANT | Facility: HOSPITAL | Age: 70
End: 2025-08-12
Payer: MEDICARE

## 2025-08-13 ENCOUNTER — SPECIALTY PHARMACY (OUTPATIENT)
Dept: PHARMACY | Facility: CLINIC | Age: 70
End: 2025-08-13

## 2025-08-13 PROCEDURE — RXMED WILLOW AMBULATORY MEDICATION CHARGE

## 2025-08-14 ENCOUNTER — PHARMACY VISIT (OUTPATIENT)
Dept: PHARMACY | Facility: CLINIC | Age: 70
End: 2025-08-14
Payer: COMMERCIAL

## 2025-08-18 ENCOUNTER — OFFICE VISIT (OUTPATIENT)
Dept: TRANSPLANT | Facility: HOSPITAL | Age: 70
End: 2025-08-18
Payer: MEDICARE

## 2025-08-18 ENCOUNTER — HOSPITAL ENCOUNTER (OUTPATIENT)
Dept: RESPIRATORY THERAPY | Facility: HOSPITAL | Age: 70
Discharge: HOME | End: 2025-08-18
Payer: MEDICARE

## 2025-08-18 ENCOUNTER — HOSPITAL ENCOUNTER (OUTPATIENT)
Dept: RADIOLOGY | Facility: HOSPITAL | Age: 70
Discharge: HOME | End: 2025-08-18
Payer: MEDICARE

## 2025-08-18 ENCOUNTER — PHARMACY VISIT (OUTPATIENT)
Dept: PHARMACY | Facility: CLINIC | Age: 70
End: 2025-08-18
Payer: COMMERCIAL

## 2025-08-18 VITALS
SYSTOLIC BLOOD PRESSURE: 160 MMHG | OXYGEN SATURATION: 98 % | DIASTOLIC BLOOD PRESSURE: 77 MMHG | HEART RATE: 64 BPM | WEIGHT: 216.5 LBS | TEMPERATURE: 98.2 F | BODY MASS INDEX: 32.07 KG/M2 | HEIGHT: 69 IN

## 2025-08-18 DIAGNOSIS — Z94.2 LUNG TRANSPLANTED (MULTI): ICD-10-CM

## 2025-08-18 DIAGNOSIS — Z29.89 NEED FOR PNEUMOCYSTIS PROPHYLAXIS: ICD-10-CM

## 2025-08-18 DIAGNOSIS — Z94.2 S/P LUNG TRANSPLANT (MULTI): Primary | ICD-10-CM

## 2025-08-18 DIAGNOSIS — D84.9 IMMUNOSUPPRESSION: Primary | ICD-10-CM

## 2025-08-18 LAB
MGC ASCENT PFT - FEV1 - PRE: 1.18
MGC ASCENT PFT - FEV1 - PREDICTED: 2.89
MGC ASCENT PFT - FVC - PRE: 2.21
MGC ASCENT PFT - FVC - PREDICTED: 3.84

## 2025-08-18 PROCEDURE — 94642 AEROSOL INHALATION TREATMENT: CPT

## 2025-08-18 PROCEDURE — RXMED WILLOW AMBULATORY MEDICATION CHARGE

## 2025-08-18 PROCEDURE — 99215 OFFICE O/P EST HI 40 MIN: CPT | Mod: 25 | Performed by: INTERNAL MEDICINE

## 2025-08-18 PROCEDURE — 71046 X-RAY EXAM CHEST 2 VIEWS: CPT

## 2025-08-18 PROCEDURE — 2500000001 HC RX 250 WO HCPCS SELF ADMINISTERED DRUGS (ALT 637 FOR MEDICARE OP): Performed by: INTERNAL MEDICINE

## 2025-08-18 PROCEDURE — 3077F SYST BP >= 140 MM HG: CPT | Performed by: INTERNAL MEDICINE

## 2025-08-18 PROCEDURE — 3008F BODY MASS INDEX DOCD: CPT | Performed by: INTERNAL MEDICINE

## 2025-08-18 PROCEDURE — 99215 OFFICE O/P EST HI 40 MIN: CPT | Performed by: INTERNAL MEDICINE

## 2025-08-18 PROCEDURE — 3078F DIAST BP <80 MM HG: CPT | Performed by: INTERNAL MEDICINE

## 2025-08-18 PROCEDURE — 71046 X-RAY EXAM CHEST 2 VIEWS: CPT | Performed by: RADIOLOGY

## 2025-08-18 PROCEDURE — 1126F AMNT PAIN NOTED NONE PRSNT: CPT | Performed by: INTERNAL MEDICINE

## 2025-08-18 RX ORDER — ATOVAQUONE 750 MG/5ML
750 SUSPENSION ORAL DAILY
Qty: 150 ML | Refills: 11 | Status: SHIPPED | OUTPATIENT
Start: 2025-09-15 | End: 2026-09-15

## 2025-08-18 RX ORDER — ALBUTEROL SULFATE 90 UG/1
2 INHALANT RESPIRATORY (INHALATION) ONCE
Status: COMPLETED | OUTPATIENT
Start: 2025-08-18 | End: 2025-08-18

## 2025-08-18 RX ADMIN — ALBUTEROL SULFATE 2 PUFF: 90 AEROSOL, METERED RESPIRATORY (INHALATION) at 10:53

## 2025-08-18 ASSESSMENT — ENCOUNTER SYMPTOMS
VOMITING: 0
CHANGE IN BOWEL HABIT: 0
SUSPICIOUS LESIONS: 0
DIARRHEA: 0
COUGH: 0
HEMATURIA: 0
IRREGULAR HEARTBEAT: 0
COLOR CHANGE: 0
BRUISES/BLEEDS EASILY: 1
MUSCLE CRAMPS: 0
FALLS: 0
TREMORS: 0
GASTROINTESTINAL NEGATIVE: 1
PALPITATIONS: 0
DIZZINESS: 0
WEAKNESS: 0
LIGHT-HEADEDNESS: 0
WEIGHT GAIN: 0
HEADACHES: 0
SORE THROAT: 0
EYE PAIN: 0
PARESTHESIAS: 0
NAUSEA: 0
BLURRED VISION: 0
DYSPNEA ON EXERTION: 0
SPUTUM PRODUCTION: 0
CONSTIPATION: 0
DOUBLE VISION: 0
SHORTNESS OF BREATH: 0
CHILLS: 0
WEIGHT LOSS: 0
FEVER: 0
PND: 0
NUMBNESS: 0

## 2025-08-18 ASSESSMENT — PAIN SCALES - GENERAL: PAINLEVEL_OUTOF10: 0-NO PAIN

## 2025-08-22 ENCOUNTER — SPECIALTY PHARMACY (OUTPATIENT)
Dept: PHARMACY | Facility: CLINIC | Age: 70
End: 2025-08-22

## 2025-08-25 ENCOUNTER — SPECIALTY PHARMACY (OUTPATIENT)
Dept: PHARMACY | Facility: CLINIC | Age: 70
End: 2025-08-25

## 2025-08-25 DIAGNOSIS — Z94.2 LUNG TRANSPLANTED (MULTI): ICD-10-CM

## 2025-08-25 PROCEDURE — RXMED WILLOW AMBULATORY MEDICATION CHARGE

## 2025-08-26 ENCOUNTER — PHARMACY VISIT (OUTPATIENT)
Dept: PHARMACY | Facility: CLINIC | Age: 70
End: 2025-08-26
Payer: COMMERCIAL

## 2025-09-02 ENCOUNTER — SPECIALTY PHARMACY (OUTPATIENT)
Dept: PHARMACY | Facility: CLINIC | Age: 70
End: 2025-09-02

## 2025-09-02 PROCEDURE — RXMED WILLOW AMBULATORY MEDICATION CHARGE

## 2025-09-04 ENCOUNTER — DOCUMENTATION (OUTPATIENT)
Dept: TRANSPLANT | Facility: HOSPITAL | Age: 70
End: 2025-09-04
Payer: MEDICARE

## 2025-09-04 ENCOUNTER — PHARMACY VISIT (OUTPATIENT)
Dept: PHARMACY | Facility: CLINIC | Age: 70
End: 2025-09-04
Payer: COMMERCIAL

## 2025-09-04 ENCOUNTER — TELEPHONE (OUTPATIENT)
Dept: TRANSPLANT | Facility: HOSPITAL | Age: 70
End: 2025-09-04
Payer: MEDICARE

## 2025-09-05 ENCOUNTER — LAB (OUTPATIENT)
Dept: LAB | Facility: HOSPITAL | Age: 70
End: 2025-09-05
Payer: MEDICARE

## 2025-09-05 DIAGNOSIS — Z94.2 LUNG TRANSPLANT STATUS: Primary | ICD-10-CM

## 2025-09-05 LAB
ALBUMIN SERPL BCP-MCNC: 3.9 G/DL (ref 3.4–5)
ALP SERPL-CCNC: 64 U/L (ref 33–136)
ALT SERPL W P-5'-P-CCNC: 22 U/L (ref 10–52)
ANION GAP SERPL CALC-SCNC: 9 MMOL/L (ref 10–20)
AST SERPL W P-5'-P-CCNC: 25 U/L (ref 9–39)
BASOPHILS # BLD AUTO: 0.03 X10*3/UL (ref 0–0.1)
BASOPHILS NFR BLD AUTO: 0.4 %
BILIRUB SERPL-MCNC: 0.6 MG/DL (ref 0–1.2)
BUN SERPL-MCNC: 23 MG/DL (ref 6–23)
CALCIUM SERPL-MCNC: 8.8 MG/DL (ref 8.6–10.3)
CHLORIDE SERPL-SCNC: 105 MMOL/L (ref 98–107)
CO2 SERPL-SCNC: 31 MMOL/L (ref 21–32)
CREAT SERPL-MCNC: 1.11 MG/DL (ref 0.5–1.3)
EGFRCR SERPLBLD CKD-EPI 2021: 71 ML/MIN/1.73M*2
EOSINOPHIL # BLD AUTO: 0.26 X10*3/UL (ref 0–0.4)
EOSINOPHIL NFR BLD AUTO: 3.8 %
ERYTHROCYTE [DISTWIDTH] IN BLOOD BY AUTOMATED COUNT: 13.4 % (ref 11.5–14.5)
GLUCOSE SERPL-MCNC: 117 MG/DL (ref 74–99)
HCT VFR BLD AUTO: 44.5 % (ref 41–52)
HGB BLD-MCNC: 14.1 G/DL (ref 13.5–17.5)
IMM GRANULOCYTES # BLD AUTO: 0.01 X10*3/UL (ref 0–0.5)
IMM GRANULOCYTES NFR BLD AUTO: 0.1 % (ref 0–0.9)
LYMPHOCYTES # BLD AUTO: 1.09 X10*3/UL (ref 0.8–3)
LYMPHOCYTES NFR BLD AUTO: 16.1 %
MAGNESIUM SERPL-MCNC: 1.73 MG/DL (ref 1.6–2.4)
MCH RBC QN AUTO: 31.3 PG (ref 26–34)
MCHC RBC AUTO-ENTMCNC: 31.7 G/DL (ref 32–36)
MCV RBC AUTO: 99 FL (ref 80–100)
MONOCYTES # BLD AUTO: 0.7 X10*3/UL (ref 0.05–0.8)
MONOCYTES NFR BLD AUTO: 10.3 %
NEUTROPHILS # BLD AUTO: 4.69 X10*3/UL (ref 1.6–5.5)
NEUTROPHILS NFR BLD AUTO: 69.3 %
NRBC BLD-RTO: 0 /100 WBCS (ref 0–0)
PLATELET # BLD AUTO: 116 X10*3/UL (ref 150–450)
POTASSIUM SERPL-SCNC: 4.4 MMOL/L (ref 3.5–5.3)
PROT SERPL-MCNC: 6 G/DL (ref 6.4–8.2)
RBC # BLD AUTO: 4.5 X10*6/UL (ref 4.5–5.9)
SODIUM SERPL-SCNC: 141 MMOL/L (ref 136–145)
WBC # BLD AUTO: 6.8 X10*3/UL (ref 4.4–11.3)

## 2025-09-05 PROCEDURE — 36415 COLL VENOUS BLD VENIPUNCTURE: CPT

## 2025-09-06 LAB
IGG SERPL-MCNC: 735 MG/DL (ref 700–1600)
TACROLIMUS BLD-MCNC: 5.9 NG/ML

## 2025-09-22 ENCOUNTER — APPOINTMENT (OUTPATIENT)
Dept: RESPIRATORY THERAPY | Facility: HOSPITAL | Age: 70
End: 2025-09-22
Payer: MEDICARE

## 2025-10-06 ENCOUNTER — APPOINTMENT (OUTPATIENT)
Dept: UROLOGY | Facility: CLINIC | Age: 70
End: 2025-10-06
Payer: MEDICARE

## 2025-10-22 ENCOUNTER — APPOINTMENT (OUTPATIENT)
Dept: RESPIRATORY THERAPY | Facility: HOSPITAL | Age: 70
End: 2025-10-22
Payer: MEDICARE

## 2025-10-22 ENCOUNTER — APPOINTMENT (OUTPATIENT)
Dept: ENDOCRINOLOGY | Facility: CLINIC | Age: 70
End: 2025-10-22
Payer: MEDICARE

## 2025-11-05 ENCOUNTER — APPOINTMENT (OUTPATIENT)
Dept: NEUROLOGY | Facility: HOSPITAL | Age: 70
End: 2025-11-05
Payer: MEDICARE

## 2026-02-09 ENCOUNTER — APPOINTMENT (OUTPATIENT)
Dept: UROLOGY | Facility: CLINIC | Age: 71
End: 2026-02-09
Payer: MEDICARE